# Patient Record
Sex: FEMALE | Race: WHITE | Employment: OTHER | URBAN - METROPOLITAN AREA
[De-identification: names, ages, dates, MRNs, and addresses within clinical notes are randomized per-mention and may not be internally consistent; named-entity substitution may affect disease eponyms.]

---

## 2017-03-28 ENCOUNTER — APPOINTMENT (OUTPATIENT)
Dept: GENERAL RADIOLOGY | Age: 62
End: 2017-03-28
Payer: MEDICARE

## 2017-03-28 ENCOUNTER — HOSPITAL ENCOUNTER (EMERGENCY)
Age: 62
Discharge: HOME | End: 2017-03-28
Attending: EMERGENCY MEDICINE
Payer: MEDICARE

## 2017-03-28 VITALS
BODY MASS INDEX: 38.95 KG/M2 | OXYGEN SATURATION: 99 % | HEART RATE: 74 BPM | HEIGHT: 69 IN | DIASTOLIC BLOOD PRESSURE: 89 MMHG | WEIGHT: 263 LBS | SYSTOLIC BLOOD PRESSURE: 153 MMHG | TEMPERATURE: 97.8 F | RESPIRATION RATE: 18 BRPM

## 2017-03-28 DIAGNOSIS — S91.311A FOOT LACERATION, RIGHT, INITIAL ENCOUNTER: Primary | ICD-10-CM

## 2017-03-28 PROCEDURE — 73630 X-RAY EXAM OF FOOT: CPT

## 2017-03-28 PROCEDURE — 99284 EMERGENCY DEPT VISIT MOD MDM: CPT | Performed by: PHYSICIAN ASSISTANT

## 2017-03-28 RX ORDER — LEVOTHYROXINE SODIUM 100 UG/1
TABLET ORAL
COMMUNITY

## 2017-03-28 RX ORDER — CLINDAMYCIN HYDROCHLORIDE 300 MG/1
300 CAPSULE ORAL 3 TIMES DAILY
Qty: 30 CAP | Refills: 0 | Status: SHIPPED | OUTPATIENT
Start: 2017-03-28 | End: 2017-04-07

## 2017-03-29 NOTE — ED PROVIDER NOTES
HPI Comments: 59-year-old  female presents with laceration to plantar surface of right foot. She states she sustained this 2 days ago when she stepped on a pole wine bottle that was on the ground causing it to shatter and cut her right foot. Patient states she has been cleansing the wound daily. She states her last tetanus diphtheria immunization was less than 5 years ago. Patient states that she has remained ambulatory and active since the injury. Patient is a 57 y.o. female presenting with skin laceration. The history is provided by the patient. Laceration    The incident occurred 2 days ago. The laceration is located on the right foot. The laceration is 3 cm in size. The injury mechanism is broken glass. Foreign body present: no. The pain is at a severity of 7/10. The pain is moderate. The pain has been fluctuating since onset. Pertinent negatives include no numbness and no tingling. The patient's last tetanus shot was less than 5 years ago. Past Medical History:   Diagnosis Date    Other ill-defined conditions(256.52)     Edema    Psychiatric disorder     Nervous breakdown 2007; bipolar, anxiety       Past Surgical History:   Procedure Laterality Date    ABDOMEN SURGERY PROC UNLISTED      Intestine \"twisted\" while pregnant with 2nd child    CARDIAC SURG PROCEDURE UNLIST      Negative heart cath 2005    CHEST SURGERY PROCEDURE UNLISTED      [facility] [place]; chest tubes 2006    HX CHOLECYSTECTOMY      HX GYN      Ovary removed; tubal ligation    VASCULAR SURGERY PROCEDURE UNLIST      Stripped veins         Family History:   Problem Relation Age of Onset    Breast Cancer Paternal Grandmother [de-identified]       Social History     Social History    Marital status:      Spouse name: N/A    Number of children: N/A    Years of education: N/A     Occupational History    Not on file.      Social History Main Topics    Smoking status: Current Some Day Smoker    Smokeless tobacco: Not on file    Alcohol use No    Drug use: No    Sexual activity: Not on file     Other Topics Concern    Not on file     Social History Narrative         ALLERGIES: Aspirin; Levaquin [levofloxacin]; Pcn [penicillins]; Geodon [ziprasidone hcl]; and Sting, bee    Review of Systems   Constitutional: Negative for chills and fever. Gastrointestinal: Negative for nausea and vomiting. Skin: Positive for wound. Neurological: Negative for tingling and numbness. Vitals:    03/28/17 1858   BP: 146/76   Pulse: 72   Resp: 16   Temp: 98.7 °F (37.1 °C)   SpO2: 96%   Weight: 119.3 kg (263 lb)   Height: 5' 9\" (1.753 m)            Physical Exam   Constitutional: She is oriented to person, place, and time. Vital signs are normal. She appears well-developed and well-nourished. She is active. Non-toxic appearance. She does not appear ill. No distress. Patient is ambulatory with slight limp. Cardiovascular:   Pulses:       Dorsalis pedis pulses are 2+ on the right side        Posterior tibial pulses are 2+ on the right side   Musculoskeletal:        Right foot: There is laceration. There is normal range of motion, no tenderness, no bony tenderness, no swelling and normal capillary refill. Feet:    Neurological: She is alert and oriented to person, place, and time. Skin: Skin is warm and dry. Psychiatric: She has a normal mood and affect. Her behavior is normal.   Nursing note and vitals reviewed. MDM  Number of Diagnoses or Management Options  Foot laceration, right, initial encounter: new and requires workup  Diagnosis management comments: Patient with foot wound that has been open for 2 days. Imaging studies of right foot do not reveal any foreign body. Right foot will be cleansed well followed by dry sterile dressing. Patient is advised to keep wound clean, dry and covered until healed. She'll be prescribed clindamycin for potential infection that may be developing.  Clindamycin prescribed due to allergy to quinolones as well as anaphylaxis listed as allergic reaction to penicillins. Amount and/or Complexity of Data Reviewed  Tests in the radiology section of CPT®: ordered and reviewed    Risk of Complications, Morbidity, and/or Mortality  Presenting problems: low  Diagnostic procedures: low  Management options: moderate    Patient Progress  Patient progress: improved    ED Course       Procedures      XR FOOT RT MIN 3 V   Final Result   IMPRESSION: Calcific plantar fasciitis. No evidence radiopaque foreign bodies. Soft tissue swelling. I discussed the results of all labs, procedures, radiographs, and treatments with the patient and available family. Treatment plan is agreed upon and the patient is ready for discharge. Questions about treatment in the ED and differential diagnosis of presenting condition were answered. Patient was given verbal discharge instructions including, but not limited to, importance of returning to the emergency department for any concern of worsening or continued symptoms. Instructions were given to follow up with a primary care provider or specialist within 1-2 days. Adverse effects of medications, if prescribed, were discussed and patient was advised to refrain from significant physical activity until followed up by primary care physician and to not drive or operate heavy machinery after taking any sedating substances.

## 2017-03-29 NOTE — DISCHARGE INSTRUCTIONS
May use Tylenol over the counter as directed for pain. It is important that you keep wound clean, dry and covered until healed. Keep right foot elevated as much as tolerated. If you should have any change in your symptoms, worsening symptoms, or concerns return to the ER       Cuts Left Open: Care Instructions  Your Care Instructions    A cut can happen anywhere on your body. Sometimes a cut can injure the tendons, blood vessels, or nerves. A cut may be left open instead of being closed with stitches, staples, or adhesive. A cut may be left open when it is likely to become infected, because closing it can make infection even more likely. You will probably have a bandage. The doctor may want the cut to stay open the whole time it heals. This happens with some cuts when too much time has gone by since the cut happened. Or the doctor may tell you to come back to have the cut closed in 4 to 5 days, when there is less chance of infection. If the cut stays open while healing, your scar may be larger than if the cut was closed. But you can get treatment later to make the scar smaller. The doctor has checked you carefully, but problems can develop later. If you notice any problems or new symptoms, get medical treatment right away. Follow-up care is a key part of your treatment and safety. Be sure to make and go to all appointments, and call your doctor if you are having problems. It's also a good idea to know your test results and keep a list of the medicines you take. How can you care for yourself at home? · Keep the cut dry for the first 24 to 48 hours. After this, you can shower if your doctor okays it. Pat the cut dry. · Don't soak the cut, such as in a bathtub. Your doctor will tell you when it's safe to get the cut wet. · If your doctor told you how to care for your cut, follow your doctor's instructions.  If you did not get instructions, follow this general advice:  ¨ After the first 24 to 48 hours, wash the cut with clean water 2 times a day. Don't use hydrogen peroxide or alcohol, which can slow healing. ¨ You may cover the cut with a thin layer of petroleum jelly, such as Vaseline, and a nonstick bandage. ¨ Apply more petroleum jelly and replace the bandage as needed. · Prop up the injured area on a pillow anytime you sit or lie down during the next 3 days. Try to keep it above the level of your heart. This will help reduce swelling. · Avoid any activity that could cause your cut to get worse. · Take pain medicines exactly as directed. ¨ If the doctor gave you a prescription medicine for pain, take it as prescribed. ¨ If you are not taking a prescription pain medicine, ask your doctor if you can take an over-the-counter medicine. When should you call for help? Call your doctor now or seek immediate medical care if:  · You have new pain, or your pain gets worse. · The cut starts to bleed, and blood soaks through the bandage. Oozing small amounts of blood is normal.  · The skin near the cut is cold or pale or changes color. · You have tingling, weakness, or numbness near the cut. · You have trouble moving the area near the cut. · You have symptoms of infection, such as:  ¨ Increased pain, swelling, warmth, or redness around the cut. ¨ Red streaks leading from the cut. ¨ Pus draining from the cut. ¨ A fever. Watch closely for changes in your health, and be sure to contact your doctor if:  · The cut is not closing (getting smaller). · You do not get better as expected. Where can you learn more? Go to http://bartolome-leanna.info/. Enter [id number] in the search box to learn more about \"Cuts Left Open: Care Instructions. \"  Current as of: May 27, 2016  Content Version: 11.2  © 7353-4038 [employer]. Care instructions adapted under license by [employer] (which disclaims liability or warranty for this information).  If you have questions about a medical condition or this instruction, always ask your healthcare professional. [place] [address] any warranty or liability for your use of this information.

## 2020-08-01 ENCOUNTER — APPOINTMENT (OUTPATIENT)
Dept: CT IMAGING | Age: 65
End: 2020-08-01
Attending: EMERGENCY MEDICINE
Payer: MEDICARE

## 2020-08-01 ENCOUNTER — APPOINTMENT (OUTPATIENT)
Dept: GENERAL RADIOLOGY | Age: 65
End: 2020-08-01
Attending: EMERGENCY MEDICINE
Payer: MEDICARE

## 2020-08-01 ENCOUNTER — HOSPITAL ENCOUNTER (EMERGENCY)
Age: 65
Discharge: HOME | End: 2020-08-02
Attending: EMERGENCY MEDICINE
Payer: MEDICARE

## 2020-08-01 VITALS
SYSTOLIC BLOOD PRESSURE: 141 MMHG | OXYGEN SATURATION: 93 % | RESPIRATION RATE: 16 BRPM | HEIGHT: 69 IN | HEART RATE: 92 BPM | BODY MASS INDEX: 41.47 KG/M2 | DIASTOLIC BLOOD PRESSURE: 64 MMHG | TEMPERATURE: 97.5 F | WEIGHT: 280 LBS

## 2020-08-01 DIAGNOSIS — H81.10 BENIGN PAROXYSMAL POSITIONAL VERTIGO, UNSPECIFIED LATERALITY: Primary | ICD-10-CM

## 2020-08-01 LAB
ANION GAP SERPL CALC-SCNC: 9 MMOL/L (ref 7–16)
BASOPHILS # BLD: 0.1 K/UL (ref 0–0.2)
BASOPHILS NFR BLD: 1 % (ref 0–2)
BUN SERPL-MCNC: 21 MG/DL (ref 8–23)
CALCIUM SERPL-MCNC: 9.3 MG/DL (ref 8.3–10.4)
CHLORIDE SERPL-SCNC: 107 MMOL/L (ref 98–107)
CO2 SERPL-SCNC: 25 MMOL/L (ref 21–32)
CREAT SERPL-MCNC: 1.48 MG/DL (ref 0.6–1)
DIFFERENTIAL METHOD BLD: ABNORMAL
EOSINOPHIL # BLD: 0.3 K/UL (ref 0–0.8)
EOSINOPHIL NFR BLD: 3 % (ref 0.5–7.8)
ERYTHROCYTE [DISTWIDTH] IN BLOOD BY AUTOMATED COUNT: 14.6 % (ref 11.9–14.6)
GLUCOSE SERPL-MCNC: 115 MG/DL (ref 65–100)
HCT VFR BLD AUTO: 41.6 % (ref 35.8–46.3)
HGB BLD-MCNC: 13.3 G/DL (ref 11.7–15.4)
IMM GRANULOCYTES # BLD AUTO: 0 K/UL (ref 0–0.5)
IMM GRANULOCYTES NFR BLD AUTO: 1 % (ref 0–5)
LACTATE SERPL-SCNC: 2.8 MMOL/L (ref 0.4–2)
LYMPHOCYTES # BLD: 2.1 K/UL (ref 0.5–4.6)
LYMPHOCYTES NFR BLD: 25 % (ref 13–44)
MAGNESIUM SERPL-MCNC: 2.2 MG/DL (ref 1.8–2.4)
MCH RBC QN AUTO: 28.3 PG (ref 26.1–32.9)
MCHC RBC AUTO-ENTMCNC: 32 G/DL (ref 31.4–35)
MCV RBC AUTO: 88.5 FL (ref 79.6–97.8)
MONOCYTES # BLD: 0.7 K/UL (ref 0.1–1.3)
MONOCYTES NFR BLD: 8 % (ref 4–12)
NEUTS SEG # BLD: 5.6 K/UL (ref 1.7–8.2)
NEUTS SEG NFR BLD: 64 % (ref 43–78)
NRBC # BLD: 0 K/UL (ref 0–0.2)
PLATELET # BLD AUTO: 307 K/UL (ref 150–450)
PMV BLD AUTO: 9.1 FL (ref 9.4–12.3)
POTASSIUM SERPL-SCNC: 4.1 MMOL/L (ref 3.5–5.1)
RBC # BLD AUTO: 4.7 M/UL (ref 4.05–5.2)
SODIUM SERPL-SCNC: 141 MMOL/L (ref 136–145)
WBC # BLD AUTO: 8.7 K/UL (ref 4.3–11.1)

## 2020-08-01 PROCEDURE — 74011250636 HC RX REV CODE- 250/636: Performed by: EMERGENCY MEDICINE

## 2020-08-01 PROCEDURE — 93005 ELECTROCARDIOGRAM TRACING: CPT | Performed by: EMERGENCY MEDICINE

## 2020-08-01 PROCEDURE — 87040 BLOOD CULTURE FOR BACTERIA: CPT

## 2020-08-01 PROCEDURE — 74011000258 HC RX REV CODE- 258: Performed by: EMERGENCY MEDICINE

## 2020-08-01 PROCEDURE — 80048 BASIC METABOLIC PNL TOTAL CA: CPT

## 2020-08-01 PROCEDURE — 83735 ASSAY OF MAGNESIUM: CPT

## 2020-08-01 PROCEDURE — 71260 CT THORAX DX C+: CPT

## 2020-08-01 PROCEDURE — 81003 URINALYSIS AUTO W/O SCOPE: CPT

## 2020-08-01 PROCEDURE — 99281 EMR DPT VST MAYX REQ PHY/QHP: CPT

## 2020-08-01 PROCEDURE — 74011636320 HC RX REV CODE- 636/320: Performed by: EMERGENCY MEDICINE

## 2020-08-01 PROCEDURE — 85025 COMPLETE CBC W/AUTO DIFF WBC: CPT

## 2020-08-01 PROCEDURE — 83605 ASSAY OF LACTIC ACID: CPT

## 2020-08-01 PROCEDURE — 87150 DNA/RNA AMPLIFIED PROBE: CPT

## 2020-08-01 PROCEDURE — 71045 X-RAY EXAM CHEST 1 VIEW: CPT

## 2020-08-01 PROCEDURE — 96374 THER/PROPH/DIAG INJ IV PUSH: CPT

## 2020-08-01 PROCEDURE — 99285 EMERGENCY DEPT VISIT HI MDM: CPT

## 2020-08-01 PROCEDURE — 87205 SMEAR GRAM STAIN: CPT

## 2020-08-01 RX ORDER — SODIUM CHLORIDE 0.9 % (FLUSH) 0.9 %
10 SYRINGE (ML) INJECTION
Status: COMPLETED | OUTPATIENT
Start: 2020-08-01 | End: 2020-08-01

## 2020-08-01 RX ORDER — MECLIZINE HYDROCHLORIDE 25 MG/1
25 TABLET ORAL
Qty: 20 TAB | Refills: 0 | Status: SHIPPED | OUTPATIENT
Start: 2020-08-01

## 2020-08-01 RX ORDER — ONDANSETRON 2 MG/ML
4 INJECTION INTRAMUSCULAR; INTRAVENOUS
Status: COMPLETED | OUTPATIENT
Start: 2020-08-01 | End: 2020-08-01

## 2020-08-01 RX ORDER — MECLIZINE HYDROCHLORIDE 25 MG/1
25 TABLET ORAL
Status: COMPLETED | OUTPATIENT
Start: 2020-08-01 | End: 2020-08-01

## 2020-08-01 RX ADMIN — MECLIZINE HYDROCHLORIDE 25 MG: 25 TABLET ORAL at 22:33

## 2020-08-01 RX ADMIN — IOPAMIDOL 100 ML: 755 INJECTION, SOLUTION INTRAVENOUS at 20:50

## 2020-08-01 RX ADMIN — ONDANSETRON 4 MG: 2 INJECTION INTRAMUSCULAR; INTRAVENOUS at 19:29

## 2020-08-01 RX ADMIN — Medication 10 ML: at 20:50

## 2020-08-01 RX ADMIN — SODIUM CHLORIDE 100 ML: 900 INJECTION, SOLUTION INTRAVENOUS at 20:50

## 2020-08-01 RX ADMIN — SODIUM CHLORIDE 500 ML: 900 INJECTION, SOLUTION INTRAVENOUS at 20:41

## 2020-08-01 NOTE — ED TRIAGE NOTES
Patient was brought by EMS after patient went to shop at [employer] and a electronic wheelchair was unavailable and started ambulating and became short of breath, employee was able to locate her a wheelchair and felt better until she had to walk out to car. While walking to car felt like she was going to pass out and per family was \"in and out\". EMS advised that initially BP was 564Z systolic, then decreased to 130 and manually checked at 88 systolic and EMS administered 10 mcg of epi IV push. Patient complaining of headache, noted to be clammy and pale at this time.

## 2020-08-01 NOTE — ED PROVIDER NOTES
[id number] [address] [place] is a 71 y.o. female seen on 8/1/2020 at 6:58 PM in the [address] [place], [place] [address]T in room ER04/04. Chief Complaint   Patient presents with    Hypotension     HPI: 41-year-old female presenting to the emergency department for shortness of breath and near syncope. Patient went to [employer] today where she was apparently supposed to use an electric shopping cart but there was not one available. As a result she was walking around the store and began feeling increasingly short of breath. She ultimately appeared to be so short of breath that another customer found her in electric shopping cart. As she continued to finish shopping she went out to her car and her family noted that she was \"going in and out\". As result they called EMS. EMS responded found the patient to be normotensive. During transport they also found her to be normotensive to slightly hypertensive. The medic then recheck the blood pressure and did a manual blood pressure finding a systolic blood pressure of 80 and then gave 10 mcg of push dose epinephrine. It was mentating at the time, there was no significant change in heart rate. States that she has been having shortness of breath for the last 6 months and that this primarily occurs when she is at [employer]. After seeing her doctor for this her doctor told her to use an electric shopping cart when she is at [employer]. When asked if she has had some unilateral leg swelling or edema she states that she has, it was in her right leg. She has had no fever, no cough congestion. No known sick contacts no known contacts with persons known to be positive with COVID-19    Historian: patient    REVIEW OF SYSTEMS     Review of Systems   Constitutional: Negative for fever. HENT: Negative. Eyes: Negative. Respiratory: Positive for shortness of breath. Negative for cough, chest tightness and wheezing.     Cardiovascular: Negative for chest pain. Gastrointestinal: Negative for abdominal distention, abdominal pain, constipation, diarrhea and vomiting. Endocrine: Negative. Genitourinary: Negative for dysuria, flank pain, frequency and urgency. Neurological: Positive for syncope (near syncope). Negative for dizziness and headaches. Psychiatric/Behavioral: Negative. All other systems reviewed and are negative. PAST MEDICAL HISTORY     Past Medical History:   Diagnosis Date    Other ill-defined conditions(799.89)     Edema    Psychiatric disorder     Nervous breakdown 2007; bipolar, anxiety     Past Surgical History:   Procedure Laterality Date    ABDOMEN SURGERY PROC UNLISTED      Intestine \"twisted\" while pregnant with 2nd child    CARDIAC SURG PROCEDURE UNLIST      Negative heart cath 2005    CHEST SURGERY PROCEDURE UNLISTED      [place]; chest tubes 2006    HX CHOLECYSTECTOMY      HX GYN      Ovary removed; tubal ligation    VASCULAR SURGERY PROCEDURE UNLIST      Stripped veins     Social History     Socioeconomic History    Marital status:      Spouse name: Not on file    Number of children: Not on file    Years of education: Not on file    Highest education level: Not on file   Tobacco Use    Smoking status: Current Some Day Smoker   Substance and Sexual Activity    Alcohol use: No    Drug use: No     Prior to Admission Medications   Prescriptions Last Dose Informant Patient Reported? Taking? FERROUS FUMARATE (IRON PO)   Yes No   Sig: Take 65 mg by mouth daily. MULTIVITAMINS W-MINERALS/LUT (CENTRUM SILVER PO)   Yes No   Sig: Take  by mouth. aripiprazole (ABILIFY) 15 mg tablet   Yes No   Sig: Take 15 mg by mouth daily. citalopram (CELEXA) 40 mg tablet   Yes No   Sig: Take 40 mg by mouth daily. lamotrigine (LAMICTAL) 150 mg tablet   Yes No   Sig: Take  by mouth daily. levothyroxine (SYNTHROID) 100 mcg tablet   Yes No   Sig: Take  by mouth Daily (before breakfast).    loratadine (CLARITIN) 10 mg tablet   Yes No   Sig: Take 10 mg by mouth daily. ranitidine (ZANTAC) 150 mg tablet   Yes No   Sig: Take 150 mg by mouth two (2) times a day. Facility-Administered Medications: None     Allergies   Allergen Reactions    Aspirin Swelling     Rash    Levaquin [Levofloxacin] Swelling     Also, itching    Pcn [Penicillins] Anaphylaxis    Geodon [Ziprasidone Hcl] Rash    Sting, Bee Hives        PHYSICAL EXAM       Vitals:    08/01/20 1849   BP: 129/61   Pulse: 99   Resp: 16   Temp: 97.5 °F (36.4 °C)   SpO2: 90%    Vital signs were reviewed. Physical Exam  Vitals signs reviewed. Constitutional:       General: She is not in acute distress. Appearance: She is well-developed. She is not diaphoretic. HENT:      Head: Normocephalic and atraumatic. Eyes:      Pupils: Pupils are equal, round, and reactive to light. Neck:      Musculoskeletal: Normal range of motion and neck supple. Cardiovascular:      Rate and Rhythm: Regular rhythm. Tachycardia present. Heart sounds: Normal heart sounds. No murmur. No friction rub. No gallop. Pulmonary:      Effort: Pulmonary effort is normal. No respiratory distress. Breath sounds: Normal breath sounds. No stridor. No wheezing. Abdominal:      General: Bowel sounds are normal. There is no distension. Palpations: Abdomen is soft. There is no mass. Tenderness: There is no abdominal tenderness. There is no guarding or rebound. Musculoskeletal: Normal range of motion. General: No tenderness or deformity. Skin:     General: Skin is warm and dry. Findings: No erythema or rash. Neurological:      Mental Status: She is alert and oriented to person, place, and time. Sensory: No sensory deficit.       Comments: No focal neuro deficits   Psychiatric:         Behavior: Behavior normal.          MEDICAL DECISION MAKING     MDM  Procedures    ED Course:    7:04 PM  Initial assessment, the patient does appear to be borderline hypoxic with a room air saturation of 90 to 91%. She has a history of DVT associated with surgery. Will scan for PE. Pt also has begun vomiting here in ED. She has had some moderate RUQ pain, hx of cheng, will obtain CT abd/pelv as well. .    7:42 PM  Called by lab regarding unusual metabolic panel findings. Will repeat BMP to confirm. EKG is normal, no peaked T waves, prolonged QRS, etc.    10:34 PM  CT of the chest abdomen and pelvis is unremarkable, with no obvious abnormality. On reevaluation of the patient, she seems much improved. She is saturating 95% on room air. She is now able to give a much more detailed history and she is actually describing intermittent episodes over the last 5 to 6 months of dizziness that is triggered by change in position of her head. Today when she was in [place] she had bent over to get some she was shopping for and had sudden room spinning sensation. This created nausea and made her felt she is going to pass out. She notes the dizziness seems to be triggering her vomiting as well. It is all caused by her change in position of her head. If she holds her head still her symptoms seem to improve. I did perform Tye-Hallpike which was positive here in the emergency department. She does not have any exam findings on HINTS suggestive of a central cause of vertigo. After talking to her further I think her symptoms sound most consistent with benign positional paroxysmal vertigo. Disposition:  Dc to home  Diagnosis:  bppv  ____________________________________________________________________  A portion of this note was generated using voice recognition dictation software. While the note has been reviewed for accuracy, please note certain words and phrases may not be transcribed as intended and some grammatical and/or typographical errors may be present.

## 2020-08-02 LAB
ATRIAL RATE: 100 BPM
CALCULATED P AXIS, ECG09: 53 DEGREES
CALCULATED R AXIS, ECG10: 55 DEGREES
CALCULATED T AXIS, ECG11: 76 DEGREES
DIAGNOSIS, 93000: NORMAL
P-R INTERVAL, ECG05: 172 MS
Q-T INTERVAL, ECG07: 346 MS
QRS DURATION, ECG06: 86 MS
QTC CALCULATION (BEZET), ECG08: 446 MS
VENTRICULAR RATE, ECG03: 100 BPM

## 2020-08-02 NOTE — DISCHARGE INSTRUCTIONS
As we discussed, I think your symptoms are likely due to benign positional vertigo. Take the medication as prescribed, follow-up with your primary care doctor early next week for reevaluation to ensure you are improving. Return to the ER for worsening symptoms.

## 2020-08-02 NOTE — ED NOTES
I have reviewed discharge instructions with the patient. The patient verbalized understanding. Patient left ED via Discharge Method: wheelchair to Home with daughter. Opportunity for questions and clarification provided. Patient given 1 scripts. To continue your aftercare when you leave the hospital, you may receive an automated call from our care team to check in on how you are doing. This is a free service and part of our promise to provide the best care and service to meet your aftercare needs.  If you have questions, or wish to unsubscribe from this service please call [phone]. Thank you for Choosing our [facility] Emergency Department.

## 2020-08-03 LAB
ACC. NO. FROM MICRO ORDER, ACCP: ABNORMAL
INTERPRETATION: ABNORMAL
MECA (METHICILLIN-RESISTANCE GENES), MRGP: NOT DETECTED
STAPHYLOCOCCUS, STAPP: DETECTED

## 2020-08-03 RX ORDER — CLINDAMYCIN HYDROCHLORIDE 300 MG/1
300 CAPSULE ORAL 4 TIMES DAILY
Qty: 28 CAP | Refills: 0 | Status: SHIPPED | OUTPATIENT
Start: 2020-08-03 | End: 2020-08-10

## 2020-08-03 NOTE — PROGRESS NOTES
cleocin sent to [facility], pt allergic to pcn, still with some dizziness, has appt with pmd, no fever,nv

## 2020-08-04 LAB
BACTERIA SPEC CULT: ABNORMAL
GRAM STN SPEC: ABNORMAL
GRAM STN SPEC: ABNORMAL
SERVICE CMNT-IMP: ABNORMAL

## 2020-08-06 LAB
BACTERIA SPEC CULT: NORMAL
BASOPHILS # BLD: ABNORMAL K/UL (ref 0–0.2)
DIFFERENTIAL METHOD BLD: ABNORMAL
EOSINOPHIL # BLD: ABNORMAL K/UL
ERYTHROCYTE [DISTWIDTH] IN BLOOD BY AUTOMATED COUNT: 14.6 % (ref 11.9–14.6)
HCT VFR BLD AUTO: 23.2 % (ref 35.8–46.3)
HGB BLD-MCNC: ABNORMAL G/DL (ref 11.7–15.4)
LYMPHOCYTES # BLD: ABNORMAL K/UL (ref 0.5–4.6)
MCH RBC QN AUTO: ABNORMAL PG (ref 26.1–32.9)
MCHC RBC AUTO-ENTMCNC: ABNORMAL G/DL (ref 31.4–35)
MCV RBC AUTO: ABNORMAL FL (ref 79.6–97.8)
MONOCYTES # BLD: ABNORMAL K/UL (ref 0.1–1.3)
NEUTS SEG # BLD: ABNORMAL K/UL (ref 1.7–8.2)
NRBC # BLD: ABNORMAL K/UL (ref 0–0.2)
PLATELET # BLD AUTO: ABNORMAL K/UL (ref 150–450)
PMV BLD AUTO: ABNORMAL FL (ref 9.4–12.3)
RBC # BLD AUTO: ABNORMAL M/UL (ref 4.05–5.2)
SERVICE CMNT-IMP: NORMAL
WBC # BLD AUTO: ABNORMAL K/UL (ref 4.3–11.1)

## 2021-04-07 ENCOUNTER — HOSPITAL ENCOUNTER (OUTPATIENT)
Dept: MAMMOGRAPHY | Age: 66
Discharge: HOME | End: 2021-04-07
Attending: INTERNAL MEDICINE
Payer: MEDICARE

## 2021-04-07 DIAGNOSIS — Z12.39 BREAST SCREENING: ICD-10-CM

## 2021-04-07 PROCEDURE — 77067 SCR MAMMO BI INCL CAD: CPT

## 2022-03-15 VITALS
BODY MASS INDEX: 41.47 KG/M2 | WEIGHT: 279.98 LBS | HEART RATE: 91 BPM | TEMPERATURE: 98.3 F | HEIGHT: 69 IN | OXYGEN SATURATION: 95 % | RESPIRATION RATE: 16 BRPM | SYSTOLIC BLOOD PRESSURE: 119 MMHG | DIASTOLIC BLOOD PRESSURE: 71 MMHG

## 2022-03-15 PROCEDURE — 75810000275 HC EMERGENCY DEPT VISIT NO LEVEL OF CARE

## 2022-03-16 ENCOUNTER — HOSPITAL ENCOUNTER (EMERGENCY)
Age: 67
Discharge: HOME | End: 2022-03-16
Payer: MEDICARE

## 2022-03-16 NOTE — ED TRIAGE NOTES
C/o right lower back pain for 2 days. Denies any recent injury or trauma. States that she is also having nausea.   Masked on arrival

## 2023-05-08 ENCOUNTER — HOSPITAL ENCOUNTER (OUTPATIENT)
Dept: MAMMOGRAPHY | Age: 68
Discharge: HOME | End: 2023-05-11
Payer: MEDICARE

## 2023-05-08 DIAGNOSIS — Z12.31 ENCOUNTER FOR SCREENING MAMMOGRAM FOR MALIGNANT NEOPLASM OF BREAST: ICD-10-CM

## 2023-05-08 PROCEDURE — 77067 SCR MAMMO BI INCL CAD: CPT

## 2023-12-23 ENCOUNTER — HOSPITAL ENCOUNTER (INPATIENT)
Age: 68
LOS: 6 days | Discharge: HOME | End: 2023-12-30
Attending: EMERGENCY MEDICINE | Admitting: EMERGENCY MEDICINE
Payer: MEDICARE

## 2023-12-23 ENCOUNTER — APPOINTMENT (OUTPATIENT)
Dept: CT IMAGING | Age: 68
End: 2023-12-23
Payer: MEDICARE

## 2023-12-23 ENCOUNTER — APPOINTMENT (OUTPATIENT)
Dept: GENERAL RADIOLOGY | Age: 68
End: 2023-12-23
Payer: MEDICARE

## 2023-12-23 DIAGNOSIS — E83.42 HYPOMAGNESEMIA: ICD-10-CM

## 2023-12-23 DIAGNOSIS — K28.9 MARGINAL ULCER: ICD-10-CM

## 2023-12-23 DIAGNOSIS — A41.9 SEPTICEMIA (HCC): Primary | ICD-10-CM

## 2023-12-23 DIAGNOSIS — E87.6 HYPOKALEMIA DUE TO EXCESSIVE GASTROINTESTINAL LOSS OF POTASSIUM: ICD-10-CM

## 2023-12-23 LAB
ALBUMIN SERPL-MCNC: 4.3 G/DL (ref 3.2–4.6)
ALBUMIN/GLOB SERPL: 1 (ref 0.4–1.6)
ALP SERPL-CCNC: 85 U/L (ref 50–136)
ALT SERPL-CCNC: 18 U/L (ref 12–65)
ANION GAP SERPL CALC-SCNC: 13 MMOL/L (ref 2–11)
AST SERPL-CCNC: 19 U/L (ref 15–37)
BASOPHILS # BLD: 0.1 K/UL (ref 0–0.2)
BASOPHILS NFR BLD: 1 % (ref 0–2)
BILIRUB SERPL-MCNC: 1.5 MG/DL (ref 0.2–1.1)
BUN SERPL-MCNC: 18 MG/DL (ref 8–23)
CALCIUM SERPL-MCNC: 10.8 MG/DL (ref 8.3–10.4)
CHLORIDE SERPL-SCNC: 100 MMOL/L (ref 103–113)
CO2 SERPL-SCNC: 25 MMOL/L (ref 21–32)
CREAT SERPL-MCNC: 1.6 MG/DL (ref 0.6–1)
DIFFERENTIAL METHOD BLD: ABNORMAL
EOSINOPHIL # BLD: 0.2 K/UL (ref 0–0.8)
EOSINOPHIL NFR BLD: 1 % (ref 0.5–7.8)
ERYTHROCYTE [DISTWIDTH] IN BLOOD BY AUTOMATED COUNT: 15.2 % (ref 11.9–14.6)
FLUAV RNA SPEC QL NAA+PROBE: NOT DETECTED
FLUBV RNA SPEC QL NAA+PROBE: NOT DETECTED
GLOBULIN SER CALC-MCNC: 4.1 G/DL (ref 2.8–4.5)
GLUCOSE SERPL-MCNC: 137 MG/DL (ref 65–100)
HCT VFR BLD AUTO: 46.5 % (ref 35.8–46.3)
HGB BLD-MCNC: 15.7 G/DL (ref 11.7–15.4)
IMM GRANULOCYTES # BLD AUTO: 0.1 K/UL (ref 0–0.5)
IMM GRANULOCYTES NFR BLD AUTO: 0 % (ref 0–5)
LACTATE SERPL-SCNC: 2.3 MMOL/L (ref 0.4–2)
LIPASE SERPL-CCNC: 58 U/L (ref 73–393)
LIPASE SERPL-CCNC: 75 U/L (ref 73–393)
LYMPHOCYTES # BLD: 1.8 K/UL (ref 0.5–4.6)
LYMPHOCYTES NFR BLD: 13 % (ref 13–44)
MAGNESIUM SERPL-MCNC: 1.5 MG/DL (ref 1.8–2.4)
MCH RBC QN AUTO: 29 PG (ref 26.1–32.9)
MCHC RBC AUTO-ENTMCNC: 33.8 G/DL (ref 31.4–35)
MCV RBC AUTO: 85.8 FL (ref 82–102)
MONOCYTES # BLD: 0.8 K/UL (ref 0.1–1.3)
MONOCYTES NFR BLD: 6 % (ref 4–12)
NEUTS SEG # BLD: 10.7 K/UL (ref 1.7–8.2)
NEUTS SEG NFR BLD: 79 % (ref 43–78)
NRBC # BLD: 0 K/UL (ref 0–0.2)
PLATELET # BLD AUTO: 409 K/UL (ref 150–450)
PMV BLD AUTO: 9.9 FL (ref 9.4–12.3)
POTASSIUM SERPL-SCNC: 2.8 MMOL/L (ref 3.5–5.1)
POTASSIUM SERPL-SCNC: 2.9 MMOL/L (ref 3.5–5.1)
PROCALCITONIN SERPL-MCNC: 0.22 NG/ML (ref 0–0.49)
PROT SERPL-MCNC: 8.4 G/DL (ref 6.3–8.2)
RBC # BLD AUTO: 5.42 M/UL (ref 4.05–5.2)
SARS-COV-2 RDRP RESP QL NAA+PROBE: NOT DETECTED
SODIUM SERPL-SCNC: 138 MMOL/L (ref 136–146)
SOURCE: NORMAL
WBC # BLD AUTO: 13.6 K/UL (ref 4.3–11.1)

## 2023-12-23 PROCEDURE — 83735 ASSAY OF MAGNESIUM: CPT

## 2023-12-23 PROCEDURE — 6360000002 HC RX W HCPCS: Performed by: EMERGENCY MEDICINE

## 2023-12-23 PROCEDURE — 94762 N-INVAS EAR/PLS OXIMTRY CONT: CPT

## 2023-12-23 PROCEDURE — 87205 SMEAR GRAM STAIN: CPT

## 2023-12-23 PROCEDURE — 74177 CT ABD & PELVIS W/CONTRAST: CPT

## 2023-12-23 PROCEDURE — 84132 ASSAY OF SERUM POTASSIUM: CPT

## 2023-12-23 PROCEDURE — 6360000004 HC RX CONTRAST MEDICATION: Performed by: PHYSICIAN ASSISTANT

## 2023-12-23 PROCEDURE — 36415 COLL VENOUS BLD VENIPUNCTURE: CPT

## 2023-12-23 PROCEDURE — 84145 PROCALCITONIN (PCT): CPT

## 2023-12-23 PROCEDURE — 6360000002 HC RX W HCPCS: Performed by: PHYSICIAN ASSISTANT

## 2023-12-23 PROCEDURE — 96365 THER/PROPH/DIAG IV INF INIT: CPT

## 2023-12-23 PROCEDURE — 2580000003 HC RX 258: Performed by: PHYSICIAN ASSISTANT

## 2023-12-23 PROCEDURE — 87040 BLOOD CULTURE FOR BACTERIA: CPT

## 2023-12-23 PROCEDURE — 87502 INFLUENZA DNA AMP PROBE: CPT

## 2023-12-23 PROCEDURE — 83690 ASSAY OF LIPASE: CPT

## 2023-12-23 PROCEDURE — 87154 CUL TYP ID BLD PTHGN 6+ TRGT: CPT

## 2023-12-23 PROCEDURE — 87635 SARS-COV-2 COVID-19 AMP PRB: CPT

## 2023-12-23 PROCEDURE — 99285 EMERGENCY DEPT VISIT HI MDM: CPT

## 2023-12-23 PROCEDURE — 71046 X-RAY EXAM CHEST 2 VIEWS: CPT

## 2023-12-23 PROCEDURE — 83605 ASSAY OF LACTIC ACID: CPT

## 2023-12-23 PROCEDURE — 96375 TX/PRO/DX INJ NEW DRUG ADDON: CPT

## 2023-12-23 PROCEDURE — 96366 THER/PROPH/DIAG IV INF ADDON: CPT

## 2023-12-23 PROCEDURE — 80053 COMPREHEN METABOLIC PANEL: CPT

## 2023-12-23 PROCEDURE — 85025 COMPLETE CBC W/AUTO DIFF WBC: CPT

## 2023-12-23 RX ORDER — SODIUM CHLORIDE 0.9 % (FLUSH) 0.9 %
5-40 SYRINGE (ML) INJECTION PRN
Status: DISCONTINUED | OUTPATIENT
Start: 2023-12-23 | End: 2023-12-24

## 2023-12-23 RX ORDER — CLINDAMYCIN PHOSPHATE 900 MG/50ML
900 INJECTION, SOLUTION INTRAVENOUS
Status: COMPLETED | OUTPATIENT
Start: 2023-12-23 | End: 2023-12-24

## 2023-12-23 RX ORDER — METRONIDAZOLE 500 MG/100ML
500 INJECTION, SOLUTION INTRAVENOUS
Status: COMPLETED | OUTPATIENT
Start: 2023-12-23 | End: 2023-12-23

## 2023-12-23 RX ORDER — ONDANSETRON 2 MG/ML
4 INJECTION INTRAMUSCULAR; INTRAVENOUS
Status: COMPLETED | OUTPATIENT
Start: 2023-12-23 | End: 2023-12-23

## 2023-12-23 RX ORDER — POTASSIUM CHLORIDE 7.45 MG/ML
10 INJECTION INTRAVENOUS ONCE
Status: COMPLETED | OUTPATIENT
Start: 2023-12-23 | End: 2023-12-24

## 2023-12-23 RX ORDER — SODIUM CHLORIDE 0.9 % (FLUSH) 0.9 %
5-40 SYRINGE (ML) INJECTION EVERY 12 HOURS SCHEDULED
Status: DISCONTINUED | OUTPATIENT
Start: 2023-12-23 | End: 2023-12-24

## 2023-12-23 RX ORDER — 0.9 % SODIUM CHLORIDE 0.9 %
30 INTRAVENOUS SOLUTION INTRAVENOUS ONCE
Status: COMPLETED | OUTPATIENT
Start: 2023-12-23 | End: 2023-12-24

## 2023-12-23 RX ORDER — SODIUM CHLORIDE 9 MG/ML
INJECTION, SOLUTION INTRAVENOUS PRN
Status: DISCONTINUED | OUTPATIENT
Start: 2023-12-23 | End: 2023-12-24

## 2023-12-23 RX ADMIN — IOPAMIDOL 100 ML: 755 INJECTION, SOLUTION INTRAVENOUS at 23:07

## 2023-12-23 RX ADMIN — ONDANSETRON 4 MG: 2 INJECTION INTRAMUSCULAR; INTRAVENOUS at 20:54

## 2023-12-23 RX ADMIN — METRONIDAZOLE 500 MG: 500 INJECTION, SOLUTION INTRAVENOUS at 22:22

## 2023-12-23 RX ADMIN — POTASSIUM CHLORIDE 10 MEQ: 7.46 INJECTION, SOLUTION INTRAVENOUS at 22:18

## 2023-12-23 RX ADMIN — SODIUM CHLORIDE 2000 ML: 9 INJECTION, SOLUTION INTRAVENOUS at 22:12

## 2023-12-23 ASSESSMENT — PAIN DESCRIPTION - LOCATION: LOCATION: ABDOMEN

## 2023-12-23 ASSESSMENT — PAIN DESCRIPTION - DESCRIPTORS: DESCRIPTORS: BURNING;ACHING

## 2023-12-23 ASSESSMENT — LIFESTYLE VARIABLES
HOW MANY STANDARD DRINKS CONTAINING ALCOHOL DO YOU HAVE ON A TYPICAL DAY: PATIENT DOES NOT DRINK
HOW OFTEN DO YOU HAVE A DRINK CONTAINING ALCOHOL: NEVER

## 2023-12-23 ASSESSMENT — PAIN SCALES - GENERAL: PAINLEVEL_OUTOF10: 10

## 2023-12-23 ASSESSMENT — PAIN - FUNCTIONAL ASSESSMENT: PAIN_FUNCTIONAL_ASSESSMENT: 0-10

## 2023-12-24 ENCOUNTER — APPOINTMENT (OUTPATIENT)
Dept: GENERAL RADIOLOGY | Age: 68
End: 2023-12-24
Payer: MEDICARE

## 2023-12-24 PROBLEM — R57.1 HYPOVOLEMIC SHOCK (HCC): Status: ACTIVE | Noted: 2023-12-24

## 2023-12-24 LAB
ACCESSION NUMBER, LLC1M: ABNORMAL
ACINETOBACTER CALCOAC BAUMANNII COMPLEX BY PCR: NOT DETECTED
ANION GAP SERPL CALC-SCNC: 15 MMOL/L (ref 2–11)
APPEARANCE UR: CLEAR
B FRAGILIS DNA BLD POS QL NAA+NON-PROBE: NOT DETECTED
BACTERIA URNS QL MICRO: ABNORMAL /HPF
BILIRUB UR QL: NEGATIVE
BIOFIRE TEST COMMENT: ABNORMAL
BUN SERPL-MCNC: 18 MG/DL (ref 8–23)
C ALBICANS DNA BLD POS QL NAA+NON-PROBE: NOT DETECTED
C AURIS DNA BLD POS QL NAA+NON-PROBE: NOT DETECTED
C GATTII+NEOFOR DNA BLD POS QL NAA+N-PRB: NOT DETECTED
C GLABRATA DNA BLD POS QL NAA+NON-PROBE: NOT DETECTED
C KRUSEI DNA BLD POS QL NAA+NON-PROBE: NOT DETECTED
C PARAP DNA BLD POS QL NAA+NON-PROBE: NOT DETECTED
C TROPICLS DNA BLD POS QL NAA+NON-PROBE: NOT DETECTED
CALCIUM SERPL-MCNC: 9.5 MG/DL (ref 8.3–10.4)
CASTS URNS QL MICRO: ABNORMAL /LPF
CHLORIDE SERPL-SCNC: 104 MMOL/L (ref 103–113)
CO2 SERPL-SCNC: 21 MMOL/L (ref 21–32)
COLOR UR: ABNORMAL
CREAT SERPL-MCNC: 1.2 MG/DL (ref 0.6–1)
E CLOAC COMP DNA BLD POS NAA+NON-PROBE: NOT DETECTED
E COLI DNA BLD POS QL NAA+NON-PROBE: NOT DETECTED
E FAECALIS DNA BLD POS QL NAA+NON-PROBE: NOT DETECTED
E FAECIUM DNA BLD POS QL NAA+NON-PROBE: NOT DETECTED
ENTEROBACTERALES DNA BLD POS NAA+N-PRB: NOT DETECTED
GLUCOSE BLD STRIP.AUTO-MCNC: 102 MG/DL (ref 65–100)
GLUCOSE BLD STRIP.AUTO-MCNC: 83 MG/DL (ref 65–100)
GLUCOSE BLD STRIP.AUTO-MCNC: 85 MG/DL (ref 65–100)
GLUCOSE BLD STRIP.AUTO-MCNC: 88 MG/DL (ref 65–100)
GLUCOSE SERPL-MCNC: 87 MG/DL (ref 65–100)
GLUCOSE UR STRIP.AUTO-MCNC: NEGATIVE MG/DL
GP B STREP DNA BLD POS QL NAA+NON-PROBE: NOT DETECTED
HAEM INFLU DNA BLD POS QL NAA+NON-PROBE: NOT DETECTED
HGB UR QL STRIP: NEGATIVE
K OXYTOCA DNA BLD POS QL NAA+NON-PROBE: NOT DETECTED
KETONES UR QL STRIP.AUTO: 15 MG/DL
KLEBSIELLA SP DNA BLD POS QL NAA+NON-PRB: NOT DETECTED
KLEBSIELLA SP DNA BLD POS QL NAA+NON-PRB: NOT DETECTED
L MONOCYTOG DNA BLD POS QL NAA+NON-PROBE: NOT DETECTED
LACTATE SERPL-SCNC: 1.8 MMOL/L (ref 0.4–2)
LEUKOCYTE ESTERASE UR QL STRIP.AUTO: ABNORMAL
MAGNESIUM SERPL-MCNC: 1.8 MG/DL (ref 1.8–2.4)
MECA+MECC ISLT/SPM QL: DETECTED
MUCOUS THREADS URNS QL MICRO: ABNORMAL /LPF
N MEN DNA BLD POS QL NAA+NON-PROBE: NOT DETECTED
NITRITE UR QL STRIP.AUTO: NEGATIVE
OTHER OBSERVATIONS: ABNORMAL
P AERUGINOSA DNA BLD POS NAA+NON-PROBE: NOT DETECTED
PH UR STRIP: 7.5 (ref 5–9)
POTASSIUM SERPL-SCNC: 2.4 MMOL/L (ref 3.5–5.1)
PROT UR STRIP-MCNC: ABNORMAL MG/DL
PROTEUS SP DNA BLD POS QL NAA+NON-PROBE: NOT DETECTED
RESISTANT GENE TARGETS: ABNORMAL
S AUREUS DNA BLD POS QL NAA+NON-PROBE: NOT DETECTED
S AUREUS+CONS DNA BLD POS NAA+NON-PROBE: DETECTED
S EPIDERMIDIS DNA BLD POS QL NAA+NON-PRB: DETECTED
S LUGDUNENSIS DNA BLD POS QL NAA+NON-PRB: NOT DETECTED
S MALTOPHILIA DNA BLD POS QL NAA+NON-PRB: NOT DETECTED
S MARCESCENS DNA BLD POS NAA+NON-PROBE: NOT DETECTED
S PNEUM DNA BLD POS QL NAA+NON-PROBE: NOT DETECTED
S PYO DNA BLD POS QL NAA+NON-PROBE: NOT DETECTED
SALMONELLA DNA BLD POS QL NAA+NON-PROBE: NOT DETECTED
SERVICE CMNT-IMP: ABNORMAL
SERVICE CMNT-IMP: NORMAL
SODIUM SERPL-SCNC: 140 MMOL/L (ref 136–146)
SP GR UR REFRACTOMETRY: >1.035 (ref 1–1.02)
STREPTOCOCCUS DNA BLD POS NAA+NON-PROBE: NOT DETECTED
UROBILINOGEN UR QL STRIP.AUTO: 1 EU/DL (ref 0.2–1)
WBC URNS QL MICRO: ABNORMAL /HPF

## 2023-12-24 PROCEDURE — 2580000003 HC RX 258: Performed by: STUDENT IN AN ORGANIZED HEALTH CARE EDUCATION/TRAINING PROGRAM

## 2023-12-24 PROCEDURE — 2580000003 HC RX 258: Performed by: PHYSICIAN ASSISTANT

## 2023-12-24 PROCEDURE — 96367 TX/PROPH/DG ADDL SEQ IV INF: CPT

## 2023-12-24 PROCEDURE — 76937 US GUIDE VASCULAR ACCESS: CPT

## 2023-12-24 PROCEDURE — 6360000002 HC RX W HCPCS: Performed by: STUDENT IN AN ORGANIZED HEALTH CARE EDUCATION/TRAINING PROGRAM

## 2023-12-24 PROCEDURE — 84425 ASSAY OF VITAMIN B-1: CPT

## 2023-12-24 PROCEDURE — 96366 THER/PROPH/DIAG IV INF ADDON: CPT

## 2023-12-24 PROCEDURE — 83605 ASSAY OF LACTIC ACID: CPT

## 2023-12-24 PROCEDURE — 6370000000 HC RX 637 (ALT 250 FOR IP): Performed by: STUDENT IN AN ORGANIZED HEALTH CARE EDUCATION/TRAINING PROGRAM

## 2023-12-24 PROCEDURE — 83735 ASSAY OF MAGNESIUM: CPT

## 2023-12-24 PROCEDURE — 6360000002 HC RX W HCPCS: Performed by: PHYSICIAN ASSISTANT

## 2023-12-24 PROCEDURE — 6360000002 HC RX W HCPCS: Performed by: NURSE PRACTITIONER

## 2023-12-24 PROCEDURE — 1100000000 HC RM PRIVATE

## 2023-12-24 PROCEDURE — 36415 COLL VENOUS BLD VENIPUNCTURE: CPT

## 2023-12-24 PROCEDURE — 80048 BASIC METABOLIC PNL TOTAL CA: CPT

## 2023-12-24 PROCEDURE — 2500000003 HC RX 250 WO HCPCS: Performed by: NURSE PRACTITIONER

## 2023-12-24 PROCEDURE — 81001 URINALYSIS AUTO W/SCOPE: CPT

## 2023-12-24 PROCEDURE — 2580000003 HC RX 258: Performed by: NURSE PRACTITIONER

## 2023-12-24 PROCEDURE — 82962 GLUCOSE BLOOD TEST: CPT

## 2023-12-24 PROCEDURE — 96375 TX/PRO/DX INJ NEW DRUG ADDON: CPT

## 2023-12-24 RX ORDER — MAGNESIUM SULFATE 1 G/100ML
1000 INJECTION INTRAVENOUS ONCE
Status: COMPLETED | OUTPATIENT
Start: 2023-12-24 | End: 2023-12-24

## 2023-12-24 RX ORDER — LEVOTHYROXINE SODIUM 0.1 MG/1
100 TABLET ORAL DAILY
Status: DISCONTINUED | OUTPATIENT
Start: 2023-12-24 | End: 2023-12-30 | Stop reason: HOSPADM

## 2023-12-24 RX ORDER — ONDANSETRON 4 MG/1
4 TABLET, ORALLY DISINTEGRATING ORAL EVERY 8 HOURS PRN
Status: DISCONTINUED | OUTPATIENT
Start: 2023-12-24 | End: 2023-12-30 | Stop reason: HOSPADM

## 2023-12-24 RX ORDER — TRAZODONE HYDROCHLORIDE 50 MG/1
50 TABLET ORAL NIGHTLY PRN
Status: DISCONTINUED | OUTPATIENT
Start: 2023-12-24 | End: 2023-12-30 | Stop reason: HOSPADM

## 2023-12-24 RX ORDER — HYDROMORPHONE HYDROCHLORIDE 1 MG/ML
0.25 INJECTION, SOLUTION INTRAMUSCULAR; INTRAVENOUS; SUBCUTANEOUS EVERY 4 HOURS PRN
Status: DISCONTINUED | OUTPATIENT
Start: 2023-12-24 | End: 2023-12-30 | Stop reason: HOSPADM

## 2023-12-24 RX ORDER — ONDANSETRON 2 MG/ML
4 INJECTION INTRAMUSCULAR; INTRAVENOUS
Status: COMPLETED | OUTPATIENT
Start: 2023-12-24 | End: 2023-12-24

## 2023-12-24 RX ORDER — DULOXETIN HYDROCHLORIDE 60 MG/1
60 CAPSULE, DELAYED RELEASE ORAL DAILY
COMMUNITY
Start: 2021-08-26

## 2023-12-24 RX ORDER — PROMETHAZINE HYDROCHLORIDE 25 MG/ML
12.5 INJECTION, SOLUTION INTRAMUSCULAR; INTRAVENOUS ONCE
Status: COMPLETED | OUTPATIENT
Start: 2023-12-24 | End: 2023-12-25

## 2023-12-24 RX ORDER — DEXTROSE MONOHYDRATE 100 MG/ML
INJECTION, SOLUTION INTRAVENOUS CONTINUOUS PRN
Status: DISCONTINUED | OUTPATIENT
Start: 2023-12-24 | End: 2023-12-30 | Stop reason: HOSPADM

## 2023-12-24 RX ORDER — SUCRALFATE 1 G/1
1 TABLET ORAL 2 TIMES DAILY
Status: ON HOLD | COMMUNITY
End: 2023-12-30 | Stop reason: HOSPADM

## 2023-12-24 RX ORDER — SODIUM CHLORIDE, SODIUM LACTATE, POTASSIUM CHLORIDE, CALCIUM CHLORIDE 600; 310; 30; 20 MG/100ML; MG/100ML; MG/100ML; MG/100ML
INJECTION, SOLUTION INTRAVENOUS CONTINUOUS
Status: ACTIVE | OUTPATIENT
Start: 2023-12-24 | End: 2023-12-24

## 2023-12-24 RX ORDER — ENOXAPARIN SODIUM 100 MG/ML
30 INJECTION SUBCUTANEOUS 2 TIMES DAILY
Status: DISCONTINUED | OUTPATIENT
Start: 2023-12-24 | End: 2023-12-24 | Stop reason: ALTCHOICE

## 2023-12-24 RX ORDER — ACETAMINOPHEN 325 MG/1
650 TABLET ORAL EVERY 6 HOURS PRN
Status: DISCONTINUED | OUTPATIENT
Start: 2023-12-24 | End: 2023-12-30 | Stop reason: HOSPADM

## 2023-12-24 RX ORDER — LANOLIN ALCOHOL/MO/W.PET/CERES
1.5 CREAM (GRAM) TOPICAL NIGHTLY PRN
Status: DISCONTINUED | OUTPATIENT
Start: 2023-12-24 | End: 2023-12-30 | Stop reason: HOSPADM

## 2023-12-24 RX ORDER — POLYETHYLENE GLYCOL 3350 17 G/17G
17 POWDER, FOR SOLUTION ORAL DAILY PRN
Status: DISCONTINUED | OUTPATIENT
Start: 2023-12-24 | End: 2023-12-30 | Stop reason: HOSPADM

## 2023-12-24 RX ORDER — ROSUVASTATIN CALCIUM 10 MG/1
5 TABLET, COATED ORAL NIGHTLY
Status: DISCONTINUED | OUTPATIENT
Start: 2023-12-24 | End: 2023-12-30 | Stop reason: HOSPADM

## 2023-12-24 RX ORDER — OXYCODONE HYDROCHLORIDE 5 MG/1
5 TABLET ORAL EVERY 6 HOURS PRN
Status: DISCONTINUED | OUTPATIENT
Start: 2023-12-24 | End: 2023-12-30 | Stop reason: HOSPADM

## 2023-12-24 RX ORDER — CARVEDILOL 6.25 MG/1
6.25 TABLET ORAL 2 TIMES DAILY
COMMUNITY
Start: 2021-09-27

## 2023-12-24 RX ORDER — HEPARIN SODIUM 5000 [USP'U]/ML
5000 INJECTION, SOLUTION INTRAVENOUS; SUBCUTANEOUS EVERY 8 HOURS SCHEDULED
Status: DISCONTINUED | OUTPATIENT
Start: 2023-12-24 | End: 2023-12-30 | Stop reason: HOSPADM

## 2023-12-24 RX ORDER — DULOXETIN HYDROCHLORIDE 30 MG/1
60 CAPSULE, DELAYED RELEASE ORAL DAILY
Status: DISCONTINUED | OUTPATIENT
Start: 2023-12-24 | End: 2023-12-30 | Stop reason: HOSPADM

## 2023-12-24 RX ORDER — MAGNESIUM SULFATE IN WATER 40 MG/ML
2000 INJECTION, SOLUTION INTRAVENOUS PRN
Status: DISCONTINUED | OUTPATIENT
Start: 2023-12-24 | End: 2023-12-30 | Stop reason: HOSPADM

## 2023-12-24 RX ORDER — INSULIN LISPRO 100 [IU]/ML
0-4 INJECTION, SOLUTION INTRAVENOUS; SUBCUTANEOUS
Status: DISCONTINUED | OUTPATIENT
Start: 2023-12-24 | End: 2023-12-30 | Stop reason: HOSPADM

## 2023-12-24 RX ORDER — POTASSIUM CHLORIDE 7.45 MG/ML
10 INJECTION INTRAVENOUS PRN
Status: DISCONTINUED | OUTPATIENT
Start: 2023-12-24 | End: 2023-12-30 | Stop reason: HOSPADM

## 2023-12-24 RX ORDER — INSULIN LISPRO 100 [IU]/ML
0-4 INJECTION, SOLUTION INTRAVENOUS; SUBCUTANEOUS NIGHTLY
Status: DISCONTINUED | OUTPATIENT
Start: 2023-12-24 | End: 2023-12-30 | Stop reason: HOSPADM

## 2023-12-24 RX ORDER — IBUPROFEN 600 MG/1
1 TABLET ORAL PRN
Status: DISCONTINUED | OUTPATIENT
Start: 2023-12-24 | End: 2023-12-30 | Stop reason: HOSPADM

## 2023-12-24 RX ORDER — POTASSIUM CHLORIDE 20 MEQ/1
40 TABLET, EXTENDED RELEASE ORAL PRN
Status: DISCONTINUED | OUTPATIENT
Start: 2023-12-24 | End: 2023-12-30 | Stop reason: HOSPADM

## 2023-12-24 RX ORDER — SENNOSIDES A AND B 8.6 MG/1
2 TABLET, FILM COATED ORAL NIGHTLY PRN
Status: DISCONTINUED | OUTPATIENT
Start: 2023-12-24 | End: 2023-12-30 | Stop reason: HOSPADM

## 2023-12-24 RX ORDER — LORAZEPAM 0.5 MG/1
0.5 TABLET ORAL EVERY 6 HOURS PRN
Status: DISCONTINUED | OUTPATIENT
Start: 2023-12-24 | End: 2023-12-30 | Stop reason: HOSPADM

## 2023-12-24 RX ORDER — MAGNESIUM HYDROXIDE/ALUMINUM HYDROXICE/SIMETHICONE 120; 1200; 1200 MG/30ML; MG/30ML; MG/30ML
30 SUSPENSION ORAL EVERY 6 HOURS PRN
Status: DISCONTINUED | OUTPATIENT
Start: 2023-12-24 | End: 2023-12-30 | Stop reason: HOSPADM

## 2023-12-24 RX ORDER — ONDANSETRON 2 MG/ML
4 INJECTION INTRAMUSCULAR; INTRAVENOUS EVERY 6 HOURS PRN
Status: DISCONTINUED | OUTPATIENT
Start: 2023-12-24 | End: 2023-12-30 | Stop reason: HOSPADM

## 2023-12-24 RX ADMIN — ONDANSETRON 4 MG: 4 TABLET, ORALLY DISINTEGRATING ORAL at 12:06

## 2023-12-24 RX ADMIN — HEPARIN SODIUM 5000 UNITS: 5000 INJECTION INTRAVENOUS; SUBCUTANEOUS at 05:11

## 2023-12-24 RX ADMIN — ASCORBIC ACID, VITAMIN A PALMITATE, CHOLECALCIFEROL, THIAMINE HYDROCHLORIDE, RIBOFLAVIN-5 PHOSPHATE SODIUM, PYRIDOXINE HYDROCHLORIDE, NIACINAMIDE, DEXPANTHENOL, ALPHA-TOCOPHEROL ACETATE, VITAMIN K1, FOLIC ACID, BIOTIN, CYANOCOBALAMIN: 200; 3300; 200; 6; 3.6; 6; 40; 15; 10; 150; 600; 60; 5 INJECTION, SOLUTION INTRAVENOUS at 11:50

## 2023-12-24 RX ADMIN — OXYCODONE HYDROCHLORIDE 5 MG: 5 TABLET ORAL at 16:51

## 2023-12-24 RX ADMIN — POTASSIUM CHLORIDE 10 MEQ: 7.46 INJECTION, SOLUTION INTRAVENOUS at 13:58

## 2023-12-24 RX ADMIN — ONDANSETRON 4 MG: 2 INJECTION INTRAMUSCULAR; INTRAVENOUS at 02:37

## 2023-12-24 RX ADMIN — POTASSIUM CHLORIDE 10 MEQ: 7.46 INJECTION, SOLUTION INTRAVENOUS at 11:35

## 2023-12-24 RX ADMIN — AZTREONAM 1000 MG: 1 INJECTION, POWDER, LYOPHILIZED, FOR SOLUTION INTRAMUSCULAR; INTRAVENOUS at 01:31

## 2023-12-24 RX ADMIN — ONDANSETRON 4 MG: 4 TABLET, ORALLY DISINTEGRATING ORAL at 20:52

## 2023-12-24 RX ADMIN — POTASSIUM CHLORIDE 10 MEQ: 7.46 INJECTION, SOLUTION INTRAVENOUS at 10:42

## 2023-12-24 RX ADMIN — ROSUVASTATIN CALCIUM 5 MG: 10 TABLET, FILM COATED ORAL at 20:52

## 2023-12-24 RX ADMIN — POTASSIUM CHLORIDE 10 MEQ: 7.46 INJECTION, SOLUTION INTRAVENOUS at 12:39

## 2023-12-24 RX ADMIN — POTASSIUM CHLORIDE 10 MEQ: 7.46 INJECTION, SOLUTION INTRAVENOUS at 00:23

## 2023-12-24 RX ADMIN — POTASSIUM CHLORIDE 10 MEQ: 7.46 INJECTION, SOLUTION INTRAVENOUS at 06:47

## 2023-12-24 RX ADMIN — ONDANSETRON 4 MG: 2 INJECTION INTRAMUSCULAR; INTRAVENOUS at 09:02

## 2023-12-24 RX ADMIN — LEVOTHYROXINE SODIUM 100 MCG: 0.1 TABLET ORAL at 05:10

## 2023-12-24 RX ADMIN — SODIUM CHLORIDE, POTASSIUM CHLORIDE, SODIUM LACTATE AND CALCIUM CHLORIDE: 600; 310; 30; 20 INJECTION, SOLUTION INTRAVENOUS at 03:42

## 2023-12-24 RX ADMIN — CLINDAMYCIN PHOSPHATE 900 MG: 900 INJECTION, SOLUTION INTRAVENOUS at 00:21

## 2023-12-24 RX ADMIN — POTASSIUM CHLORIDE 10 MEQ: 7.46 INJECTION, SOLUTION INTRAVENOUS at 09:05

## 2023-12-24 RX ADMIN — HEPARIN SODIUM 5000 UNITS: 5000 INJECTION INTRAVENOUS; SUBCUTANEOUS at 13:37

## 2023-12-24 RX ADMIN — MAGNESIUM SULFATE HEPTAHYDRATE 1000 MG: 1 INJECTION, SOLUTION INTRAVENOUS at 04:18

## 2023-12-24 RX ADMIN — HEPARIN SODIUM 5000 UNITS: 5000 INJECTION INTRAVENOUS; SUBCUTANEOUS at 20:54

## 2023-12-24 RX ADMIN — DULOXETINE HYDROCHLORIDE 60 MG: 30 CAPSULE, DELAYED RELEASE ORAL at 09:02

## 2023-12-24 ASSESSMENT — PAIN DESCRIPTION - LOCATION: LOCATION: ABDOMEN

## 2023-12-24 ASSESSMENT — PAIN SCALES - GENERAL
PAINLEVEL_OUTOF10: 6
PAINLEVEL_OUTOF10: 0

## 2023-12-24 ASSESSMENT — PAIN DESCRIPTION - DESCRIPTORS: DESCRIPTORS: ACHING

## 2023-12-24 NOTE — PROGRESS NOTES
Please see admitting H&P for details of presentation. In brief, 69-year-old female with severe obesity status post Jesica-en-Y 9/13/2023 at [facility], JEREMI not on CPAP due to poor tolerance presents with nausea vomiting and hypotension. Initial BP 84/66, K of 2.9 and then repeat of 2.8 but these are both hemolyzed, creatinine 1.6 (baseline closer to 1), magnesium 1.5, lactic acid 2.3 and a WBC of 13.6, hemoglobin of 15.7. She was admitted to the hospitalist service on Dec/24 with persistent N/V, hypovolemic shock. General surgery was consulted and has ordered a Gastrografin swallow to assess the patient's anatomy. General surgery consulted gastroenterology who will plan EGD on December 26. The patient was seen today and she is tearful and upset about her recent quality of life. She says she cannot even tolerate water without throwing up. We are continuing IVF and replacing her electrolytes. Her renal function appears better this morning. We will trend labs in the morning and continue to follow blood cultures which are thus far negative.

## 2023-12-24 NOTE — H&P (VIEW-ONLY)
HPI:    67-year-old female who underwent a robotic gastric bypass procedure on 09/13/2023 at [facility] by Dr. Felix Marr.  The patient reports that since the procedure in September, she has had difficulty with oral intake of any kind.  She reports having nausea, vomiting and diarrhea.  She was admitted to [facility] in October for the same problem.  They did an upper GI that was negative for strictures or leaks, and her diet was advanced.  She was thought to have a UTI and was given antibiotics.  She presented again to the emergency department at [facility] on 11/17/2023, again was treated for UTI and discharged.  She now comes to [place] on 12/23 due to nausea, vomiting, diarrhea.  She is quite angry about the situation, feeling that she is extremely frustrated and that she feels very dehydrated and malnourished.  I asked if she had been back to see Dr. Marr, she said that she has had a problem getting an appointment time where her daughter was available to take her to the appointment as she does not drive.  She was admitted by the hospitalist service.  CT scan showed surgical changes of the gastric bypass, there was no evidence of obstruction and the oral contrast easily entered her small intestine.  She was thought to have a urinary tract infection, was started on aztreonam, clindamycin and Flagyl.  She was given a bolus of fluids for lactic acid of 2.3 and white count of 13.6.  Her lactate improved with fluids           ROS:    HPI      Physical Exam:    General:          Well nourished.  Patient is alert and oriented.   Head:               Normocephalic, atraumatic  Eyes:               Sclerae appear normal.  Pupils equally round.  ENT:                Nares appear normal.  Moist oral mucosa  Neck:               No restricted ROM.  Trachea midline   CV:                  RRR.  No m/r/g.  No jugular venous distension.  Lungs:             CTAB.  No wheezing, rhonchi, or rales.  Symmetric expansion.  Abdomen:        Soft,  non tender abdomen, no guarding, non distended. Extremities:     No cyanosis or clubbing. No edema  Skin:                No rashes and normal coloration. Warm and dry. Neuro:             CN II-XII grossly intact. Sensation intact. Psych:             Normal mood and affect. Labs, Imaging reviewed      Impression:    Nausea, vomiting and diarrhea likely due to recent gastric bypass. Need to r/o anastomotic ulcer. No signs of bleeding noted. Plan:    Liquid diet as tolerated. General surgery consult noted. Recommended gastrograffin study. PPI 40 BID. Avoid NSAIDs. Zofran prn If no contraindications. Will do EGD on 12/26. NPO past midnight 12/25. Stool workup to rule out infections. Replace potassium. GI will follow.

## 2023-12-24 NOTE — ED NOTES
Verbal report and transfer of pt care given to [facility], [address] [place] [address].       Becky Emerson RN  12/24/23 [id number]

## 2023-12-24 NOTE — PLAN OF CARE
HISTORY OF PRESENT ILLNESS: Any Pantoja is a 51 year old female who comes in today complaining of Fall (1/21- states that she was on the steps and she fell back and hit her head- states that she has been having nausea, lightheadness, and vomiting but over the last few days it has gotten better- ) and Other (pt is by self)  .  Patient presents here requesting evaluation for a head injury that she suffered several days ago.  In general feels improved.  Patient states that her work wanted her checked before she returns.  She did in fact missed 2 days of work last week because of it.  Currently only has a slight headache no blurry double vision no ongoing nausea vomiting no other focal neurologic complaints.  Denies any significant swelling of the scalp no other areas of injury.  I have reviewed the patient's medications and allergies, past medical, surgical, social and family history, updating these as appropriate.  See Histories section of the EMR for a display of this information...       REVIEW OF SYSTEMS:   Otherwise negative    PHYSICAL EXAMINATION:  Vitals:    01/25/21 1055   BP: 118/70   Pulse: 65   Resp: 16   Temp: 97.8 °F (36.6 °C)     Alert female no acute distress.  Slight tenderness over the posterior scalp is noted no soft tissue swelling is noted neck is supple with full range of motion no meningeal signs are noted no other facial trauma is noted.  Neurologically she is alert she is oriented x3 cranial 2 through 12 are intact has equal strength bilaterally gait is stable negative Romberg is noted no other focal findings are noted.        ASSESSMENT/PLAN:  1. Injury of head, initial encounter  Head injury instructions given continue symptomatic treatment may return to work at a local [employer]       Problem: Discharge Planning  Goal: Discharge to home or other facility with appropriate resources  Outcome: Progressing     Problem: Pain  Goal: Verbalizes/displays adequate comfort level or baseline comfort level  Outcome: Progressing     Problem: Safety - Adult  Goal: Free from fall injury  Outcome: Progressing

## 2023-12-24 NOTE — ED NOTES
TRANSFER - OUT REPORT:    Verbal report given to CORNELIUS Ortiz on [employer]  being transferred to [phone] for routine progression of patient care       Report consisted of patient's Situation, Background, Assessment and   Recommendations(SBAR). Information from the following report(s) ED Encounter Summary was reviewed with the receiving nurse. [place] Fall Assessment:    Presents to emergency department  because of falls (Syncope, seizure, or loss of consciousness): No  Age > 70: No  Altered Mental Status, Intoxication with alcohol or substance confusion (Disorientation, impaired judgment, poor safety awaremess, or inability to follow instructions): No  Impaired Mobility: Ambulates or transfers with assistive devices or assistance; Unable to ambulate or transer.: No  Nursing Judgement: No          Lines:   Peripheral IV 12/23/23 Right Antecubital (Active)   Site Assessment Clean, dry & intact 12/23/23 2026   Line Status Blood return noted 12/23/23 2026       Peripheral IV 12/23/23 Left Antecubital (Active)        Opportunity for questions and clarification was provided.       Patient transported with:  Registered Nurse

## 2023-12-24 NOTE — ED PROVIDER NOTES
ED ATTENDING SHARED SERVICES NOTE:     I have seen and evaluated the patient and performed an independent history and physical exam, and I agree with the assessment and plan as documented in the advanced provider note. I performed the history of present illness, physical exam, and medical decision making in its entirety. With the following updates: Blood pressure improved with fluids. Ordered repeat potassium as it was low and hemolyzed. Potassium supplementation ordered. Viral panel is pending. Will admit.     12/23/23 9:57 PM EST MD Enzo Lal MD  12/23/23 [id number]

## 2023-12-24 NOTE — PROGRESS NOTES
TRANSFER - IN REPORT:    Verbal report received from [address] [place]  being received from ED for routine progression of patient care      Report consisted of patient's Situation, Background, Assessment and   Recommendations(SBAR). Information from the following report(s) Adult Overview, MAR, and Recent Results was reviewed with the receiving nurse. Opportunity for questions and clarification was provided.

## 2023-12-24 NOTE — ED PROVIDER NOTES
conditions(799.89)     Edema    Psychiatric disorder     Nervous breakdown 2007; bipolar, anxiety        Past Surgical History:   Procedure Laterality Date    CHEST SURGERY      Emypema; chest tubes 2006    CHOLECYSTECTOMY      GYN      Ovary removed; tubal ligation    HYSTERECTOMY (CERVIX STATUS UNKNOWN)      GA UNLISTED PROCEDURE ABDOMEN PERITONEUM & OMENTUM      Intestine \"twisted\" while pregnant with 2nd child    GA UNLISTED PROCEDURE CARDIAC SURGERY      Negative heart cath 2005    VASCULAR SURGERY      Stripped veins        Social History     Socioeconomic History    Marital status:      Spouse name: None    Number of children: None    Years of education: None    Highest education level: None   Tobacco Use    Smoking status: Some Days   Substance and Sexual Activity    Alcohol use: No    Drug use: No        Previous Medications    ARIPIPRAZOLE (ABILIFY) 15 MG TABLET    Take 15 mg by mouth daily    CITALOPRAM (CELEXA) 40 MG TABLET    Take 40 mg by mouth daily    LAMOTRIGINE (LAMICTAL) 150 MG TABLET    Take by mouth daily    LEVOTHYROXINE (SYNTHROID) 100 MCG TABLET    Take by mouth every morning (before breakfast)    LORATADINE (CLARITIN) 10 MG TABLET    Take 10 mg by mouth daily    MECLIZINE (ANTIVERT) 25 MG TABLET    Take 25 mg by mouth 3 times daily as needed    RANITIDINE (ZANTAC) 150 MG TABLET    Take 150 mg by mouth 2 times daily        Results for orders placed or performed during the hospital encounter of 12/23/23   COVID-19, Rapid    Specimen: Nasopharyngeal   Result Value Ref Range    Source NASAL      SARS-CoV-2, Rapid Not detected NOTD     Influenza A/B, Molecular    Specimen: Nasopharyngeal   Result Value Ref Range    Influenza A, SONIA Not detected NOTD      Influenza B, SONIA Not detected NOTD     XR CHEST (2 VW)    Narrative    Chest X-ray    INDICATION: Sepsis.    Comparison August 1, 2020.    PA and lateral views of the chest were obtained.    FINDINGS: Groundglass opacification with air  Calcium 10.8 (H) 8.3 - 10.4 MG/DL    Total Bilirubin 1.5 (H) 0.2 - 1.1 MG/DL    ALT 18 12 - 65 U/L    AST 19 15 - 37 U/L    Alk Phosphatase 85 50 - 136 U/L    Total Protein 8.4 (H) 6.3 - 8.2 g/dL    Albumin 4.3 3.2 - 4.6 g/dL    Globulin 4.1 2.8 - 4.5 g/dL    Albumin/Globulin Ratio 1.0 0.4 - 1.6     Lipase   Result Value Ref Range    Lipase 75 73 - 393 U/L   Magnesium   Result Value Ref Range    Magnesium 1.5 (L) 1.8 - 2.4 mg/dL   Lactate, Sepsis   Result Value Ref Range    Lactic Acid, Sepsis 2.3 (H) 0.4 - 2.0 MMOL/L   Lipase   Result Value Ref Range    Lipase 58 (L) 73 - 393 U/L   Potassium   Result Value Ref Range    Potassium 2.8 (L) 3.5 - 5.1 mmol/L   Procalcitonin   Result Value Ref Range    Procalcitonin 0.22 0.00 - 0.49 ng/mL        CT ABDOMEN PELVIS W IV CONTRAST Additional Contrast? None   Final Result   Possible left urinary tract infection based on central collecting system   haziness. No hydronephrosis or ureteral calculus seen bilaterally. Please   correlate clinically. Chronic findings. XR CHEST (2 VW)   Final Result   Groundglass opacification with air bronchogram at the left lower   lobe/retrocardiac regions. This could be due to infiltrate versus crowding of   the pulmonary vessel. Recommend clinical correlations. No other significant changes compared to prior study with chronic elevations of   the right hemidiaphragm. No evidence of pneumothorax or pleural effusions. Voice dictation software was used during the making of this note. This software is not perfect and grammatical and other typographical errors may be present. This note has not been completely proofread for errors.      Pili Tanner, [place]  12/24/23 7614

## 2023-12-24 NOTE — ED NOTES
TRANSFER - OUT REPORT:    Verbal report given to CORNELIUS Ortiz on [employer]  being transferred to [phone] for routine progression of patient care       Report consisted of patient's Situation, Background, Assessment and   Recommendations(SBAR). Information from the following report(s) ED Encounter Summary was reviewed with the receiving nurse. [employer] Fall Assessment:    Presents to emergency department  because of falls (Syncope, seizure, or loss of consciousness): No  Age > 70: No  Altered Mental Status, Intoxication with alcohol or substance confusion (Disorientation, impaired judgment, poor safety awaremess, or inability to follow instructions): No  Impaired Mobility: Ambulates or transfers with assistive devices or assistance; Unable to ambulate or transer.: No  Nursing Judgement: No          Lines:   Peripheral IV 12/23/23 Right Antecubital (Active)   Site Assessment Clean, dry & intact 12/23/23 2026   Line Status Blood return noted 12/23/23 2026       Peripheral IV 12/23/23 Left Antecubital (Active)        Opportunity for questions and clarification was provided.       Patient transported with:  Registered Nurse           Jordan Guillen RN  12/24/23 [id number]

## 2023-12-24 NOTE — PROGRESS NOTES
Surgery  Consult dictated. 75year old female who underwent a robotic gastric bypass with Dr. Mardene Oppenheim on 9/13/23. The patient has been having problems, by her report, tolerating a diet since the time of surgery. She was admitted with nausea, vomiting and diarrhea last night. She was seen at [facility] in late October for the same problems with a work up being negative. A CT scan done last night showed:    CT ABDOMEN PELVIS W IV CONTRAST - 12/23/2023 11:12 PM     COMPARISON: 8/1/2020 CT abdomen pelvis with contrast.     INDICATION: abd pain, nausea, vomiting, diarrhea . Hysterectomy. Cholecystectomy. TECHNIQUE:  Multiple axial images were obtained through the abdomen and pelvis. 100mL of Isovue 370 intravenous contrast was used for better evaluation of solid  organs and vascular structures. Oral contrast was used for bowel  opacification. Radiation dose reduction techniques were used for this study. All CT scans performed at this facility use one or all of the following:  Automated exposure control, adjustment of the mA and/or kVp according to  patient's size, iterative reconstruction. FINDINGS:  Lung bases: Mild bilateral pulmonary scar. Chronic right diaphragmatic  elevation. Left upper lobe calcified granuloma. Liver:        Mild diffuse hepatic steatosis with focal fatty infiltration  gallbladder fossa, para falciform region. 4 cm simple cyst.      Spleen:     Negative     Pancreas: Negative     Adrenals:  Negative     Urinary system: Minimal haziness left central collecting system may represent  urinary tract infection. No hydronephrosis bilaterally. Bilateral  benign-appearing renal cysts, largest 6.9 cm. Aorta/Vasculature: Mild calcific atherosclerotic disease. GI Tract: Gastric bypass surgical change. No bowel obstruction seen. Appendix  not identified on this study. No secondary signs of acute appendicitis seen. Colonic diverticulosis.      Mesentery:  Negative     Retroperitoneum:

## 2023-12-24 NOTE — H&P
Hospitalist History and Physical   Admit Date:  2023  7:58 PM   Name:  Torie Mckoy   Age:  75 y.o. Sex:  female  :  1955   MRN:  [id number]   Room:  [facility]/    Presenting Complaint: Emesis, Nausea, Diarrhea, and Dehydration     Reason(s) for Admission: Hypovolemic shock ([address]) [R57.1]  Septicemia ([address]) [A41.9]     History of Present Illness:   41-year-old female with severe obesity status post Jesica-en-Y 2023 at [facility], JEREMI not on CPAP due to poor tolerance presents with nausea vomiting and hypotension. Since the procedure in September, she has had difficulty with this. She was admitted to [facility] in October for postoperative nausea vomiting (see care everywhere discharge summary 10/25/2023). During that hospital stay, she had a bump in her creatinine and was hydrated, she is status post an upper GI that was negative for strictures or leaks and her diet was advanced. Thought possibly to have a UTI and was given antibiotics. She then presented to the emergency room 2023 at [facility], again was treated for UTI and discharged. Presents to [facility] on  due to nausea vomiting and diarrhea. Has had a steady decline in her overall quality of life due to how severe the nausea vomiting is. She has tried to stick to the diet that was recommended to her with no improvement. (Was even having only soups at some point). She has had a loss of appetite, no longer taking protein shakes. Says she cannot get in trouble with her bariatric surgeon at [facility]. Initial BP 84/66, K of 2.9 and then repeat of 2.8 but these are both hemolyzed, creatinine 1.6 (baseline closer to 1), magnesium 1.5, lactic acid 2.3 and a WBC of 13.6, hemoglobin of 15.7. COVID swab was negative. CT abdomen pelvis showed surgical changes of the gastric bypass, \"possible left urinary tract infection \"without hydronephrosis.   Chest x-ray showed groundglass opacification with air bronchograms in the left lower lobe

## 2023-12-24 NOTE — CONSULTS
[facility]  CONSULTATION    Name:  Jackelyn Cramer  MR#:  [id number]  :  1955  ACCOUNT #:  [de-identified]  DATE OF SERVICE:  2023    REQUESTING PHYSICIAN:  Dr. Leny Raymundo of the Hospitalist service. CONSULTING PHYSICIAN:  Dr. Jose Esquivel of the General Surgery service. REASON FOR THE CONSULTATION:  Nausea, vomiting, diarrhea. HISTORY:  The patient is a 79-year-old female who underwent a robotic gastric bypass procedure on 2023 at [facility] by Dr. Lencho Benoit. The patient reports that since the procedure in September, she has had difficulty with oral intake of any kind. She reports having nausea, vomiting and diarrhea. She was admitted to [facility] in October for the same problem. They did an upper GI that was negative for strictures or leaks, and her diet was advanced. She was thought to have a UTI and was given antibiotics. She presented again to the emergency department at [facility] on 2023, again was treated for UTI and discharged. She now comes to [address] [place] [address] on  due to nausea, vomiting, diarrhea. She is quite angry about the situation, feeling that she is extremely frustrated and that she feels very dehydrated and malnourished. I asked if she had been back to see Dr. Kathleen Encarnacion, she said that she has had a problem getting an appointment time where her daughter was available to take her to the appointment as she does not drive. She was admitted by the hospitalist service. CT scan showed surgical changes of the gastric bypass, there was no evidence of obstruction and the oral contrast easily entered her small intestine. She was thought to have a urinary tract infection, was started on aztreonam, clindamycin and Flagyl. She was given a bolus of fluids for lactic acid of 2.3 and white count of 13.6. Her lactate improved with fluids. She was admitted by the hospitalist service. General Surgery was asked to evaluate her for her recent gastric bypass procedure.     REVIEW OF

## 2023-12-24 NOTE — ED TRIAGE NOTES
Patient presents to ER with c/o of N/V/D for about 3/4b weeks states she has been to a couple of of different hospitals and no one can figure out what is going on with her, states she has gastric bypass in September and this has been going on since then and has not been able to get back in to see the bariatric surgeon.

## 2023-12-24 NOTE — CONSULTS
HPI:    67-year-old female who underwent a robotic gastric bypass procedure on 09/13/2023 at [facility] by Dr. Felix Marr.  The patient reports that since the procedure in September, she has had difficulty with oral intake of any kind.  She reports having nausea, vomiting and diarrhea.  She was admitted to [facility] in October for the same problem.  They did an upper GI that was negative for strictures or leaks, and her diet was advanced.  She was thought to have a UTI and was given antibiotics.  She presented again to the emergency department at [facility] on 11/17/2023, again was treated for UTI and discharged.  She now comes to [place] on 12/23 due to nausea, vomiting, diarrhea.  She is quite angry about the situation, feeling that she is extremely frustrated and that she feels very dehydrated and malnourished.  I asked if she had been back to see Dr. Marr, she said that she has had a problem getting an appointment time where her daughter was available to take her to the appointment as she does not drive.  She was admitted by the hospitalist service.  CT scan showed surgical changes of the gastric bypass, there was no evidence of obstruction and the oral contrast easily entered her small intestine.  She was thought to have a urinary tract infection, was started on aztreonam, clindamycin and Flagyl.  She was given a bolus of fluids for lactic acid of 2.3 and white count of 13.6.  Her lactate improved with fluids           ROS:    HPI      Physical Exam:    General:          Well nourished.  Patient is alert and oriented.   Head:               Normocephalic, atraumatic  Eyes:               Sclerae appear normal.  Pupils equally round.  ENT:                Nares appear normal.  Moist oral mucosa  Neck:               No restricted ROM.  Trachea midline   CV:                  RRR.  No m/r/g.  No jugular venous distension.  Lungs:             CTAB.  No wheezing, rhonchi, or rales.  Symmetric expansion.  Abdomen:        Soft,  non tender abdomen, no guarding, non distended. Extremities:     No cyanosis or clubbing. No edema  Skin:                No rashes and normal coloration. Warm and dry. Neuro:             CN II-XII grossly intact. Sensation intact. Psych:             Normal mood and affect. Labs, Imaging reviewed      Impression:    Nausea, vomiting and diarrhea likely due to recent gastric bypass. Need to r/o anastomotic ulcer. No signs of bleeding noted. Plan:    Liquid diet as tolerated. General surgery consult noted. Recommended gastrograffin study. PPI 40 BID. Avoid NSAIDs. Zofran prn If no contraindications. Will do EGD on 12/26. NPO past midnight 12/25. Stool workup to rule out infections. Replace potassium. GI will follow.

## 2023-12-25 LAB
ANION GAP SERPL CALC-SCNC: 8 MMOL/L (ref 2–11)
BUN SERPL-MCNC: 13 MG/DL (ref 8–23)
CALCIUM SERPL-MCNC: 8.7 MG/DL (ref 8.3–10.4)
CHLORIDE SERPL-SCNC: 108 MMOL/L (ref 103–113)
CO2 SERPL-SCNC: 25 MMOL/L (ref 21–32)
CREAT SERPL-MCNC: 0.8 MG/DL (ref 0.6–1)
ERYTHROCYTE [DISTWIDTH] IN BLOOD BY AUTOMATED COUNT: 15.6 % (ref 11.9–14.6)
GLUCOSE BLD STRIP.AUTO-MCNC: 133 MG/DL (ref 65–100)
GLUCOSE BLD STRIP.AUTO-MCNC: 88 MG/DL (ref 65–100)
GLUCOSE BLD STRIP.AUTO-MCNC: 89 MG/DL (ref 65–100)
GLUCOSE BLD STRIP.AUTO-MCNC: 90 MG/DL (ref 65–100)
GLUCOSE SERPL-MCNC: 79 MG/DL (ref 65–100)
HCT VFR BLD AUTO: 35.6 % (ref 35.8–46.3)
HGB BLD-MCNC: 11.1 G/DL (ref 11.7–15.4)
MCH RBC QN AUTO: 28.2 PG (ref 26.1–32.9)
MCHC RBC AUTO-ENTMCNC: 31.2 G/DL (ref 31.4–35)
MCV RBC AUTO: 90.6 FL (ref 82–102)
NRBC # BLD: 0 K/UL (ref 0–0.2)
PLATELET # BLD AUTO: 191 K/UL (ref 150–450)
PMV BLD AUTO: 10.4 FL (ref 9.4–12.3)
POTASSIUM SERPL-SCNC: 2.6 MMOL/L (ref 3.5–5.1)
RBC # BLD AUTO: 3.93 M/UL (ref 4.05–5.2)
SERVICE CMNT-IMP: ABNORMAL
SERVICE CMNT-IMP: NORMAL
SODIUM SERPL-SCNC: 141 MMOL/L (ref 136–146)
WBC # BLD AUTO: 7.1 K/UL (ref 4.3–11.1)

## 2023-12-25 PROCEDURE — 6370000000 HC RX 637 (ALT 250 FOR IP): Performed by: STUDENT IN AN ORGANIZED HEALTH CARE EDUCATION/TRAINING PROGRAM

## 2023-12-25 PROCEDURE — 1100000000 HC RM PRIVATE

## 2023-12-25 PROCEDURE — 80048 BASIC METABOLIC PNL TOTAL CA: CPT

## 2023-12-25 PROCEDURE — 6370000000 HC RX 637 (ALT 250 FOR IP): Performed by: SURGERY

## 2023-12-25 PROCEDURE — 6360000002 HC RX W HCPCS: Performed by: NURSE PRACTITIONER

## 2023-12-25 PROCEDURE — 2580000003 HC RX 258: Performed by: SURGERY

## 2023-12-25 PROCEDURE — 36415 COLL VENOUS BLD VENIPUNCTURE: CPT

## 2023-12-25 PROCEDURE — 2500000003 HC RX 250 WO HCPCS: Performed by: SURGERY

## 2023-12-25 PROCEDURE — 6360000002 HC RX W HCPCS: Performed by: SURGERY

## 2023-12-25 PROCEDURE — 82962 GLUCOSE BLOOD TEST: CPT

## 2023-12-25 PROCEDURE — 6360000002 HC RX W HCPCS: Performed by: STUDENT IN AN ORGANIZED HEALTH CARE EDUCATION/TRAINING PROGRAM

## 2023-12-25 PROCEDURE — 6360000002 HC RX W HCPCS: Performed by: HOSPITALIST

## 2023-12-25 PROCEDURE — C9113 INJ PANTOPRAZOLE SODIUM, VIA: HCPCS | Performed by: SURGERY

## 2023-12-25 PROCEDURE — 2580000003 HC RX 258: Performed by: INTERNAL MEDICINE

## 2023-12-25 PROCEDURE — A4216 STERILE WATER/SALINE, 10 ML: HCPCS | Performed by: SURGERY

## 2023-12-25 PROCEDURE — 85027 COMPLETE CBC AUTOMATED: CPT

## 2023-12-25 RX ORDER — SODIUM CHLORIDE 9 MG/ML
INJECTION, SOLUTION INTRAVENOUS CONTINUOUS
Status: DISCONTINUED | OUTPATIENT
Start: 2023-12-25 | End: 2023-12-26

## 2023-12-25 RX ORDER — ONDANSETRON 2 MG/ML
4 INJECTION INTRAMUSCULAR; INTRAVENOUS EVERY 6 HOURS PRN
Status: DISCONTINUED | OUTPATIENT
Start: 2023-12-25 | End: 2023-12-25 | Stop reason: SDUPTHER

## 2023-12-25 RX ORDER — PROCHLORPERAZINE EDISYLATE 5 MG/ML
10 INJECTION INTRAMUSCULAR; INTRAVENOUS EVERY 6 HOURS PRN
Status: DISCONTINUED | OUTPATIENT
Start: 2023-12-25 | End: 2023-12-30 | Stop reason: HOSPADM

## 2023-12-25 RX ORDER — SCOLOPAMINE TRANSDERMAL SYSTEM 1 MG/1
1 PATCH, EXTENDED RELEASE TRANSDERMAL
Status: DISCONTINUED | OUTPATIENT
Start: 2023-12-25 | End: 2023-12-30 | Stop reason: HOSPADM

## 2023-12-25 RX ADMIN — PROCHLORPERAZINE EDISYLATE 10 MG: 5 INJECTION INTRAMUSCULAR; INTRAVENOUS at 23:53

## 2023-12-25 RX ADMIN — HEPARIN SODIUM 5000 UNITS: 5000 INJECTION INTRAVENOUS; SUBCUTANEOUS at 21:38

## 2023-12-25 RX ADMIN — SODIUM CHLORIDE: 9 INJECTION, SOLUTION INTRAVENOUS at 14:11

## 2023-12-25 RX ADMIN — HEPARIN SODIUM 5000 UNITS: 5000 INJECTION INTRAVENOUS; SUBCUTANEOUS at 05:16

## 2023-12-25 RX ADMIN — PROMETHAZINE HYDROCHLORIDE 12.5 MG: 25 INJECTION INTRAMUSCULAR; INTRAVENOUS at 00:04

## 2023-12-25 RX ADMIN — PANTOPRAZOLE SODIUM 40 MG: 40 INJECTION, POWDER, FOR SOLUTION INTRAVENOUS at 11:09

## 2023-12-25 RX ADMIN — ASCORBIC ACID, VITAMIN A PALMITATE, CHOLECALCIFEROL, THIAMINE HYDROCHLORIDE, RIBOFLAVIN-5 PHOSPHATE SODIUM, PYRIDOXINE HYDROCHLORIDE, NIACINAMIDE, DEXPANTHENOL, ALPHA-TOCOPHEROL ACETATE, VITAMIN K1, FOLIC ACID, BIOTIN, CYANOCOBALAMIN: 200; 3300; 200; 6; 3.6; 6; 40; 15; 10; 150; 600; 60; 5 INJECTION, SOLUTION INTRAVENOUS at 12:58

## 2023-12-25 RX ADMIN — HEPARIN SODIUM 5000 UNITS: 5000 INJECTION INTRAVENOUS; SUBCUTANEOUS at 14:06

## 2023-12-25 RX ADMIN — POTASSIUM CHLORIDE 10 MEQ: 7.46 INJECTION, SOLUTION INTRAVENOUS at 12:01

## 2023-12-25 RX ADMIN — LEVOTHYROXINE SODIUM 100 MCG: 0.1 TABLET ORAL at 05:17

## 2023-12-25 RX ADMIN — PROCHLORPERAZINE EDISYLATE 10 MG: 5 INJECTION INTRAMUSCULAR; INTRAVENOUS at 12:35

## 2023-12-25 RX ADMIN — POTASSIUM CHLORIDE 10 MEQ: 7.46 INJECTION, SOLUTION INTRAVENOUS at 21:42

## 2023-12-25 RX ADMIN — POTASSIUM CHLORIDE 10 MEQ: 7.46 INJECTION, SOLUTION INTRAVENOUS at 18:13

## 2023-12-25 RX ADMIN — POTASSIUM CHLORIDE 10 MEQ: 7.46 INJECTION, SOLUTION INTRAVENOUS at 19:56

## 2023-12-25 RX ADMIN — POTASSIUM CHLORIDE 10 MEQ: 7.46 INJECTION, SOLUTION INTRAVENOUS at 14:03

## 2023-12-25 RX ADMIN — ONDANSETRON 4 MG: 4 TABLET, ORALLY DISINTEGRATING ORAL at 05:17

## 2023-12-25 RX ADMIN — ROSUVASTATIN CALCIUM 5 MG: 10 TABLET, FILM COATED ORAL at 20:02

## 2023-12-25 RX ADMIN — POTASSIUM CHLORIDE 10 MEQ: 7.46 INJECTION, SOLUTION INTRAVENOUS at 16:04

## 2023-12-25 RX ADMIN — DULOXETINE HYDROCHLORIDE 60 MG: 30 CAPSULE, DELAYED RELEASE ORAL at 10:50

## 2023-12-25 NOTE — PROGRESS NOTES
POCT Glucose    Collection Time: 12/25/23  6:56 AM   Result Value Ref Range    POC Glucose 89 65 - 100 mg/dL    Performed by: Odilon Sesay         Principal Problem:    Hypovolemic shock ([facility])  Resolved Problems:    * No resolved hospital problems. *        Plan: 1. Gastrograffin swallow for 12/26. 2. EGD for 12/26  3 Scopolamine patch  4. Daily banana bag  5. Supplement potassium  6. Protonix/Zofran  7.  F/U thiamine level  Robbie Diaz MD.

## 2023-12-25 NOTE — PROGRESS NOTES
Hospitalist Progress Note   Admit Date:  2023  7:58 PM   Name:  La Dawn   Age:  75 y.o. Sex:  female  :  1955   MRN:  [id number]   Room:  [facility]/    Presenting/Chief Complaint: Emesis, Nausea, Diarrhea, and Dehydration     Reason(s) for Admission: Hypovolemic shock ([address]) [R57.1]  Septicemia [facility]) [A41.9]     Hospital Course:       Please see admitting H&P for details of presentation. In brief, 69-year-old female with severe obesity status post Jesica-en-Y 2023 at [facility], JEREMI not on CPAP due to poor tolerance presents with nausea vomiting and hypotension. Initial BP 84/66, K of 2.9 and then repeat of 2.8 but these are both hemolyzed, creatinine 1.6 (baseline closer to 1), magnesium 1.5, lactic acid 2.3 and a WBC of 13.6, hemoglobin of 15.7. She was admitted to the hospitalist service on Dec/24 with persistent N/V, hypovolemic shock. General surgery was consulted and has ordered a Gastrografin swallow to assess the patient's anatomy. General surgery consulted gastroenterology who will plan EGD on . Subjective & 24hr Events:   Patient seen around 1040 this morning. She is resting comfortably. She was continuing to have nausea overnight. I have added Compazine as an option. Barium swallow was not completed yesterday nor will be completed today. Hopefully this test and EGD can be completed tomorrow. We will continue to replace her potassium today. Her renal function has normalized. Blood culture with contaminant; discussed this with the attending provider. Assessment & Plan:     Nausea vomiting, status post recent Jesica-en-Y  General surgery will have a barium swallow performed to evaluate anatomy status post gastric surgery  General surgery consulted GI for EGD for anatomical evaluation as well. As needed Zofran, add as needed Compazine today. Continue IVF.     Hypovolemic shock  do not favor sepsis as there is no clear source, and the CT imaging is 1.001 - 1.023    pH, Urine 7.5 5.0 - 9.0      Protein, UA TRACE (A) NEG mg/dL    Glucose, UA Negative mg/dL    Ketones, Urine 15 (A) NEG mg/dL    Bilirubin Urine Negative NEG      Blood, Urine Negative NEG      Urobilinogen, Urine 1.0 0.2 - 1.0 EU/dL    Nitrite, Urine Negative NEG      Leukocyte Esterase, Urine TRACE (A) NEG      WBC, UA 0-3 0 /hpf    BACTERIA, URINE 3+ (H) 0 /hpf    Casts 10-20 0 /lpf    Mucus, UA 2+ (H) 0 /lpf    Other observations RESULTS VERIFIED MANUALLY     Basic Metabolic Panel w/ Reflex to MG    Collection Time: 12/24/23  5:32 AM   Result Value Ref Range    Sodium 140 136 - 146 mmol/L    Potassium 2.4 (LL) 3.5 - 5.1 mmol/L    Chloride 104 103 - 113 mmol/L    CO2 21 21 - 32 mmol/L    Anion Gap 15 (H) 2 - 11 mmol/L    Glucose 87 65 - 100 mg/dL    BUN 18 8 - 23 MG/DL    Creatinine 1.20 (H) 0.6 - 1.0 MG/DL    Est, Glom Filt Rate 49 (L) >60 ml/min/1.73m2    Calcium 9.5 8.3 - 10.4 MG/DL   Magnesium    Collection Time: 12/24/23  5:32 AM   Result Value Ref Range    Magnesium 1.8 1.8 - 2.4 mg/dL   POCT Glucose    Collection Time: 12/24/23  7:54 AM   Result Value Ref Range    POC Glucose 83 65 - 100 mg/dL    Performed by: JamlLBuzzeroeraPCT    POCT Glucose    Collection Time: 12/24/23 10:58 AM   Result Value Ref Range    POC Glucose 88 65 - 100 mg/dL    Performed by: JamlLBuzzeroeraPCT    POCT Glucose    Collection Time: 12/24/23  4:11 PM   Result Value Ref Range    POC Glucose 85 65 - 100 mg/dL    Performed by: JamlLasheraPCT    POCT Glucose    Collection Time: 12/24/23  8:16 PM   Result Value Ref Range    POC Glucose 102 (H) 65 - 100 mg/dL    Performed by: Tina    CBC    Collection Time: 12/25/23  5:06 AM   Result Value Ref Range    WBC 7.1 4.3 - 11.1 K/uL    RBC 3.93 (L) 4.05 - 5.2 M/uL    Hemoglobin 11.1 (L) 11.7 - 15.4 g/dL    Hematocrit 35.6 (L) 35.8 - 46.3 %    MCV 90.6 82 - 102 FL    MCH 28.2 26.1 - 32.9 PG    MCHC 31.2 (L) 31.4 - 35.0 g/dL    RDW 15.6 (H) 11.9 - 14.6 %    Platelets

## 2023-12-25 NOTE — PROGRESS NOTES
Resting quietly in bed. A/O x4. Pt has c/o persistent nausea, relieved with Zofran PRN on MAR but does not seem to last long enough. Notified on-call hospitalist and received orders to give x1 of IM phenergan, and is effective. Hourly rounds completed, all needs met this shift. Bed in L/L with call light in reach. Report to be given to dayshift nurse. Lab called (+) positive BCx overnight. MD made aware. No new orders for now.

## 2023-12-26 ENCOUNTER — ANESTHESIA EVENT (OUTPATIENT)
Dept: ENDOSCOPY | Age: 68
End: 2023-12-26
Payer: MEDICARE

## 2023-12-26 ENCOUNTER — ANESTHESIA (OUTPATIENT)
Dept: ENDOSCOPY | Age: 68
End: 2023-12-26
Payer: MEDICARE

## 2023-12-26 ENCOUNTER — APPOINTMENT (OUTPATIENT)
Dept: GENERAL RADIOLOGY | Age: 68
End: 2023-12-26
Payer: MEDICARE

## 2023-12-26 LAB
ANION GAP SERPL CALC-SCNC: 6 MMOL/L (ref 2–11)
BACTERIA SPEC CULT: ABNORMAL
BACTERIA SPEC CULT: ABNORMAL
BUN SERPL-MCNC: 8 MG/DL (ref 8–23)
CALCIUM SERPL-MCNC: 8.7 MG/DL (ref 8.3–10.4)
CHLORIDE SERPL-SCNC: 110 MMOL/L (ref 103–113)
CO2 SERPL-SCNC: 27 MMOL/L (ref 21–32)
CREAT SERPL-MCNC: 0.7 MG/DL (ref 0.6–1)
GLUCOSE BLD STRIP.AUTO-MCNC: 148 MG/DL (ref 65–100)
GLUCOSE BLD STRIP.AUTO-MCNC: 79 MG/DL (ref 65–100)
GLUCOSE BLD STRIP.AUTO-MCNC: 79 MG/DL (ref 65–100)
GLUCOSE BLD STRIP.AUTO-MCNC: 95 MG/DL (ref 65–100)
GLUCOSE SERPL-MCNC: 79 MG/DL (ref 65–100)
GRAM STN SPEC: ABNORMAL
POTASSIUM SERPL-SCNC: 3.2 MMOL/L (ref 3.5–5.1)
SERVICE CMNT-IMP: ABNORMAL
SERVICE CMNT-IMP: ABNORMAL
SERVICE CMNT-IMP: NORMAL
SODIUM SERPL-SCNC: 143 MMOL/L (ref 136–146)

## 2023-12-26 PROCEDURE — 6370000000 HC RX 637 (ALT 250 FOR IP): Performed by: STUDENT IN AN ORGANIZED HEALTH CARE EDUCATION/TRAINING PROGRAM

## 2023-12-26 PROCEDURE — 82962 GLUCOSE BLOOD TEST: CPT

## 2023-12-26 PROCEDURE — 6360000002 HC RX W HCPCS: Performed by: SURGERY

## 2023-12-26 PROCEDURE — 3609012400 HC EGD TRANSORAL BIOPSY SINGLE/MULTIPLE: Performed by: INTERNAL MEDICINE

## 2023-12-26 PROCEDURE — 1100000000 HC RM PRIVATE

## 2023-12-26 PROCEDURE — 2580000003 HC RX 258: Performed by: INTERNAL MEDICINE

## 2023-12-26 PROCEDURE — 2580000003 HC RX 258: Performed by: SURGERY

## 2023-12-26 PROCEDURE — 2580000003 HC RX 258: Performed by: STUDENT IN AN ORGANIZED HEALTH CARE EDUCATION/TRAINING PROGRAM

## 2023-12-26 PROCEDURE — 43239 EGD BIOPSY SINGLE/MULTIPLE: CPT | Performed by: INTERNAL MEDICINE

## 2023-12-26 PROCEDURE — 7100000010 HC PHASE II RECOVERY - FIRST 15 MIN: Performed by: INTERNAL MEDICINE

## 2023-12-26 PROCEDURE — 88305 TISSUE EXAM BY PATHOLOGIST: CPT

## 2023-12-26 PROCEDURE — 6360000004 HC RX CONTRAST MEDICATION: Performed by: NURSE PRACTITIONER

## 2023-12-26 PROCEDURE — 2580000003 HC RX 258: Performed by: NURSE PRACTITIONER

## 2023-12-26 PROCEDURE — 6360000002 HC RX W HCPCS: Performed by: INTERNAL MEDICINE

## 2023-12-26 PROCEDURE — 6360000002 HC RX W HCPCS: Performed by: STUDENT IN AN ORGANIZED HEALTH CARE EDUCATION/TRAINING PROGRAM

## 2023-12-26 PROCEDURE — 36415 COLL VENOUS BLD VENIPUNCTURE: CPT

## 2023-12-26 PROCEDURE — 6370000000 HC RX 637 (ALT 250 FOR IP): Performed by: INTERNAL MEDICINE

## 2023-12-26 PROCEDURE — 6360000002 HC RX W HCPCS

## 2023-12-26 PROCEDURE — 2500000003 HC RX 250 WO HCPCS

## 2023-12-26 PROCEDURE — 2709999900 HC NON-CHARGEABLE SUPPLY: Performed by: INTERNAL MEDICINE

## 2023-12-26 PROCEDURE — 3700000000 HC ANESTHESIA ATTENDED CARE: Performed by: INTERNAL MEDICINE

## 2023-12-26 PROCEDURE — 0DB68ZX EXCISION OF STOMACH, VIA NATURAL OR ARTIFICIAL OPENING ENDOSCOPIC, DIAGNOSTIC: ICD-10-PCS | Performed by: INTERNAL MEDICINE

## 2023-12-26 PROCEDURE — A4216 STERILE WATER/SALINE, 10 ML: HCPCS | Performed by: SURGERY

## 2023-12-26 PROCEDURE — 88312 SPECIAL STAINS GROUP 1: CPT

## 2023-12-26 PROCEDURE — C9113 INJ PANTOPRAZOLE SODIUM, VIA: HCPCS | Performed by: INTERNAL MEDICINE

## 2023-12-26 PROCEDURE — 6370000000 HC RX 637 (ALT 250 FOR IP): Performed by: NURSE PRACTITIONER

## 2023-12-26 PROCEDURE — 80048 BASIC METABOLIC PNL TOTAL CA: CPT

## 2023-12-26 PROCEDURE — 7100000011 HC PHASE II RECOVERY - ADDTL 15 MIN: Performed by: INTERNAL MEDICINE

## 2023-12-26 PROCEDURE — C9113 INJ PANTOPRAZOLE SODIUM, VIA: HCPCS | Performed by: SURGERY

## 2023-12-26 PROCEDURE — 74220 X-RAY XM ESOPHAGUS 1CNTRST: CPT

## 2023-12-26 RX ORDER — POTASSIUM CHLORIDE 20 MEQ/1
40 TABLET, EXTENDED RELEASE ORAL ONCE
Status: COMPLETED | OUTPATIENT
Start: 2023-12-26 | End: 2023-12-26

## 2023-12-26 RX ORDER — PROPOFOL 10 MG/ML
INJECTION, EMULSION INTRAVENOUS PRN
Status: DISCONTINUED | OUTPATIENT
Start: 2023-12-26 | End: 2023-12-26 | Stop reason: SDUPTHER

## 2023-12-26 RX ORDER — SODIUM CHLORIDE, SODIUM LACTATE, POTASSIUM CHLORIDE, CALCIUM CHLORIDE 600; 310; 30; 20 MG/100ML; MG/100ML; MG/100ML; MG/100ML
INJECTION, SOLUTION INTRAVENOUS CONTINUOUS
Status: DISCONTINUED | OUTPATIENT
Start: 2023-12-26 | End: 2023-12-26

## 2023-12-26 RX ORDER — SUCRALFATE 1 G/1
1 TABLET ORAL EVERY 6 HOURS SCHEDULED
Status: DISCONTINUED | OUTPATIENT
Start: 2023-12-26 | End: 2023-12-30 | Stop reason: HOSPADM

## 2023-12-26 RX ORDER — LIDOCAINE HYDROCHLORIDE 20 MG/ML
INJECTION, SOLUTION EPIDURAL; INFILTRATION; INTRACAUDAL; PERINEURAL PRN
Status: DISCONTINUED | OUTPATIENT
Start: 2023-12-26 | End: 2023-12-26 | Stop reason: SDUPTHER

## 2023-12-26 RX ORDER — SODIUM CHLORIDE, SODIUM LACTATE, POTASSIUM CHLORIDE, CALCIUM CHLORIDE 600; 310; 30; 20 MG/100ML; MG/100ML; MG/100ML; MG/100ML
INJECTION, SOLUTION INTRAVENOUS CONTINUOUS
Status: DISCONTINUED | OUTPATIENT
Start: 2023-12-26 | End: 2023-12-27

## 2023-12-26 RX ORDER — SUCRALFATE ORAL 1 G/10ML
1 SUSPENSION ORAL EVERY 6 HOURS SCHEDULED
Status: DISCONTINUED | OUTPATIENT
Start: 2023-12-26 | End: 2023-12-26

## 2023-12-26 RX ADMIN — SUCRALFATE 1 G: 1 TABLET ORAL at 19:52

## 2023-12-26 RX ADMIN — LEVOTHYROXINE SODIUM 100 MCG: 0.1 TABLET ORAL at 04:15

## 2023-12-26 RX ADMIN — PROPOFOL 160 MCG/KG/MIN: 10 INJECTION, EMULSION INTRAVENOUS at 09:58

## 2023-12-26 RX ADMIN — SODIUM CHLORIDE, POTASSIUM CHLORIDE, SODIUM LACTATE AND CALCIUM CHLORIDE: 600; 310; 30; 20 INJECTION, SOLUTION INTRAVENOUS at 15:02

## 2023-12-26 RX ADMIN — SODIUM CHLORIDE, SODIUM LACTATE, POTASSIUM CHLORIDE, AND CALCIUM CHLORIDE: 600; 310; 30; 20 INJECTION, SOLUTION INTRAVENOUS at 09:46

## 2023-12-26 RX ADMIN — PROPOFOL 50 MG: 10 INJECTION, EMULSION INTRAVENOUS at 09:57

## 2023-12-26 RX ADMIN — SUCRALFATE 1 G: 1 TABLET ORAL at 22:50

## 2023-12-26 RX ADMIN — LORAZEPAM 0.5 MG: 0.5 TABLET ORAL at 09:16

## 2023-12-26 RX ADMIN — PROPOFOL 50 MG: 10 INJECTION, EMULSION INTRAVENOUS at 09:59

## 2023-12-26 RX ADMIN — POTASSIUM CHLORIDE 40 MEQ: 1500 TABLET, EXTENDED RELEASE ORAL at 12:15

## 2023-12-26 RX ADMIN — ROSUVASTATIN CALCIUM 5 MG: 10 TABLET, FILM COATED ORAL at 21:17

## 2023-12-26 RX ADMIN — DULOXETINE HYDROCHLORIDE 60 MG: 30 CAPSULE, DELAYED RELEASE ORAL at 09:16

## 2023-12-26 RX ADMIN — SUCRALFATE 1 G: 1 TABLET ORAL at 12:15

## 2023-12-26 RX ADMIN — HEPARIN SODIUM 5000 UNITS: 5000 INJECTION INTRAVENOUS; SUBCUTANEOUS at 21:16

## 2023-12-26 RX ADMIN — SODIUM CHLORIDE, PRESERVATIVE FREE 40 MG: 5 INJECTION INTRAVENOUS at 21:16

## 2023-12-26 RX ADMIN — PANTOPRAZOLE SODIUM 40 MG: 40 INJECTION, POWDER, FOR SOLUTION INTRAVENOUS at 09:16

## 2023-12-26 RX ADMIN — HEPARIN SODIUM 5000 UNITS: 5000 INJECTION INTRAVENOUS; SUBCUTANEOUS at 13:55

## 2023-12-26 RX ADMIN — ONDANSETRON 4 MG: 4 TABLET, ORALLY DISINTEGRATING ORAL at 09:16

## 2023-12-26 RX ADMIN — DIATRIZOATE MEGLUMINE AND DIATRIZOATE SODIUM 180 ML: 660; 100 LIQUID ORAL; RECTAL at 18:10

## 2023-12-26 RX ADMIN — LIDOCAINE HYDROCHLORIDE 50 MG: 20 INJECTION, SOLUTION EPIDURAL; INFILTRATION; INTRACAUDAL; PERINEURAL at 09:57

## 2023-12-26 ASSESSMENT — PAIN SCALES - GENERAL
PAINLEVEL_OUTOF10: 0

## 2023-12-26 ASSESSMENT — PAIN - FUNCTIONAL ASSESSMENT: PAIN_FUNCTIONAL_ASSESSMENT: 0-10

## 2023-12-26 NOTE — PROGRESS NOTES
Up ad geraldine in room. Intake of 2 italian ice. Medicated for nausea x 1. Refused melatonin. NPO for swallow study and EGD today.

## 2023-12-26 NOTE — PROGRESS NOTES
1026-TRANSFER - OUT REPORT:    Verbal report given to [facility] on [facility]  being transferred to 6th floor for routine progression of patient care       Report consisted of patient's Situation, Background, Assessment and   Recommendations(SBAR). Information from the following report(s) Nurse Handoff Report was reviewed with the receiving nurse. Lines:   Peripheral IV 12/24/23 Left;Upper Arm (Active)   Site Assessment Dry; Other (Comment) 12/26/23 0950   Line Status No blood return;Flushed 12/26/23 0950   Line Care Other (Comment) 12/26/23 0950   Phlebitis Assessment No symptoms 12/26/23 0950   Infiltration Assessment 0 12/26/23 0950   Alcohol Cap Used Yes 12/25/23 2337   Dressing Status New dressing applied; Old drainage noted 12/26/23 0950   Dressing Type Transparent 12/25/23 2337        Opportunity for questions and clarification was provided. Patient will be transported with:  Tech  1052-To floor via RT Jared. Clean and dry upon departure from unit with all needs met.

## 2023-12-26 NOTE — PROGRESS NOTES
Pt is resting comfortably in bed without complaints. Hourly rounds completed and all needs are met. Bed is locked, low and call light is within reach and patient is encouraged to call for any need(s).

## 2023-12-26 NOTE — PROGRESS NOTES
Hospitalist Progress Note   Admit Date:  2023  7:58 PM   Name:  Tyrell Isaac   Age:  75 y.o. Sex:  female  :  1955   MRN:  [id number]   Room:  [facility]/    Presenting/Chief Complaint: Emesis, Nausea, Diarrhea, and Dehydration     Reason(s) for Admission: Hypovolemic shock ([address]) [R57.1]  Septicemia [facility]) [A41.9]     Hospital Course:       Please see admitting H&P for details of presentation. In brief, 68-year-old female with severe obesity status post Jesica-en-Y 2023 at [facility], JEREMI not on CPAP due to poor tolerance presents with nausea vomiting and hypotension. Initial BP 84/66, K of 2.9 and then repeat of 2.8 but these are both hemolyzed, creatinine 1.6 (baseline closer to 1), magnesium 1.5, lactic acid 2.3 and a WBC of 13.6, hemoglobin of 15.7. She was admitted to the hospitalist service on Dec/24 with persistent N/V, hypovolemic shock. General surgery was consulted and has ordered a Gastrografin swallow to assess the patient's anatomy. General surgery consulted gastroenterology who performed an EGD on  with finding of a clean-based ulcer at the gastrojejunal anastomosis. Subjective & 24hr Events:   Patient seen after EGD. She denies nausea at this time and would like to try to eat. Continue IVF. Replace K+ and trend BMP in the morning. Assessment & Plan:     Nausea/vomiting, status post recent Jesica-en-Y  General surgery has ordered a barium swallow to evaluate anatomy s/p gastric surgery  General surgery consulted GI for EGD for anatomical evaluation  EGD w/ one clean-based ulcer, biopsy sent for pathology  PPI BID and sucralfate QID  As needed Zofran, add as needed Compazine. Hypovolemic shock  do not favor sepsis as there is no clear source, and the CT imaging is an extremely soft call for what has been documented multiple times and external records as a UTI.    Blood culture w/ coag negative Staph, contaminant  Resolved with   Intake 2576.21 ml   Output 0 ml   Net 2576.21 ml           Physical Exam:   General:    NAD, ambulatory  Head:  Normocephalic, atraumatic  Eyes:  Sclerae appear normal.  Pupils equally round.  ENT:  Nares appear normal.  Moist oral mucosa  CV:   RRR.  No m/r/g.   Lungs:   No wheezing.  Symmetric expansion.  Skin:     No rashes and normal coloration.   Warm and dry.    Neuro:  CN II-XII grossly intact.    Psych:  Normal mood and affect.      I have personally reviewed labs and tests:  Recent Labs:  Recent Results (from the past 48 hour(s))   POCT Glucose    Collection Time: 12/24/23  4:11 PM   Result Value Ref Range    POC Glucose 85 65 - 100 mg/dL    Performed by: Fabrice    POCT Glucose    Collection Time: 12/24/23  8:16 PM   Result Value Ref Range    POC Glucose 102 (H) 65 - 100 mg/dL    Performed by: Tina    CBC    Collection Time: 12/25/23  5:06 AM   Result Value Ref Range    WBC 7.1 4.3 - 11.1 K/uL    RBC 3.93 (L) 4.05 - 5.2 M/uL    Hemoglobin 11.1 (L) 11.7 - 15.4 g/dL    Hematocrit 35.6 (L) 35.8 - 46.3 %    MCV 90.6 82 - 102 FL    MCH 28.2 26.1 - 32.9 PG    MCHC 31.2 (L) 31.4 - 35.0 g/dL    RDW 15.6 (H) 11.9 - 14.6 %    Platelets 191 150 - 450 K/uL    MPV 10.4 9.4 - 12.3 FL    nRBC 0.00 0.0 - 0.2 K/uL   Basic Metabolic Panel    Collection Time: 12/25/23  5:06 AM   Result Value Ref Range    Sodium 141 136 - 146 mmol/L    Potassium 2.6 (L) 3.5 - 5.1 mmol/L    Chloride 108 103 - 113 mmol/L    CO2 25 21 - 32 mmol/L    Anion Gap 8 2 - 11 mmol/L    Glucose 79 65 - 100 mg/dL    BUN 13 8 - 23 MG/DL    Creatinine 0.80 0.6 - 1.0 MG/DL    Est, Glom Filt Rate >60 >60 ml/min/1.73m2    Calcium 8.7 8.3 - 10.4 MG/DL   POCT Glucose    Collection Time: 12/25/23  6:56 AM   Result Value Ref Range    POC Glucose 89 65 - 100 mg/dL    Performed by: Vega    POCT Glucose    Collection Time: 12/25/23 11:30 AM   Result Value Ref Range    POC Glucose 88 65 - 100 mg/dL    Performed by:

## 2023-12-26 NOTE — OP NOTE
Endoscopy Note          Operative Report    Patient: Kiran Amador MRN: [id number]      YOB: 1955  Age: 75 y.o. Sex: female            Indications: Nausea, vomiting, and abdominal pain after Diana-en-Y gastric bypass September 2023 at [facility] by Dr. Mami Ching. Preoperative Evaluation: The patient was evaluated prior to the procedure in the GI lab admission area, the patient ASA was recorded . Consent was obtained from the patient with the risk of perforation bleeding and aspiration. Anesthesia: ESTEFANI-per anesthesia    Complication: None; patient tolerated the procedure well. Estimated blood loss: insignificant    Procedure: The patient was sedated into the left lateral decubitus position. Scope was advanced from the mouth to the third portion of duodenum. Scope was withdrawn to the stomach and retroflexed view was performed. Findings:  Normal esophagus. Slightly irregular Z-line located at 40 cm. 5 cm gastric pouch, normal healthy mucosa. There was a 7 mm clean based ulcer at the gastrojejunal anastomosis with surrounding mild edema and erythema. Cold forcep biopsies were obtained for H. pylori at the ulcer edge and gastric pouch. The diana limb was normal and widely patent, up to 50 cm examined. Postoperative Diagnosis:  7mm clean based GJ anastomotic ulcer. Recommendations:   Return to hospital neri for ongoing care. Okay to advance diet as tolerated. Okay to resume prior medications. Increase pantoprazole to 40 mg twice daily and add liquid formulation sucralfate 3 times daily to 4 times daily x 14 days. Please send home on this regimen. Avoid NSAID medications. Follow-up pathology and treat if positive for H. pylori. Please contact our service with any questions or concerns.     Signed By:  Radha Coombs MD     December 26, 2023

## 2023-12-26 NOTE — PROGRESS NOTES
Repeat EGD in 3 weeks with Dr. Elizabeth Sun. We will order and arrange. Ensure she is d/c'd home with Pantoprazole 40mg po bid.

## 2023-12-26 NOTE — ANESTHESIA POSTPROCEDURE EVALUATION
Department of Anesthesiology  Postprocedure Note    Patient: Alvaro Cabrera  MRN: [id number]  YOB: 1955  Date of evaluation: 12/26/2023    Procedure Summary       Date: 12/26/23 Room / Location: [facility] ENDO 03 / [facility] ENDOSCOPY    Anesthesia Start: 0644 Anesthesia Stop: 4113    Procedure: EGD BIOPSY (Upper GI Region) Diagnosis:       Nausea and vomiting, unspecified vomiting type      (Nausea and vomiting, unspecified vomiting type [R11.2])    Surgeons: Tushar Guzman MD Responsible Provider: Les Monroy MD    Anesthesia Type: TIVA ASA Status: 3            Anesthesia Type: No value filed. Shahla Phase I: Shahla Score: 10    Shahla Phase II: Shahla Score: 10    Anesthesia Post Evaluation    Patient location during evaluation: PACU  Patient participation: complete - patient participated  Level of consciousness: awake  Airway patency: patent  Nausea & Vomiting: no nausea and no vomiting  Cardiovascular status: blood pressure returned to baseline  Respiratory status: acceptable  Hydration status: euvolemic  Pain management: adequate    No notable events documented.

## 2023-12-26 NOTE — INTERVAL H&P NOTE
Update History & Physical    The patient's History and Physical of December 26, 2023 was reviewed with the patient and I examined the patient. There was no change. The surgical site was confirmed by the patient and me. Plan: The risks, benefits, expected outcome, and alternative to the recommended procedure have been discussed with the patient. Patient understands and wants to proceed with the procedure.      Electronically signed by Yair Whittington MD on 12/26/2023 at 9:48 AM
3 = A little assistance

## 2023-12-26 NOTE — CARE COORDINATION
Met with Ms. Ness Pickens at bedside for initial CM assessment. Patient is alert and oriented x4. Demographics and PCP verified. Ms. Ness Pickens lives with her daughter in a one level home with no steps to enter. She was independent with mobility and ADLs at most recent baseline and used no DME or services. She is retired but remains an active  in the community. Ms. Ness Pickens plans to return home at discharge and anticipates no needs. CM will continue to follow. 12/26/23 1148   Service Assessment   Patient Orientation Alert and Oriented;Person;Place;Situation   Cognition Alert   History Provided By Patient   Primary Caregiver Self   Accompanied By/Relationship N/A   Support Systems Children;Family Members;Friends/Neighbors   Patient's Healthcare Decision Maker is: Legal Next of Kin   PCP Verified by CM Yes   Last Visit to PCP Within last 3 months   Prior Functional Level Independent in ADLs/IADLs   Current Functional Level Independent in ADLs/IADLs   Can patient return to prior living arrangement Yes   Ability to make needs known: Good   Family able to assist with home care needs: Yes   Would you like for me to discuss the discharge plan with any other family members/significant others, and if so, who? Yes  (daughter-Karin)   Financial Resources Medicare   Community Resources None   Social/Functional History   Type of [address] [place] [address]  One level   Home Access Level entry   [address] unit   [address] None   ADL Assistance Independent   Homemaking Assistance Independent   Ambulation Assistance Independent   Transfer Assistance Independent   Active  Yes   Mode of Transportation Car   Occupation Retired   Discharge Planning   Type of [address] [place] [address]  Prior To Admission None   Potential Assistance Needed N/A   DME Ordered?  No   Potential

## 2023-12-26 NOTE — PROGRESS NOTES
TRANSFER - IN REPORT:    Verbal report received from Essie Fortune RN on [email or web address]  being received from GI Lab for routine progression of patient care      Report consisted of patient's Situation, Background, Assessment and   Recommendations(SBAR). Information from the following report(s) Nurse Handoff Report was reviewed with the receiving nurse. Some biopsies were taken of an ulceration that was found. Patient is stable and will return to unit shortly via [employer] for questions and clarification was provided. Assessment completed upon patient's arrival to unit and care assumed.

## 2023-12-26 NOTE — PROGRESS NOTES
IV in pt forearm removed due to leaking when being flushed. Pt IV in upper arm had some old drainage noted and the dressing was not intact. Old dressing removed, IV site cleaned, and new Tegaderm placed.

## 2023-12-26 NOTE — PROGRESS NOTES
TRANSFER - IN REPORT:    Verbal report received from Odalis Albarran, [address] on Sal[address] Lawler  being received from [phone] for ordered procedure      Report consisted of patient's Situation, Background, Assessment and   Recommendations(SBAR). Information from the following report(s) Nurse Handoff Report, Adult Overview, Surgery Report, Intake/Output, MAR, and Recent Results was reviewed with the receiving nurse. Opportunity for questions and clarification was provided. Assessment completed upon patient's arrival to unit and care assumed.

## 2023-12-27 PROBLEM — K28.9 MARGINAL ULCER: Status: ACTIVE | Noted: 2023-12-23

## 2023-12-27 LAB
ANION GAP SERPL CALC-SCNC: 6 MMOL/L (ref 2–11)
BUN SERPL-MCNC: 4 MG/DL (ref 8–23)
CALCIUM SERPL-MCNC: 9 MG/DL (ref 8.3–10.4)
CHLORIDE SERPL-SCNC: 111 MMOL/L (ref 103–113)
CO2 SERPL-SCNC: 27 MMOL/L (ref 21–32)
CREAT SERPL-MCNC: 0.8 MG/DL (ref 0.6–1)
ERYTHROCYTE [DISTWIDTH] IN BLOOD BY AUTOMATED COUNT: 15.6 % (ref 11.9–14.6)
GLUCOSE BLD STRIP.AUTO-MCNC: 64 MG/DL (ref 65–100)
GLUCOSE BLD STRIP.AUTO-MCNC: 73 MG/DL (ref 65–100)
GLUCOSE BLD STRIP.AUTO-MCNC: 82 MG/DL (ref 65–100)
GLUCOSE BLD STRIP.AUTO-MCNC: 89 MG/DL (ref 65–100)
GLUCOSE BLD STRIP.AUTO-MCNC: 90 MG/DL (ref 65–100)
GLUCOSE SERPL-MCNC: 78 MG/DL (ref 65–100)
HCT VFR BLD AUTO: 36.3 % (ref 35.8–46.3)
HGB BLD-MCNC: 11.5 G/DL (ref 11.7–15.4)
MCH RBC QN AUTO: 28.9 PG (ref 26.1–32.9)
MCHC RBC AUTO-ENTMCNC: 31.7 G/DL (ref 31.4–35)
MCV RBC AUTO: 91.2 FL (ref 82–102)
NRBC # BLD: 0 K/UL (ref 0–0.2)
PLATELET # BLD AUTO: 222 K/UL (ref 150–450)
PMV BLD AUTO: 11 FL (ref 9.4–12.3)
POTASSIUM SERPL-SCNC: 3.2 MMOL/L (ref 3.5–5.1)
RBC # BLD AUTO: 3.98 M/UL (ref 4.05–5.2)
SERVICE CMNT-IMP: ABNORMAL
SERVICE CMNT-IMP: NORMAL
SODIUM SERPL-SCNC: 144 MMOL/L (ref 136–146)
WBC # BLD AUTO: 8.8 K/UL (ref 4.3–11.1)

## 2023-12-27 PROCEDURE — 82962 GLUCOSE BLOOD TEST: CPT

## 2023-12-27 PROCEDURE — 36415 COLL VENOUS BLD VENIPUNCTURE: CPT

## 2023-12-27 PROCEDURE — 99231 SBSQ HOSP IP/OBS SF/LOW 25: CPT | Performed by: SURGERY

## 2023-12-27 PROCEDURE — 85027 COMPLETE CBC AUTOMATED: CPT

## 2023-12-27 PROCEDURE — 2580000003 HC RX 258: Performed by: NURSE PRACTITIONER

## 2023-12-27 PROCEDURE — 1100000000 HC RM PRIVATE

## 2023-12-27 PROCEDURE — A4216 STERILE WATER/SALINE, 10 ML: HCPCS | Performed by: INTERNAL MEDICINE

## 2023-12-27 PROCEDURE — 6370000000 HC RX 637 (ALT 250 FOR IP): Performed by: INTERNAL MEDICINE

## 2023-12-27 PROCEDURE — 2500000003 HC RX 250 WO HCPCS: Performed by: NURSE PRACTITIONER

## 2023-12-27 PROCEDURE — 6370000000 HC RX 637 (ALT 250 FOR IP): Performed by: STUDENT IN AN ORGANIZED HEALTH CARE EDUCATION/TRAINING PROGRAM

## 2023-12-27 PROCEDURE — 76937 US GUIDE VASCULAR ACCESS: CPT

## 2023-12-27 PROCEDURE — 6360000002 HC RX W HCPCS: Performed by: INTERNAL MEDICINE

## 2023-12-27 PROCEDURE — 80048 BASIC METABOLIC PNL TOTAL CA: CPT

## 2023-12-27 PROCEDURE — 2580000003 HC RX 258: Performed by: INTERNAL MEDICINE

## 2023-12-27 PROCEDURE — 6360000002 HC RX W HCPCS: Performed by: STUDENT IN AN ORGANIZED HEALTH CARE EDUCATION/TRAINING PROGRAM

## 2023-12-27 PROCEDURE — 6360000002 HC RX W HCPCS: Performed by: NURSE PRACTITIONER

## 2023-12-27 PROCEDURE — C9113 INJ PANTOPRAZOLE SODIUM, VIA: HCPCS | Performed by: INTERNAL MEDICINE

## 2023-12-27 RX ORDER — CHOLESTYRAMINE LIGHT 4 G/5.7G
4 POWDER, FOR SUSPENSION ORAL 2 TIMES DAILY
Status: DISCONTINUED | OUTPATIENT
Start: 2023-12-27 | End: 2023-12-30 | Stop reason: HOSPADM

## 2023-12-27 RX ORDER — CARVEDILOL 3.12 MG/1
6.25 TABLET ORAL 2 TIMES DAILY WITH MEALS
Status: DISCONTINUED | OUTPATIENT
Start: 2023-12-27 | End: 2023-12-30 | Stop reason: HOSPADM

## 2023-12-27 RX ORDER — MECLIZINE HYDROCHLORIDE 25 MG/1
25 TABLET ORAL 3 TIMES DAILY PRN
Status: DISCONTINUED | OUTPATIENT
Start: 2023-12-27 | End: 2023-12-30 | Stop reason: HOSPADM

## 2023-12-27 RX ORDER — DEXTROSE MONOHYDRATE, SODIUM CHLORIDE, AND POTASSIUM CHLORIDE 50; 1.49; 4.5 G/1000ML; G/1000ML; G/1000ML
INJECTION, SOLUTION INTRAVENOUS CONTINUOUS
Status: DISCONTINUED | OUTPATIENT
Start: 2023-12-27 | End: 2023-12-29

## 2023-12-27 RX ADMIN — ROSUVASTATIN CALCIUM 5 MG: 10 TABLET, FILM COATED ORAL at 20:59

## 2023-12-27 RX ADMIN — HEPARIN SODIUM 5000 UNITS: 5000 INJECTION INTRAVENOUS; SUBCUTANEOUS at 05:31

## 2023-12-27 RX ADMIN — SODIUM CHLORIDE, PRESERVATIVE FREE 40 MG: 5 INJECTION INTRAVENOUS at 08:46

## 2023-12-27 RX ADMIN — SODIUM CHLORIDE, POTASSIUM CHLORIDE, SODIUM LACTATE AND CALCIUM CHLORIDE: 600; 310; 30; 20 INJECTION, SOLUTION INTRAVENOUS at 03:56

## 2023-12-27 RX ADMIN — CHOLESTYRAMINE 4 G: 4 POWDER, FOR SUSPENSION ORAL at 14:10

## 2023-12-27 RX ADMIN — ONDANSETRON 4 MG: 4 TABLET, ORALLY DISINTEGRATING ORAL at 08:46

## 2023-12-27 RX ADMIN — HEPARIN SODIUM 5000 UNITS: 5000 INJECTION INTRAVENOUS; SUBCUTANEOUS at 22:02

## 2023-12-27 RX ADMIN — SUCRALFATE 1 G: 1 TABLET ORAL at 04:34

## 2023-12-27 RX ADMIN — DULOXETINE HYDROCHLORIDE 60 MG: 30 CAPSULE, DELAYED RELEASE ORAL at 08:46

## 2023-12-27 RX ADMIN — POTASSIUM CHLORIDE, DEXTROSE MONOHYDRATE AND SODIUM CHLORIDE: 150; 5; 450 INJECTION, SOLUTION INTRAVENOUS at 21:06

## 2023-12-27 RX ADMIN — LEVOTHYROXINE SODIUM 100 MCG: 0.1 TABLET ORAL at 04:34

## 2023-12-27 RX ADMIN — SODIUM CHLORIDE, PRESERVATIVE FREE 40 MG: 5 INJECTION INTRAVENOUS at 20:59

## 2023-12-27 RX ADMIN — HEPARIN SODIUM 5000 UNITS: 5000 INJECTION INTRAVENOUS; SUBCUTANEOUS at 14:11

## 2023-12-27 RX ADMIN — CARVEDILOL 6.25 MG: 3.12 TABLET, FILM COATED ORAL at 17:42

## 2023-12-27 RX ADMIN — SUCRALFATE 1 G: 1 TABLET ORAL at 12:23

## 2023-12-27 RX ADMIN — SUCRALFATE 1 G: 1 TABLET ORAL at 17:41

## 2023-12-27 RX ADMIN — THIAMINE HYDROCHLORIDE: 100 INJECTION, SOLUTION INTRAMUSCULAR; INTRAVENOUS at 10:50

## 2023-12-27 RX ADMIN — CHOLESTYRAMINE 4 G: 4 POWDER, FOR SUSPENSION ORAL at 20:58

## 2023-12-27 RX ADMIN — SUCRALFATE 1 G: 1 TABLET ORAL at 22:03

## 2023-12-27 NOTE — PROGRESS NOTES
Hourly rounds performed this shift. Bed lowered and locked. Call light within reach. All needs met at this time. Pt c/o nausea/diarrhea earlier during shift. PRN meds administered with relief noted. New IV site initiated on the LUE due to bruising of the RUE and pain when flushed. Banana bag infusing without difficulty. Pt has continuous fluids hanging to start after completion of banana bag. Bedside shift report will be given to oncoming nurse.

## 2023-12-27 NOTE — CONSULTS
Consult Note            Date:12/27/2023        Patient Name:Jesika Olsen     YOB: 1955     Age:67 y.o.    Inpatient consult to GI  Consult performed by: Frommel, Kimberly, APRN - CNP  Consult ordered by: Ludivina Frausto APRN - CNP  Reason for consult: Acute/ Chronic diarrhea        Chief Complaint     Chief Complaint   Patient presents with    Emesis    Nausea    Diarrhea    Dehydration        History Obtained From   patient    History of Present Illness   As previously stated, Jesika Olsen is a \"67-year-old female who underwent a robotic gastric bypass procedure on 09/13/2023 at [facility] by Dr. Felix Marr.  The patient reports that since the procedure in September, she has had difficulty with oral intake of any kind.  She reports having nausea, vomiting and diarrhea.  She was admitted to [facility] in October for the same problem. They did an upper GI that was negative for strictures or leaks, and her diet was advanced.  She was thought to have a UTI and was given antibiotics.  She presented again to the emergency department at [facility] on 11/17/2023, again was treated for UTI and discharged.  She now comes to [place] on 12/23 due to nausea, vomiting, diarrhea.  She is quite angry about the situation, feeling that she is extremely frustrated and that she feels very dehydrated and malnourished.  I asked if she had been back to see Dr. Marr, she said that she has had a problem getting an appointment time where her daughter was available to take her to the appointment as she does not drive.  She was admitted by the hospitalist service.  CT scan showed surgical changes of the gastric bypass, there was no evidence of obstruction and the oral contrast easily entered her small intestine.  She was thought to have a urinary tract infection, was started on aztreonam, clindamycin and Flagyl.  She was given a bolus of fluids for lactic acid of 2.3 and white count of 13.6.  Her lactate improved with  POA    * (Principal) Hypovolemic shock ([address]) 12/24/2023 Yes       Plan   Care Management per Hospitalist  General Surgery Following   --Protonix BID  --Carafate QID  --Supplement potassium  --Banana bag today.   --Scopolamine patch  --Stool for Cdiff canceled   GI  --Start Cholestyramine BID  --Anti dumping diet  --Further input/ plan per Dr Samantha Camarena, FNP-BC

## 2023-12-27 NOTE — PROGRESS NOTES
Surgery  Looked to see the results of the UGI series. They are very disturbing seeing as she has had an antecolic, antegastric Omega loop gastric bypass done at [facility] in September of 2023. The results were read as follows: HISTORY: Gastric bypass. FINDINGS:  Gastrografin was given by mouth. Multiple images of the esophagus, the remnants  of the stomach and duodenum were obtained. There is free flow of Gastrografin from the esophagus through the remnant of the  stomach into the duodenum without any difficulty. No evidence of extravasation of Gastrografin. IMPRESSION:  Free flow of Gastrografin from the esophagus to the remnant of the stomach into  the duodenum without any difficulty. No evidence of extravasations of Gastrografin. There should be no flow into the duodenum as it is bypassed. Will have to check with radiology in the [place] to make sure there is no gastrogastric fistula.     Nimo Hurt MD.

## 2023-12-27 NOTE — PROGRESS NOTES
Hospitalist Progress Note   Admit Date:  2023  7:58 PM   Name:  Jesika Olsen   Age:  67 y.o.  Sex:  female  :  1955   MRN:  [id number]   Room:  [facility]/    Presenting/Chief Complaint: Emesis, Nausea, Diarrhea, and Dehydration     Reason(s) for Admission: Hypovolemic shock (HCC) [R57.1]  Septicemia (HCC) [A41.9]     Hospital Course:   Ms. Olsen is a 66 y/o WF with a h/o obesity status post Jesica-en-Y 2023 at [facility], JEREMI not on CPAP due to poor tolerance who was admitted to our service on  with suspected hypovolemic shock. She presented with N/V and hypotension.  Initial BP 84/66, K 2.9, Cr 1.6 (baseline closer to 1), Mg 1.5, LA 2.3 and a WBC of 13.6, hemoglobin of 15.7. General surgery was consulted. Gastrograffin swallow showed free flow of contrast from esophagus to remnant stomach and duodenum. General surgery consulted gastroenterology who performed an EGD on  with finding of a clean-based ulcer at the gastrojejunal anastomosis.    Subjective & 24hr Events:   Abdo cramping today, tolerating clears so far, overall improved.     Assessment & Plan:   # Intractable N/V   - Resolved. General Surgery following given recent Jesica-en-Y bypass surgery at [facility] 2023. Gastrograffin swallow study reviewed. EGD  with ulcer at gastrojejunal anastomosis. Con't PPI and sucralfate. Further plans per Surgery. For now con't IVFs, scopolamine patch ordered , cholestyramine.     # Hypovolemic shock    - Resolved. Due to poor PO intake and excessive GI losses.    # HypoMg // hypoK   - Resolved.    # ZEUS   - Resolved.    # MDD   - Con't Cymbalta    # HTN   - Resume carvedilol    # Peripheral neuropathy   - No longer on gabapentin    # DM2   - SSI    # Hypothyroidism   - Con't Synthroid    Anticipated Discharge Arrangements: Home when able.  PT/OT evals and PPD ordered?  No  Diet:  ADULT DIET; Regular; 3 carb choices (45 gm/meal); Low Fat/Low Chol/High Fiber/2 gm Na; Post Gastrectomy;  102 FL    MCH 28.9 26.1 - 32.9 PG    MCHC 31.7 31.4 - 35.0 g/dL    RDW 15.6 (H) 11.9 - 14.6 %    Platelets 402 282 - 278 K/uL    MPV 11.0 9.4 - 12.3 FL    nRBC 0.00 0.0 - 0.2 K/uL   POCT Glucose    Collection Time: 12/27/23  7:07 AM   Result Value Ref Range    POC Glucose 64 (L) 65 - 100 mg/dL    Performed by: Anabell Coronado    POCT Glucose    Collection Time: 12/27/23  7:58 AM   Result Value Ref Range    POC Glucose 73 65 - 100 mg/dL    Performed by: Brandyn Hill    POCT Glucose    Collection Time: 12/27/23 11:11 AM   Result Value Ref Range    POC Glucose 90 65 - 100 mg/dL    Performed by: Anabell Coronado        Current Meds:  Current Facility-Administered Medications   Medication Dose Route Frequency    dextrose 5 % and 0.45 % NaCl with KCl 20 mEq infusion   IntraVENous Continuous    cholestyramine light packet 4 g  4 g Oral BID    pantoprazole (PROTONIX) 40 mg in sodium chloride (PF) 0.9 % 10 mL injection  40 mg IntraVENous Q12H    sucralfate (CARAFATE) tablet 1 g  1 g Oral 4 times per day    diatrizoate meglumine-sodium (GASTROGRAFIN) 66-10 % solution 180 mL  180 mL Oral ONCE PRN    prochlorperazine (COMPAZINE) injection 10 mg  10 mg IntraVENous Q6H PRN    scopolamine (TRANSDERM-SCOP) transdermal patch 1 patch  1 patch TransDERmal Q72H    DULoxetine (CYMBALTA) extended release capsule 60 mg  60 mg Oral Daily    rosuvastatin (CRESTOR) tablet 5 mg  5 mg Oral Nightly    insulin lispro (HUMALOG) injection vial 0-4 Units  0-4 Units SubCUTAneous TID WC    insulin lispro (HUMALOG) injection vial 0-4 Units  0-4 Units SubCUTAneous Nightly    glucose chewable tablet 16 g  4 tablet Oral PRN    dextrose bolus 10% 125 mL  125 mL IntraVENous PRN    Or    dextrose bolus 10% 250 mL  250 mL IntraVENous PRN    Glucagon Emergency KIT 1 mg  1 mg SubCUTAneous PRN    dextrose 10 % infusion   IntraVENous Continuous PRN    levothyroxine (SYNTHROID) tablet 100 mcg  100 mcg Oral Daily    ondansetron (ZOFRAN) injection 4 mg  4 mg

## 2023-12-28 PROBLEM — E87.6 HYPOKALEMIA DUE TO EXCESSIVE GASTROINTESTINAL LOSS OF POTASSIUM: Status: ACTIVE | Noted: 2023-12-28

## 2023-12-28 PROBLEM — F32.9 MDD (MAJOR DEPRESSIVE DISORDER): Status: ACTIVE | Noted: 2023-12-28

## 2023-12-28 PROBLEM — R57.1 HYPOVOLEMIC SHOCK (HCC): Status: RESOLVED | Noted: 2023-12-24 | Resolved: 2023-12-28

## 2023-12-28 PROBLEM — N17.9 AKI (ACUTE KIDNEY INJURY) (HCC): Status: ACTIVE | Noted: 2023-12-28

## 2023-12-28 PROBLEM — N17.9 AKI (ACUTE KIDNEY INJURY) (HCC): Status: RESOLVED | Noted: 2023-12-28 | Resolved: 2023-12-28

## 2023-12-28 PROBLEM — E03.9 HYPOTHYROIDISM: Status: ACTIVE | Noted: 2023-12-28

## 2023-12-28 PROBLEM — E83.42 HYPOMAGNESEMIA: Status: ACTIVE | Noted: 2023-12-28

## 2023-12-28 PROBLEM — E11.9 DM2 (DIABETES MELLITUS, TYPE 2) (HCC): Status: ACTIVE | Noted: 2023-12-28

## 2023-12-28 LAB
BACTERIA SPEC CULT: NORMAL
GLUCOSE BLD STRIP.AUTO-MCNC: 79 MG/DL (ref 65–100)
GLUCOSE BLD STRIP.AUTO-MCNC: 82 MG/DL (ref 65–100)
GLUCOSE BLD STRIP.AUTO-MCNC: 86 MG/DL (ref 65–100)
GLUCOSE BLD STRIP.AUTO-MCNC: 90 MG/DL (ref 65–100)
GLUCOSE BLD STRIP.AUTO-MCNC: 92 MG/DL (ref 65–100)
MAGNESIUM SERPL-MCNC: 1.2 MG/DL (ref 1.8–2.4)
POTASSIUM SERPL-SCNC: 3.3 MMOL/L (ref 3.5–5.1)
SERVICE CMNT-IMP: NORMAL

## 2023-12-28 PROCEDURE — 1100000000 HC RM PRIVATE

## 2023-12-28 PROCEDURE — 2500000003 HC RX 250 WO HCPCS: Performed by: NURSE PRACTITIONER

## 2023-12-28 PROCEDURE — 87324 CLOSTRIDIUM AG IA: CPT

## 2023-12-28 PROCEDURE — 6370000000 HC RX 637 (ALT 250 FOR IP): Performed by: INTERNAL MEDICINE

## 2023-12-28 PROCEDURE — 6370000000 HC RX 637 (ALT 250 FOR IP): Performed by: STUDENT IN AN ORGANIZED HEALTH CARE EDUCATION/TRAINING PROGRAM

## 2023-12-28 PROCEDURE — 84132 ASSAY OF SERUM POTASSIUM: CPT

## 2023-12-28 PROCEDURE — C9113 INJ PANTOPRAZOLE SODIUM, VIA: HCPCS | Performed by: INTERNAL MEDICINE

## 2023-12-28 PROCEDURE — 2580000003 HC RX 258: Performed by: INTERNAL MEDICINE

## 2023-12-28 PROCEDURE — 83735 ASSAY OF MAGNESIUM: CPT

## 2023-12-28 PROCEDURE — 82962 GLUCOSE BLOOD TEST: CPT

## 2023-12-28 PROCEDURE — 87449 NOS EACH ORGANISM AG IA: CPT

## 2023-12-28 PROCEDURE — A4216 STERILE WATER/SALINE, 10 ML: HCPCS | Performed by: INTERNAL MEDICINE

## 2023-12-28 PROCEDURE — 6360000002 HC RX W HCPCS: Performed by: INTERNAL MEDICINE

## 2023-12-28 PROCEDURE — 6370000000 HC RX 637 (ALT 250 FOR IP): Performed by: SURGERY

## 2023-12-28 PROCEDURE — 36415 COLL VENOUS BLD VENIPUNCTURE: CPT

## 2023-12-28 PROCEDURE — 2500000003 HC RX 250 WO HCPCS: Performed by: STUDENT IN AN ORGANIZED HEALTH CARE EDUCATION/TRAINING PROGRAM

## 2023-12-28 PROCEDURE — 6360000002 HC RX W HCPCS: Performed by: STUDENT IN AN ORGANIZED HEALTH CARE EDUCATION/TRAINING PROGRAM

## 2023-12-28 RX ORDER — LOPERAMIDE HYDROCHLORIDE 2 MG/1
2 CAPSULE ORAL 3 TIMES DAILY PRN
Status: DISCONTINUED | OUTPATIENT
Start: 2023-12-28 | End: 2023-12-30 | Stop reason: HOSPADM

## 2023-12-28 RX ORDER — LANOLIN ALCOHOL/MO/W.PET/CERES
400 CREAM (GRAM) TOPICAL DAILY
Status: DISCONTINUED | OUTPATIENT
Start: 2023-12-28 | End: 2023-12-30 | Stop reason: HOSPADM

## 2023-12-28 RX ORDER — PANTOPRAZOLE SODIUM 40 MG/1
40 TABLET, DELAYED RELEASE ORAL
Status: DISCONTINUED | OUTPATIENT
Start: 2023-12-28 | End: 2023-12-30 | Stop reason: HOSPADM

## 2023-12-28 RX ADMIN — POTASSIUM CHLORIDE, DEXTROSE MONOHYDRATE AND SODIUM CHLORIDE: 150; 5; 450 INJECTION, SOLUTION INTRAVENOUS at 06:33

## 2023-12-28 RX ADMIN — MAGNESIUM SULFATE HEPTAHYDRATE 2000 MG: 40 INJECTION, SOLUTION INTRAVENOUS at 13:49

## 2023-12-28 RX ADMIN — PANTOPRAZOLE SODIUM 40 MG: 40 TABLET, DELAYED RELEASE ORAL at 15:43

## 2023-12-28 RX ADMIN — MAGNESIUM SULFATE HEPTAHYDRATE 2000 MG: 40 INJECTION, SOLUTION INTRAVENOUS at 19:20

## 2023-12-28 RX ADMIN — POTASSIUM BICARBONATE 40 MEQ: 782 TABLET, EFFERVESCENT ORAL at 11:43

## 2023-12-28 RX ADMIN — ROSUVASTATIN CALCIUM 5 MG: 10 TABLET, FILM COATED ORAL at 21:44

## 2023-12-28 RX ADMIN — HEPARIN SODIUM 5000 UNITS: 5000 INJECTION INTRAVENOUS; SUBCUTANEOUS at 21:44

## 2023-12-28 RX ADMIN — HEPARIN SODIUM 5000 UNITS: 5000 INJECTION INTRAVENOUS; SUBCUTANEOUS at 15:43

## 2023-12-28 RX ADMIN — CHOLESTYRAMINE 4 G: 4 POWDER, FOR SUSPENSION ORAL at 21:44

## 2023-12-28 RX ADMIN — CARVEDILOL 6.25 MG: 3.12 TABLET, FILM COATED ORAL at 17:00

## 2023-12-28 RX ADMIN — SUCRALFATE 1 G: 1 TABLET ORAL at 17:00

## 2023-12-28 RX ADMIN — LEVOTHYROXINE SODIUM 100 MCG: 0.1 TABLET ORAL at 04:55

## 2023-12-28 RX ADMIN — MAGNESIUM SULFATE HEPTAHYDRATE 2000 MG: 40 INJECTION, SOLUTION INTRAVENOUS at 11:55

## 2023-12-28 RX ADMIN — POTASSIUM CHLORIDE, DEXTROSE MONOHYDRATE AND SODIUM CHLORIDE: 150; 5; 450 INJECTION, SOLUTION INTRAVENOUS at 17:46

## 2023-12-28 RX ADMIN — CARVEDILOL 6.25 MG: 3.12 TABLET, FILM COATED ORAL at 10:01

## 2023-12-28 RX ADMIN — MAGNESIUM GLUCONATE 500 MG ORAL TABLET 400 MG: 500 TABLET ORAL at 21:44

## 2023-12-28 RX ADMIN — SUCRALFATE 1 G: 1 TABLET ORAL at 11:42

## 2023-12-28 RX ADMIN — HYDROMORPHONE HYDROCHLORIDE 0.25 MG: 1 INJECTION, SOLUTION INTRAMUSCULAR; INTRAVENOUS; SUBCUTANEOUS at 15:40

## 2023-12-28 RX ADMIN — CHOLESTYRAMINE 4 G: 4 POWDER, FOR SUSPENSION ORAL at 10:01

## 2023-12-28 RX ADMIN — HEPARIN SODIUM 5000 UNITS: 5000 INJECTION INTRAVENOUS; SUBCUTANEOUS at 05:30

## 2023-12-28 RX ADMIN — DULOXETINE HYDROCHLORIDE 60 MG: 30 CAPSULE, DELAYED RELEASE ORAL at 10:01

## 2023-12-28 RX ADMIN — SODIUM CHLORIDE, PRESERVATIVE FREE 40 MG: 5 INJECTION INTRAVENOUS at 10:02

## 2023-12-28 RX ADMIN — MAGNESIUM SULFATE HEPTAHYDRATE 2000 MG: 40 INJECTION, SOLUTION INTRAVENOUS at 17:05

## 2023-12-28 RX ADMIN — SUCRALFATE 1 G: 1 TABLET ORAL at 04:54

## 2023-12-28 ASSESSMENT — PAIN DESCRIPTION - LOCATION: LOCATION: ABDOMEN

## 2023-12-28 ASSESSMENT — PAIN SCALES - GENERAL
PAINLEVEL_OUTOF10: 1
PAINLEVEL_OUTOF10: 8

## 2023-12-28 ASSESSMENT — PAIN DESCRIPTION - DESCRIPTORS: DESCRIPTORS: ACHING

## 2023-12-28 ASSESSMENT — PAIN - FUNCTIONAL ASSESSMENT: PAIN_FUNCTIONAL_ASSESSMENT: ACTIVITIES ARE NOT PREVENTED

## 2023-12-28 NOTE — PROGRESS NOTES
Hospitalist Progress Note   Admit Date:  2023  7:58 PM   Name:  Pramod Weaver   Age:  75 y.o. Sex:  female  :  1955   MRN:  [id number]   Room:  [facility]/    Presenting/Chief Complaint: Emesis, Nausea, Diarrhea, and Dehydration     Reason(s) for Admission: Hypovolemic shock ([address]) [R57.1]  Septicemia [facility]) [A41.9]     Hospital Course:   Ms. Karan Irizarry is a 74 y/o WF with a h/o obesity status post Jesica-en-Y 2023 at [facility], JEREMI not on CPAP due to poor tolerance who was admitted to our service on  with suspected hypovolemic shock. She presented with N/V and hypotension. Initial BP 84/66, K 2.9, Cr 1.6 (baseline closer to 1), Mg 1.5, LA 2.3 and a WBC of 13.6, hemoglobin of 15.7. General surgery was consulted. Gastrograffin swallow showed free flow of contrast from esophagus to remnant stomach and duodenum. General surgery consulted gastroenterology who performed an EGD on  with finding of a clean-based ulcer at the gastrojejunal anastomosis. Subjective & 24hr Events: Tolerating PO better, still some intermittent cramping. Mg and K being replaced. Bowel movements are less today, lab would not run cdiff yesterday because it didn't meet criteria. Assessment & Plan:   # Intractable N/V              - Resolved. General Surgery following given recent Jesica-en-Y bypass surgery at [facility] 2023. Gastrograffin swallow study reviewed. EGD  with ulcer at gastrojejunal anastomosis. Con't PPI and sucralfate. Replacing electrolytes . # HypoMg // hypoK              - IV mag, PO Kcl along with Kcl in fluids. Start PO mag tonight, PO Kcl tomorrow pending repeat levels. # Hypovolemic shock                - Resolved. Due to poor PO intake and excessive GI losses. # ZEUS              - Resolved.      # MDD              - Con't Cymbalta     # HTN              - Resume carvedilol     # Peripheral neuropathy              - No longer on gabapentin     # DM2              - SSI     # lispro (HUMALOG) injection vial 0-4 Units  0-4 Units SubCUTAneous TID WC    insulin lispro (HUMALOG) injection vial 0-4 Units  0-4 Units SubCUTAneous Nightly    glucose chewable tablet 16 g  4 tablet Oral PRN    dextrose bolus 10% 125 mL  125 mL IntraVENous PRN    Or    dextrose bolus 10% 250 mL  250 mL IntraVENous PRN    Glucagon Emergency KIT 1 mg  1 mg SubCUTAneous PRN    dextrose 10 % infusion   IntraVENous Continuous PRN    levothyroxine (SYNTHROID) tablet 100 mcg  100 mcg Oral Daily    ondansetron (ZOFRAN) injection 4 mg  4 mg IntraVENous Q6H PRN    polyethylene glycol (GLYCOLAX) packet 17 g  17 g Oral Daily PRN    senna (SENOKOT) tablet 17.2 mg  2 tablet Oral Nightly PRN    melatonin tablet 1.5 mg  1.5 mg Oral Nightly PRN    aluminum & magnesium hydroxide-simethicone (MAALOX) 200-200-20 MG/5ML suspension 30 mL  30 mL Oral Q6H PRN    traZODone (DESYREL) tablet 50 mg  50 mg Oral Nightly PRN    ondansetron (ZOFRAN-ODT) disintegrating tablet 4 mg  4 mg Oral Q8H PRN    LORazepam (ATIVAN) tablet 0.5 mg  0.5 mg Oral Q6H PRN    potassium chloride (KLOR-CON M) extended release tablet 40 mEq  40 mEq Oral PRN    Or    potassium bicarb-citric acid (EFFER-K) effervescent tablet 40 mEq  40 mEq Oral PRN    Or    potassium chloride 10 mEq/100 mL IVPB (Peripheral Line)  10 mEq IntraVENous PRN    magnesium sulfate 2000 mg in 50 mL IVPB premix  2,000 mg IntraVENous PRN    HYDROmorphone HCl PF (DILAUDID) injection 0.25 mg  0.25 mg IntraVENous Q4H PRN    oxyCODONE (ROXICODONE) immediate release tablet 5 mg  5 mg Oral Q6H PRN    acetaminophen (TYLENOL) tablet 650 mg  650 mg Oral Q6H PRN    heparin (porcine) injection 5,000 Units  5,000 Units SubCUTAneous 3 times per day       Signed:  Garrett Calle MD    Part of this note may have been written by using a voice dictation software.  The note has been proof read but may still contain some grammatical/other typographical errors.

## 2023-12-28 NOTE — PROGRESS NOTES
Pt denies needs at this time. Bed in low position, locked and call light/personal items within reach. Pt c/o abd pain once during shift. PRN meds administered with relief noted. New orders placed for stool studies. Pt had a smear formed bowel movement earlier during shift but it was not enough to send down for sampling. Pt informed to notify staff next time she has one. No c/o nausea/vomiting or diarrhea today. Pt receiving IV electrolyte replacement and tolerating well. Will continue to monitor and report to night shift nurse.

## 2023-12-28 NOTE — PROGRESS NOTES
Up ad geraldine. No c/o nausea. States diarrhea continues having several unobserved Bms. IVF infusing.

## 2023-12-28 NOTE — PROGRESS NOTES
Seen, examined bedside. Pt feeling much better. Tolerating diet. No nausea or vomiting. Still experiencing some intermittent cramping and flushing. Stools have significantly decreased and are now more formed. Physical Exam    Constitutional: Obese, Alert, No acute distress   ENMT: no external lesions' gross hearing normal; no obvious neck masses, no ear or lip lesions, nares normal  CV: RRR. Normal perfusion  Resp: No JVD. Breathing is  non-labored; no audible wheezing. GI: soft and non-distended, non-tender     Musculoskeletal: unremarkable with normal function. No embolic signs or cyanosis. Neuro:  Oriented; moves all 4; no focal deficits  Psychiatric: normal affect and mood, no memory impairment           Labs, Imaging reviewed           Plan:  Care Management per Hospitalist  General Surgery Following   --Protonix BID  --Carafate QID  --Replace lytes prn  --Scopolamine patch  --Stool for Cdiff canceled by ID didn't meet criteria  GI  --Cholestyramine BID  --Anti dumping diet  --Imodium prn  --No additional GI needs at this time. Will sign off. Thank you for the consult. Please call for questions or concerns.   --F/u with Dr Elizabeth Sun to discuss covering the ulcer that was seen on EGD yesterday with ProStat to help it heal faster per Dr Faith Ramos (her surgeon at [facility] request)     ELMA Patiño-BC

## 2023-12-29 LAB
ANION GAP SERPL CALC-SCNC: 2 MMOL/L (ref 2–11)
BUN SERPL-MCNC: 5 MG/DL (ref 8–23)
C DIFF GDH STL QL: NORMAL
C DIFF TOX A+B STL QL IA: NORMAL
C DIFF TOXIN INTERPRETATION: NORMAL
CALCIUM SERPL-MCNC: 8.2 MG/DL (ref 8.3–10.4)
CHLORIDE SERPL-SCNC: 113 MMOL/L (ref 103–113)
CLINICAL CONSIDERATION: NORMAL
CO2 SERPL-SCNC: 24 MMOL/L (ref 21–32)
CREAT SERPL-MCNC: 0.8 MG/DL (ref 0.6–1)
GLUCOSE BLD STRIP.AUTO-MCNC: 71 MG/DL (ref 65–100)
GLUCOSE BLD STRIP.AUTO-MCNC: 76 MG/DL (ref 65–100)
GLUCOSE BLD STRIP.AUTO-MCNC: 78 MG/DL (ref 65–100)
GLUCOSE BLD STRIP.AUTO-MCNC: 92 MG/DL (ref 65–100)
GLUCOSE SERPL-MCNC: 110 MG/DL (ref 65–100)
MAGNESIUM SERPL-MCNC: 2.5 MG/DL (ref 1.8–2.4)
POTASSIUM SERPL-SCNC: 3.9 MMOL/L (ref 3.5–5.1)
REFLEX: NORMAL
SERVICE CMNT-IMP: NORMAL
SODIUM SERPL-SCNC: 139 MMOL/L (ref 136–146)
VIT B1 BLD-SCNC: 45.8 NMOL/L (ref 66.5–200)

## 2023-12-29 PROCEDURE — 6370000000 HC RX 637 (ALT 250 FOR IP): Performed by: INTERNAL MEDICINE

## 2023-12-29 PROCEDURE — 2500000003 HC RX 250 WO HCPCS: Performed by: NURSE PRACTITIONER

## 2023-12-29 PROCEDURE — 99221 1ST HOSP IP/OBS SF/LOW 40: CPT | Performed by: SURGERY

## 2023-12-29 PROCEDURE — 80048 BASIC METABOLIC PNL TOTAL CA: CPT

## 2023-12-29 PROCEDURE — 82962 GLUCOSE BLOOD TEST: CPT

## 2023-12-29 PROCEDURE — 83735 ASSAY OF MAGNESIUM: CPT

## 2023-12-29 PROCEDURE — 6370000000 HC RX 637 (ALT 250 FOR IP): Performed by: SURGERY

## 2023-12-29 PROCEDURE — 1100000000 HC RM PRIVATE

## 2023-12-29 PROCEDURE — 6360000002 HC RX W HCPCS: Performed by: STUDENT IN AN ORGANIZED HEALTH CARE EDUCATION/TRAINING PROGRAM

## 2023-12-29 PROCEDURE — 6370000000 HC RX 637 (ALT 250 FOR IP): Performed by: STUDENT IN AN ORGANIZED HEALTH CARE EDUCATION/TRAINING PROGRAM

## 2023-12-29 PROCEDURE — 36415 COLL VENOUS BLD VENIPUNCTURE: CPT

## 2023-12-29 RX ORDER — DIPHENHYDRAMINE HCL 25 MG
25 CAPSULE ORAL EVERY 6 HOURS PRN
Status: DISCONTINUED | OUTPATIENT
Start: 2023-12-29 | End: 2023-12-30 | Stop reason: HOSPADM

## 2023-12-29 RX ORDER — HYOSCYAMINE SULFATE 0.5 MG/ML
500 INJECTION, SOLUTION SUBCUTANEOUS EVERY 6 HOURS PRN
Status: DISCONTINUED | OUTPATIENT
Start: 2023-12-29 | End: 2023-12-29 | Stop reason: ALTCHOICE

## 2023-12-29 RX ORDER — BENZOCAINE/MENTHOL 6 MG-10 MG
LOZENGE MUCOUS MEMBRANE 2 TIMES DAILY
Status: DISCONTINUED | OUTPATIENT
Start: 2023-12-29 | End: 2023-12-30 | Stop reason: HOSPADM

## 2023-12-29 RX ADMIN — PANTOPRAZOLE SODIUM 40 MG: 40 TABLET, DELAYED RELEASE ORAL at 06:05

## 2023-12-29 RX ADMIN — SUCRALFATE 1 G: 1 TABLET ORAL at 06:06

## 2023-12-29 RX ADMIN — MAGNESIUM GLUCONATE 500 MG ORAL TABLET 400 MG: 500 TABLET ORAL at 09:44

## 2023-12-29 RX ADMIN — OXYCODONE HYDROCHLORIDE 5 MG: 5 TABLET ORAL at 06:06

## 2023-12-29 RX ADMIN — HEPARIN SODIUM 5000 UNITS: 5000 INJECTION INTRAVENOUS; SUBCUTANEOUS at 15:07

## 2023-12-29 RX ADMIN — DIPHENHYDRAMINE HYDROCHLORIDE 25 MG: 25 CAPSULE ORAL at 18:11

## 2023-12-29 RX ADMIN — SUCRALFATE 1 G: 1 TABLET ORAL at 00:23

## 2023-12-29 RX ADMIN — CHOLESTYRAMINE 4 G: 4 POWDER, FOR SUSPENSION ORAL at 09:45

## 2023-12-29 RX ADMIN — CHOLESTYRAMINE 4 G: 4 POWDER, FOR SUSPENSION ORAL at 20:52

## 2023-12-29 RX ADMIN — LEVOTHYROXINE SODIUM 100 MCG: 0.1 TABLET ORAL at 06:06

## 2023-12-29 RX ADMIN — SUCRALFATE 1 G: 1 TABLET ORAL at 22:49

## 2023-12-29 RX ADMIN — PANTOPRAZOLE SODIUM 40 MG: 40 TABLET, DELAYED RELEASE ORAL at 15:07

## 2023-12-29 RX ADMIN — SUCRALFATE 1 G: 1 TABLET ORAL at 18:11

## 2023-12-29 RX ADMIN — OXYCODONE HYDROCHLORIDE 5 MG: 5 TABLET ORAL at 11:50

## 2023-12-29 RX ADMIN — ROSUVASTATIN CALCIUM 5 MG: 10 TABLET, FILM COATED ORAL at 20:52

## 2023-12-29 RX ADMIN — CARVEDILOL 6.25 MG: 3.12 TABLET, FILM COATED ORAL at 09:44

## 2023-12-29 RX ADMIN — POTASSIUM CHLORIDE, DEXTROSE MONOHYDRATE AND SODIUM CHLORIDE: 150; 5; 450 INJECTION, SOLUTION INTRAVENOUS at 06:13

## 2023-12-29 RX ADMIN — CARVEDILOL 6.25 MG: 3.12 TABLET, FILM COATED ORAL at 18:11

## 2023-12-29 RX ADMIN — HEPARIN SODIUM 5000 UNITS: 5000 INJECTION INTRAVENOUS; SUBCUTANEOUS at 06:06

## 2023-12-29 RX ADMIN — DULOXETINE HYDROCHLORIDE 60 MG: 30 CAPSULE, DELAYED RELEASE ORAL at 09:45

## 2023-12-29 RX ADMIN — HEPARIN SODIUM 5000 UNITS: 5000 INJECTION INTRAVENOUS; SUBCUTANEOUS at 20:52

## 2023-12-29 RX ADMIN — SUCRALFATE 1 G: 1 TABLET ORAL at 11:50

## 2023-12-29 RX ADMIN — HYDROCORTISONE: 1 CREAM TOPICAL at 20:53

## 2023-12-29 RX ADMIN — HYDROCORTISONE: 1 CREAM TOPICAL at 15:06

## 2023-12-29 RX ADMIN — HYOSCYAMINE SULFATE 125 MCG: 0.12 TABLET ORAL; SUBLINGUAL at 09:55

## 2023-12-29 ASSESSMENT — PAIN SCALES - GENERAL
PAINLEVEL_OUTOF10: 6
PAINLEVEL_OUTOF10: 5
PAINLEVEL_OUTOF10: 5
PAINLEVEL_OUTOF10: 0

## 2023-12-29 ASSESSMENT — PAIN - FUNCTIONAL ASSESSMENT
PAIN_FUNCTIONAL_ASSESSMENT: PREVENTS OR INTERFERES SOME ACTIVE ACTIVITIES AND ADLS
PAIN_FUNCTIONAL_ASSESSMENT: ACTIVITIES ARE NOT PREVENTED

## 2023-12-29 ASSESSMENT — PAIN DESCRIPTION - DESCRIPTORS
DESCRIPTORS: CRAMPING
DESCRIPTORS: ACHING;DISCOMFORT
DESCRIPTORS: ACHING;CRAMPING
DESCRIPTORS: CRAMPING;ACHING

## 2023-12-29 ASSESSMENT — PAIN DESCRIPTION - ORIENTATION
ORIENTATION: MID;LOWER
ORIENTATION: LOWER

## 2023-12-29 ASSESSMENT — PAIN DESCRIPTION - LOCATION
LOCATION: ABDOMEN

## 2023-12-29 NOTE — CARE COORDINATION
Patient chart reviewed for continued stay. Per primary MD, awaiting general surgery clearance for discharge. Patient will discharge home with no CM needs when medically ready. Will continue to follow patient's plan of care and assist further with supportive care needs as appropriate.

## 2023-12-29 NOTE — PROGRESS NOTES
Hospitalist Progress Note   Admit Date:  2023  7:58 PM   Name:  Wilder El   Age:  75 y.o. Sex:  female  :  1955   MRN:  [id number]   Room:  Replaced by [facility]/    Presenting/Chief Complaint: Emesis, Nausea, Diarrhea, and Dehydration     Reason(s) for Admission: Hypovolemic shock ([address]) [R57.1]  Septicemia [facility]) [A41.9]     Hospital Course:   Ms. Luann Chin is a 74 y/o WF with a h/o obesity status post Jesica-en-Y 2023 at [facility], JEREMI not on CPAP due to poor tolerance who was admitted to our service on  with suspected hypovolemic shock. She presented with N/V and hypotension. Initial BP 84/66, K 2.9, Cr 1.6 (baseline closer to 1), Mg 1.5, LA 2.3 and a WBC of 13.6, hemoglobin of 15.7. General surgery was consulted. Gastrograffin swallow showed free flow of contrast from esophagus to remnant stomach and duodenum. General surgery consulted gastroenterology who performed an EGD on  with finding of a clean-based ulcer at the gastrojejunal anastomosis. Subjective & 24hr Events:   Up to edge of bed, ate a little food her daughter brought. She c/o leg and belly swelling. Passing gas but no BM today. Lytes normalized. Thinks she had a reaction to something bc her face is flushed/red. Assessment & Plan:   # Intractable N/V              - Resolved. General Surgery following given recent Jesica-en-Y bypass surgery at [facility] 2023. Gastrograffin swallow study reviewed. EGD  with ulcer at gastrojejunal anastomosis. Con't PPI and sucralfate. K and Mg normal , message sent to Surgery about that and patient's desire for discharge since her bday is tomorrow, awaiting to hear back. Stop IVFs with swelling. # HypoMg // hypoK              - Normal on today's labs, stop fluids due to swelling as noted above. # Hypovolemic shock                - Resolved. Due to poor PO intake and excessive GI losses. # ZEUS              - Resolved.      # MDD              - Con't Cymbalta     # HTN

## 2023-12-29 NOTE — PROGRESS NOTES
Hourly rounds performed this shift. Bed rails up x2, bed set in lowest position, locked, and call bell within reach. All needs met at this time. Pt has yet to have bowel movement substantial enough for stool sample.

## 2023-12-30 VITALS
OXYGEN SATURATION: 100 % | RESPIRATION RATE: 17 BRPM | HEART RATE: 69 BPM | BODY MASS INDEX: 39.22 KG/M2 | DIASTOLIC BLOOD PRESSURE: 68 MMHG | TEMPERATURE: 98 F | WEIGHT: 264.77 LBS | SYSTOLIC BLOOD PRESSURE: 107 MMHG | HEIGHT: 69 IN

## 2023-12-30 PROBLEM — E87.6 HYPOKALEMIA DUE TO EXCESSIVE GASTROINTESTINAL LOSS OF POTASSIUM: Status: RESOLVED | Noted: 2023-12-28 | Resolved: 2023-12-30

## 2023-12-30 PROBLEM — E83.42 HYPOMAGNESEMIA: Status: RESOLVED | Noted: 2023-12-28 | Resolved: 2023-12-30

## 2023-12-30 LAB
ANION GAP SERPL CALC-SCNC: 4 MMOL/L (ref 2–11)
BUN SERPL-MCNC: 6 MG/DL (ref 8–23)
CALCIUM SERPL-MCNC: 8.9 MG/DL (ref 8.3–10.4)
CHLORIDE SERPL-SCNC: 111 MMOL/L (ref 103–113)
CO2 SERPL-SCNC: 25 MMOL/L (ref 21–32)
CREAT SERPL-MCNC: 0.8 MG/DL (ref 0.6–1)
GLUCOSE BLD STRIP.AUTO-MCNC: 87 MG/DL (ref 65–100)
GLUCOSE BLD STRIP.AUTO-MCNC: 89 MG/DL (ref 65–100)
GLUCOSE SERPL-MCNC: 101 MG/DL (ref 65–100)
MAGNESIUM SERPL-MCNC: 2 MG/DL (ref 1.8–2.4)
POTASSIUM SERPL-SCNC: 3.8 MMOL/L (ref 3.5–5.1)
SERVICE CMNT-IMP: NORMAL
SERVICE CMNT-IMP: NORMAL
SODIUM SERPL-SCNC: 140 MMOL/L (ref 136–146)

## 2023-12-30 PROCEDURE — 6370000000 HC RX 637 (ALT 250 FOR IP): Performed by: INTERNAL MEDICINE

## 2023-12-30 PROCEDURE — 6360000002 HC RX W HCPCS: Performed by: STUDENT IN AN ORGANIZED HEALTH CARE EDUCATION/TRAINING PROGRAM

## 2023-12-30 PROCEDURE — 99221 1ST HOSP IP/OBS SF/LOW 40: CPT | Performed by: SURGERY

## 2023-12-30 PROCEDURE — 6370000000 HC RX 637 (ALT 250 FOR IP): Performed by: STUDENT IN AN ORGANIZED HEALTH CARE EDUCATION/TRAINING PROGRAM

## 2023-12-30 PROCEDURE — 82962 GLUCOSE BLOOD TEST: CPT

## 2023-12-30 PROCEDURE — 83735 ASSAY OF MAGNESIUM: CPT

## 2023-12-30 PROCEDURE — 80048 BASIC METABOLIC PNL TOTAL CA: CPT

## 2023-12-30 PROCEDURE — 36415 COLL VENOUS BLD VENIPUNCTURE: CPT

## 2023-12-30 RX ORDER — SCOLOPAMINE TRANSDERMAL SYSTEM 1 MG/1
1 PATCH, EXTENDED RELEASE TRANSDERMAL
Qty: 5 PATCH | Refills: 0 | Status: SHIPPED | OUTPATIENT
Start: 2023-12-31

## 2023-12-30 RX ORDER — ONDANSETRON 4 MG/1
4 TABLET, ORALLY DISINTEGRATING ORAL EVERY 8 HOURS PRN
Qty: 30 TABLET | Refills: 0 | Status: SHIPPED | OUTPATIENT
Start: 2023-12-30

## 2023-12-30 RX ORDER — PANTOPRAZOLE SODIUM 40 MG/1
40 TABLET, DELAYED RELEASE ORAL
Qty: 30 TABLET | Refills: 1 | Status: SHIPPED | OUTPATIENT
Start: 2023-12-30

## 2023-12-30 RX ORDER — SUCRALFATE ORAL 1 G/10ML
1 SUSPENSION ORAL 3 TIMES DAILY
Qty: 1200 ML | Refills: 1 | Status: SHIPPED | OUTPATIENT
Start: 2023-12-30

## 2023-12-30 RX ADMIN — MAGNESIUM GLUCONATE 500 MG ORAL TABLET 400 MG: 500 TABLET ORAL at 09:08

## 2023-12-30 RX ADMIN — SUCRALFATE 1 G: 1 TABLET ORAL at 11:24

## 2023-12-30 RX ADMIN — CARVEDILOL 6.25 MG: 3.12 TABLET, FILM COATED ORAL at 09:08

## 2023-12-30 RX ADMIN — PANTOPRAZOLE SODIUM 40 MG: 40 TABLET, DELAYED RELEASE ORAL at 05:27

## 2023-12-30 RX ADMIN — LEVOTHYROXINE SODIUM 100 MCG: 0.1 TABLET ORAL at 05:27

## 2023-12-30 RX ADMIN — CHOLESTYRAMINE 4 G: 4 POWDER, FOR SUSPENSION ORAL at 09:08

## 2023-12-30 RX ADMIN — HYDROCORTISONE: 1 CREAM TOPICAL at 09:11

## 2023-12-30 RX ADMIN — HEPARIN SODIUM 5000 UNITS: 5000 INJECTION INTRAVENOUS; SUBCUTANEOUS at 05:27

## 2023-12-30 RX ADMIN — SUCRALFATE 1 G: 1 TABLET ORAL at 05:27

## 2023-12-30 RX ADMIN — DULOXETINE HYDROCHLORIDE 60 MG: 30 CAPSULE, DELAYED RELEASE ORAL at 09:07

## 2023-12-30 ASSESSMENT — PAIN SCALES - GENERAL: PAINLEVEL_OUTOF10: 0

## 2023-12-30 NOTE — PROGRESS NOTES
General Surgery Progress Note    12/30/2023    Admit Date: 12/23/2023    Subjective:   Surgery     The patient feels much better today. She denies nausea or vomiting at present. Diarrhea is better. Potassium and magnesium are normal. She would like to go home as it is her birthday.     Objective:     /73   Pulse 67   Temp 97.9 °F (36.6 °C) (Oral)   Resp 17   Ht 1.753 m (5' 9\")   Wt 120.1 kg (264 lb 12.4 oz)   SpO2 99%   BMI 39.10 kg/m²       Intake/Output Summary (Last 24 hours) at 12/30/2023 0944  Last data filed at 12/30/2023 0306  Gross per 24 hour   Intake 480 ml   Output --   Net 480 ml        EXAM:  ABD soft, no tenderness, active BS'S       Data Review    Recent Results (from the past 24 hour(s))   POCT Glucose    Collection Time: 12/29/23 11:41 AM   Result Value Ref Range    POC Glucose 71 65 - 100 mg/dL    Performed by: [employer]    POCT Glucose    Collection Time: 12/29/23  4:47 PM   Result Value Ref Range    POC Glucose 92 65 - 100 mg/dL    Performed by: [employer]    POCT Glucose    Collection Time: 12/29/23  8:35 PM   Result Value Ref Range    POC Glucose 76 65 - 100 mg/dL    Performed by: Elvie    Basic Metabolic Panel    Collection Time: 12/30/23  5:24 AM   Result Value Ref Range    Sodium 140 136 - 146 mmol/L    Potassium 3.8 3.5 - 5.1 mmol/L    Chloride 111 103 - 113 mmol/L    CO2 25 21 - 32 mmol/L    Anion Gap 4 2 - 11 mmol/L    Glucose 101 (H) 65 - 100 mg/dL    BUN 6 (L) 8 - 23 MG/DL    Creatinine 0.80 0.6 - 1.0 MG/DL    Est, Glom Filt Rate >60 >60 ml/min/1.73m2    Calcium 8.9 8.3 - 10.4 MG/DL   Magnesium    Collection Time: 12/30/23  5:24 AM   Result Value Ref Range    Magnesium 2.0 1.8 - 2.4 mg/dL   POCT Glucose    Collection Time: 12/30/23  8:00 AM   Result Value Ref Range    POC Glucose 89 65 - 100 mg/dL    Performed by: Margareth         Principal Problem (Resolved):    Hypovolemic shock (HCC)  Active Problems:    Marginal  ulcer    Hypokalemia due to excessive gastrointestinal loss of potassium    Hypomagnesemia    Hypothyroidism    MDD (major depressive disorder)    DM2 (diabetes mellitus, type 2) ([facility])  Resolved Problems:    ZEUS (acute kidney injury) ([address])        Plan: 1. Clear for discharge from a surgical point of view  2. Would discharge her on:       A) Protonix 40 mg BID for 6 weeks       B) Carafate slurry 1 gram TID       C) Scopolamine patch for 2 weeks       D) Bariatric soft diet. She should have instructions on this from Dr. Flory Miller. 3. I will have to follow her for bariatrics as her insurance is no longer accepted at [facility]. She can see me in 2 weeks, [phone]. 4. Dr. Blank Ventura is planning on repeating an EGD and administering ProStat at that time on 1/16/24.   Clarissa Nichole MD.

## 2023-12-30 NOTE — CARE COORDINATION
Pt is for discharge home today with family and no needs/supportive care orders recieved for CM at this time. 12/26/23 1148   Service Assessment   Patient Orientation Alert and Oriented;Person;Place;Situation   Cognition Alert   History Provided By Patient   Primary Caregiver Self   Accompanied By/Relationship N/A   Support Systems Children;Family Members;Friends/Neighbors   Patient's Healthcare Decision Maker is: Legal Next of Kin   PCP Verified by CM Yes   Last Visit to PCP Within last 3 months   Prior Functional Level Independent in ADLs/IADLs   Current Functional Level Independent in ADLs/IADLs   Can patient return to prior living arrangement Yes   Ability to make needs known: Good   Family able to assist with home care needs: Yes   Would you like for me to discuss the discharge plan with any other family members/significant others, and if so, who? Yes  (daughter-Karin)   Financial Resources Medicare   Community Resources None   Social/Functional History   Type of [address] [place] [address]  One level   Home Access Level entry   [address] [facility] [address]   [address] [facility] None   ADL Assistance Independent   Homemaking Assistance Independent   Ambulation Assistance Independent   Transfer Assistance Independent   Active  Yes   Mode of Transportation Car   Occupation Retired   Discharge Planning   Type of [address] [place] [address] Dr Prior To Admission None   Potential Assistance Needed N/A   DME Ordered? No   Potential Assistance Purchasing Medications No   Type of Home Care Services None   One/Two Story Residence One story   History of falls? 0   Services At/After Discharge   Transition of Care Consult (CM Consult) Discharge [facility] Discharge None    Resource Information Provided?  No   Mode of Transport at Discharge Other (see comment)  (Family

## 2023-12-30 NOTE — PROGRESS NOTES
Pt resting in bed. Completed hourly rounds. Pt's bed low and locked. Call light and personal items within pt's reach. Pt denies needs at this time.  End of shift report given to night shift RN

## 2024-01-15 ENCOUNTER — ANESTHESIA EVENT (OUTPATIENT)
Dept: ENDOSCOPY | Age: 69
End: 2024-01-15
Payer: MEDICARE

## 2024-01-15 RX ORDER — GABAPENTIN 100 MG/1
100 CAPSULE ORAL EVERY 12 HOURS
COMMUNITY

## 2024-01-15 RX ORDER — LOSARTAN POTASSIUM 100 MG/1
12.5 TABLET ORAL DAILY
COMMUNITY

## 2024-01-15 RX ORDER — OMEPRAZOLE 20 MG/1
20 CAPSULE, DELAYED RELEASE ORAL 2 TIMES DAILY
COMMUNITY

## 2024-01-15 NOTE — PRE-PROCEDURE INSTRUCTIONS
Patient verified name, , and procedure.    Type: 1a; abbreviated assessment per anesthesia guidelines  Labs per surgeon: none  Labs per anesthesia: none      Instructed pt that they will be notified by the Gi Lab for time of arrival. If any questions please call the GI lab at [phone].    Follow diet and prep instructions per office. May have clear liquids until 4 hours prior to time of arrival.    Bath or shower the night before and the am of surgery with antibacterial soap. No lotions, oils, powders, cologne on skin. No make up, eye make up or jewelry. Wear loose fitting comfortable, clean clothing.     Must have adult present in building the entire time .     Medications for the day of procedure Duloxetine,Gabapentin, Levothyroxine, Protonix leave Scopolamine and Omeprazole    Hold Losartan, and Potassium day of procedure

## 2024-01-16 ENCOUNTER — ANESTHESIA (OUTPATIENT)
Dept: ENDOSCOPY | Age: 69
End: 2024-01-16
Payer: MEDICARE

## 2024-01-16 ENCOUNTER — HOSPITAL ENCOUNTER (OUTPATIENT)
Age: 69
Setting detail: OUTPATIENT SURGERY
Discharge: HOME | End: 2024-01-16
Attending: INTERNAL MEDICINE | Admitting: INTERNAL MEDICINE
Payer: MEDICARE

## 2024-01-16 VITALS
TEMPERATURE: 98.6 F | RESPIRATION RATE: 18 BRPM | WEIGHT: 236 LBS | HEIGHT: 69 IN | OXYGEN SATURATION: 98 % | HEART RATE: 68 BPM | DIASTOLIC BLOOD PRESSURE: 65 MMHG | BODY MASS INDEX: 34.96 KG/M2 | SYSTOLIC BLOOD PRESSURE: 144 MMHG

## 2024-01-16 PROCEDURE — 2580000003 HC RX 258: Performed by: NURSE ANESTHETIST, CERTIFIED REGISTERED

## 2024-01-16 PROCEDURE — 3700000000 HC ANESTHESIA ATTENDED CARE: Performed by: INTERNAL MEDICINE

## 2024-01-16 PROCEDURE — 7100000010 HC PHASE II RECOVERY - FIRST 15 MIN: Performed by: INTERNAL MEDICINE

## 2024-01-16 PROCEDURE — 2500000003 HC RX 250 WO HCPCS: Performed by: NURSE ANESTHETIST, CERTIFIED REGISTERED

## 2024-01-16 PROCEDURE — 43235 EGD DIAGNOSTIC BRUSH WASH: CPT | Performed by: INTERNAL MEDICINE

## 2024-01-16 PROCEDURE — 7100000011 HC PHASE II RECOVERY - ADDTL 15 MIN: Performed by: INTERNAL MEDICINE

## 2024-01-16 PROCEDURE — 6360000002 HC RX W HCPCS: Performed by: NURSE ANESTHETIST, CERTIFIED REGISTERED

## 2024-01-16 PROCEDURE — 3609017100 HC EGD: Performed by: INTERNAL MEDICINE

## 2024-01-16 PROCEDURE — 2709999900 HC NON-CHARGEABLE SUPPLY: Performed by: INTERNAL MEDICINE

## 2024-01-16 RX ORDER — SODIUM CHLORIDE 0.9 % (FLUSH) 0.9 %
5-40 SYRINGE (ML) INJECTION PRN
Status: DISCONTINUED | OUTPATIENT
Start: 2024-01-16 | End: 2024-01-16 | Stop reason: HOSPADM

## 2024-01-16 RX ORDER — LIDOCAINE HYDROCHLORIDE 20 MG/ML
INJECTION, SOLUTION EPIDURAL; INFILTRATION; INTRACAUDAL; PERINEURAL PRN
Status: DISCONTINUED | OUTPATIENT
Start: 2024-01-16 | End: 2024-01-16 | Stop reason: SDUPTHER

## 2024-01-16 RX ORDER — SODIUM CHLORIDE 0.9 % (FLUSH) 0.9 %
5-40 SYRINGE (ML) INJECTION EVERY 12 HOURS SCHEDULED
Status: DISCONTINUED | OUTPATIENT
Start: 2024-01-16 | End: 2024-01-16 | Stop reason: HOSPADM

## 2024-01-16 RX ORDER — LIDOCAINE HYDROCHLORIDE 10 MG/ML
1 INJECTION, SOLUTION INFILTRATION; PERINEURAL
Status: DISCONTINUED | OUTPATIENT
Start: 2024-01-16 | End: 2024-01-16 | Stop reason: HOSPADM

## 2024-01-16 RX ORDER — PROPOFOL 10 MG/ML
INJECTION, EMULSION INTRAVENOUS PRN
Status: DISCONTINUED | OUTPATIENT
Start: 2024-01-16 | End: 2024-01-16 | Stop reason: SDUPTHER

## 2024-01-16 RX ORDER — SODIUM CHLORIDE, SODIUM LACTATE, POTASSIUM CHLORIDE, CALCIUM CHLORIDE 600; 310; 30; 20 MG/100ML; MG/100ML; MG/100ML; MG/100ML
INJECTION, SOLUTION INTRAVENOUS CONTINUOUS PRN
Status: DISCONTINUED | OUTPATIENT
Start: 2024-01-16 | End: 2024-01-16 | Stop reason: SDUPTHER

## 2024-01-16 RX ORDER — SODIUM CHLORIDE, SODIUM LACTATE, POTASSIUM CHLORIDE, CALCIUM CHLORIDE 600; 310; 30; 20 MG/100ML; MG/100ML; MG/100ML; MG/100ML
INJECTION, SOLUTION INTRAVENOUS CONTINUOUS
Status: DISCONTINUED | OUTPATIENT
Start: 2024-01-16 | End: 2024-01-16 | Stop reason: HOSPADM

## 2024-01-16 RX ORDER — SODIUM CHLORIDE 9 MG/ML
INJECTION, SOLUTION INTRAVENOUS PRN
Status: DISCONTINUED | OUTPATIENT
Start: 2024-01-16 | End: 2024-01-16 | Stop reason: HOSPADM

## 2024-01-16 RX ADMIN — SODIUM CHLORIDE, SODIUM LACTATE, POTASSIUM CHLORIDE, AND CALCIUM CHLORIDE: 600; 310; 30; 20 INJECTION, SOLUTION INTRAVENOUS at 14:25

## 2024-01-16 RX ADMIN — PROPOFOL 20 MG: 10 INJECTION, EMULSION INTRAVENOUS at 14:29

## 2024-01-16 RX ADMIN — PROPOFOL 80 MG: 10 INJECTION, EMULSION INTRAVENOUS at 14:28

## 2024-01-16 RX ADMIN — PROPOFOL 50 MG: 10 INJECTION, EMULSION INTRAVENOUS at 14:30

## 2024-01-16 RX ADMIN — LIDOCAINE HYDROCHLORIDE 50 MG: 20 INJECTION, SOLUTION EPIDURAL; INFILTRATION; INTRACAUDAL; PERINEURAL at 14:28

## 2024-01-16 ASSESSMENT — PAIN SCALES - GENERAL
PAINLEVEL_OUTOF10: 0

## 2024-01-16 NOTE — ANESTHESIA POSTPROCEDURE EVALUATION
Department of Anesthesiology  Postprocedure Note    Patient: Jesika Olsen  MRN: [id number]  YOB: 1955  Date of evaluation: 1/16/2024    Procedure Summary       Date: 01/16/24 Room / Location: [facility] ENDO 03 / [facility] ENDOSCOPY    Anesthesia Start: 1425 Anesthesia Stop: 1439    Procedure: EGD DIAGNOSTIC ONLY (Upper GI Region) Diagnosis:       Marginal ulcer      (Marginal ulcer [K28.9])    Surgeons: Davy Altamirano MD Responsible Provider: Garrett Richards Jr., MD    Anesthesia Type: TIVA ASA Status: 3            Anesthesia Type: TIVA    Shahla Phase I: Shahla Score: 10    Shahla Phase II:      Anesthesia Post Evaluation    Patient location during evaluation: PACU  Patient participation: complete - patient participated  Level of consciousness: awake  Pain score: 0  Airway patency: patent  Nausea & Vomiting: no nausea and no vomiting  Cardiovascular status: blood pressure returned to baseline and hemodynamically stable  Respiratory status: acceptable, spontaneous ventilation and nonlabored ventilation  Hydration status: euvolemic  Multimodal analgesia pain management approach  Pain management: adequate    No notable events documented.

## 2024-01-16 NOTE — OP NOTE
Procedure: EGD    Indication: GJ anastomotic ulcer    Date of Procedure: 1/16/2024    Patient profile: Refer to patient note in chart for documentation of history and physical    Providers: Davy Altamirano MD    Referring MD: Dr. Washington    PCP: aPulette Negrete MD    Medicines: Monitored anesthesia care    Complication: No immediate complications.     Estimated blood loss: Minimal    Procedure: After the risks including, but not limited to medication reaction, infection, bleeding, perforation, missed lesions, benefits and alternatives of the procedure were discussed with the patient and all questions were answered, informed consent was obtained. The gastroscope was passed under direct vision throughout the procedure, the patient's blood pressure pulse and oxygen saturation were monitored continuously. The scope was introduced through the mouth and advanced to the 2nd portion of the duodenum. The endoscopy was performed without difficulty. The patient tolerated the procedure well.       Findings:   --The adult gastroscope was introduced from the mouth and advanced through the esophagus to the GE junction.   --The esophagus was normal.  --The scope was advanced to the stomach where anatomy of prior gastric surgery was noted. (Jesica-en-Y).  --GJ anastomosis was noted. This normal  The scope was pulled back, and the procedure was subsequently ended.    Impression:  --GJ anastomotic ulcer appears healed from prior.   --normal exam  Recommendations:  -- Follow up in GI clinic in 4 weeks.    Davy Altamirano MD  Gastroenterology and Interventional Endoscopy

## 2024-01-16 NOTE — ANESTHESIA PRE PROCEDURE
\"LABRH\"    Drug/Infectious Status (If Applicable):  No results found for: \"HIV\", \"HEPCAB\"    COVID-19 Screening (If Applicable):   Lab Results   Component Value Date/Time    COVID19 Not detected 12/23/2023 10:00 PM           Anesthesia Evaluation  Patient summary reviewed and Nursing notes reviewed   no history of anesthetic complications:   Airway: Mallampati: III  TM distance: >3 FB   Neck ROM: full  Mouth opening: < 3 FB   Dental:    (+) edentulous      Pulmonary:normal exam  breath sounds clear to auscultation  (+)     sleep apnea:                                  Cardiovascular:  Exercise tolerance: no interval change  (+) hypertension:, hyperlipidemia        Rhythm: regular  Rate: normal                    Neuro/Psych:   (+) depression/anxiety             GI/Hepatic/Renal:            ROS comment: Jesica-en-Y gastric bypass in Sept 2023, diarrhea/intermittent nausea since.   Endo/Other:    (+) DiabetesType II DM.                 Abdominal:             Vascular:          Other Findings:           Anesthesia Plan      TIVA     ASA 3       Induction: intravenous.      Anesthetic plan and risks discussed with patient and child/children.                      HOLLY ARIAS MD   1/16/2024

## 2024-01-16 NOTE — H&P
Gastroenterology and Interventional Endoscopy Pre-procedure HPI    Date of visit   :  01/16/24      Patient name :  Jesika Olsen  YOB: 1955    HPI:    Patient is a 68 y.o. year old female, who has been referred for re-evaluation of GJ anastomotic ulcer. Plan for Purastat application if ulcer noted  ____________________      Past Medical History:  Reviewed, and in prior HPI,documentation    Surgical History:    Reviewed, and in prior HPI,documentation    Medications:    Reviewed, and in prior HPI,documentation    Allergies:  Allergies   Allergen Reactions    Aspirin Swelling     Rash    Levofloxacin Swelling     Also, itching    Penicillins Anaphylaxis    Ziprasidone Hcl Rash       Social History:    Reviewed, and in prior HPI,documentation    Family History:    Reviewed, and in prior HPI,documentation  Physical Exam:    There were no vitals filed for this visit.  Body mass index is 34.85 kg/m².  [unfilled]      Constitutional: Alert, oriented.  No acute distress  Head: Normocephalic and Atraumatic  Eyes: No icterus, EOMI, no congestion  ENT: No deformity, no hearing loss   Cardiovascular: Regular rate and rhythm, S1-S2 normal, no murmur rub or gallop  Respiratory: Clear to auscultation bilaterally no wheezing rhonchi or crackles  Gastrointestinal:, No hepatosplenomegaly nontender nondistended bowel sounds present in all 4 quadrants  Musculoskeletal: No joint deformity, no swelling in larger joints including knees elbows hands shoulders  Skin: No new rash.  Neurologic: Alert oriented to time place and person , good affect  ____________________    Recommendations:  --Plan for EGD    Davy Altamirano MD  Gastroenterology and Interventional Endoscopy

## 2024-01-16 NOTE — DISCHARGE INSTRUCTIONS
Gastrointestinal Esophagogastroduodenoscopy (EGD) - Upper Exam Discharge Instructions    1. Call Dr. Altamirano at [phone] for any problems or questions.    2. Contact the doctor's office for follow up appointment as directed.    3. Medication may cause drowsiness for several hours, therefore:  Do not drive or operate machinery for remainder of the day.    No alcohol today.  Do not make any important or legal decisions for 24 hours.  Do not sign any legal documents for 24 hours.    5. Ordinarily, you may resume regular diet and activity after exam unless otherwise specified by your physician.    6. For mild soreness in your throat you may use Cepacol throat lozenges or warm salt-water gargles as needed.    Any additional instructions:  Follow up as directed.

## 2024-01-17 NOTE — PROGRESS NOTES
Called and left detailed message informing f/u office visit is scheduled 2/13, instructed patient to call back if date and time didn't work

## 2024-01-17 NOTE — PROGRESS NOTES
aDate: 2024      Name: Jesika Olsen      MRN: [id number]       : 1955       Age: 68 y.o.    Sex: female        Paulette Negrete MD       CC:  No chief complaint on file.      HPI:     Jesika Olsen is a 68 y.o. female who presents for follow up after I saw her in the hospital from 23 until 23. The hospital course was as follows:    Course:  Ms. Olsen is a 68 y/o WF with a h/o obesity status post Jesica-en-Y 2023 at [facility], JEREMI not on CPAP due to poor tolerance who was admitted to our service on  with suspected hypovolemic shock. She presented with N/V and hypotension.  Initial BP 84/66, K 2.9, Cr 1.6 (baseline closer to 1), Mg 1.5, LA 2.3 and a WBC of 13.6, hemoglobin of 15.7. General surgery was consulted. Gastrograffin swallow showed free flow of contrast from esophagus to remnant stomach and duodenum. General surgery consulted GI. EGD on  showed a clean-based ulcer at the gastrojejunal anastomosis.  Potassium and magnesium were replaced aggressively. IVFs were stopped due to LE edema. Her diet was advanced. Scopolamine patch was added. Cdiff testing was negative. Her home medications were continued. Electrolytes normalized. Tolerating her diet. Medications adjusted per Dr. Washington's recommendations, sent to pharmacy. She will need follow up with him in approximately 2 weeks, there are also plans for her to see Dr. Altamirano for repeat EGD to have prostat for her ulcer. She is medically stable for discharge home today. She needs repeat K/Mg levels in about 2 weeks which I have already ordered.    24: The patient had a robotic RNY done by Dr. Felix Marr at [facility] on 23. She has [employer] insurance and can not go back and see Dr. Marr. She had an EGD done on 23 that showed an anastamotic ulcer. She had a repeat EGD with Dr. Altamirano on 24 that showed further ulceration. She has some epigastric pain, no nausea, no vomiting, but continues to have

## 2024-01-18 ENCOUNTER — OFFICE VISIT (OUTPATIENT)
Dept: SURGERY | Age: 69
End: 2024-01-18
Payer: MEDICARE

## 2024-01-18 VITALS
DIASTOLIC BLOOD PRESSURE: 98 MMHG | BODY MASS INDEX: 35.69 KG/M2 | SYSTOLIC BLOOD PRESSURE: 120 MMHG | WEIGHT: 241.7 LBS | HEART RATE: 83 BPM

## 2024-01-18 DIAGNOSIS — R19.7 DIARRHEA, UNSPECIFIED TYPE: ICD-10-CM

## 2024-01-18 DIAGNOSIS — R11.0 NAUSEA: ICD-10-CM

## 2024-01-18 DIAGNOSIS — K28.9 MARGINAL ULCER: Primary | ICD-10-CM

## 2024-01-18 PROCEDURE — 4004F PT TOBACCO SCREEN RCVD TLK: CPT | Performed by: SURGERY

## 2024-01-18 PROCEDURE — G8427 DOCREV CUR MEDS BY ELIG CLIN: HCPCS | Performed by: SURGERY

## 2024-01-18 PROCEDURE — 1123F ACP DISCUSS/DSCN MKR DOCD: CPT | Performed by: SURGERY

## 2024-01-18 PROCEDURE — G8400 PT W/DXA NO RESULTS DOC: HCPCS | Performed by: SURGERY

## 2024-01-18 PROCEDURE — G8417 CALC BMI ABV UP PARAM F/U: HCPCS | Performed by: SURGERY

## 2024-01-18 PROCEDURE — 99213 OFFICE O/P EST LOW 20 MIN: CPT | Performed by: SURGERY

## 2024-01-18 PROCEDURE — G8484 FLU IMMUNIZE NO ADMIN: HCPCS | Performed by: SURGERY

## 2024-01-18 PROCEDURE — 3017F COLORECTAL CA SCREEN DOC REV: CPT | Performed by: SURGERY

## 2024-01-18 PROCEDURE — 1090F PRES/ABSN URINE INCON ASSESS: CPT | Performed by: SURGERY

## 2024-01-18 PROCEDURE — 1111F DSCHRG MED/CURRENT MED MERGE: CPT | Performed by: SURGERY

## 2024-01-18 RX ORDER — PANTOPRAZOLE SODIUM 40 MG/1
40 TABLET, DELAYED RELEASE ORAL
Qty: 30 TABLET | Refills: 2 | Status: SHIPPED | OUTPATIENT
Start: 2024-01-18

## 2024-01-18 RX ORDER — CHOLESTYRAMINE 4 G/9G
1 POWDER, FOR SUSPENSION ORAL 2 TIMES DAILY
Qty: 90 PACKET | Refills: 3 | Status: SHIPPED | OUTPATIENT
Start: 2024-01-18

## 2024-01-18 RX ORDER — SCOLOPAMINE TRANSDERMAL SYSTEM 1 MG/1
1 PATCH, EXTENDED RELEASE TRANSDERMAL
Qty: 10 PATCH | Refills: 1 | Status: SHIPPED | OUTPATIENT
Start: 2024-01-18

## 2024-02-06 NOTE — PROGRESS NOTES
diarrhea.    2/8/24: Pre-op weight was 338 lbs. Today the patient weighs 230 lbs. She has occasional abdominal pain, but no further nausea or vomiting. She has occasional diarrhea. She uses a half a packet of Questran every \"few days\" as she became constipated while using it daily.     PMH:    Past Medical History:   Diagnosis Date    History of gastric bypass 09/13/2023    JEREMI (obstructive sleep apnea)     can not tolerated c pap    Other ill-defined conditions(799.89)     Edema    Psychiatric disorder     Nervous breakdown 2007; bipolar, anxiety       PSH:    Past Surgical History:   Procedure Laterality Date    CHEST SURGERY      Emypema; chest tubes 2006    CHOLECYSTECTOMY      GYN  2020    Ovary removed; tubal ligation    HYSTERECTOMY (CERVIX STATUS UNKNOWN)  2021    ID UNLISTED PROCEDURE ABDOMEN PERITONEUM & OMENTUM      Intestine \"twisted\" while pregnant with 2nd child    ID UNLISTED PROCEDURE CARDIAC SURGERY      Negative heart cath 2005    UPPER GASTROINTESTINAL ENDOSCOPY N/A 12/26/2023    EGD BIOPSY performed by Lizeth Carter MD at [facility] ENDOSCOPY    UPPER GASTROINTESTINAL ENDOSCOPY N/A 1/16/2024    EGD DIAGNOSTIC ONLY performed by Davy Altamirano MD at [facility] ENDOSCOPY    VASCULAR SURGERY      Stripped veins       MEDS:    Prior to Visit Medications    Medication Sig Taking? Authorizing Provider   scopolamine (TRANSDERM-SCOP) transdermal patch Place 1 patch onto the skin every 72 hours  Spenser Washington MD   pantoprazole (PROTONIX) 40 MG tablet Take 1 tablet by mouth every morning (before breakfast)  Spenser Washington MD   cholestyramine (QUESTRAN) 4 g packet Take 1 packet by mouth 2 times daily  Spenser Washington MD   gabapentin (NEURONTIN) 100 MG capsule Take 1 capsule by mouth in the morning and 1 capsule in the evening.  ProviderZelalem MD   omeprazole (PRILOSEC) 20 MG delayed release capsule Take 1 capsule by mouth in the morning and at bedtime  Patient not taking: Reported on 1/18/2024

## 2024-02-08 ENCOUNTER — OFFICE VISIT (OUTPATIENT)
Dept: SURGERY | Age: 69
End: 2024-02-08
Payer: MEDICARE

## 2024-02-08 VITALS
WEIGHT: 230 LBS | HEART RATE: 78 BPM | DIASTOLIC BLOOD PRESSURE: 82 MMHG | SYSTOLIC BLOOD PRESSURE: 140 MMHG | BODY MASS INDEX: 33.97 KG/M2

## 2024-02-08 DIAGNOSIS — K28.9 MARGINAL ULCER: Primary | ICD-10-CM

## 2024-02-08 DIAGNOSIS — Z98.84 H/O GASTRIC BYPASS: ICD-10-CM

## 2024-02-08 PROCEDURE — G8484 FLU IMMUNIZE NO ADMIN: HCPCS | Performed by: SURGERY

## 2024-02-08 PROCEDURE — G8400 PT W/DXA NO RESULTS DOC: HCPCS | Performed by: SURGERY

## 2024-02-08 PROCEDURE — 99212 OFFICE O/P EST SF 10 MIN: CPT | Performed by: SURGERY

## 2024-02-08 PROCEDURE — 1090F PRES/ABSN URINE INCON ASSESS: CPT | Performed by: SURGERY

## 2024-02-08 PROCEDURE — G8427 DOCREV CUR MEDS BY ELIG CLIN: HCPCS | Performed by: SURGERY

## 2024-02-08 PROCEDURE — G8417 CALC BMI ABV UP PARAM F/U: HCPCS | Performed by: SURGERY

## 2024-02-08 PROCEDURE — 1036F TOBACCO NON-USER: CPT | Performed by: SURGERY

## 2024-02-08 PROCEDURE — 1123F ACP DISCUSS/DSCN MKR DOCD: CPT | Performed by: SURGERY

## 2024-02-08 PROCEDURE — 3017F COLORECTAL CA SCREEN DOC REV: CPT | Performed by: SURGERY

## 2024-03-08 ENCOUNTER — HOSPITAL ENCOUNTER (INPATIENT)
Age: 69
LOS: 5 days | Discharge: HOME | DRG: 683 | End: 2024-03-13
Attending: EMERGENCY MEDICINE | Admitting: HOSPITALIST
Payer: MEDICARE

## 2024-03-08 ENCOUNTER — APPOINTMENT (OUTPATIENT)
Dept: CT IMAGING | Age: 69
DRG: 683 | End: 2024-03-08
Payer: MEDICARE

## 2024-03-08 DIAGNOSIS — E86.0 DEHYDRATION: Primary | ICD-10-CM

## 2024-03-08 DIAGNOSIS — E87.6 HYPOKALEMIA: ICD-10-CM

## 2024-03-08 DIAGNOSIS — R11.2 NAUSEA AND VOMITING, UNSPECIFIED VOMITING TYPE: ICD-10-CM

## 2024-03-08 PROBLEM — N17.9 AKI (ACUTE KIDNEY INJURY) (HCC): Status: ACTIVE | Noted: 2024-03-08

## 2024-03-08 LAB
ALBUMIN SERPL-MCNC: 3.4 G/DL (ref 3.2–4.6)
ALBUMIN/GLOB SERPL: 0.9 (ref 0.4–1.6)
ALP SERPL-CCNC: 79 U/L (ref 50–136)
ALT SERPL-CCNC: 19 U/L (ref 12–65)
ANION GAP SERPL CALC-SCNC: 10 MMOL/L (ref 2–11)
AST SERPL-CCNC: 26 U/L (ref 15–37)
BASOPHILS # BLD: 0.1 K/UL (ref 0–0.2)
BASOPHILS NFR BLD: 1 % (ref 0–2)
BILIRUB SERPL-MCNC: 0.9 MG/DL (ref 0.2–1.1)
BUN SERPL-MCNC: 18 MG/DL (ref 8–23)
CALCIUM SERPL-MCNC: 10.5 MG/DL (ref 8.3–10.4)
CHLORIDE SERPL-SCNC: 104 MMOL/L (ref 103–113)
CO2 SERPL-SCNC: 25 MMOL/L (ref 21–32)
CREAT SERPL-MCNC: 1.1 MG/DL (ref 0.6–1)
DIFFERENTIAL METHOD BLD: ABNORMAL
EOSINOPHIL # BLD: 0.1 K/UL (ref 0–0.8)
EOSINOPHIL NFR BLD: 1 % (ref 0.5–7.8)
ERYTHROCYTE [DISTWIDTH] IN BLOOD BY AUTOMATED COUNT: 12.7 % (ref 11.9–14.6)
GLOBULIN SER CALC-MCNC: 3.8 G/DL (ref 2.8–4.5)
GLUCOSE SERPL-MCNC: 131 MG/DL (ref 65–100)
HCT VFR BLD AUTO: 42.1 % (ref 35.8–46.3)
HGB BLD-MCNC: 14.2 G/DL (ref 11.7–15.4)
IMM GRANULOCYTES # BLD AUTO: 0.1 K/UL (ref 0–0.5)
IMM GRANULOCYTES NFR BLD AUTO: 1 % (ref 0–5)
LACTATE SERPL-SCNC: 1.4 MMOL/L (ref 0.4–2)
LACTATE SERPL-SCNC: 2.2 MMOL/L (ref 0.4–2)
LIPASE SERPL-CCNC: 74 U/L (ref 73–393)
LYMPHOCYTES # BLD: 2.5 K/UL (ref 0.5–4.6)
LYMPHOCYTES NFR BLD: 17 % (ref 13–44)
MAGNESIUM SERPL-MCNC: 1.5 MG/DL (ref 1.8–2.4)
MCH RBC QN AUTO: 29.6 PG (ref 26.1–32.9)
MCHC RBC AUTO-ENTMCNC: 33.7 G/DL (ref 31.4–35)
MCV RBC AUTO: 87.9 FL (ref 82–102)
MONOCYTES # BLD: 1 K/UL (ref 0.1–1.3)
MONOCYTES NFR BLD: 7 % (ref 4–12)
NEUTS SEG # BLD: 10.9 K/UL (ref 1.7–8.2)
NEUTS SEG NFR BLD: 73 % (ref 43–78)
NRBC # BLD: 0 K/UL (ref 0–0.2)
PLATELET # BLD AUTO: 426 K/UL (ref 150–450)
PMV BLD AUTO: 10.2 FL (ref 9.4–12.3)
POTASSIUM SERPL-SCNC: 2.5 MMOL/L (ref 3.5–5.1)
PROCALCITONIN SERPL-MCNC: 0.31 NG/ML (ref 0–0.49)
PROT SERPL-MCNC: 7.2 G/DL (ref 6.3–8.2)
RBC # BLD AUTO: 4.79 M/UL (ref 4.05–5.2)
SODIUM SERPL-SCNC: 139 MMOL/L (ref 136–146)
WBC # BLD AUTO: 14.7 K/UL (ref 4.3–11.1)

## 2024-03-08 PROCEDURE — 2580000003 HC RX 258: Performed by: EMERGENCY MEDICINE

## 2024-03-08 PROCEDURE — 96365 THER/PROPH/DIAG IV INF INIT: CPT

## 2024-03-08 PROCEDURE — 96361 HYDRATE IV INFUSION ADD-ON: CPT

## 2024-03-08 PROCEDURE — 84145 PROCALCITONIN (PCT): CPT

## 2024-03-08 PROCEDURE — 74177 CT ABD & PELVIS W/CONTRAST: CPT

## 2024-03-08 PROCEDURE — 6360000004 HC RX CONTRAST MEDICATION: Performed by: EMERGENCY MEDICINE

## 2024-03-08 PROCEDURE — 99285 EMERGENCY DEPT VISIT HI MDM: CPT

## 2024-03-08 PROCEDURE — 96375 TX/PRO/DX INJ NEW DRUG ADDON: CPT

## 2024-03-08 PROCEDURE — 83690 ASSAY OF LIPASE: CPT

## 2024-03-08 PROCEDURE — 83605 ASSAY OF LACTIC ACID: CPT

## 2024-03-08 PROCEDURE — 83735 ASSAY OF MAGNESIUM: CPT

## 2024-03-08 PROCEDURE — 1100000000 HC RM PRIVATE

## 2024-03-08 PROCEDURE — 85025 COMPLETE CBC W/AUTO DIFF WBC: CPT

## 2024-03-08 PROCEDURE — 80053 COMPREHEN METABOLIC PANEL: CPT

## 2024-03-08 PROCEDURE — 6360000002 HC RX W HCPCS: Performed by: EMERGENCY MEDICINE

## 2024-03-08 PROCEDURE — 96366 THER/PROPH/DIAG IV INF ADDON: CPT

## 2024-03-08 RX ORDER — POLYETHYLENE GLYCOL 3350 17 G/17G
17 POWDER, FOR SOLUTION ORAL DAILY PRN
Status: DISCONTINUED | OUTPATIENT
Start: 2024-03-08 | End: 2024-03-13 | Stop reason: HOSPADM

## 2024-03-08 RX ORDER — ONDANSETRON 2 MG/ML
4 INJECTION INTRAMUSCULAR; INTRAVENOUS ONCE
Status: COMPLETED | OUTPATIENT
Start: 2024-03-08 | End: 2024-03-08

## 2024-03-08 RX ORDER — ACETAMINOPHEN 650 MG/1
650 SUPPOSITORY RECTAL EVERY 6 HOURS PRN
Status: DISCONTINUED | OUTPATIENT
Start: 2024-03-08 | End: 2024-03-13 | Stop reason: HOSPADM

## 2024-03-08 RX ORDER — ENOXAPARIN SODIUM 100 MG/ML
40 INJECTION SUBCUTANEOUS DAILY
Status: DISCONTINUED | OUTPATIENT
Start: 2024-03-09 | End: 2024-03-09

## 2024-03-08 RX ORDER — CHOLESTYRAMINE 4 G/9G
1 POWDER, FOR SUSPENSION ORAL 2 TIMES DAILY
Status: DISCONTINUED | OUTPATIENT
Start: 2024-03-09 | End: 2024-03-09

## 2024-03-08 RX ORDER — ONDANSETRON 4 MG/1
4 TABLET, ORALLY DISINTEGRATING ORAL EVERY 8 HOURS PRN
Status: DISCONTINUED | OUTPATIENT
Start: 2024-03-08 | End: 2024-03-13 | Stop reason: HOSPADM

## 2024-03-08 RX ORDER — ONDANSETRON 2 MG/ML
4 INJECTION INTRAMUSCULAR; INTRAVENOUS EVERY 6 HOURS PRN
Status: DISCONTINUED | OUTPATIENT
Start: 2024-03-08 | End: 2024-03-13 | Stop reason: HOSPADM

## 2024-03-08 RX ORDER — SODIUM CHLORIDE AND POTASSIUM CHLORIDE 300; 900 MG/100ML; MG/100ML
INJECTION, SOLUTION INTRAVENOUS CONTINUOUS
Status: DISPENSED | OUTPATIENT
Start: 2024-03-08 | End: 2024-03-09

## 2024-03-08 RX ORDER — 0.9 % SODIUM CHLORIDE 0.9 %
1000 INTRAVENOUS SOLUTION INTRAVENOUS ONCE
Status: COMPLETED | OUTPATIENT
Start: 2024-03-08 | End: 2024-03-08

## 2024-03-08 RX ORDER — POTASSIUM CHLORIDE 7.45 MG/ML
10 INJECTION INTRAVENOUS PRN
Status: DISCONTINUED | OUTPATIENT
Start: 2024-03-08 | End: 2024-03-13 | Stop reason: HOSPADM

## 2024-03-08 RX ORDER — POTASSIUM CHLORIDE 20 MEQ/1
40 TABLET, EXTENDED RELEASE ORAL PRN
Status: DISCONTINUED | OUTPATIENT
Start: 2024-03-08 | End: 2024-03-13 | Stop reason: HOSPADM

## 2024-03-08 RX ORDER — MAGNESIUM SULFATE IN WATER 40 MG/ML
2000 INJECTION, SOLUTION INTRAVENOUS PRN
Status: DISCONTINUED | OUTPATIENT
Start: 2024-03-08 | End: 2024-03-13 | Stop reason: HOSPADM

## 2024-03-08 RX ORDER — SODIUM CHLORIDE 0.9 % (FLUSH) 0.9 %
5-40 SYRINGE (ML) INJECTION PRN
Status: DISCONTINUED | OUTPATIENT
Start: 2024-03-08 | End: 2024-03-13 | Stop reason: HOSPADM

## 2024-03-08 RX ORDER — SCOLOPAMINE TRANSDERMAL SYSTEM 1 MG/1
1 PATCH, EXTENDED RELEASE TRANSDERMAL
Status: DISCONTINUED | OUTPATIENT
Start: 2024-03-09 | End: 2024-03-09

## 2024-03-08 RX ORDER — GABAPENTIN 100 MG/1
100 CAPSULE ORAL 2 TIMES DAILY
Status: DISCONTINUED | OUTPATIENT
Start: 2024-03-09 | End: 2024-03-13 | Stop reason: HOSPADM

## 2024-03-08 RX ORDER — SODIUM CHLORIDE 0.9 % (FLUSH) 0.9 %
5-40 SYRINGE (ML) INJECTION EVERY 12 HOURS SCHEDULED
Status: DISCONTINUED | OUTPATIENT
Start: 2024-03-09 | End: 2024-03-13 | Stop reason: HOSPADM

## 2024-03-08 RX ORDER — LEVOTHYROXINE SODIUM 0.1 MG/1
100 TABLET ORAL
Status: DISCONTINUED | OUTPATIENT
Start: 2024-03-09 | End: 2024-03-13 | Stop reason: HOSPADM

## 2024-03-08 RX ORDER — POTASSIUM CHLORIDE 7.45 MG/ML
10 INJECTION INTRAVENOUS
Status: COMPLETED | OUTPATIENT
Start: 2024-03-08 | End: 2024-03-09

## 2024-03-08 RX ORDER — SODIUM CHLORIDE 9 MG/ML
INJECTION, SOLUTION INTRAVENOUS PRN
Status: DISCONTINUED | OUTPATIENT
Start: 2024-03-08 | End: 2024-03-13 | Stop reason: HOSPADM

## 2024-03-08 RX ORDER — ACETAMINOPHEN 325 MG/1
650 TABLET ORAL EVERY 6 HOURS PRN
Status: DISCONTINUED | OUTPATIENT
Start: 2024-03-08 | End: 2024-03-13 | Stop reason: HOSPADM

## 2024-03-08 RX ORDER — DULOXETIN HYDROCHLORIDE 60 MG/1
60 CAPSULE, DELAYED RELEASE ORAL DAILY
Status: DISCONTINUED | OUTPATIENT
Start: 2024-03-09 | End: 2024-03-13 | Stop reason: HOSPADM

## 2024-03-08 RX ADMIN — SODIUM CHLORIDE 1000 ML: 9 INJECTION, SOLUTION INTRAVENOUS at 19:22

## 2024-03-08 RX ADMIN — IOPAMIDOL 100 ML: 755 INJECTION, SOLUTION INTRAVENOUS at 21:03

## 2024-03-08 RX ADMIN — POTASSIUM CHLORIDE 10 MEQ: 7.46 INJECTION, SOLUTION INTRAVENOUS at 22:57

## 2024-03-08 RX ADMIN — SODIUM CHLORIDE 1000 ML: 9 INJECTION, SOLUTION INTRAVENOUS at 20:39

## 2024-03-08 RX ADMIN — POTASSIUM CHLORIDE 10 MEQ: 7.46 INJECTION, SOLUTION INTRAVENOUS at 20:40

## 2024-03-08 RX ADMIN — ONDANSETRON 4 MG: 2 INJECTION INTRAMUSCULAR; INTRAVENOUS at 19:24

## 2024-03-08 RX ADMIN — DIATRIZOATE MEGLUMINE AND DIATRIZOATE SODIUM 15 ML: 660; 100 LIQUID ORAL; RECTAL at 19:56

## 2024-03-08 ASSESSMENT — PAIN - FUNCTIONAL ASSESSMENT: PAIN_FUNCTIONAL_ASSESSMENT: 0-10

## 2024-03-08 ASSESSMENT — LIFESTYLE VARIABLES
HOW OFTEN DO YOU HAVE A DRINK CONTAINING ALCOHOL: NEVER
HOW MANY STANDARD DRINKS CONTAINING ALCOHOL DO YOU HAVE ON A TYPICAL DAY: PATIENT DOES NOT DRINK

## 2024-03-08 ASSESSMENT — PAIN DESCRIPTION - LOCATION: LOCATION: ABDOMEN

## 2024-03-08 ASSESSMENT — PAIN SCALES - GENERAL: PAINLEVEL_OUTOF10: 8

## 2024-03-08 ASSESSMENT — PAIN DESCRIPTION - ORIENTATION: ORIENTATION: MID

## 2024-03-08 NOTE — ED TRIAGE NOTES
Patient arrives with daughter from home. Patient had a bariatric surgery in september. Patient has had nausea and vomiting since Friday. Patient states she hs 8/10 abdominal pain in the mid abdomen. Patient has also has diarrhea and denies blood in vomit or stool.

## 2024-03-09 ENCOUNTER — APPOINTMENT (OUTPATIENT)
Dept: MRI IMAGING | Age: 69
DRG: 683 | End: 2024-03-09
Payer: MEDICARE

## 2024-03-09 LAB
ALBUMIN SERPL-MCNC: 2.8 G/DL (ref 3.2–4.6)
ALBUMIN/GLOB SERPL: 0.9 (ref 0.4–1.6)
ALP SERPL-CCNC: 63 U/L (ref 50–136)
ALT SERPL-CCNC: 15 U/L (ref 12–65)
ANION GAP SERPL CALC-SCNC: 6 MMOL/L (ref 2–11)
APPEARANCE UR: ABNORMAL
AST SERPL-CCNC: 19 U/L (ref 15–37)
BACTERIA URNS QL MICRO: ABNORMAL /HPF
BASOPHILS # BLD: 0.1 K/UL (ref 0–0.2)
BASOPHILS NFR BLD: 1 % (ref 0–2)
BILIRUB SERPL-MCNC: 0.7 MG/DL (ref 0.2–1.1)
BILIRUB UR QL: NEGATIVE
BUN SERPL-MCNC: 15 MG/DL (ref 8–23)
CALCIUM SERPL-MCNC: 9 MG/DL (ref 8.3–10.4)
CHLORIDE SERPL-SCNC: 109 MMOL/L (ref 103–113)
CO2 SERPL-SCNC: 27 MMOL/L (ref 21–32)
COLOR UR: ABNORMAL
CREAT SERPL-MCNC: 0.9 MG/DL (ref 0.6–1)
DIFFERENTIAL METHOD BLD: ABNORMAL
EOSINOPHIL # BLD: 0.2 K/UL (ref 0–0.8)
EOSINOPHIL NFR BLD: 2 % (ref 0.5–7.8)
EPI CELLS #/AREA URNS HPF: ABNORMAL /HPF
ERYTHROCYTE [DISTWIDTH] IN BLOOD BY AUTOMATED COUNT: 12.9 % (ref 11.9–14.6)
GLOBULIN SER CALC-MCNC: 3 G/DL (ref 2.8–4.5)
GLUCOSE SERPL-MCNC: 93 MG/DL (ref 65–100)
GLUCOSE UR STRIP.AUTO-MCNC: NEGATIVE MG/DL
HCT VFR BLD AUTO: 34.5 % (ref 35.8–46.3)
HGB BLD-MCNC: 11.5 G/DL (ref 11.7–15.4)
HGB UR QL STRIP: NEGATIVE
IMM GRANULOCYTES # BLD AUTO: 0.1 K/UL (ref 0–0.5)
IMM GRANULOCYTES NFR BLD AUTO: 1 % (ref 0–5)
KETONES UR QL STRIP.AUTO: NEGATIVE MG/DL
LEUKOCYTE ESTERASE UR QL STRIP.AUTO: ABNORMAL
LYMPHOCYTES # BLD: 2.9 K/UL (ref 0.5–4.6)
LYMPHOCYTES NFR BLD: 27 % (ref 13–44)
MAGNESIUM SERPL-MCNC: 1.6 MG/DL (ref 1.8–2.4)
MCH RBC QN AUTO: 29.2 PG (ref 26.1–32.9)
MCHC RBC AUTO-ENTMCNC: 33.3 G/DL (ref 31.4–35)
MCV RBC AUTO: 87.6 FL (ref 82–102)
MONOCYTES # BLD: 0.9 K/UL (ref 0.1–1.3)
MONOCYTES NFR BLD: 8 % (ref 4–12)
NEUTS SEG # BLD: 6.8 K/UL (ref 1.7–8.2)
NEUTS SEG NFR BLD: 63 % (ref 43–78)
NITRITE UR QL STRIP.AUTO: NEGATIVE
NRBC # BLD: 0 K/UL (ref 0–0.2)
OTHER OBSERVATIONS: ABNORMAL
PH UR STRIP: 8 (ref 5–9)
PLATELET # BLD AUTO: 266 K/UL (ref 150–450)
PMV BLD AUTO: 10.1 FL (ref 9.4–12.3)
POTASSIUM SERPL-SCNC: 2.6 MMOL/L (ref 3.5–5.1)
PROT SERPL-MCNC: 5.8 G/DL (ref 6.3–8.2)
PROT UR STRIP-MCNC: 30 MG/DL
RBC # BLD AUTO: 3.94 M/UL (ref 4.05–5.2)
SODIUM SERPL-SCNC: 142 MMOL/L (ref 136–146)
SP GR UR REFRACTOMETRY: >1.035 (ref 1–1.02)
TSH, 3RD GENERATION: 9.53 UIU/ML (ref 0.36–3.74)
UROBILINOGEN UR QL STRIP.AUTO: 1 EU/DL (ref 0.2–1)
WBC # BLD AUTO: 10.9 K/UL (ref 4.3–11.1)
WBC URNS QL MICRO: ABNORMAL /HPF

## 2024-03-09 PROCEDURE — 87086 URINE CULTURE/COLONY COUNT: CPT

## 2024-03-09 PROCEDURE — 2580000003 HC RX 258: Performed by: INTERNAL MEDICINE

## 2024-03-09 PROCEDURE — 81001 URINALYSIS AUTO W/SCOPE: CPT

## 2024-03-09 PROCEDURE — 1100000000 HC RM PRIVATE

## 2024-03-09 PROCEDURE — 2580000003 HC RX 258: Performed by: HOSPITALIST

## 2024-03-09 PROCEDURE — 80053 COMPREHEN METABOLIC PANEL: CPT

## 2024-03-09 PROCEDURE — 84443 ASSAY THYROID STIM HORMONE: CPT

## 2024-03-09 PROCEDURE — 36415 COLL VENOUS BLD VENIPUNCTURE: CPT

## 2024-03-09 PROCEDURE — 6360000002 HC RX W HCPCS: Performed by: INTERNAL MEDICINE

## 2024-03-09 PROCEDURE — 6370000000 HC RX 637 (ALT 250 FOR IP): Performed by: HOSPITALIST

## 2024-03-09 PROCEDURE — 83735 ASSAY OF MAGNESIUM: CPT

## 2024-03-09 PROCEDURE — 74183 MRI ABD W/O CNTR FLWD CNTR: CPT

## 2024-03-09 PROCEDURE — A9579 GAD-BASE MR CONTRAST NOS,1ML: HCPCS | Performed by: HOSPITALIST

## 2024-03-09 PROCEDURE — 85025 COMPLETE CBC W/AUTO DIFF WBC: CPT

## 2024-03-09 PROCEDURE — 6360000004 HC RX CONTRAST MEDICATION: Performed by: HOSPITALIST

## 2024-03-09 PROCEDURE — 6360000002 HC RX W HCPCS: Performed by: HOSPITALIST

## 2024-03-09 RX ORDER — HEPARIN SODIUM 5000 [USP'U]/ML
5000 INJECTION, SOLUTION INTRAVENOUS; SUBCUTANEOUS EVERY 8 HOURS SCHEDULED
Status: DISCONTINUED | OUTPATIENT
Start: 2024-03-09 | End: 2024-03-13 | Stop reason: HOSPADM

## 2024-03-09 RX ORDER — SODIUM CHLORIDE, SODIUM LACTATE, POTASSIUM CHLORIDE, AND CALCIUM CHLORIDE .6; .31; .03; .02 G/100ML; G/100ML; G/100ML; G/100ML
500 INJECTION, SOLUTION INTRAVENOUS ONCE
Status: COMPLETED | OUTPATIENT
Start: 2024-03-09 | End: 2024-03-09

## 2024-03-09 RX ORDER — MORPHINE SULFATE 2 MG/ML
2 INJECTION, SOLUTION INTRAMUSCULAR; INTRAVENOUS EVERY 4 HOURS PRN
Status: DISCONTINUED | OUTPATIENT
Start: 2024-03-09 | End: 2024-03-13 | Stop reason: HOSPADM

## 2024-03-09 RX ORDER — SUCRALFATE ORAL 1 G/10ML
1 SUSPENSION ORAL 4 TIMES DAILY
COMMUNITY

## 2024-03-09 RX ORDER — SODIUM CHLORIDE 0.9 % (FLUSH) 0.9 %
30 SYRINGE (ML) INJECTION AS NEEDED
Status: DISCONTINUED | OUTPATIENT
Start: 2024-03-09 | End: 2024-03-13 | Stop reason: HOSPADM

## 2024-03-09 RX ORDER — MAGNESIUM SULFATE IN WATER 40 MG/ML
2000 INJECTION, SOLUTION INTRAVENOUS ONCE
Status: COMPLETED | OUTPATIENT
Start: 2024-03-09 | End: 2024-03-09

## 2024-03-09 RX ADMIN — GABAPENTIN 100 MG: 100 CAPSULE ORAL at 08:34

## 2024-03-09 RX ADMIN — SODIUM CHLORIDE, POTASSIUM CHLORIDE, SODIUM LACTATE AND CALCIUM CHLORIDE 500 ML: 600; 310; 30; 20 INJECTION, SOLUTION INTRAVENOUS at 10:52

## 2024-03-09 RX ADMIN — HEPARIN SODIUM 5000 UNITS: 5000 INJECTION INTRAVENOUS; SUBCUTANEOUS at 21:47

## 2024-03-09 RX ADMIN — ONDANSETRON 4 MG: 4 TABLET, ORALLY DISINTEGRATING ORAL at 08:34

## 2024-03-09 RX ADMIN — GABAPENTIN 100 MG: 100 CAPSULE ORAL at 21:48

## 2024-03-09 RX ADMIN — AZTREONAM 1000 MG: 1 INJECTION, POWDER, LYOPHILIZED, FOR SOLUTION INTRAMUSCULAR; INTRAVENOUS at 22:37

## 2024-03-09 RX ADMIN — MAGNESIUM SULFATE HEPTAHYDRATE 2000 MG: 40 INJECTION, SOLUTION INTRAVENOUS at 06:14

## 2024-03-09 RX ADMIN — SODIUM CHLORIDE, PRESERVATIVE FREE 5 ML: 5 INJECTION INTRAVENOUS at 21:48

## 2024-03-09 RX ADMIN — GABAPENTIN 100 MG: 100 CAPSULE ORAL at 01:23

## 2024-03-09 RX ADMIN — HEPARIN SODIUM 5000 UNITS: 5000 INJECTION INTRAVENOUS; SUBCUTANEOUS at 15:21

## 2024-03-09 RX ADMIN — DULOXETINE HYDROCHLORIDE 60 MG: 60 CAPSULE, DELAYED RELEASE ORAL at 08:34

## 2024-03-09 RX ADMIN — POTASSIUM CHLORIDE AND SODIUM CHLORIDE: 900; 300 INJECTION, SOLUTION INTRAVENOUS at 01:29

## 2024-03-09 RX ADMIN — MORPHINE SULFATE 2 MG: 2 INJECTION, SOLUTION INTRAMUSCULAR; INTRAVENOUS at 09:12

## 2024-03-09 RX ADMIN — SODIUM CHLORIDE, PRESERVATIVE FREE 30 ML: 5 INJECTION INTRAVENOUS at 10:06

## 2024-03-09 RX ADMIN — HEPARIN SODIUM 5000 UNITS: 5000 INJECTION INTRAVENOUS; SUBCUTANEOUS at 05:52

## 2024-03-09 RX ADMIN — SODIUM CHLORIDE: 9 INJECTION, SOLUTION INTRAVENOUS at 22:05

## 2024-03-09 RX ADMIN — LEVOTHYROXINE SODIUM 100 MCG: 0.1 TABLET ORAL at 05:52

## 2024-03-09 RX ADMIN — GADOTERIDOL 19 ML: 279.3 INJECTION, SOLUTION INTRAVENOUS at 10:06

## 2024-03-09 RX ADMIN — ONDANSETRON 4 MG: 4 TABLET, ORALLY DISINTEGRATING ORAL at 22:39

## 2024-03-09 ASSESSMENT — PAIN DESCRIPTION - DESCRIPTORS: DESCRIPTORS: ACHING;DISCOMFORT

## 2024-03-09 ASSESSMENT — PAIN SCALES - GENERAL
PAINLEVEL_OUTOF10: 0
PAINLEVEL_OUTOF10: 10

## 2024-03-09 ASSESSMENT — PAIN - FUNCTIONAL ASSESSMENT: PAIN_FUNCTIONAL_ASSESSMENT: PREVENTS OR INTERFERES SOME ACTIVE ACTIVITIES AND ADLS

## 2024-03-09 ASSESSMENT — PAIN DESCRIPTION - LOCATION: LOCATION: ABDOMEN

## 2024-03-09 NOTE — H&P
contrast-enhanced MRI of the liver to exclude neoplasm. Prior gastric bypass, cholecystectomy, and hysterectomy. Additional findings as detailed above.. If there are any questions about this report, I can be reached on [employer] or at [phone]         Signed:  Jenniffer Dominique MD    Part of this note may have been written by using a voice dictation software.  The note has been proof read but may still contain some grammatical/other typographical errors.

## 2024-03-09 NOTE — ED PROVIDER NOTES
mouth in the morning and at bedtime    ONDANSETRON (ZOFRAN-ODT) 4 MG DISINTEGRATING TABLET    Take 1 tablet by mouth every 8 hours as needed for Nausea or Vomiting (if pt can tolerate PO)    PANTOPRAZOLE (PROTONIX) 40 MG TABLET    Take 1 tablet by mouth 2 times daily (before meals)    PANTOPRAZOLE (PROTONIX) 40 MG TABLET    Take 1 tablet by mouth every morning (before breakfast)    POTASSIUM 99 MG TABS    Take by mouth daily    SCOPOLAMINE (TRANSDERM-SCOP) TRANSDERMAL PATCH    Place 1 patch onto the skin every 72 hours    SCOPOLAMINE (TRANSDERM-SCOP) TRANSDERMAL PATCH    Place 1 patch onto the skin every 72 hours        Results for orders placed or performed during the hospital encounter of 03/08/24   CBC with Auto Differential   Result Value Ref Range    WBC 14.7 (H) 4.3 - 11.1 K/uL    RBC 4.79 4.05 - 5.2 M/uL    Hemoglobin 14.2 11.7 - 15.4 g/dL    Hematocrit 42.1 35.8 - 46.3 %    MCV 87.9 82 - 102 FL    MCH 29.6 26.1 - 32.9 PG    MCHC 33.7 31.4 - 35.0 g/dL    RDW 12.7 11.9 - 14.6 %    Platelets 426 150 - 450 K/uL    MPV 10.2 9.4 - 12.3 FL    nRBC 0.00 0.0 - 0.2 K/uL    Differential Type AUTOMATED      Neutrophils % 73 43 - 78 %    Lymphocytes % 17 13 - 44 %    Monocytes % 7 4.0 - 12.0 %    Eosinophils % 1 0.5 - 7.8 %    Basophils % 1 0.0 - 2.0 %    Immature Granulocytes 1 0.0 - 5.0 %    Neutrophils Absolute 10.9 (H) 1.7 - 8.2 K/UL    Lymphocytes Absolute 2.5 0.5 - 4.6 K/UL    Monocytes Absolute 1.0 0.1 - 1.3 K/UL    Eosinophils Absolute 0.1 0.0 - 0.8 K/UL    Basophils Absolute 0.1 0.0 - 0.2 K/UL    Absolute Immature Granulocyte 0.1 0.0 - 0.5 K/UL         No orders to display                No results for input(s): \"COVID19\" in the last 72 hours.    Voice dictation software was used during the making of this note.  This software is not perfect and grammatical and other typographical errors may be present.  This note has not been completely proofread for errors.        Joselito Dominguez MD  03/08/24 8144

## 2024-03-09 NOTE — ED NOTES
TRANSFER - OUT REPORT:    Verbal report given to Leslie SHIELDS on Jesika Olsen  being transferred to 7th floor for routine progression of patient care       Report consisted of patient's Situation, Background, Assessment and   Recommendations(SBAR).     Information from the following report(s) ED SBAR was reviewed with the receiving nurse.    Alee Fall Assessment:    Presents to emergency department  because of falls (Syncope, seizure, or loss of consciousness): No  Age > 70: No  Altered Mental Status, Intoxication with alcohol or substance confusion (Disorientation, impaired judgment, poor safety awaremess, or inability to follow instructions): No  Impaired Mobility: Ambulates or transfers with assistive devices or assistance; Unable to ambulate or transer.: Yes             Lines:   Peripheral IV 03/08/24 Right Antecubital (Active)        Opportunity for questions and clarification was provided.      Patient transported with:  Registered Nurse          Eri Hinojosa RN  03/09/24 0002

## 2024-03-09 NOTE — ICUWATCH
RRT Clinical Rounding Nurse Progress Report      SUBJECTIVE: Patient assessed secondary to recent rapid response.      Vitals:    03/09/24 0025 03/09/24 0832 03/09/24 0912 03/09/24 0942   BP: 124/69 107/64     Pulse: 65 64     Resp: 16  18 18   Temp: 97.7 °F (36.5 °C)      TempSrc: Oral      SpO2: 100% 96%     Weight:       Height:            DETERIORATION INDEX SCORE: 21    ASSESSMENT:  Patient alert and oriented. No further syncopal episodes. VS, labs, and progress notes reviewed.    PLAN:  Will follow per RRT Clinical Rounding Program protocol.    Javier Wilkerson RN  [facility]: [phone]  [facility] [place]: [phone]

## 2024-03-09 NOTE — ICUWATCH
Rapid Response Team Note      Subjective: Responded to Rapid Response secondary to near loss of consciousness in MRI.    Summary: Patient in MRI suite for MRI. Per MRI staff, when patient sat up to transfer to MRI table, her eyes rolled back and she almost passed out. Connected to monitor and appeared SR 60s. SpO2 95% /62. Patient evaluated by attending MD and transported back to room to receive IV fluid bolus.       See Rapid Response/Code Blue Narrator for full documentation    Outcome:  Patient returned to room. Will follow per RRT Clinical Rounding protocol.       Javier Wilkerson RN  [facility]: [phone]  [facility]: [phone]

## 2024-03-10 LAB
ANION GAP SERPL CALC-SCNC: 5 MMOL/L (ref 2–11)
BUN SERPL-MCNC: 10 MG/DL (ref 8–23)
CALCIUM SERPL-MCNC: 9 MG/DL (ref 8.3–10.4)
CHLORIDE SERPL-SCNC: 110 MMOL/L (ref 103–113)
CO2 SERPL-SCNC: 26 MMOL/L (ref 21–32)
CREAT SERPL-MCNC: 0.7 MG/DL (ref 0.6–1)
ERYTHROCYTE [DISTWIDTH] IN BLOOD BY AUTOMATED COUNT: 12.9 % (ref 11.9–14.6)
GLUCOSE SERPL-MCNC: 93 MG/DL (ref 65–100)
HCT VFR BLD AUTO: 32.3 % (ref 35.8–46.3)
HGB BLD-MCNC: 10.8 G/DL (ref 11.7–15.4)
MAGNESIUM SERPL-MCNC: 1.7 MG/DL (ref 1.8–2.4)
MCH RBC QN AUTO: 29.3 PG (ref 26.1–32.9)
MCHC RBC AUTO-ENTMCNC: 33.4 G/DL (ref 31.4–35)
MCV RBC AUTO: 87.5 FL (ref 82–102)
NRBC # BLD: 0 K/UL (ref 0–0.2)
PLATELET # BLD AUTO: 212 K/UL (ref 150–450)
PMV BLD AUTO: 10 FL (ref 9.4–12.3)
POTASSIUM SERPL-SCNC: 2.6 MMOL/L (ref 3.5–5.1)
RBC # BLD AUTO: 3.69 M/UL (ref 4.05–5.2)
SODIUM SERPL-SCNC: 141 MMOL/L (ref 136–146)
WBC # BLD AUTO: 8.5 K/UL (ref 4.3–11.1)

## 2024-03-10 PROCEDURE — 2580000003 HC RX 258: Performed by: HOSPITALIST

## 2024-03-10 PROCEDURE — 6360000002 HC RX W HCPCS: Performed by: HOSPITALIST

## 2024-03-10 PROCEDURE — 6370000000 HC RX 637 (ALT 250 FOR IP): Performed by: HOSPITALIST

## 2024-03-10 PROCEDURE — 1100000000 HC RM PRIVATE

## 2024-03-10 PROCEDURE — 85027 COMPLETE CBC AUTOMATED: CPT

## 2024-03-10 PROCEDURE — 6360000002 HC RX W HCPCS: Performed by: INTERNAL MEDICINE

## 2024-03-10 PROCEDURE — 80048 BASIC METABOLIC PNL TOTAL CA: CPT

## 2024-03-10 PROCEDURE — 83735 ASSAY OF MAGNESIUM: CPT

## 2024-03-10 PROCEDURE — 76937 US GUIDE VASCULAR ACCESS: CPT

## 2024-03-10 PROCEDURE — 36415 COLL VENOUS BLD VENIPUNCTURE: CPT

## 2024-03-10 RX ORDER — PROCHLORPERAZINE EDISYLATE 5 MG/ML
5 INJECTION INTRAMUSCULAR; INTRAVENOUS ONCE
Status: COMPLETED | OUTPATIENT
Start: 2024-03-10 | End: 2024-03-10

## 2024-03-10 RX ADMIN — POTASSIUM CHLORIDE 10 MEQ: 7.46 INJECTION, SOLUTION INTRAVENOUS at 18:39

## 2024-03-10 RX ADMIN — AZTREONAM 1000 MG: 1 INJECTION, POWDER, LYOPHILIZED, FOR SOLUTION INTRAMUSCULAR; INTRAVENOUS at 22:53

## 2024-03-10 RX ADMIN — HEPARIN SODIUM 5000 UNITS: 5000 INJECTION INTRAVENOUS; SUBCUTANEOUS at 20:29

## 2024-03-10 RX ADMIN — ONDANSETRON 4 MG: 4 TABLET, ORALLY DISINTEGRATING ORAL at 22:50

## 2024-03-10 RX ADMIN — POTASSIUM CHLORIDE 10 MEQ: 7.46 INJECTION, SOLUTION INTRAVENOUS at 16:44

## 2024-03-10 RX ADMIN — POTASSIUM CHLORIDE 10 MEQ: 7.46 INJECTION, SOLUTION INTRAVENOUS at 14:29

## 2024-03-10 RX ADMIN — GABAPENTIN 100 MG: 100 CAPSULE ORAL at 08:55

## 2024-03-10 RX ADMIN — DULOXETINE HYDROCHLORIDE 60 MG: 60 CAPSULE, DELAYED RELEASE ORAL at 08:55

## 2024-03-10 RX ADMIN — AZTREONAM 1000 MG: 1 INJECTION, POWDER, LYOPHILIZED, FOR SOLUTION INTRAMUSCULAR; INTRAVENOUS at 05:46

## 2024-03-10 RX ADMIN — GABAPENTIN 100 MG: 100 CAPSULE ORAL at 20:29

## 2024-03-10 RX ADMIN — HEPARIN SODIUM 5000 UNITS: 5000 INJECTION INTRAVENOUS; SUBCUTANEOUS at 14:28

## 2024-03-10 RX ADMIN — POTASSIUM CHLORIDE 10 MEQ: 7.46 INJECTION, SOLUTION INTRAVENOUS at 15:40

## 2024-03-10 RX ADMIN — SODIUM CHLORIDE, PRESERVATIVE FREE 5 ML: 5 INJECTION INTRAVENOUS at 08:55

## 2024-03-10 RX ADMIN — POTASSIUM CHLORIDE 10 MEQ: 7.46 INJECTION, SOLUTION INTRAVENOUS at 20:31

## 2024-03-10 RX ADMIN — PROCHLORPERAZINE EDISYLATE 5 MG: 5 INJECTION INTRAMUSCULAR; INTRAVENOUS at 12:47

## 2024-03-10 RX ADMIN — HEPARIN SODIUM 5000 UNITS: 5000 INJECTION INTRAVENOUS; SUBCUTANEOUS at 05:36

## 2024-03-10 RX ADMIN — ONDANSETRON 4 MG: 4 TABLET, ORALLY DISINTEGRATING ORAL at 05:47

## 2024-03-10 RX ADMIN — AZTREONAM 1000 MG: 1 INJECTION, POWDER, LYOPHILIZED, FOR SOLUTION INTRAMUSCULAR; INTRAVENOUS at 13:25

## 2024-03-10 RX ADMIN — POTASSIUM CHLORIDE 10 MEQ: 7.46 INJECTION, SOLUTION INTRAVENOUS at 23:43

## 2024-03-10 RX ADMIN — LEVOTHYROXINE SODIUM 100 MCG: 0.1 TABLET ORAL at 05:36

## 2024-03-10 RX ADMIN — ONDANSETRON 4 MG: 4 TABLET, ORALLY DISINTEGRATING ORAL at 15:41

## 2024-03-10 RX ADMIN — SODIUM CHLORIDE, PRESERVATIVE FREE 5 ML: 5 INJECTION INTRAVENOUS at 20:33

## 2024-03-10 ASSESSMENT — PAIN SCALES - GENERAL
PAINLEVEL_OUTOF10: 0
PAINLEVEL_OUTOF10: 0

## 2024-03-10 NOTE — ICUWATCH
RRT Clinical Rounding Nurse Update    Vitals:    03/09/24 0832 03/09/24 0912 03/09/24 0942 03/09/24 1947   BP: 107/64   120/71   Pulse: 64   62   Resp:  18 18 18   Temp:    97.7 °F (36.5 °C)   TempSrc:    Oral   SpO2: 96%   96%   Weight:       Height:            DETERIORATION INDEX SCORE: 21    ASSESSMENT:  Previous outreach assessment was reviewed. There have been no significant changes since previous assessment. VSS.    PLAN:  Will follow per RRT Clinical Rounding Program protocol.    Compa Avila RN  [facility]: [phone]  [facility]: [phone]

## 2024-03-10 NOTE — ICUWATCH
RRT Clinical Rounding Nurse Update    Vitals:    03/09/24 0912 03/09/24 0942 03/09/24 1947 03/10/24 0804   BP:   120/71 120/73   Pulse:   62 70   Resp: 18 18 18 18   Temp:   97.7 °F (36.5 °C) 98.2 °F (36.8 °C)   TempSrc:   Oral Oral   SpO2:   96% 96%   Weight:       Height:            DETERIORATION INDEX SCORE: 21    ASSESSMENT:  Previous outreach assessment was reviewed. There have been no significant changes since previous assessment. Patient alert and oriented. Complains of not being able to have full diet.     PLAN:  Will discharge from RRT Clinical Rounding Program per protocol. Please call if needed.    Javier Wilkerson RN  [facility]: [phone]  [facility]: [phone]

## 2024-03-10 NOTE — ICUWATCH
RRT Clinical Rounding Nurse Progress Report      SUBJECTIVE: Patient assessed secondary to recent rapid response.      Vitals:    03/09/24 0832 03/09/24 0912 03/09/24 0942 03/09/24 1947   BP: 107/64   120/71   Pulse: 64   62   Resp:  18 18 18   Temp:    97.7 °F (36.5 °C)   TempSrc:    Oral   SpO2: 96%   96%   Weight:       Height:            DETERIORATION INDEX SCORE: 21    ASSESSMENT:  Previous outreach assessment reviewed. Pt is laying in bed, awakens to voice and oriented to self, place and time. Unlabored breathing on RA. No complaints of HA, dizziness or lightheadedness, although has not gotten out of bed since syncopal episode earlier. VSS. Pt has no additional concerns or complaints at this time.    Pertinent lab work, vital signs and progress notes from today have been reviewed.    PLAN:  Will follow per RRT Clinical Rounding Program protocol.    Compa Avila RN  [facility]: [phone]  [facility]: [phone]

## 2024-03-11 LAB
ANION GAP SERPL CALC-SCNC: 7 MMOL/L (ref 2–11)
BUN SERPL-MCNC: 9 MG/DL (ref 8–23)
CALCIUM SERPL-MCNC: 8.8 MG/DL (ref 8.3–10.4)
CHLORIDE SERPL-SCNC: 110 MMOL/L (ref 103–113)
CO2 SERPL-SCNC: 27 MMOL/L (ref 21–32)
CREAT SERPL-MCNC: 0.7 MG/DL (ref 0.6–1)
GLUCOSE SERPL-MCNC: 93 MG/DL (ref 65–100)
POTASSIUM SERPL-SCNC: 3.3 MMOL/L (ref 3.5–5.1)
POTASSIUM SERPL-SCNC: 3.3 MMOL/L (ref 3.5–5.1)
SODIUM SERPL-SCNC: 144 MMOL/L (ref 136–146)

## 2024-03-11 PROCEDURE — 6360000002 HC RX W HCPCS: Performed by: HOSPITALIST

## 2024-03-11 PROCEDURE — 97530 THERAPEUTIC ACTIVITIES: CPT

## 2024-03-11 PROCEDURE — 80048 BASIC METABOLIC PNL TOTAL CA: CPT

## 2024-03-11 PROCEDURE — 36415 COLL VENOUS BLD VENIPUNCTURE: CPT

## 2024-03-11 PROCEDURE — 2580000003 HC RX 258: Performed by: HOSPITALIST

## 2024-03-11 PROCEDURE — 76937 US GUIDE VASCULAR ACCESS: CPT

## 2024-03-11 PROCEDURE — 1100000000 HC RM PRIVATE

## 2024-03-11 PROCEDURE — 97535 SELF CARE MNGMENT TRAINING: CPT

## 2024-03-11 PROCEDURE — 84132 ASSAY OF SERUM POTASSIUM: CPT

## 2024-03-11 PROCEDURE — 97165 OT EVAL LOW COMPLEX 30 MIN: CPT

## 2024-03-11 PROCEDURE — 97161 PT EVAL LOW COMPLEX 20 MIN: CPT

## 2024-03-11 PROCEDURE — 6370000000 HC RX 637 (ALT 250 FOR IP): Performed by: HOSPITALIST

## 2024-03-11 RX ADMIN — ONDANSETRON 4 MG: 4 TABLET, ORALLY DISINTEGRATING ORAL at 20:36

## 2024-03-11 RX ADMIN — LEVOTHYROXINE SODIUM 100 MCG: 0.1 TABLET ORAL at 06:03

## 2024-03-11 RX ADMIN — ONDANSETRON 4 MG: 4 TABLET, ORALLY DISINTEGRATING ORAL at 11:34

## 2024-03-11 RX ADMIN — GABAPENTIN 100 MG: 100 CAPSULE ORAL at 08:59

## 2024-03-11 RX ADMIN — AZTREONAM 1000 MG: 1 INJECTION, POWDER, LYOPHILIZED, FOR SOLUTION INTRAMUSCULAR; INTRAVENOUS at 06:11

## 2024-03-11 RX ADMIN — SODIUM CHLORIDE, PRESERVATIVE FREE 10 ML: 5 INJECTION INTRAVENOUS at 09:00

## 2024-03-11 RX ADMIN — HEPARIN SODIUM 5000 UNITS: 5000 INJECTION INTRAVENOUS; SUBCUTANEOUS at 14:02

## 2024-03-11 RX ADMIN — HEPARIN SODIUM 5000 UNITS: 5000 INJECTION INTRAVENOUS; SUBCUTANEOUS at 21:46

## 2024-03-11 RX ADMIN — POTASSIUM CHLORIDE 40 MEQ: 1500 TABLET, EXTENDED RELEASE ORAL at 09:00

## 2024-03-11 RX ADMIN — SODIUM CHLORIDE: 9 INJECTION, SOLUTION INTRAVENOUS at 06:09

## 2024-03-11 RX ADMIN — AZTREONAM 1000 MG: 1 INJECTION, POWDER, LYOPHILIZED, FOR SOLUTION INTRAMUSCULAR; INTRAVENOUS at 14:02

## 2024-03-11 RX ADMIN — SODIUM CHLORIDE, PRESERVATIVE FREE 10 ML: 5 INJECTION INTRAVENOUS at 20:36

## 2024-03-11 RX ADMIN — SODIUM CHLORIDE: 9 INJECTION, SOLUTION INTRAVENOUS at 21:50

## 2024-03-11 RX ADMIN — HEPARIN SODIUM 5000 UNITS: 5000 INJECTION INTRAVENOUS; SUBCUTANEOUS at 06:03

## 2024-03-11 RX ADMIN — GABAPENTIN 100 MG: 100 CAPSULE ORAL at 20:36

## 2024-03-11 RX ADMIN — DULOXETINE HYDROCHLORIDE 60 MG: 60 CAPSULE, DELAYED RELEASE ORAL at 08:59

## 2024-03-11 RX ADMIN — AZTREONAM 1000 MG: 1 INJECTION, POWDER, LYOPHILIZED, FOR SOLUTION INTRAMUSCULAR; INTRAVENOUS at 21:50

## 2024-03-11 ASSESSMENT — PAIN SCALES - GENERAL
PAINLEVEL_OUTOF10: 0
PAINLEVEL_OUTOF10: 0

## 2024-03-12 PROBLEM — R19.7 DIARRHEA: Status: ACTIVE | Noted: 2024-03-12

## 2024-03-12 LAB
ANION GAP SERPL CALC-SCNC: 6 MMOL/L (ref 2–11)
BACTERIA SPEC CULT: NORMAL
BACTERIA SPEC CULT: NORMAL
BUN SERPL-MCNC: 7 MG/DL (ref 8–23)
CALCIUM SERPL-MCNC: 9 MG/DL (ref 8.3–10.4)
CHLORIDE SERPL-SCNC: 113 MMOL/L (ref 103–113)
CO2 SERPL-SCNC: 27 MMOL/L (ref 21–32)
CREAT SERPL-MCNC: 0.6 MG/DL (ref 0.6–1)
ERYTHROCYTE [DISTWIDTH] IN BLOOD BY AUTOMATED COUNT: 13 % (ref 11.9–14.6)
GLUCOSE SERPL-MCNC: 77 MG/DL (ref 65–100)
HCT VFR BLD AUTO: 33.4 % (ref 35.8–46.3)
HGB BLD-MCNC: 10.9 G/DL (ref 11.7–15.4)
MAGNESIUM SERPL-MCNC: 1.6 MG/DL (ref 1.8–2.4)
MCH RBC QN AUTO: 29.3 PG (ref 26.1–32.9)
MCHC RBC AUTO-ENTMCNC: 32.6 G/DL (ref 31.4–35)
MCV RBC AUTO: 89.8 FL (ref 82–102)
NRBC # BLD: 0 K/UL (ref 0–0.2)
PLATELET # BLD AUTO: 216 K/UL (ref 150–450)
PMV BLD AUTO: 10.3 FL (ref 9.4–12.3)
POTASSIUM SERPL-SCNC: 3.2 MMOL/L (ref 3.5–5.1)
RBC # BLD AUTO: 3.72 M/UL (ref 4.05–5.2)
SERVICE CMNT-IMP: NORMAL
SODIUM SERPL-SCNC: 146 MMOL/L (ref 136–146)
WBC # BLD AUTO: 7.2 K/UL (ref 4.3–11.1)

## 2024-03-12 PROCEDURE — 1100000000 HC RM PRIVATE

## 2024-03-12 PROCEDURE — 6360000002 HC RX W HCPCS: Performed by: INTERNAL MEDICINE

## 2024-03-12 PROCEDURE — 36415 COLL VENOUS BLD VENIPUNCTURE: CPT

## 2024-03-12 PROCEDURE — 2580000003 HC RX 258: Performed by: HOSPITALIST

## 2024-03-12 PROCEDURE — 80048 BASIC METABOLIC PNL TOTAL CA: CPT

## 2024-03-12 PROCEDURE — 6370000000 HC RX 637 (ALT 250 FOR IP): Performed by: HOSPITALIST

## 2024-03-12 PROCEDURE — 6370000000 HC RX 637 (ALT 250 FOR IP): Performed by: INTERNAL MEDICINE

## 2024-03-12 PROCEDURE — 85027 COMPLETE CBC AUTOMATED: CPT

## 2024-03-12 PROCEDURE — 83735 ASSAY OF MAGNESIUM: CPT

## 2024-03-12 PROCEDURE — 6360000002 HC RX W HCPCS: Performed by: HOSPITALIST

## 2024-03-12 RX ORDER — CALCIUM CARBONATE 500 MG/1
500 TABLET, CHEWABLE ORAL 3 TIMES DAILY PRN
Status: DISCONTINUED | OUTPATIENT
Start: 2024-03-12 | End: 2024-03-13 | Stop reason: HOSPADM

## 2024-03-12 RX ORDER — LOPERAMIDE HYDROCHLORIDE 2 MG/1
2 CAPSULE ORAL 4 TIMES DAILY PRN
Status: DISCONTINUED | OUTPATIENT
Start: 2024-03-12 | End: 2024-03-13 | Stop reason: HOSPADM

## 2024-03-12 RX ADMIN — CALCIUM CARBONATE (ANTACID) CHEW TAB 500 MG 500 MG: 500 CHEW TAB at 19:57

## 2024-03-12 RX ADMIN — HEPARIN SODIUM 5000 UNITS: 5000 INJECTION INTRAVENOUS; SUBCUTANEOUS at 14:30

## 2024-03-12 RX ADMIN — DULOXETINE HYDROCHLORIDE 60 MG: 60 CAPSULE, DELAYED RELEASE ORAL at 09:35

## 2024-03-12 RX ADMIN — HEPARIN SODIUM 5000 UNITS: 5000 INJECTION INTRAVENOUS; SUBCUTANEOUS at 05:59

## 2024-03-12 RX ADMIN — SODIUM CHLORIDE: 9 INJECTION, SOLUTION INTRAVENOUS at 06:04

## 2024-03-12 RX ADMIN — SODIUM CHLORIDE, PRESERVATIVE FREE 10 ML: 5 INJECTION INTRAVENOUS at 19:58

## 2024-03-12 RX ADMIN — ONDANSETRON 4 MG: 2 INJECTION INTRAMUSCULAR; INTRAVENOUS at 13:26

## 2024-03-12 RX ADMIN — GABAPENTIN 100 MG: 100 CAPSULE ORAL at 09:35

## 2024-03-12 RX ADMIN — AZTREONAM 1000 MG: 1 INJECTION, POWDER, LYOPHILIZED, FOR SOLUTION INTRAMUSCULAR; INTRAVENOUS at 14:26

## 2024-03-12 RX ADMIN — MORPHINE SULFATE 2 MG: 2 INJECTION, SOLUTION INTRAMUSCULAR; INTRAVENOUS at 19:57

## 2024-03-12 RX ADMIN — POTASSIUM CHLORIDE 40 MEQ: 1500 TABLET, EXTENDED RELEASE ORAL at 13:28

## 2024-03-12 RX ADMIN — LOPERAMIDE HYDROCHLORIDE 2 MG: 2 CAPSULE ORAL at 15:20

## 2024-03-12 RX ADMIN — LEVOTHYROXINE SODIUM 100 MCG: 0.1 TABLET ORAL at 05:59

## 2024-03-12 RX ADMIN — SODIUM CHLORIDE, PRESERVATIVE FREE 10 ML: 5 INJECTION INTRAVENOUS at 09:36

## 2024-03-12 RX ADMIN — GABAPENTIN 100 MG: 100 CAPSULE ORAL at 19:57

## 2024-03-12 RX ADMIN — AZTREONAM 1000 MG: 1 INJECTION, POWDER, LYOPHILIZED, FOR SOLUTION INTRAMUSCULAR; INTRAVENOUS at 06:04

## 2024-03-12 RX ADMIN — HEPARIN SODIUM 5000 UNITS: 5000 INJECTION INTRAVENOUS; SUBCUTANEOUS at 22:42

## 2024-03-12 RX ADMIN — MAGNESIUM SULFATE HEPTAHYDRATE 2000 MG: 40 INJECTION, SOLUTION INTRAVENOUS at 15:59

## 2024-03-12 RX ADMIN — POTASSIUM BICARBONATE 40 MEQ: 782 TABLET, EFFERVESCENT ORAL at 15:21

## 2024-03-12 RX ADMIN — ONDANSETRON 4 MG: 4 TABLET, ORALLY DISINTEGRATING ORAL at 06:06

## 2024-03-12 ASSESSMENT — PAIN DESCRIPTION - DESCRIPTORS: DESCRIPTORS: SHARP;SPASM

## 2024-03-12 ASSESSMENT — PAIN DESCRIPTION - PAIN TYPE: TYPE: ACUTE PAIN

## 2024-03-12 ASSESSMENT — PAIN DESCRIPTION - ONSET: ONSET: GRADUAL

## 2024-03-12 ASSESSMENT — PAIN - FUNCTIONAL ASSESSMENT: PAIN_FUNCTIONAL_ASSESSMENT: ACTIVITIES ARE NOT PREVENTED

## 2024-03-12 ASSESSMENT — PAIN DESCRIPTION - ORIENTATION: ORIENTATION: LOWER;MID

## 2024-03-12 ASSESSMENT — PAIN SCALES - GENERAL: PAINLEVEL_OUTOF10: 8

## 2024-03-12 ASSESSMENT — PAIN DESCRIPTION - FREQUENCY: FREQUENCY: INTERMITTENT

## 2024-03-12 ASSESSMENT — PAIN DESCRIPTION - LOCATION: LOCATION: ABDOMEN

## 2024-03-12 NOTE — PROGRESS NOTES
Progress note   Admit Date:  3/8/2024  7:05 PM   Name:  Jesika Olsen   Age:  68 y.o.  Sex:  female  :  1955   MRN:  [id number]   Room:  1/    Presenting/Chief Complaint: Emesis     Reason(s) for Admission: Dehydration [E86.0]  Hypokalemia [E87.6]  ZEUS (acute kidney injury) (HCC) [N17.9]  Nausea and vomiting, unspecified vomiting type [R11.2]     Hospital course:   68-year-old female with PMH of hypothyroidism, and PSH of gastric bypass surgery performed in 2023 who presented with abdominal pain associated with nausea, vomiting and diarrhea.  According to the patient, she underwent gastric bypass surgery in .  Ever since then she has had 4 episodes of abdominal pain associate with nausea vomiting diarrhea requiring hospitalization.  In the week prior to admission, she started experiencing abdominal pain.  She described the pain as sharp and severe and localized to the epigastric region.  The abdominal pain is associated with nausea and vomiting and diarrhea.  She would vomit 8 to 10 times a day and have 8 to 10 episodes of diarrhea.  On the day of admission, patient complained of feeling dizzy.  She described the sensation as feeling lightheaded and she almost passed out.  As a result, patient decided come to the hospital for further evaluation and treatment.      Subjective:   This morning feeling weak.  Improved abdominal pain.  Not having any more diarrhea.  Improved nausea and vomiting.  Having some dysuria      Assessment & Plan:     Acute kidney injury   Avoid nephrotoxic agents  Monitor renal function    Acute gastroenteritis  CT scan of the abdomen pelvis was unremarkable.  Symptomatic management    Urinary tract infection  Continue aztreonam  Follow urine cultures    Hypothyroidism stable  Continue levothyroxine.    Liver lesion stable  MRI of the abdomen with no malignant appearing liver lesion  Outpatient follow-up    PT/OT evals and PPD ordered?  Not ordered; 
Attempted OT eval. Patient eating her breakfast and did not want to participate.   Tram Rod, OT    
Placed 20g 1.75in PIV in left forearm with ultrasound assistance.      
TRANSFER - IN REPORT:    Verbal report received from CORNELIUS Alba on Jesika Olsen  being received from ED for routine progression of patient care      Report consisted of patient's Situation, Background, Assessment and   Recommendations(SBAR).     Information from the following report(s) Nurse Handoff Report, MAR, Neuro Assessment, and Event Log was reviewed with the receiving nurse.    Opportunity for questions and clarification was provided.      Assessment to be completed upon patient's arrival to unit and care assumed.    
The beginning of Daylight Saving Time occurred at 0200 hrs. Documentation of patient care and medications administered is done with respect to the time change.   
US Guided PIV access-    Ultrasound was used to find the vein which was compressible and without any ultrasound features of an artery or nerve bundle. Skin was cleaned and disinfected prior to IV puncture.  Under real-time ultrasound guidance peripheral access was obtained in the right cephalic using 22 G 1.75\" Peripheral IV catheter after 1 attempt(s). Blood return was present and IV flushed without difficulty with no clinical signs of infiltration. IV dressing applied and no immediate complications noted. Patient tolerated the procedure well.      
   potassium bicarb-citric acid (EFFER-K) effervescent tablet 40 mEq  40 mEq Oral PRN    Or    potassium chloride 10 mEq/100 mL IVPB (Peripheral Line)  10 mEq IntraVENous PRN    magnesium sulfate 2000 mg in 50 mL IVPB premix  2,000 mg IntraVENous PRN    ondansetron (ZOFRAN-ODT) disintegrating tablet 4 mg  4 mg Oral Q8H PRN    Or    ondansetron (ZOFRAN) injection 4 mg  4 mg IntraVENous Q6H PRN    polyethylene glycol (GLYCOLAX) packet 17 g  17 g Oral Daily PRN    acetaminophen (TYLENOL) tablet 650 mg  650 mg Oral Q6H PRN    Or    acetaminophen (TYLENOL) suppository 650 mg  650 mg Rectal Q6H PRN       Signed:  Iris Sue MD    Part of this note may have been written by using a voice dictation software.  The note has been proof read but may still contain some grammatical/other typographical errors.      
TREATMENT PRECAUTIONS: Bed/Chair Locked, Call light within reach, Chair, Needs within reach, and RN notified    INTERDISCIPLINARY COLLABORATION:  MD/ PA/ NP , RN/ PCT, PT/ PTA, OT/ OLSON, and RN Case Manager/      EDUCATION:  Education Given To: Patient  Education Provided: Role of Therapy;Plan of Care  Education Outcome: Verbalized understanding    TOTAL TREATMENT DURATION AND TIME:  Time In: 1030  Time Out: 1056  Minutes: 26    Milwaukeefemi Rod OT               
ground, Sitting balance , and Standing balance to improve functional Activity tolerance, Balance, Coordination, Mobility, Strength, and ROM.    TREATMENT GRID:  N/A    AFTER TREATMENT PRECAUTIONS: Bed/Chair Locked, Call light within reach, Chair, and Needs within reach    INTERDISCIPLINARY COLLABORATION:  RN/ PCT, PT/ PTA, and OT/ OLSON    EDUCATION: Education Given To: Patient  Education Provided: Role of Therapy    TIME IN/OUT:  Time In: 1037  Time Out: 1055  Minutes: 18    Jorge Segura PT    
surgically absent. Small calcifications in the peritoneum. - BOWEL: Postoperative changes of gastric bypass. No evidence of bowel obstruction. The appendix is not definitely visualized but no secondary signs of appendicitis. - LYMPH NODES: No significant retroperitoneal, mesenteric, or pelvic adenopathy. - BONES: Multilevel spondylosis - VASCULATURE: Normal - OTHER: No ascites.     No evidence of acute process in the abdomen or pelvis. Ill-defined areas of diminished liver attenuation are of uncertain etiology. Perfusion abnormalities or focal fat could give a similar appearance. Recommend contrast-enhanced MRI of the liver to exclude neoplasm. Prior gastric bypass, cholecystectomy, and hysterectomy. Additional findings as detailed above.. If there are any questions about this report, I can be reached on [employer] or at [phone]         Signed:  Elder Up MD    Part of this note may have been written by using a voice dictation software.  The note has been proof read but may still contain some grammatical/other typographical errors.

## 2024-03-13 VITALS
RESPIRATION RATE: 16 BRPM | WEIGHT: 200 LBS | HEART RATE: 65 BPM | BODY MASS INDEX: 29.62 KG/M2 | TEMPERATURE: 98.1 F | HEIGHT: 69 IN | DIASTOLIC BLOOD PRESSURE: 73 MMHG | OXYGEN SATURATION: 99 % | SYSTOLIC BLOOD PRESSURE: 135 MMHG

## 2024-03-13 LAB
ANION GAP SERPL CALC-SCNC: 7 MMOL/L (ref 2–11)
BUN SERPL-MCNC: 7 MG/DL (ref 8–23)
CALCIUM SERPL-MCNC: 9.1 MG/DL (ref 8.3–10.4)
CHLORIDE SERPL-SCNC: 111 MMOL/L (ref 103–113)
CO2 SERPL-SCNC: 28 MMOL/L (ref 21–32)
CREAT SERPL-MCNC: 0.6 MG/DL (ref 0.6–1)
GLUCOSE SERPL-MCNC: 76 MG/DL (ref 65–100)
POTASSIUM SERPL-SCNC: 3.4 MMOL/L (ref 3.5–5.1)
SODIUM SERPL-SCNC: 146 MMOL/L (ref 136–146)

## 2024-03-13 PROCEDURE — 6370000000 HC RX 637 (ALT 250 FOR IP): Performed by: HOSPITALIST

## 2024-03-13 PROCEDURE — 80048 BASIC METABOLIC PNL TOTAL CA: CPT

## 2024-03-13 PROCEDURE — 36415 COLL VENOUS BLD VENIPUNCTURE: CPT

## 2024-03-13 RX ORDER — ONDANSETRON 4 MG/1
4 TABLET, ORALLY DISINTEGRATING ORAL EVERY 8 HOURS PRN
Qty: 9 TABLET | Refills: 0 | Status: SHIPPED | OUTPATIENT
Start: 2024-03-13 | End: 2024-03-16

## 2024-03-13 RX ADMIN — CALCIUM CARBONATE (ANTACID) CHEW TAB 500 MG 500 MG: 500 CHEW TAB at 08:39

## 2024-03-13 RX ADMIN — ONDANSETRON 4 MG: 4 TABLET, ORALLY DISINTEGRATING ORAL at 12:03

## 2024-03-13 RX ADMIN — DULOXETINE HYDROCHLORIDE 60 MG: 60 CAPSULE, DELAYED RELEASE ORAL at 09:59

## 2024-03-13 RX ADMIN — ONDANSETRON 4 MG: 4 TABLET, ORALLY DISINTEGRATING ORAL at 04:19

## 2024-03-13 RX ADMIN — POTASSIUM CHLORIDE 40 MEQ: 1500 TABLET, EXTENDED RELEASE ORAL at 10:41

## 2024-03-13 RX ADMIN — GABAPENTIN 100 MG: 100 CAPSULE ORAL at 09:59

## 2024-03-13 RX ADMIN — LEVOTHYROXINE SODIUM 100 MCG: 0.1 TABLET ORAL at 05:54

## 2024-03-13 ASSESSMENT — PAIN SCALES - GENERAL: PAINLEVEL_OUTOF10: 7

## 2024-03-13 ASSESSMENT — PAIN - FUNCTIONAL ASSESSMENT: PAIN_FUNCTIONAL_ASSESSMENT: ACTIVITIES ARE NOT PREVENTED

## 2024-03-13 ASSESSMENT — PAIN DESCRIPTION - LOCATION: LOCATION: ABDOMEN

## 2024-03-13 ASSESSMENT — PAIN DESCRIPTION - PAIN TYPE: TYPE: ACUTE PAIN

## 2024-03-13 ASSESSMENT — PAIN DESCRIPTION - DESCRIPTORS: DESCRIPTORS: DISCOMFORT;ACHING

## 2024-03-13 ASSESSMENT — PAIN DESCRIPTION - ONSET: ONSET: GRADUAL

## 2024-03-13 ASSESSMENT — PAIN DESCRIPTION - ORIENTATION: ORIENTATION: MID;LOWER

## 2024-03-13 ASSESSMENT — PAIN DESCRIPTION - FREQUENCY: FREQUENCY: INTERMITTENT

## 2024-03-13 NOTE — CARE COORDINATION
CM screened chart to assist with transitions of care needs.  No CM consult was received.  Patient is medically ready to discharge per attending.  Therapy recommending no skilled needs at discharge.  Patient current with PCP and insured with managed Medicare plan.  No CM/transitions of care needs noted at this time.  2nd ILM completed with patient at bedside.           03/13/24 1535   Service Assessment   Patient Orientation Alert and Oriented   Cognition Alert   History Provided By Medical Record   Primary Caregiver Self   Accompanied By/Relationship N/A   Support Systems Children   Patient's Healthcare Decision Maker is: Legal Next of Kin   PCP Verified by CM Yes   Last Visit to PCP Within last 3 months   Prior Functional Level Independent in ADLs/IADLs   Current Functional Level Independent in ADLs/IADLs   Can patient return to prior living arrangement Yes   Ability to make needs known: Good   Family able to assist with home care needs: Yes   Would you like for me to discuss the discharge plan with any other family members/significant others, and if so, who? No   Financial Resources Medicare   Community Resources None   CM/SW Referral Other (see comment)  (No CM consult)

## 2024-03-13 NOTE — DISCHARGE SUMMARY
06:02 AM    NITRU Negative 03/09/2024 06:02 AM    LEUKOCYTESUR MODERATE 03/09/2024 06:02 AM    WBCUA 20-50 03/09/2024 06:02 AM    EPITHUA 3-5 03/09/2024 06:02 AM    BACTERIA 4+ 03/09/2024 06:02 AM    LABCAST 10-20 12/24/2023 02:01 AM    MUCUS 2+ 12/24/2023 02:01 AM        Microbiology:  Results       Procedure Component Value Units Date/Time    C. difficile toxin Molecular [[id number]]     Order Status: Canceled Specimen: Stool     Culture, Urine [[id number]] Collected: 03/09/24 0602    Order Status: Completed Specimen: Urine Updated: 03/12/24 0752     Special Requests NO SPECIAL REQUESTS        Culture       50,000-100,000 COLONIES/mL MIXED SKIN DECLAN ISOLATED                  >2 organisms - likely contaminated specimen.                  All Labs from Last 24 Hrs:  Recent Results (from the past 24 hour(s))   Basic Metabolic Panel    Collection Time: 03/13/24  4:14 AM   Result Value Ref Range    Sodium 146 136 - 146 mmol/L    Potassium 3.4 (L) 3.5 - 5.1 mmol/L    Chloride 111 103 - 113 mmol/L    CO2 28 21 - 32 mmol/L    Anion Gap 7 2 - 11 mmol/L    Glucose 76 65 - 100 mg/dL    BUN 7 (L) 8 - 23 MG/DL    Creatinine 0.60 0.6 - 1.0 MG/DL    Est, Glom Filt Rate >60 >60 ml/min/1.73m2    Calcium 9.1 8.3 - 10.4 MG/DL       No results for input(s): \"COVID19\" in the last 72 hours.    Recent Vital Data:  Patient Vitals for the past 24 hrs:   Temp Pulse Resp BP SpO2   03/13/24 0825 98.1 °F (36.7 °C) 65 16 135/73 99 %   03/12/24 2027 -- -- 18 -- --   03/12/24 1957 -- -- 18 -- --   03/12/24 1925 98.4 °F (36.9 °C) 70 18 128/81 97 %       Oxygen Therapy  SpO2: 99 %  Pulse via Oximetry: 67 beats per minute  O2 Device: None (Room air)    Estimated body mass index is 29.53 kg/m² as calculated from the following:    Height as of this encounter: 1.753 m (5' 9\").    Weight as of this encounter: 90.7 kg (200 lb).    Intake/Output Summary (Last 24 hours) at 3/13/2024 1039  Last data filed at 3/13/2024 0958  Gross per 24 hour   Intake 350

## 2024-03-13 NOTE — PLAN OF CARE
Problem: Discharge Planning  Goal: Discharge to home or other facility with appropriate resources  Outcome: Progressing     Problem: Pain  Goal: Verbalizes/displays adequate comfort level or baseline comfort level  Outcome: Progressing     Problem: Safety - Adult  Goal: Free from fall injury  Outcome: Progressing     Problem: ABCDS Injury Assessment  Goal: Absence of physical injury  Outcome: Progressing     Problem: Neurosensory - Adult  Goal: Achieves maximal functionality and self care  Outcome: Progressing     Problem: Respiratory - Adult  Goal: Achieves optimal ventilation and oxygenation  Outcome: Progressing     Problem: Cardiovascular - Adult  Goal: Absence of cardiac dysrhythmias or at baseline  Outcome: Progressing     Problem: Skin/Tissue Integrity - Adult  Goal: Skin integrity remains intact  Outcome: Progressing  Goal: Incisions, wounds, or drain sites healing without S/S of infection  Outcome: Progressing     Problem: Musculoskeletal - Adult  Goal: Return mobility to safest level of function  Outcome: Progressing  Goal: Maintain proper alignment of affected body part  Outcome: Progressing  Goal: Return ADL status to a safe level of function  Outcome: Progressing     Problem: Gastrointestinal - Adult  Goal: Minimal or absence of nausea and vomiting  Outcome: Progressing  Goal: Maintains or returns to baseline bowel function  Outcome: Progressing     Problem: Genitourinary - Adult  Goal: Absence of urinary retention  Outcome: Progressing  Goal: Urinary catheter remains patent  Outcome: Progressing     Problem: Infection - Adult  Goal: Absence of infection at discharge  Outcome: Progressing  Goal: Absence of infection during hospitalization  Outcome: Progressing     Problem: Metabolic/Fluid and Electrolytes - Adult  Goal: Electrolytes maintained within normal limits  Outcome: Progressing  Goal: Hemodynamic stability and optimal renal function maintained  Outcome: Progressing  Goal: Glucose maintained 
  Problem: Discharge Planning  Goal: Discharge to home or other facility with appropriate resources  Outcome: Progressing     Problem: Pain  Goal: Verbalizes/displays adequate comfort level or baseline comfort level  Outcome: Progressing     Problem: Safety - Adult  Goal: Free from fall injury  Outcome: Progressing     Problem: ABCDS Injury Assessment  Goal: Absence of physical injury  Outcome: Progressing     Problem: Neurosensory - Adult  Goal: Achieves maximal functionality and self care  Outcome: Progressing     Problem: Respiratory - Adult  Goal: Achieves optimal ventilation and oxygenation  Outcome: Progressing     Problem: Cardiovascular - Adult  Goal: Absence of cardiac dysrhythmias or at baseline  Outcome: Progressing     Problem: Skin/Tissue Integrity - Adult  Goal: Skin integrity remains intact  Outcome: Progressing  Goal: Incisions, wounds, or drain sites healing without S/S of infection  Outcome: Progressing     Problem: Musculoskeletal - Adult  Goal: Return mobility to safest level of function  Outcome: Progressing  Goal: Maintain proper alignment of affected body part  Outcome: Progressing  Goal: Return ADL status to a safe level of function  Outcome: Progressing     Problem: Gastrointestinal - Adult  Goal: Minimal or absence of nausea and vomiting  Outcome: Progressing  Goal: Maintains or returns to baseline bowel function  Outcome: Progressing     Problem: Genitourinary - Adult  Goal: Absence of urinary retention  Outcome: Progressing  Goal: Urinary catheter remains patent  Outcome: Progressing     Problem: Infection - Adult  Goal: Absence of infection at discharge  Outcome: Progressing  Goal: Absence of infection during hospitalization  Outcome: Progressing     Problem: Metabolic/Fluid and Electrolytes - Adult  Goal: Electrolytes maintained within normal limits  Outcome: Progressing  Goal: Hemodynamic stability and optimal renal function maintained  Outcome: Progressing  Goal: Glucose maintained 
within prescribed range  Outcome: Progressing     Problem: Hematologic - Adult  Goal: Maintains hematologic stability  Outcome: Progressing     Problem: Chronic Conditions and Co-morbidities  Goal: Patient's chronic conditions and co-morbidity symptoms are monitored and maintained or improved  Outcome: Progressing     
Metabolic/Fluid and Electrolytes - Adult  Goal: Electrolytes maintained within normal limits  Outcome: Completed  Flowsheets (Taken 3/13/2024 0844)  Electrolytes maintained within normal limits: Monitor labs and assess patient for signs and symptoms of electrolyte imbalances  Goal: Hemodynamic stability and optimal renal function maintained  Outcome: Completed  Flowsheets (Taken 3/13/2024 0844)  Hemodynamic stability and optimal renal function maintained: Monitor labs and assess for signs and symptoms of volume excess or deficit  Goal: Glucose maintained within prescribed range  Outcome: Completed  Flowsheets (Taken 3/13/2024 0844)  Glucose maintained within prescribed range: Monitor blood glucose as ordered     Problem: Hematologic - Adult  Goal: Maintains hematologic stability  Outcome: Completed  Flowsheets (Taken 3/13/2024 0844)  Maintains hematologic stability: Assess for signs and symptoms of bleeding or hemorrhage     Problem: Chronic Conditions and Co-morbidities  Goal: Patient's chronic conditions and co-morbidity symptoms are monitored and maintained or improved  Outcome: Completed  Flowsheets (Taken 3/13/2024 0844)  Care Plan - Patient's Chronic Conditions and Co-Morbidity Symptoms are Monitored and Maintained or Improved: Monitor and assess patient's chronic conditions and comorbid symptoms for stability, deterioration, or improvement     Problem: Skin/Tissue Integrity  Goal: Absence of new skin breakdown  Description: 1.  Monitor for areas of redness and/or skin breakdown  2.  Assess vascular access sites hourly  3.  Every 4-6 hours minimum:  Change oxygen saturation probe site  4.  Every 4-6 hours:  If on nasal continuous positive airway pressure, respiratory therapy assess nares and determine need for appliance change or resting period.  Outcome: Completed

## 2024-03-13 NOTE — DISCHARGE INSTRUCTIONS
If your condition is worse after hospital discharge, contact your healthcare provider or return to hospital.  You are advised to follow-up with your primary doctor and/or specialist.  With a follow-up visit, please make sure that your provider is aware of your admission and have them review your hospital record for any follow-up plans or investigations that need to be done.  You may need prescription renewal when you complete your current prescription, at the time of follow-up.  Please consult your healthcare provider regarding that.

## 2024-03-26 ENCOUNTER — HOSPITAL ENCOUNTER (EMERGENCY)
Age: 69
Discharge: HOME | End: 2024-03-26
Payer: MEDICARE

## 2024-03-26 ENCOUNTER — APPOINTMENT (OUTPATIENT)
Dept: CT IMAGING | Age: 69
End: 2024-03-26
Payer: MEDICARE

## 2024-03-26 VITALS
SYSTOLIC BLOOD PRESSURE: 148 MMHG | BODY MASS INDEX: 30.66 KG/M2 | HEART RATE: 85 BPM | OXYGEN SATURATION: 96 % | TEMPERATURE: 97.6 F | RESPIRATION RATE: 16 BRPM | WEIGHT: 207 LBS | HEIGHT: 69 IN | DIASTOLIC BLOOD PRESSURE: 83 MMHG

## 2024-03-26 DIAGNOSIS — R19.7 DIARRHEA, UNSPECIFIED TYPE: ICD-10-CM

## 2024-03-26 DIAGNOSIS — R10.13 ABDOMINAL PAIN, EPIGASTRIC: ICD-10-CM

## 2024-03-26 DIAGNOSIS — R11.2 NAUSEA AND VOMITING, UNSPECIFIED VOMITING TYPE: Primary | ICD-10-CM

## 2024-03-26 LAB
ALBUMIN SERPL-MCNC: 3.7 G/DL (ref 3.2–4.6)
ALBUMIN/GLOB SERPL: 1.2 (ref 0.4–1.6)
ALP SERPL-CCNC: 85 U/L (ref 45–117)
ALT SERPL-CCNC: 10 U/L (ref 13–61)
ANION GAP SERPL CALC-SCNC: 18 MMOL/L (ref 2–11)
APPEARANCE UR: CLEAR
AST SERPL-CCNC: 19 U/L (ref 15–37)
BACTERIA URNS QL MICRO: ABNORMAL /HPF
BASOPHILS # BLD: 0.1 K/UL (ref 0–0.2)
BASOPHILS NFR BLD: 0 % (ref 0–2)
BILIRUB SERPL-MCNC: 1 MG/DL (ref 0.2–1.1)
BILIRUB UR QL: ABNORMAL
BUN SERPL-MCNC: 11 MG/DL (ref 8–23)
CALCIUM SERPL-MCNC: 10.3 MG/DL (ref 8.3–10.4)
CASTS URNS QL MICRO: ABNORMAL /LPF
CHLORIDE SERPL-SCNC: 99 MMOL/L (ref 98–107)
CO2 SERPL-SCNC: 26 MMOL/L (ref 21–32)
COLOR UR: YELLOW
CREAT SERPL-MCNC: 0.55 MG/DL (ref 0.6–1)
CRYSTALS URNS QL MICRO: 0 /LPF
DIFFERENTIAL METHOD BLD: ABNORMAL
EOSINOPHIL # BLD: 0.1 K/UL (ref 0–0.8)
EOSINOPHIL NFR BLD: 1 % (ref 0.5–7.8)
EPI CELLS #/AREA URNS HPF: ABNORMAL /HPF
ERYTHROCYTE [DISTWIDTH] IN BLOOD BY AUTOMATED COUNT: 13.1 % (ref 11.9–14.6)
GLOBULIN SER CALC-MCNC: 3.2 G/DL (ref 2.8–4.5)
GLUCOSE SERPL-MCNC: 144 MG/DL (ref 65–100)
GLUCOSE UR STRIP.AUTO-MCNC: NEGATIVE MG/DL
HCT VFR BLD AUTO: 41.8 % (ref 35.8–46.3)
HGB BLD-MCNC: 14.2 G/DL (ref 11.7–15.4)
HGB UR QL STRIP: NEGATIVE
IMM GRANULOCYTES # BLD AUTO: 0.1 K/UL (ref 0–0.5)
IMM GRANULOCYTES NFR BLD AUTO: 1 % (ref 0–5)
KETONES UR QL STRIP.AUTO: 40 MG/DL
LACTATE SERPL-SCNC: 1.3 MMOL/L (ref 0.4–2)
LACTATE SERPL-SCNC: 2.7 MMOL/L (ref 0.4–2)
LEUKOCYTE ESTERASE UR QL STRIP.AUTO: NEGATIVE
LIPASE SERPL-CCNC: 10 U/L (ref 13–60)
LYMPHOCYTES # BLD: 1.5 K/UL (ref 0.5–4.6)
LYMPHOCYTES NFR BLD: 12 % (ref 13–44)
MCH RBC QN AUTO: 29.9 PG (ref 26.1–32.9)
MCHC RBC AUTO-ENTMCNC: 34 G/DL (ref 31.4–35)
MCV RBC AUTO: 88 FL (ref 82–102)
MONOCYTES # BLD: 0.6 K/UL (ref 0.1–1.3)
MONOCYTES NFR BLD: 5 % (ref 4–12)
MUCOUS THREADS URNS QL MICRO: ABNORMAL /LPF
NEUTS SEG # BLD: 9.8 K/UL (ref 1.7–8.2)
NEUTS SEG NFR BLD: 81 % (ref 43–78)
NITRITE UR QL STRIP.AUTO: NEGATIVE
NRBC # BLD: 0.02 K/UL (ref 0–0.2)
OTHER OBSERVATIONS: ABNORMAL
PH UR STRIP: 6.5 (ref 5–9)
PLATELET # BLD AUTO: 417 K/UL (ref 150–450)
PMV BLD AUTO: 9.8 FL (ref 9.4–12.3)
POTASSIUM SERPL-SCNC: 3 MMOL/L (ref 3.5–5.1)
PROCALCITONIN SERPL-MCNC: 0.14 NG/ML (ref 0–0.49)
PROT SERPL-MCNC: 6.9 G/DL (ref 6.4–8.2)
PROT UR STRIP-MCNC: 100 MG/DL
RBC # BLD AUTO: 4.75 M/UL (ref 4.05–5.2)
RBC #/AREA URNS HPF: 0 /HPF
SODIUM SERPL-SCNC: 143 MMOL/L (ref 133–143)
SP GR UR REFRACTOMETRY: 1.02 (ref 1–1.02)
UROBILINOGEN UR QL STRIP.AUTO: 2 EU/DL (ref 0.2–1)
WBC # BLD AUTO: 12.1 K/UL (ref 4.3–11.1)
WBC URNS QL MICRO: ABNORMAL /HPF

## 2024-03-26 PROCEDURE — 87040 BLOOD CULTURE FOR BACTERIA: CPT

## 2024-03-26 PROCEDURE — 99285 EMERGENCY DEPT VISIT HI MDM: CPT

## 2024-03-26 PROCEDURE — 2580000003 HC RX 258

## 2024-03-26 PROCEDURE — 80053 COMPREHEN METABOLIC PANEL: CPT

## 2024-03-26 PROCEDURE — 96361 HYDRATE IV INFUSION ADD-ON: CPT

## 2024-03-26 PROCEDURE — 96375 TX/PRO/DX INJ NEW DRUG ADDON: CPT

## 2024-03-26 PROCEDURE — 87086 URINE CULTURE/COLONY COUNT: CPT

## 2024-03-26 PROCEDURE — 84145 PROCALCITONIN (PCT): CPT

## 2024-03-26 PROCEDURE — 96374 THER/PROPH/DIAG INJ IV PUSH: CPT

## 2024-03-26 PROCEDURE — 85025 COMPLETE CBC W/AUTO DIFF WBC: CPT

## 2024-03-26 PROCEDURE — 83605 ASSAY OF LACTIC ACID: CPT

## 2024-03-26 PROCEDURE — 6360000004 HC RX CONTRAST MEDICATION

## 2024-03-26 PROCEDURE — 6370000000 HC RX 637 (ALT 250 FOR IP)

## 2024-03-26 PROCEDURE — 74177 CT ABD & PELVIS W/CONTRAST: CPT

## 2024-03-26 PROCEDURE — 81001 URINALYSIS AUTO W/SCOPE: CPT

## 2024-03-26 PROCEDURE — 6360000002 HC RX W HCPCS

## 2024-03-26 PROCEDURE — 83690 ASSAY OF LIPASE: CPT

## 2024-03-26 RX ORDER — 0.9 % SODIUM CHLORIDE 0.9 %
1000 INTRAVENOUS SOLUTION INTRAVENOUS
Status: COMPLETED | OUTPATIENT
Start: 2024-03-26 | End: 2024-03-26

## 2024-03-26 RX ORDER — ONDANSETRON 2 MG/ML
4 INJECTION INTRAMUSCULAR; INTRAVENOUS
Status: COMPLETED | OUTPATIENT
Start: 2024-03-26 | End: 2024-03-26

## 2024-03-26 RX ORDER — DIPHENHYDRAMINE HYDROCHLORIDE 50 MG/ML
12.5 INJECTION INTRAMUSCULAR; INTRAVENOUS ONCE
Status: COMPLETED | OUTPATIENT
Start: 2024-03-26 | End: 2024-03-26

## 2024-03-26 RX ORDER — METOCLOPRAMIDE 10 MG/1
10 TABLET ORAL 4 TIMES DAILY
Qty: 60 TABLET | Refills: 0 | Status: SHIPPED | OUTPATIENT
Start: 2024-03-26 | End: 2024-04-10

## 2024-03-26 RX ORDER — KETOROLAC TROMETHAMINE 15 MG/ML
15 INJECTION, SOLUTION INTRAMUSCULAR; INTRAVENOUS ONCE
Status: COMPLETED | OUTPATIENT
Start: 2024-03-26 | End: 2024-03-26

## 2024-03-26 RX ORDER — METOCLOPRAMIDE HYDROCHLORIDE 5 MG/ML
10 INJECTION INTRAMUSCULAR; INTRAVENOUS
Status: COMPLETED | OUTPATIENT
Start: 2024-03-26 | End: 2024-03-26

## 2024-03-26 RX ORDER — SUCRALFATE 1 G/1
1 TABLET ORAL 4 TIMES DAILY
Qty: 120 TABLET | Refills: 0 | Status: SHIPPED | OUTPATIENT
Start: 2024-03-26 | End: 2024-04-25

## 2024-03-26 RX ADMIN — IOPAMIDOL 100 ML: 755 INJECTION, SOLUTION INTRAVENOUS at 21:36

## 2024-03-26 RX ADMIN — POTASSIUM BICARBONATE 40 MEQ: 391 TABLET, EFFERVESCENT ORAL at 22:30

## 2024-03-26 RX ADMIN — SODIUM CHLORIDE 1000 ML: 9 INJECTION, SOLUTION INTRAVENOUS at 20:25

## 2024-03-26 RX ADMIN — ONDANSETRON 4 MG: 2 INJECTION INTRAMUSCULAR; INTRAVENOUS at 21:20

## 2024-03-26 RX ADMIN — KETOROLAC TROMETHAMINE 15 MG: 15 INJECTION, SOLUTION INTRAMUSCULAR; INTRAVENOUS at 21:21

## 2024-03-26 RX ADMIN — CEFTRIAXONE 1000 MG: 1 INJECTION, POWDER, FOR SOLUTION INTRAMUSCULAR; INTRAVENOUS at 21:23

## 2024-03-26 RX ADMIN — METOCLOPRAMIDE 10 MG: 5 INJECTION, SOLUTION INTRAMUSCULAR; INTRAVENOUS at 22:18

## 2024-03-26 RX ADMIN — DIPHENHYDRAMINE HYDROCHLORIDE 12.5 MG: 50 INJECTION INTRAMUSCULAR; INTRAVENOUS at 22:18

## 2024-03-26 ASSESSMENT — PAIN SCALES - GENERAL
PAINLEVEL_OUTOF10: 7
PAINLEVEL_OUTOF10: 9
PAINLEVEL_OUTOF10: 7
PAINLEVEL_OUTOF10: 8

## 2024-03-26 ASSESSMENT — ENCOUNTER SYMPTOMS
CONSTIPATION: 0
DIARRHEA: 1
VOMITING: 1
ABDOMINAL PAIN: 1
NAUSEA: 1

## 2024-03-26 ASSESSMENT — PAIN DESCRIPTION - LOCATION
LOCATION: ABDOMEN

## 2024-03-26 ASSESSMENT — PAIN DESCRIPTION - ORIENTATION: ORIENTATION: MID

## 2024-03-26 ASSESSMENT — PAIN - FUNCTIONAL ASSESSMENT: PAIN_FUNCTIONAL_ASSESSMENT: 0-10

## 2024-03-26 NOTE — ED TRIAGE NOTES
Arrives via w/c . Reports n/v/d for weeks with mid abd pain.   Was on Abx 2 weeks ago for UTI    Reports seeing PCP and has been taking immodium- last taken 3 days ago

## 2024-03-27 ENCOUNTER — TELEPHONE (OUTPATIENT)
Age: 69
End: 2024-03-27

## 2024-03-27 NOTE — ED NOTES
Blood Culture drawn and 2nd staff member assisted (audited) Eddie SHIELDS.  Clinical collect stickers were printed prior to scanning and administering antibiotics.

## 2024-03-27 NOTE — DISCHARGE INSTRUCTIONS
Please make sure you are still taking your Protonix daily as prescribed.  Also begin using the Carafate.  Begin using the Reglan as needed for nausea.  You can also continue using the Imodium.  Continue to stay hydrated and make sure you are pushing fluids.  Please follow-up with your primary care provider as well as Dr. Altamirano.

## 2024-03-27 NOTE — TELEPHONE ENCOUNTER
Jesika called requesting a Hospital f/up per her D/G orders. Unable to schedule pt within the time frame requested due to Dr. Altamirano not having anything available at this time. Pt is stating she can't wait a month to have a f/up w/provider and is requesting to be seen sooner.    F/up due to experiencing: Nausea, vomiting, abdominal pain, epigastric, diarrhea, unspecified type.     D/G date: 3/26/2024    Please advise and or call patient to schedule.

## 2024-03-27 NOTE — ED PROVIDER NOTES
Emergency Department Provider Note       PCP: Paulette Negrete MD   Age: 68 y.o.   Sex: female     DISPOSITION Decision To Discharge 03/26/2024 10:33:54 PM       ICD-10-CM    1. Nausea and vomiting, unspecified vomiting type  R11.2       2. Abdominal pain, epigastric  R10.13       3. Diarrhea, unspecified type  R19.7           Medical Decision Making     Patient is a 68-year-old female with past medical history of type 2 diabetes presenting with abdominal pain mainly in the epigastric region, nausea, vomiting, and diarrhea.    Patient states he symptoms have been ongoing since September 2023.  She had an EGD performed in January by Dr. Barros but she has not followed up with them.    On presentation, patient is afebrile, vital signs are stable, and she is nontoxic-appearing.    She has some mild generalized abdominal tenderness with maximal tenderness in the epigastric region without any rebound, guarding, or distention.    CBC revealed a mild leukocytosis of 12.1, stable H&H.  Lactic was elevated at 2.7, do believe this is likely due to her nausea and vomiting rather than infection, went ahead and gave rocephin to cover before CT scan.   Urinalysis reveals evidence of dehydration, did have 1+ bacteria, will send for culture, will hold off on antibiotic at this time as she is not currently experiencing any symptoms and could likely be contamination.    CMP with no evidence of ZEUS, mild hypokalemia, will repeat.  Mildly elevated anion gap and normal bicarb, likely do to her elevated lactic.  She is currently receiving IV fluids and will repeat her lactic.    Her CT scan did not reveal any acute pathology.    Patient was given IV Zofran and Reglan with improvement in her nausea.  We discussed further treatment and management and patient did believe she could manage her nausea outpatient.  Will begin her on Reglan.  Advise she continue taking her Protonix that was previously prescribed to her and begin taking Carafate as

## 2024-03-27 NOTE — ED NOTES
Patient mobility status  with no difficulty. Provider aware     I have reviewed discharge instructions with the patient.  The patient verbalized understanding.    Patient left ED via Discharge Method: ambulatory to Home with Extended Family:.daughter    Opportunity for questions and clarification provided.     Patient given 1 scripts.

## 2024-03-29 LAB
BACTERIA SPEC CULT: NORMAL
SERVICE CMNT-IMP: NORMAL

## 2024-03-31 ENCOUNTER — APPOINTMENT (OUTPATIENT)
Dept: GENERAL RADIOLOGY | Age: 69
DRG: 208 | End: 2024-03-31
Payer: MEDICARE

## 2024-03-31 ENCOUNTER — HOSPITAL ENCOUNTER (INPATIENT)
Age: 69
LOS: 16 days | Discharge: NURSING, REHAB OR LONG-TERM CARE | DRG: 208 | End: 2024-04-17
Attending: EMERGENCY MEDICINE
Payer: MEDICARE

## 2024-03-31 ENCOUNTER — APPOINTMENT (OUTPATIENT)
Dept: CT IMAGING | Age: 69
DRG: 208 | End: 2024-03-31
Payer: MEDICARE

## 2024-03-31 DIAGNOSIS — J96.00 ACUTE RESPIRATORY FAILURE, UNSPECIFIED WHETHER WITH HYPOXIA OR HYPERCAPNIA (HCC): ICD-10-CM

## 2024-03-31 DIAGNOSIS — A41.9 SEPTICEMIA (HCC): ICD-10-CM

## 2024-03-31 DIAGNOSIS — R09.2 RESPIRATORY ARREST (HCC): Primary | ICD-10-CM

## 2024-03-31 LAB
ALBUMIN SERPL-MCNC: 3.5 G/DL (ref 3.2–4.6)
ALBUMIN/GLOB SERPL: 0.8 (ref 0.4–1.6)
ALP SERPL-CCNC: 81 U/L (ref 50–136)
ALT SERPL-CCNC: 20 U/L (ref 12–65)
ANION GAP SERPL CALC-SCNC: 13 MMOL/L (ref 2–11)
APPEARANCE UR: CLEAR
AST SERPL-CCNC: 29 U/L (ref 15–37)
BACTERIA SPEC CULT: NORMAL
BACTERIA SPEC CULT: NORMAL
BACTERIA URNS QL MICRO: ABNORMAL /HPF
BASOPHILS # BLD: 0.1 K/UL (ref 0–0.2)
BASOPHILS NFR BLD: 0 % (ref 0–2)
BILIRUB SERPL-MCNC: 1.2 MG/DL (ref 0.2–1.1)
BILIRUB UR QL: ABNORMAL
BUN SERPL-MCNC: 11 MG/DL (ref 8–23)
CALCIUM SERPL-MCNC: 10.3 MG/DL (ref 8.3–10.4)
CASTS URNS QL MICRO: ABNORMAL /LPF
CHLORIDE SERPL-SCNC: 100 MMOL/L (ref 103–113)
CO2 SERPL-SCNC: 25 MMOL/L (ref 21–32)
COLOR UR: ABNORMAL
CREAT SERPL-MCNC: 0.9 MG/DL (ref 0.6–1)
DIFFERENTIAL METHOD BLD: ABNORMAL
EOSINOPHIL # BLD: 0 K/UL (ref 0–0.8)
EOSINOPHIL NFR BLD: 0 % (ref 0.5–7.8)
EPI CELLS #/AREA URNS HPF: ABNORMAL /HPF
ERYTHROCYTE [DISTWIDTH] IN BLOOD BY AUTOMATED COUNT: 13.1 % (ref 11.9–14.6)
GLOBULIN SER CALC-MCNC: 4.2 G/DL (ref 2.8–4.5)
GLUCOSE SERPL-MCNC: 113 MG/DL (ref 65–100)
GLUCOSE UR STRIP.AUTO-MCNC: NEGATIVE MG/DL
HCT VFR BLD AUTO: 41.9 % (ref 35.8–46.3)
HGB BLD-MCNC: 14.3 G/DL (ref 11.7–15.4)
HGB UR QL STRIP: NEGATIVE
IMM GRANULOCYTES # BLD AUTO: 0.2 K/UL (ref 0–0.5)
IMM GRANULOCYTES NFR BLD AUTO: 1 % (ref 0–5)
KETONES UR QL STRIP.AUTO: 15 MG/DL
LACTATE SERPL-SCNC: 4.7 MMOL/L (ref 0.4–2)
LEUKOCYTE ESTERASE UR QL STRIP.AUTO: NEGATIVE
LYMPHOCYTES # BLD: 1.1 K/UL (ref 0.5–4.6)
LYMPHOCYTES NFR BLD: 5 % (ref 13–44)
MCH RBC QN AUTO: 28.9 PG (ref 26.1–32.9)
MCHC RBC AUTO-ENTMCNC: 34.1 G/DL (ref 31.4–35)
MCV RBC AUTO: 84.6 FL (ref 82–102)
MONOCYTES # BLD: 1.7 K/UL (ref 0.1–1.3)
MONOCYTES NFR BLD: 8 % (ref 4–12)
MUCOUS THREADS URNS QL MICRO: ABNORMAL /LPF
NEUTS SEG # BLD: 18.9 K/UL (ref 1.7–8.2)
NEUTS SEG NFR BLD: 86 % (ref 43–78)
NITRITE UR QL STRIP.AUTO: NEGATIVE
NRBC # BLD: 0 K/UL (ref 0–0.2)
OTHER OBSERVATIONS: ABNORMAL
PH UR STRIP: 7 (ref 5–9)
PLATELET # BLD AUTO: 420 K/UL (ref 150–450)
PMV BLD AUTO: 9.7 FL (ref 9.4–12.3)
POTASSIUM SERPL-SCNC: 3.1 MMOL/L (ref 3.5–5.1)
PROCALCITONIN SERPL-MCNC: 0.27 NG/ML (ref 0–0.49)
PROT SERPL-MCNC: 7.7 G/DL (ref 6.3–8.2)
PROT UR STRIP-MCNC: 100 MG/DL
RBC # BLD AUTO: 4.95 M/UL (ref 4.05–5.2)
RBC #/AREA URNS HPF: ABNORMAL /HPF
SERVICE CMNT-IMP: NORMAL
SERVICE CMNT-IMP: NORMAL
SODIUM SERPL-SCNC: 138 MMOL/L (ref 136–146)
SP GR UR REFRACTOMETRY: 1.02 (ref 1–1.02)
TROPONIN I SERPL HS-MCNC: 27.1 PG/ML (ref 0–14)
UROBILINOGEN UR QL STRIP.AUTO: 1 EU/DL (ref 0.2–1)
WBC # BLD AUTO: 21.9 K/UL (ref 4.3–11.1)
WBC URNS QL MICRO: ABNORMAL /HPF

## 2024-03-31 PROCEDURE — 83605 ASSAY OF LACTIC ACID: CPT

## 2024-03-31 PROCEDURE — 87086 URINE CULTURE/COLONY COUNT: CPT

## 2024-03-31 PROCEDURE — 31500 INSERT EMERGENCY AIRWAY: CPT

## 2024-03-31 PROCEDURE — 93005 ELECTROCARDIOGRAM TRACING: CPT | Performed by: EMERGENCY MEDICINE

## 2024-03-31 PROCEDURE — 2580000003 HC RX 258: Performed by: EMERGENCY MEDICINE

## 2024-03-31 PROCEDURE — 84439 ASSAY OF FREE THYROXINE: CPT

## 2024-03-31 PROCEDURE — 81001 URINALYSIS AUTO W/SCOPE: CPT

## 2024-03-31 PROCEDURE — 84145 PROCALCITONIN (PCT): CPT

## 2024-03-31 PROCEDURE — 96361 HYDRATE IV INFUSION ADD-ON: CPT

## 2024-03-31 PROCEDURE — 96375 TX/PRO/DX INJ NEW DRUG ADDON: CPT

## 2024-03-31 PROCEDURE — 87040 BLOOD CULTURE FOR BACTERIA: CPT

## 2024-03-31 PROCEDURE — 80053 COMPREHEN METABOLIC PANEL: CPT

## 2024-03-31 PROCEDURE — 99291 CRITICAL CARE FIRST HOUR: CPT

## 2024-03-31 PROCEDURE — 84484 ASSAY OF TROPONIN QUANT: CPT

## 2024-03-31 PROCEDURE — 84443 ASSAY THYROID STIM HORMONE: CPT

## 2024-03-31 PROCEDURE — 6360000002 HC RX W HCPCS: Performed by: EMERGENCY MEDICINE

## 2024-03-31 PROCEDURE — 71045 X-RAY EXAM CHEST 1 VIEW: CPT

## 2024-03-31 PROCEDURE — 85025 COMPLETE CBC W/AUTO DIFF WBC: CPT

## 2024-03-31 PROCEDURE — 96365 THER/PROPH/DIAG IV INF INIT: CPT

## 2024-03-31 RX ORDER — 0.9 % SODIUM CHLORIDE 0.9 %
1000 INTRAVENOUS SOLUTION INTRAVENOUS
Status: COMPLETED | OUTPATIENT
Start: 2024-03-31 | End: 2024-04-01

## 2024-03-31 RX ORDER — DROPERIDOL 2.5 MG/ML
1.25 INJECTION, SOLUTION INTRAMUSCULAR; INTRAVENOUS EVERY 6 HOURS PRN
Status: DISCONTINUED | OUTPATIENT
Start: 2024-03-31 | End: 2024-04-03

## 2024-03-31 RX ORDER — ONDANSETRON 2 MG/ML
4 INJECTION INTRAMUSCULAR; INTRAVENOUS
Status: COMPLETED | OUTPATIENT
Start: 2024-03-31 | End: 2024-03-31

## 2024-03-31 RX ORDER — MORPHINE SULFATE 4 MG/ML
4 INJECTION INTRAVENOUS ONCE
Status: COMPLETED | OUTPATIENT
Start: 2024-03-31 | End: 2024-03-31

## 2024-03-31 RX ADMIN — SODIUM CHLORIDE 1000 ML: 9 INJECTION, SOLUTION INTRAVENOUS at 22:47

## 2024-03-31 RX ADMIN — MORPHINE SULFATE 4 MG: 4 INJECTION INTRAVENOUS at 23:48

## 2024-03-31 RX ADMIN — ONDANSETRON 4 MG: 2 INJECTION INTRAMUSCULAR; INTRAVENOUS at 22:56

## 2024-03-31 RX ADMIN — WATER 2000 MG: 1 INJECTION INTRAMUSCULAR; INTRAVENOUS; SUBCUTANEOUS at 22:49

## 2024-03-31 RX ADMIN — VANCOMYCIN HYDROCHLORIDE 2000 MG: 10 INJECTION, POWDER, LYOPHILIZED, FOR SOLUTION INTRAVENOUS at 23:19

## 2024-04-01 ENCOUNTER — APPOINTMENT (OUTPATIENT)
Dept: CT IMAGING | Age: 69
DRG: 208 | End: 2024-04-01
Payer: MEDICARE

## 2024-04-01 ENCOUNTER — APPOINTMENT (OUTPATIENT)
Dept: GENERAL RADIOLOGY | Age: 69
DRG: 208 | End: 2024-04-01
Payer: MEDICARE

## 2024-04-01 ENCOUNTER — APPOINTMENT (OUTPATIENT)
Dept: ULTRASOUND IMAGING | Age: 69
DRG: 208 | End: 2024-04-01
Payer: MEDICARE

## 2024-04-01 PROBLEM — R65.21 SEPTIC SHOCK (HCC): Status: ACTIVE | Noted: 2024-04-01

## 2024-04-01 PROBLEM — A41.9 SEPTIC SHOCK (HCC): Status: ACTIVE | Noted: 2024-04-01

## 2024-04-01 PROBLEM — R09.2 RESPIRATORY ARREST (HCC): Status: ACTIVE | Noted: 2024-04-01

## 2024-04-01 LAB
ANION GAP BLD CALC-SCNC: ABNORMAL MMOL/L
ANION GAP SERPL CALC-SCNC: 8 MMOL/L (ref 2–11)
ARTERIAL PATENCY WRIST A: POSITIVE
BASE EXCESS BLD CALC-SCNC: 0.6 MMOL/L
BASOPHILS # BLD: 0 K/UL (ref 0–0.2)
BASOPHILS NFR BLD: 0 % (ref 0–2)
BDY SITE: ABNORMAL
BUN SERPL-MCNC: 9 MG/DL (ref 8–23)
C GATTII+NEOFOR DNA CSF QL NAA+NON-PROBE: NOT DETECTED
CA-I BLD-MCNC: 1.16 MMOL/L (ref 1.12–1.32)
CALCIUM SERPL-MCNC: 8.8 MG/DL (ref 8.3–10.4)
CHLORIDE SERPL-SCNC: 109 MMOL/L (ref 103–113)
CMV DNA CSF QL NAA+NON-PROBE: NOT DETECTED
CO2 BLD-SCNC: 23 MMOL/L (ref 13–23)
CO2 SERPL-SCNC: 26 MMOL/L (ref 21–32)
CREAT SERPL-MCNC: 0.7 MG/DL (ref 0.6–1)
D DIMER PPP FEU-MCNC: 11.73 UG/ML(FEU)
DIFFERENTIAL METHOD BLD: ABNORMAL
E COLI K1 DNA CSF QL NAA+NON-PROBE: NOT DETECTED
EKG ATRIAL RATE: 106 BPM
EKG DIAGNOSIS: NORMAL
EKG P AXIS: 49 DEGREES
EKG P-R INTERVAL: 178 MS
EKG Q-T INTERVAL: 380 MS
EKG QRS DURATION: 91 MS
EKG QTC CALCULATION (BAZETT): 507 MS
EKG R AXIS: 39 DEGREES
EKG T AXIS: 40 DEGREES
EKG VENTRICULAR RATE: 107 BPM
EOSINOPHIL # BLD: 0 K/UL (ref 0–0.8)
EOSINOPHIL NFR BLD: 0 % (ref 0.5–7.8)
ERYTHROCYTE [DISTWIDTH] IN BLOOD BY AUTOMATED COUNT: 13 % (ref 11.9–14.6)
EV RNA CSF QL NAA+NON-PROBE: NOT DETECTED
FIO2 ON VENT: 100 %
GAS FLOW.O2 O2 DELIVERY SYS: ABNORMAL
GLUCOSE BLD STRIP.AUTO-MCNC: 107 MG/DL (ref 65–100)
GLUCOSE CSF-MCNC: 62 MG/DL (ref 50–70)
GLUCOSE SERPL-MCNC: 78 MG/DL (ref 65–100)
GP B STREP DNA CSF QL NAA+NON-PROBE: NOT DETECTED
HAEM INFLU DNA CSF QL NAA+NON-PROBE: NOT DETECTED
HCO3 BLD-SCNC: 23.6 MMOL/L (ref 22–26)
HCT VFR BLD AUTO: 37.4 % (ref 35.8–46.3)
HGB BLD-MCNC: 12.6 G/DL (ref 11.7–15.4)
HHV6 DNA CSF QL NAA+NON-PROBE: NOT DETECTED
HSV1 DNA CSF QL NAA+PROBE: NOT DETECTED
HSV2 DNA CSF QL NAA+NON-PROBE: NOT DETECTED
IMM GRANULOCYTES # BLD AUTO: 0.1 K/UL (ref 0–0.5)
IMM GRANULOCYTES NFR BLD AUTO: 1 % (ref 0–5)
INSPIRATION.DURATION SETTING TIME VENT: 0.85 SEC
IPAP/PIP: 19
L MONOCYTOG DNA CSF QL NAA+NON-PROBE: NOT DETECTED
LACTATE SERPL-SCNC: 2.5 MMOL/L (ref 0.4–2)
LYMPHOCYTES # BLD: 2.2 K/UL (ref 0.5–4.6)
LYMPHOCYTES NFR BLD: 13 % (ref 13–44)
MAGNESIUM SERPL-MCNC: 1.2 MG/DL (ref 1.8–2.4)
MAGNESIUM SERPL-MCNC: 1.8 MG/DL (ref 1.8–2.4)
MCH RBC QN AUTO: 29.2 PG (ref 26.1–32.9)
MCHC RBC AUTO-ENTMCNC: 33.7 G/DL (ref 31.4–35)
MCV RBC AUTO: 86.6 FL (ref 82–102)
MONOCYTES # BLD: 1.9 K/UL (ref 0.1–1.3)
MONOCYTES NFR BLD: 11 % (ref 4–12)
N MEN DNA CSF QL NAA+NON-PROBE: NOT DETECTED
NEUTS SEG # BLD: 13.3 K/UL (ref 1.7–8.2)
NEUTS SEG NFR BLD: 76 % (ref 43–78)
NRBC # BLD: 0 K/UL (ref 0–0.2)
PARECHOVIRUS A RNA CSF QL NAA+NON-PROBE: NOT DETECTED
PAW @ MEAN EXP FLOW ON VENT: 11 CMH2O
PCO2 BLD: 31.9 MMHG (ref 35–45)
PEEP RESPIRATORY: 8
PH BLD: 7.48 (ref 7.35–7.45)
PLATELET # BLD AUTO: 294 K/UL (ref 150–450)
PMV BLD AUTO: 9.8 FL (ref 9.4–12.3)
PO2 BLD: 528 MMHG (ref 75–100)
POTASSIUM BLD-SCNC: 2.2 MMOL/L (ref 3.5–5.1)
POTASSIUM SERPL-SCNC: 3 MMOL/L (ref 3.5–5.1)
POTASSIUM SERPL-SCNC: 3.3 MMOL/L (ref 3.5–5.1)
PROT CSF-MCNC: 52 MG/DL (ref 15–45)
RBC # BLD AUTO: 4.32 M/UL (ref 4.05–5.2)
RESPIRATORY RATE: 20
S PNEUM DNA CSF QL NAA+NON-PROBE: NOT DETECTED
SAO2 % BLD: 100 %
SERVICE CMNT-IMP: ABNORMAL
SERVICE CMNT-IMP: ABNORMAL
SODIUM BLD-SCNC: 142 MMOL/L (ref 136–145)
SODIUM SERPL-SCNC: 143 MMOL/L (ref 136–146)
SPECIMEN SITE: ABNORMAL
T4 FREE SERPL-MCNC: 1.2 NG/DL (ref 0.78–1.46)
TSH W FREE THYROID IF ABNORMAL: 13.6 UIU/ML (ref 0.36–3.74)
TUBE # CSF: 3
TUBE # CSF: 3
VENTILATION MODE VENT: ABNORMAL
VT SETTING VENT: 450
VZV DNA CSF QL NAA+NON-PROBE: NOT DETECTED
WBC # BLD AUTO: 17.6 K/UL (ref 4.3–11.1)

## 2024-04-01 PROCEDURE — 87205 SMEAR GRAM STAIN: CPT

## 2024-04-01 PROCEDURE — 71045 X-RAY EXAM CHEST 1 VIEW: CPT

## 2024-04-01 PROCEDURE — 2500000003 HC RX 250 WO HCPCS: Performed by: EMERGENCY MEDICINE

## 2024-04-01 PROCEDURE — 82945 GLUCOSE OTHER FLUID: CPT

## 2024-04-01 PROCEDURE — 74177 CT ABD & PELVIS W/CONTRAST: CPT

## 2024-04-01 PROCEDURE — 93970 EXTREMITY STUDY: CPT | Performed by: RADIOLOGY

## 2024-04-01 PROCEDURE — 36415 COLL VENOUS BLD VENIPUNCTURE: CPT

## 2024-04-01 PROCEDURE — 96374 THER/PROPH/DIAG INJ IV PUSH: CPT

## 2024-04-01 PROCEDURE — 82947 ASSAY GLUCOSE BLOOD QUANT: CPT

## 2024-04-01 PROCEDURE — 83735 ASSAY OF MAGNESIUM: CPT

## 2024-04-01 PROCEDURE — 6360000002 HC RX W HCPCS: Performed by: EMERGENCY MEDICINE

## 2024-04-01 PROCEDURE — 85025 COMPLETE CBC W/AUTO DIFF WBC: CPT

## 2024-04-01 PROCEDURE — 71260 CT THORAX DX C+: CPT

## 2024-04-01 PROCEDURE — 2709999900 HC NON-CHARGEABLE SUPPLY

## 2024-04-01 PROCEDURE — 6360000002 HC RX W HCPCS: Performed by: INTERNAL MEDICINE

## 2024-04-01 PROCEDURE — 2700000000 HC OXYGEN THERAPY PER DAY

## 2024-04-01 PROCEDURE — 85379 FIBRIN DEGRADATION QUANT: CPT

## 2024-04-01 PROCEDURE — 87070 CULTURE OTHR SPECIMN AEROBIC: CPT

## 2024-04-01 PROCEDURE — 6360000002 HC RX W HCPCS

## 2024-04-01 PROCEDURE — 84157 ASSAY OF PROTEIN OTHER: CPT

## 2024-04-01 PROCEDURE — 87641 MR-STAPH DNA AMP PROBE: CPT

## 2024-04-01 PROCEDURE — 6360000004 HC RX CONTRAST MEDICATION: Performed by: EMERGENCY MEDICINE

## 2024-04-01 PROCEDURE — 87483 CNS DNA AMP PROBE TYPE 12-25: CPT

## 2024-04-01 PROCEDURE — 70450 CT HEAD/BRAIN W/O DYE: CPT

## 2024-04-01 PROCEDURE — 94002 VENT MGMT INPAT INIT DAY: CPT

## 2024-04-01 PROCEDURE — 6360000004 HC RX CONTRAST MEDICATION

## 2024-04-01 PROCEDURE — 5A1935Z RESPIRATORY VENTILATION, LESS THAN 24 CONSECUTIVE HOURS: ICD-10-PCS

## 2024-04-01 PROCEDURE — 74018 RADEX ABDOMEN 1 VIEW: CPT

## 2024-04-01 PROCEDURE — 2000000000 HC ICU R&B

## 2024-04-01 PROCEDURE — 96361 HYDRATE IV INFUSION ADD-ON: CPT

## 2024-04-01 PROCEDURE — 0BH17EZ INSERTION OF ENDOTRACHEAL AIRWAY INTO TRACHEA, VIA NATURAL OR ARTIFICIAL OPENING: ICD-10-PCS | Performed by: EMERGENCY MEDICINE

## 2024-04-01 PROCEDURE — 93010 ELECTROCARDIOGRAM REPORT: CPT | Performed by: INTERNAL MEDICINE

## 2024-04-01 PROCEDURE — 93970 EXTREMITY STUDY: CPT

## 2024-04-01 PROCEDURE — 83605 ASSAY OF LACTIC ACID: CPT

## 2024-04-01 PROCEDURE — 82330 ASSAY OF CALCIUM: CPT

## 2024-04-01 PROCEDURE — 84132 ASSAY OF SERUM POTASSIUM: CPT

## 2024-04-01 PROCEDURE — 009U3ZX DRAINAGE OF SPINAL CANAL, PERCUTANEOUS APPROACH, DIAGNOSTIC: ICD-10-PCS

## 2024-04-01 PROCEDURE — 80048 BASIC METABOLIC PNL TOTAL CA: CPT

## 2024-04-01 PROCEDURE — 84295 ASSAY OF SERUM SODIUM: CPT

## 2024-04-01 PROCEDURE — 82803 BLOOD GASES ANY COMBINATION: CPT

## 2024-04-01 PROCEDURE — 2580000003 HC RX 258

## 2024-04-01 RX ORDER — ACETAMINOPHEN 650 MG/1
650 SUPPOSITORY RECTAL EVERY 6 HOURS PRN
Status: DISCONTINUED | OUTPATIENT
Start: 2024-04-01 | End: 2024-04-17 | Stop reason: HOSPADM

## 2024-04-01 RX ORDER — POLYETHYLENE GLYCOL 3350 17 G/17G
17 POWDER, FOR SOLUTION ORAL DAILY PRN
Status: DISCONTINUED | OUTPATIENT
Start: 2024-04-01 | End: 2024-04-05

## 2024-04-01 RX ORDER — ENOXAPARIN SODIUM 100 MG/ML
40 INJECTION SUBCUTANEOUS DAILY
Status: DISCONTINUED | OUTPATIENT
Start: 2024-04-01 | End: 2024-04-17 | Stop reason: HOSPADM

## 2024-04-01 RX ORDER — MAGNESIUM SULFATE IN WATER 40 MG/ML
2000 INJECTION, SOLUTION INTRAVENOUS PRN
Status: DISCONTINUED | OUTPATIENT
Start: 2024-04-01 | End: 2024-04-03

## 2024-04-01 RX ORDER — SODIUM CHLORIDE 0.9 % (FLUSH) 0.9 %
5-40 SYRINGE (ML) INJECTION EVERY 12 HOURS SCHEDULED
Status: DISCONTINUED | OUTPATIENT
Start: 2024-04-01 | End: 2024-04-17 | Stop reason: HOSPADM

## 2024-04-01 RX ORDER — SODIUM CHLORIDE 9 MG/ML
INJECTION, SOLUTION INTRAVENOUS PRN
Status: DISCONTINUED | OUTPATIENT
Start: 2024-04-01 | End: 2024-04-17 | Stop reason: HOSPADM

## 2024-04-01 RX ORDER — SODIUM CHLORIDE 0.9 % (FLUSH) 0.9 %
5-40 SYRINGE (ML) INJECTION PRN
Status: DISCONTINUED | OUTPATIENT
Start: 2024-04-01 | End: 2024-04-17 | Stop reason: HOSPADM

## 2024-04-01 RX ORDER — SUCCINYLCHOLINE CHLORIDE 20 MG/ML
INJECTION INTRAMUSCULAR; INTRAVENOUS DAILY PRN
Status: COMPLETED | OUTPATIENT
Start: 2024-04-01 | End: 2024-04-01

## 2024-04-01 RX ORDER — PROPOFOL 10 MG/ML
INJECTION, EMULSION INTRAVENOUS
Status: COMPLETED
Start: 2024-04-01 | End: 2024-04-01

## 2024-04-01 RX ORDER — POTASSIUM CHLORIDE 29.8 MG/ML
20 INJECTION INTRAVENOUS PRN
Status: DISCONTINUED | OUTPATIENT
Start: 2024-04-01 | End: 2024-04-03

## 2024-04-01 RX ORDER — POTASSIUM CHLORIDE 7.45 MG/ML
10 INJECTION INTRAVENOUS PRN
Status: DISCONTINUED | OUTPATIENT
Start: 2024-04-01 | End: 2024-04-04

## 2024-04-01 RX ORDER — SODIUM CHLORIDE 0.9 % (FLUSH) 0.9 %
5-40 SYRINGE (ML) INJECTION EVERY 12 HOURS SCHEDULED
Status: DISCONTINUED | OUTPATIENT
Start: 2024-04-01 | End: 2024-04-02

## 2024-04-01 RX ORDER — PROPOFOL 10 MG/ML
5-50 INJECTION, EMULSION INTRAVENOUS
Status: COMPLETED | OUTPATIENT
Start: 2024-04-01 | End: 2024-04-01

## 2024-04-01 RX ORDER — ETOMIDATE 2 MG/ML
INJECTION INTRAVENOUS DAILY PRN
Status: COMPLETED | OUTPATIENT
Start: 2024-04-01 | End: 2024-04-01

## 2024-04-01 RX ORDER — ONDANSETRON 4 MG/1
4 TABLET, ORALLY DISINTEGRATING ORAL EVERY 8 HOURS PRN
Status: DISCONTINUED | OUTPATIENT
Start: 2024-04-01 | End: 2024-04-17 | Stop reason: HOSPADM

## 2024-04-01 RX ORDER — ONDANSETRON 2 MG/ML
4 INJECTION INTRAMUSCULAR; INTRAVENOUS EVERY 6 HOURS PRN
Status: DISCONTINUED | OUTPATIENT
Start: 2024-04-01 | End: 2024-04-17 | Stop reason: HOSPADM

## 2024-04-01 RX ORDER — ACETAMINOPHEN 325 MG/1
650 TABLET ORAL EVERY 6 HOURS PRN
Status: DISCONTINUED | OUTPATIENT
Start: 2024-04-01 | End: 2024-04-17 | Stop reason: HOSPADM

## 2024-04-01 RX ORDER — PROPOFOL 10 MG/ML
5-50 INJECTION, EMULSION INTRAVENOUS CONTINUOUS
Status: DISCONTINUED | OUTPATIENT
Start: 2024-04-01 | End: 2024-04-01

## 2024-04-01 RX ORDER — MAGNESIUM SULFATE IN WATER 40 MG/ML
2000 INJECTION, SOLUTION INTRAVENOUS ONCE
Status: COMPLETED | OUTPATIENT
Start: 2024-04-01 | End: 2024-04-01

## 2024-04-01 RX ADMIN — VANCOMYCIN HYDROCHLORIDE 1500 MG: 10 INJECTION, POWDER, LYOPHILIZED, FOR SOLUTION INTRAVENOUS at 23:23

## 2024-04-01 RX ADMIN — DROPERIDOL 1.25 MG: 2.5 INJECTION, SOLUTION INTRAMUSCULAR; INTRAVENOUS at 00:21

## 2024-04-01 RX ADMIN — CEFEPIME 2000 MG: 2 INJECTION, POWDER, FOR SOLUTION INTRAVENOUS at 13:44

## 2024-04-01 RX ADMIN — POTASSIUM CHLORIDE 10 MEQ: 7.46 INJECTION, SOLUTION INTRAVENOUS at 11:39

## 2024-04-01 RX ADMIN — IOPAMIDOL 100 ML: 755 INJECTION, SOLUTION INTRAVENOUS at 00:44

## 2024-04-01 RX ADMIN — POTASSIUM CHLORIDE 10 MEQ: 7.46 INJECTION, SOLUTION INTRAVENOUS at 23:17

## 2024-04-01 RX ADMIN — POTASSIUM CHLORIDE 10 MEQ: 7.46 INJECTION, SOLUTION INTRAVENOUS at 13:05

## 2024-04-01 RX ADMIN — POTASSIUM CHLORIDE 10 MEQ: 7.46 INJECTION, SOLUTION INTRAVENOUS at 10:35

## 2024-04-01 RX ADMIN — PROPOFOL 20 MCG/KG/MIN: 10 INJECTION, EMULSION INTRAVENOUS at 01:52

## 2024-04-01 RX ADMIN — PROPOFOL 50 MCG/KG/MIN: 10 INJECTION, EMULSION INTRAVENOUS at 04:27

## 2024-04-01 RX ADMIN — MAGNESIUM SULFATE HEPTAHYDRATE 2000 MG: 40 INJECTION, SOLUTION INTRAVENOUS at 10:08

## 2024-04-01 RX ADMIN — SODIUM CHLORIDE, PRESERVATIVE FREE 10 ML: 5 INJECTION INTRAVENOUS at 22:01

## 2024-04-01 RX ADMIN — PROPOFOL 50 MCG/KG/MIN: 10 INJECTION, EMULSION INTRAVENOUS at 08:09

## 2024-04-01 RX ADMIN — POTASSIUM CHLORIDE 10 MEQ: 7.46 INJECTION, SOLUTION INTRAVENOUS at 09:37

## 2024-04-01 RX ADMIN — ETOMIDATE 30 MG: 2 INJECTION, SOLUTION INTRAVENOUS at 01:31

## 2024-04-01 RX ADMIN — CEFEPIME 2000 MG: 2 INJECTION, POWDER, FOR SOLUTION INTRAVENOUS at 21:59

## 2024-04-01 RX ADMIN — POTASSIUM CHLORIDE 10 MEQ: 7.46 INJECTION, SOLUTION INTRAVENOUS at 14:06

## 2024-04-01 RX ADMIN — SODIUM CHLORIDE: 9 INJECTION, SOLUTION INTRAVENOUS at 05:46

## 2024-04-01 RX ADMIN — CEFEPIME 2000 MG: 2 INJECTION, POWDER, FOR SOLUTION INTRAVENOUS at 05:47

## 2024-04-01 RX ADMIN — SODIUM CHLORIDE, PRESERVATIVE FREE 10 ML: 5 INJECTION INTRAVENOUS at 09:07

## 2024-04-01 RX ADMIN — IOPAMIDOL 75 ML: 755 INJECTION, SOLUTION INTRAVENOUS at 14:53

## 2024-04-01 RX ADMIN — POTASSIUM CHLORIDE 10 MEQ: 7.46 INJECTION, SOLUTION INTRAVENOUS at 22:17

## 2024-04-01 RX ADMIN — POTASSIUM CHLORIDE 10 MEQ: 7.46 INJECTION, SOLUTION INTRAVENOUS at 08:34

## 2024-04-01 RX ADMIN — Medication 150 MG: at 01:31

## 2024-04-01 RX ADMIN — SODIUM CHLORIDE, PRESERVATIVE FREE 10 ML: 5 INJECTION INTRAVENOUS at 09:08

## 2024-04-01 ASSESSMENT — PULMONARY FUNCTION TESTS
PIF_VALUE: 19
PIF_VALUE: 13
PIF_VALUE: 18
PIF_VALUE: 20
PIF_VALUE: 16
PIF_VALUE: 18

## 2024-04-01 ASSESSMENT — PAIN SCALES - GENERAL
PAINLEVEL_OUTOF10: 0

## 2024-04-01 NOTE — H&P
infection and sepsis who will recently diagnosed with a UTI with noncompliant with medication.  Patient was complaining of lower abdominal cramping but had alteration in her mental status today and was brought to the ER.  The patient had respiratory failure upon returning from CT and was intubated.  Patient admitted to the ICU for sepsis and respiratory failure..    NEURO:   Sedation: Propofol      CV:   Volume Status: Euvolemic  Septic shock: Patient responded to fluids  NSTEMI: EKG notes ST segment changes sinus tachycardia  PULM:   Acute hypoxemic/hypercapneic respiratory failure: Patient had agonal respirations was hypoxic upon returning from CT  RENAL:  ZEUS: BUN/creatinine: 11 and 0.9 with a GFR of 69  Lactic acidosis: Initial lactic acid 4.7 decreased to 2.5 after fluid administration  Electrolytes: Hypokalemia 3.1  GI:   Nutrition: Morbidly obese well-developed female  HEME:   Anemia: H&H 14/41  Thrombocytopenia: 420 K  Anticoagulation: No previous anticoagulation  ID:   Septic Shock: Borderline hypotension   ENDO:   DM: Glucose normal  Skin: no decub, turns, preventive care  Prophy: Mechanical, hold anticoagulation therapy until LP complete    Family can be updated by phone after rounds.  [email or web address] (Child)    can be reached at [phone] (Mobile).    Full Code    The patient is critically ill with respiratory failure, circulatory failure and requires high complexity decision making for assessment and support including frequent ventilator adjustment, frequent evaluation and titration of therapies , application of advanced monitoring technologies and extensive interpretation of multiple databases    Cumulative time devoted to patient care services by me for day of service is 45 mins.    Stef Benson MD

## 2024-04-01 NOTE — CARE COORDINATION
Case Management Assessment  Initial Evaluation    Date/Time of Evaluation: 4/1/2024 11:51 AM  Assessment Completed by: Belgica Overton RN    If patient is discharged prior to next notation, then this note serves as note for discharge by case management.    Patient Name: Jesika Olsen                   YOB: 1955  Diagnosis: Respiratory arrest (HCC) [R09.2]  Septicemia (HCC) [A41.9]  Septic shock (HCC) [A41.9, R65.21]  Acute respiratory failure, unspecified whether with hypoxia or hypercapnia (HCC) [J96.00]                   Date / Time: 3/31/2024  9:12 PM    Patient Admission Status: Inpatient   Readmission Risk (Low < 19, Mod (19-27), High > 27): Readmission Risk Score: 18.9    Current PCP: Paulette Negrete MD  PCP verified by CM? (P) Yes    Chart Reviewed: Yes      History Provided by: (P) Child/Family  Patient Orientation: (P) Unable to Assess (on vent)    Patient Cognition: (P) Other (see comment) (on vent)    Hospitalization in the last 30 days (Readmission):  No    If yes, Readmission Assessment in CM Navigator will be completed.    Advance Directives:      Code Status: Full Code   Patient's Primary Decision Maker is: (P) Legal Next of Kin      Discharge Planning:    Patient lives with: (P) Children Type of Home: (P) Other (Comment) (pending)  Primary Care Giver: (P) Self  Patient Support Systems include: (P) Children, Family Members   Current Financial resources: (P) Medicare ([facility])  Current community resources: (P) None  Current services prior to admission: (P) Durable Medical Equipment            Current DME: (P) Cane, Walker (Rolator walker)- at home but does not use            Type of Home Care services:  None    ADLS  Prior functional level: (P) Independent in ADLs/IADLs  Current functional level: (P) Assistance with the following:    PT AM-PAC:   /24  OT AM-PAC:   /24    Family can provide assistance at DC: (P) Yes  Would you like Case Management to discuss the discharge plan with any

## 2024-04-01 NOTE — INTERDISCIPLINARY ROUNDS
Multi-D Rounds/Checklist (leapfrog):  Lines: can any be removed?: None   ETT  (Active)     Urinary Catheter 04/01/24 [place]-Temperature (Active)      DVT Prophylaxis: Ordered  Vent: HOB elevated? Yes ;  mL/kg: N/A ; Vent day 1  Nutrition Ordered/appropriate: Per Primary Team  Can antibiotics or other drugs be stopped? Yes/End Date set Yes  Inpat Anti-Infectives (From admission, onward)       Start     Ordered Stop    04/01/24 2300  vancomycin (VANCOCIN) 1500 mg in sodium chloride 0.9 % 250 mL IVPB  1,500 mg,   IntraVENous,   EVERY 24 HOURS         04/01/24 0237 04/07/24 2259    04/01/24 0600  ceFEPIme (MAXIPIME) 2,000 mg in sodium chloride 0.9 % 100 mL IVPB (mini-bag)  2,000 mg,   IntraVENous,   EVERY 8 HOURS         04/01/24 0237 04/08/24 0559                  Consults needed: None  A: Is pain control adequate? (has PRNs? Stop drip?) Yes  B: Sedation break and SBT? Yes  C: Is sedation choice appropriate? Yes  D: Delirium/CAM-ICU? Unable to screen  E: Mobility goals/appropriateness? Yes  F: Family update and plan? child is surrogate decision maker and is being updated daily by primary attending and nursing staff.    Fiorella Carter, APRN - CNP

## 2024-04-01 NOTE — ED TRIAGE NOTES
Pt diagnosed with uti 3/26 d/c home with uti and rx for abx. Has not taken any of antibiotics. Pt now with nausea and lower abdominal pain. Confused per family.  EMS VS    %  /100  Temp97.8F  Resp 40  Unable to get IV got 1 blood culture.

## 2024-04-01 NOTE — ED PROVIDER NOTES
LEFT  Antecubital        Culture PENDING    XR CHEST PORTABLE    Narrative    Chest X-ray    INDICATION: Sepsis    COMPARISON:  None    TECHNIQUE: AP/PA view of the chest was obtained.    FINDINGS: The lungs are clear. There are no infiltrates or effusions.  The heart  size is normal.  The bony thorax is intact.        Impression    No acute findings in the chest     CT ABDOMEN PELVIS W IV CONTRAST Additional Contrast? Radiologist Recommendation    Narrative    CT OF THE ABDOMEN AND PELVIS    INDICATION: Pain    TECHNIQUE: Multiple 2D axial images were obtained through the abdomen and  pelvis.  Oral contrast was used for bowel opacification.  100mL of Isovue 370  intravenous contrast was used for better evaluation of solid organs and vascular  structures.  Radiation dose reduction techniques were used for this study.  All  CT scans performed at this facility use one or all of the following: Automated  exposure control, adjustment of the mA and/or kVp according to patient's size,  iterative reconstruction.    COMPARISON: None    FINDINGS:  - LUNG BASES: No infiltrates or masses.    - LIVER: Normal in size and appearance.  Right hepatic cyst.  - GALLBLADDER/BILE DUCTS: Status post cholecystectomy.  - PANCREAS: Normal.  - SPLEEN: Normal.    - ADRENALS: Normal.  - KIDNEYS/URETERS: No hydronephrosis or significant mass. Left renal cyst.  - BLADDER: Normal.  - REPRODUCTIVE ORGANS: No pelvic masses.    - BOWEL: Gastric bypass. Normal caliber.  No inflammatory changes.  - LYMPH NODES: No significant retroperitoneal, mesenteric, or pelvic adenopathy.  - BONES: No fracture or significant bone lesion.  - VASCULATURE: Normal  - OTHER: No ascites.      Impression    No evidence of acute intra-abdominal pathology.      If there are any questions about this report, I can be reached on PerfectServe  or at [phone]     CT HEAD WO CONTRAST    Narrative    Head CT    INDICATION: Altered mental status    TECHNIQUE: Multiple 2D axial

## 2024-04-01 NOTE — ED NOTES
TRANSFER - OUT REPORT:    Verbal report given to CORNELIUS Garcia on Jesika Olsen  being transferred to [facility] for routine progression of patient care       Report consisted of patient's Situation, Background, Assessment and   Recommendations(SBAR).     Information from the following report(s) Nurse Handoff Report was reviewed with the receiving nurse.    [place] Fall Assessment:    Presents to emergency department  because of falls (Syncope, seizure, or loss of consciousness): No  Age > 70: No  Altered Mental Status, Intoxication with alcohol or substance confusion (Disorientation, impaired judgment, poor safety awaremess, or inability to follow instructions): Yes  Impaired Mobility: Ambulates or transfers with assistive devices or assistance; Unable to ambulate or transer.: Yes  Nursing Judgement: Yes          Lines:   Peripheral IV 03/31/24 Right Antecubital (Active)   Site Assessment Clean, dry & intact;Clean 03/31/24 2140   Line Status Blood return noted;Flushed;Specimen collected;Normal saline locked 03/31/24 2140   Phlebitis Assessment No symptoms 03/31/24 2140   Infiltration Assessment 0 03/31/24 2140   Dressing Status Clean, dry & intact;New dressing applied 03/31/24 2140   Dressing Type Transparent 03/31/24 2140       Peripheral IV Left Antecubital (Active)        Opportunity for questions and clarification was provided.      Patient transported with:  Registered Nurse          Stef Gamino RN  04/01/24 7563

## 2024-04-01 NOTE — ACP (ADVANCE CARE PLANNING)
Advance Care Planning     Advance Care Planning Activator (Inpatient)  Conversation Note      Date of ACP Conversation: 4/1/2024       ACP Activator: Belgica Ovetron RN    {When Decision Maker makes decisions on behalf of the incapacitated patient: Decision Maker is asked to consider and make decisions based on patient values, known preferences, or best interests.     Health Care Decision Maker: NO LW/HCPOA on file. Pt . Has 2 children, but one estranged. Both are legal NOK unless document presented stating otherwise. Daughter thinks pt has LW/HCPOA.     Current Designated Health Care Decision Maker: Karin primary contact.     Click here to complete Healthcare Decision Makers including section of the Healthcare Decision Maker Relationship (ie \"Primary\")  Today we documented Decision Maker(s) consistent with Legal Next of Kin hierarchy.    Care Preferences  Full code per MD orders.

## 2024-04-02 PROBLEM — E03.9 HYPOTHYROIDISM: Chronic | Status: ACTIVE | Noted: 2023-12-28

## 2024-04-02 PROBLEM — E11.9 DM2 (DIABETES MELLITUS, TYPE 2) (HCC): Chronic | Status: ACTIVE | Noted: 2023-12-28

## 2024-04-02 PROBLEM — F32.9 MDD (MAJOR DEPRESSIVE DISORDER): Chronic | Status: ACTIVE | Noted: 2023-12-28

## 2024-04-02 PROBLEM — N17.9 AKI (ACUTE KIDNEY INJURY) (HCC): Status: RESOLVED | Noted: 2024-03-08 | Resolved: 2024-04-02

## 2024-04-02 PROBLEM — E87.6 HYPOKALEMIA: Status: RESOLVED | Noted: 2023-12-28 | Resolved: 2024-04-02

## 2024-04-02 PROBLEM — A41.9 SEPTIC SHOCK (HCC): Status: RESOLVED | Noted: 2024-04-01 | Resolved: 2024-04-02

## 2024-04-02 PROBLEM — R65.21 SEPTIC SHOCK (HCC): Status: RESOLVED | Noted: 2024-04-01 | Resolved: 2024-04-02

## 2024-04-02 LAB
ANION GAP SERPL CALC-SCNC: 9 MMOL/L (ref 2–11)
BASOPHILS # BLD: 0.1 K/UL (ref 0–0.2)
BASOPHILS NFR BLD: 1 % (ref 0–2)
BUN SERPL-MCNC: 10 MG/DL (ref 8–23)
CALCIUM SERPL-MCNC: 9.3 MG/DL (ref 8.3–10.4)
CHLORIDE SERPL-SCNC: 106 MMOL/L (ref 103–113)
CO2 SERPL-SCNC: 23 MMOL/L (ref 21–32)
CREAT SERPL-MCNC: 0.5 MG/DL (ref 0.6–1)
DIFFERENTIAL METHOD BLD: ABNORMAL
EOSINOPHIL # BLD: 0.1 K/UL (ref 0–0.8)
EOSINOPHIL NFR BLD: 0 % (ref 0.5–7.8)
ERYTHROCYTE [DISTWIDTH] IN BLOOD BY AUTOMATED COUNT: 13.2 % (ref 11.9–14.6)
GLUCOSE SERPL-MCNC: 86 MG/DL (ref 65–100)
HCT VFR BLD AUTO: 37.2 % (ref 35.8–46.3)
HGB BLD-MCNC: 12.6 G/DL (ref 11.7–15.4)
IMM GRANULOCYTES # BLD AUTO: 0.1 K/UL (ref 0–0.5)
IMM GRANULOCYTES NFR BLD AUTO: 1 % (ref 0–5)
LYMPHOCYTES # BLD: 2.2 K/UL (ref 0.5–4.6)
LYMPHOCYTES NFR BLD: 12 % (ref 13–44)
MAGNESIUM SERPL-MCNC: 1.8 MG/DL (ref 1.8–2.4)
MCH RBC QN AUTO: 29.5 PG (ref 26.1–32.9)
MCHC RBC AUTO-ENTMCNC: 33.9 G/DL (ref 31.4–35)
MCV RBC AUTO: 87.1 FL (ref 82–102)
MONOCYTES # BLD: 1.8 K/UL (ref 0.1–1.3)
MONOCYTES NFR BLD: 10 % (ref 4–12)
MRSA DNA SPEC QL NAA+PROBE: NOT DETECTED
NEUTS SEG # BLD: 14.2 K/UL (ref 1.7–8.2)
NEUTS SEG NFR BLD: 77 % (ref 43–78)
NRBC # BLD: 0 K/UL (ref 0–0.2)
PLATELET # BLD AUTO: 284 K/UL (ref 150–450)
PMV BLD AUTO: 10.4 FL (ref 9.4–12.3)
POTASSIUM SERPL-SCNC: 3.5 MMOL/L (ref 3.5–5.1)
RBC # BLD AUTO: 4.27 M/UL (ref 4.05–5.2)
S AUREUS CPE NOSE QL NAA+PROBE: NOT DETECTED
SODIUM SERPL-SCNC: 138 MMOL/L (ref 136–146)
WBC # BLD AUTO: 18.5 K/UL (ref 4.3–11.1)

## 2024-04-02 PROCEDURE — 92610 EVALUATE SWALLOWING FUNCTION: CPT

## 2024-04-02 PROCEDURE — 80048 BASIC METABOLIC PNL TOTAL CA: CPT

## 2024-04-02 PROCEDURE — 2580000003 HC RX 258

## 2024-04-02 PROCEDURE — 99232 SBSQ HOSP IP/OBS MODERATE 35: CPT | Performed by: INTERNAL MEDICINE

## 2024-04-02 PROCEDURE — 1100000000 HC RM PRIVATE

## 2024-04-02 PROCEDURE — 83735 ASSAY OF MAGNESIUM: CPT

## 2024-04-02 PROCEDURE — 85025 COMPLETE CBC W/AUTO DIFF WBC: CPT

## 2024-04-02 PROCEDURE — 6370000000 HC RX 637 (ALT 250 FOR IP): Performed by: NURSE PRACTITIONER

## 2024-04-02 PROCEDURE — 97166 OT EVAL MOD COMPLEX 45 MIN: CPT

## 2024-04-02 PROCEDURE — 97535 SELF CARE MNGMENT TRAINING: CPT

## 2024-04-02 PROCEDURE — 51798 US URINE CAPACITY MEASURE: CPT

## 2024-04-02 PROCEDURE — 6360000002 HC RX W HCPCS

## 2024-04-02 PROCEDURE — 36415 COLL VENOUS BLD VENIPUNCTURE: CPT

## 2024-04-02 PROCEDURE — 2500000003 HC RX 250 WO HCPCS: Performed by: NURSE PRACTITIONER

## 2024-04-02 RX ORDER — LEVOTHYROXINE SODIUM 0.1 MG/1
100 TABLET ORAL
Status: DISCONTINUED | OUTPATIENT
Start: 2024-04-03 | End: 2024-04-17 | Stop reason: HOSPADM

## 2024-04-02 RX ORDER — DULOXETIN HYDROCHLORIDE 60 MG/1
60 CAPSULE, DELAYED RELEASE ORAL DAILY
Status: DISCONTINUED | OUTPATIENT
Start: 2024-04-02 | End: 2024-04-17 | Stop reason: HOSPADM

## 2024-04-02 RX ORDER — SUCRALFATE 1 G/1
1 TABLET ORAL 4 TIMES DAILY
Status: DISCONTINUED | OUTPATIENT
Start: 2024-04-02 | End: 2024-04-17 | Stop reason: HOSPADM

## 2024-04-02 RX ORDER — PANTOPRAZOLE SODIUM 40 MG/1
40 TABLET, DELAYED RELEASE ORAL
Status: DISCONTINUED | OUTPATIENT
Start: 2024-04-02 | End: 2024-04-17 | Stop reason: HOSPADM

## 2024-04-02 RX ORDER — GABAPENTIN 100 MG/1
100 CAPSULE ORAL EVERY 12 HOURS
Status: DISCONTINUED | OUTPATIENT
Start: 2024-04-02 | End: 2024-04-10

## 2024-04-02 RX ADMIN — SODIUM CHLORIDE, PRESERVATIVE FREE 5 ML: 5 INJECTION INTRAVENOUS at 08:49

## 2024-04-02 RX ADMIN — POTASSIUM CHLORIDE 10 MEQ: 7.46 INJECTION, SOLUTION INTRAVENOUS at 01:26

## 2024-04-02 RX ADMIN — ENOXAPARIN SODIUM 40 MG: 100 INJECTION SUBCUTANEOUS at 08:49

## 2024-04-02 RX ADMIN — GABAPENTIN 100 MG: 100 CAPSULE ORAL at 11:38

## 2024-04-02 RX ADMIN — CEFEPIME 2000 MG: 2 INJECTION, POWDER, FOR SOLUTION INTRAVENOUS at 06:25

## 2024-04-02 RX ADMIN — TUBERCULIN PURIFIED PROTEIN DERIVATIVE 5 UNITS: 5 INJECTION, SOLUTION INTRADERMAL at 10:21

## 2024-04-02 RX ADMIN — SUCRALFATE 1 G: 1 TABLET ORAL at 20:45

## 2024-04-02 RX ADMIN — GABAPENTIN 100 MG: 100 CAPSULE ORAL at 22:42

## 2024-04-02 RX ADMIN — SODIUM CHLORIDE, PRESERVATIVE FREE 5 ML: 5 INJECTION INTRAVENOUS at 08:50

## 2024-04-02 RX ADMIN — POTASSIUM CHLORIDE 10 MEQ: 7.46 INJECTION, SOLUTION INTRAVENOUS at 00:24

## 2024-04-02 RX ADMIN — DULOXETINE HYDROCHLORIDE 60 MG: 60 CAPSULE, DELAYED RELEASE ORAL at 11:38

## 2024-04-02 RX ADMIN — SODIUM CHLORIDE: 9 INJECTION, SOLUTION INTRAVENOUS at 06:24

## 2024-04-02 RX ADMIN — PANTOPRAZOLE SODIUM 40 MG: 40 TABLET, DELAYED RELEASE ORAL at 16:58

## 2024-04-02 RX ADMIN — SUCRALFATE 1 G: 1 TABLET ORAL at 11:38

## 2024-04-02 RX ADMIN — SUCRALFATE 1 G: 1 TABLET ORAL at 16:58

## 2024-04-02 ASSESSMENT — PAIN SCALES - GENERAL: PAINLEVEL_OUTOF10: 0

## 2024-04-03 LAB
ANION GAP SERPL CALC-SCNC: 9 MMOL/L (ref 2–11)
BACTERIA SPEC CULT: NORMAL
BUN SERPL-MCNC: 13 MG/DL (ref 8–23)
CALCIUM SERPL-MCNC: 9.3 MG/DL (ref 8.3–10.4)
CHLORIDE SERPL-SCNC: 110 MMOL/L (ref 103–113)
CO2 SERPL-SCNC: 23 MMOL/L (ref 21–32)
CREAT SERPL-MCNC: 0.4 MG/DL (ref 0.6–1)
ERYTHROCYTE [DISTWIDTH] IN BLOOD BY AUTOMATED COUNT: 13.2 % (ref 11.9–14.6)
GLUCOSE SERPL-MCNC: 108 MG/DL (ref 65–100)
HCT VFR BLD AUTO: 31.8 % (ref 35.8–46.3)
HGB BLD-MCNC: 10.7 G/DL (ref 11.7–15.4)
MCH RBC QN AUTO: 28.9 PG (ref 26.1–32.9)
MCHC RBC AUTO-ENTMCNC: 33.6 G/DL (ref 31.4–35)
MCV RBC AUTO: 85.9 FL (ref 82–102)
MM INDURATION, POC: 0 MM (ref 0–5)
NRBC # BLD: 0 K/UL (ref 0–0.2)
PLATELET # BLD AUTO: 238 K/UL (ref 150–450)
PMV BLD AUTO: 9.8 FL (ref 9.4–12.3)
POTASSIUM SERPL-SCNC: 2.7 MMOL/L (ref 3.5–5.1)
POTASSIUM SERPL-SCNC: 3.1 MMOL/L (ref 3.5–5.1)
PPD, POC: NEGATIVE
RBC # BLD AUTO: 3.7 M/UL (ref 4.05–5.2)
SERVICE CMNT-IMP: NORMAL
SODIUM SERPL-SCNC: 142 MMOL/L (ref 136–146)
WBC # BLD AUTO: 13.8 K/UL (ref 4.3–11.1)

## 2024-04-03 PROCEDURE — 92526 ORAL FUNCTION THERAPY: CPT

## 2024-04-03 PROCEDURE — 6370000000 HC RX 637 (ALT 250 FOR IP)

## 2024-04-03 PROCEDURE — 36415 COLL VENOUS BLD VENIPUNCTURE: CPT

## 2024-04-03 PROCEDURE — 2500000003 HC RX 250 WO HCPCS: Performed by: NURSE PRACTITIONER

## 2024-04-03 PROCEDURE — 6360000002 HC RX W HCPCS: Performed by: NURSE PRACTITIONER

## 2024-04-03 PROCEDURE — 6360000002 HC RX W HCPCS

## 2024-04-03 PROCEDURE — 80048 BASIC METABOLIC PNL TOTAL CA: CPT

## 2024-04-03 PROCEDURE — 84132 ASSAY OF SERUM POTASSIUM: CPT

## 2024-04-03 PROCEDURE — 1100000000 HC RM PRIVATE

## 2024-04-03 PROCEDURE — 2580000003 HC RX 258

## 2024-04-03 PROCEDURE — 99232 SBSQ HOSP IP/OBS MODERATE 35: CPT | Performed by: INTERNAL MEDICINE

## 2024-04-03 PROCEDURE — 6370000000 HC RX 637 (ALT 250 FOR IP): Performed by: NURSE PRACTITIONER

## 2024-04-03 PROCEDURE — 51798 US URINE CAPACITY MEASURE: CPT

## 2024-04-03 PROCEDURE — 76937 US GUIDE VASCULAR ACCESS: CPT

## 2024-04-03 PROCEDURE — 85027 COMPLETE CBC AUTOMATED: CPT

## 2024-04-03 PROCEDURE — 97161 PT EVAL LOW COMPLEX 20 MIN: CPT

## 2024-04-03 PROCEDURE — 97530 THERAPEUTIC ACTIVITIES: CPT

## 2024-04-03 RX ORDER — DEXTROSE MONOHYDRATE, SODIUM CHLORIDE, AND POTASSIUM CHLORIDE 50; 1.49; 4.5 G/1000ML; G/1000ML; G/1000ML
INJECTION, SOLUTION INTRAVENOUS CONTINUOUS
Status: DISCONTINUED | OUTPATIENT
Start: 2024-04-03 | End: 2024-04-05

## 2024-04-03 RX ORDER — MAGNESIUM SULFATE IN WATER 40 MG/ML
2000 INJECTION, SOLUTION INTRAVENOUS ONCE
Status: COMPLETED | OUTPATIENT
Start: 2024-04-03 | End: 2024-04-03

## 2024-04-03 RX ADMIN — SUCRALFATE 1 G: 1 TABLET ORAL at 09:57

## 2024-04-03 RX ADMIN — SUCRALFATE 1 G: 1 TABLET ORAL at 16:53

## 2024-04-03 RX ADMIN — POTASSIUM CHLORIDE, DEXTROSE MONOHYDRATE AND SODIUM CHLORIDE: 150; 5; 450 INJECTION, SOLUTION INTRAVENOUS at 21:07

## 2024-04-03 RX ADMIN — POTASSIUM CHLORIDE, DEXTROSE MONOHYDRATE AND SODIUM CHLORIDE: 150; 5; 450 INJECTION, SOLUTION INTRAVENOUS at 10:16

## 2024-04-03 RX ADMIN — PANTOPRAZOLE SODIUM 40 MG: 40 TABLET, DELAYED RELEASE ORAL at 05:36

## 2024-04-03 RX ADMIN — SODIUM CHLORIDE, PRESERVATIVE FREE 5 ML: 5 INJECTION INTRAVENOUS at 21:05

## 2024-04-03 RX ADMIN — POTASSIUM CHLORIDE 10 MEQ: 7.46 INJECTION, SOLUTION INTRAVENOUS at 13:19

## 2024-04-03 RX ADMIN — POTASSIUM CHLORIDE 10 MEQ: 7.46 INJECTION, SOLUTION INTRAVENOUS at 16:52

## 2024-04-03 RX ADMIN — ONDANSETRON 4 MG: 4 TABLET, ORALLY DISINTEGRATING ORAL at 09:56

## 2024-04-03 RX ADMIN — SUCRALFATE 1 G: 1 TABLET ORAL at 13:19

## 2024-04-03 RX ADMIN — SUCRALFATE 1 G: 1 TABLET ORAL at 21:05

## 2024-04-03 RX ADMIN — DULOXETINE HYDROCHLORIDE 60 MG: 60 CAPSULE, DELAYED RELEASE ORAL at 09:57

## 2024-04-03 RX ADMIN — POTASSIUM CHLORIDE 10 MEQ: 7.46 INJECTION, SOLUTION INTRAVENOUS at 15:21

## 2024-04-03 RX ADMIN — GABAPENTIN 100 MG: 100 CAPSULE ORAL at 11:32

## 2024-04-03 RX ADMIN — LEVOTHYROXINE SODIUM 100 MCG: 0.1 TABLET ORAL at 05:36

## 2024-04-03 RX ADMIN — ENOXAPARIN SODIUM 40 MG: 100 INJECTION SUBCUTANEOUS at 09:58

## 2024-04-03 RX ADMIN — PANTOPRAZOLE SODIUM 40 MG: 40 TABLET, DELAYED RELEASE ORAL at 16:53

## 2024-04-03 RX ADMIN — MAGNESIUM SULFATE HEPTAHYDRATE 2000 MG: 40 INJECTION, SOLUTION INTRAVENOUS at 10:15

## 2024-04-03 RX ADMIN — POTASSIUM CHLORIDE 10 MEQ: 7.46 INJECTION, SOLUTION INTRAVENOUS at 11:31

## 2024-04-04 ENCOUNTER — APPOINTMENT (OUTPATIENT)
Dept: CT IMAGING | Age: 69
DRG: 208 | End: 2024-04-04
Payer: MEDICARE

## 2024-04-04 LAB
ANION GAP SERPL CALC-SCNC: 5 MMOL/L (ref 2–11)
BUN SERPL-MCNC: 10 MG/DL (ref 8–23)
CALCIUM SERPL-MCNC: 9.3 MG/DL (ref 8.3–10.4)
CHLORIDE SERPL-SCNC: 107 MMOL/L (ref 103–113)
CO2 SERPL-SCNC: 26 MMOL/L (ref 21–32)
CREAT SERPL-MCNC: 0.4 MG/DL (ref 0.6–1)
ERYTHROCYTE [DISTWIDTH] IN BLOOD BY AUTOMATED COUNT: 13.2 % (ref 11.9–14.6)
GLUCOSE BLD STRIP.AUTO-MCNC: 123 MG/DL (ref 65–100)
GLUCOSE SERPL-MCNC: 95 MG/DL (ref 65–100)
HCT VFR BLD AUTO: 36.7 % (ref 35.8–46.3)
HGB BLD-MCNC: 12.5 G/DL (ref 11.7–15.4)
MAGNESIUM SERPL-MCNC: 2.2 MG/DL (ref 1.8–2.4)
MCH RBC QN AUTO: 29.6 PG (ref 26.1–32.9)
MCHC RBC AUTO-ENTMCNC: 34.1 G/DL (ref 31.4–35)
MCV RBC AUTO: 86.8 FL (ref 82–102)
MM INDURATION, POC: 0 MM (ref 0–5)
NRBC # BLD: 0.02 K/UL (ref 0–0.2)
PLATELET # BLD AUTO: 245 K/UL (ref 150–450)
PMV BLD AUTO: 10.3 FL (ref 9.4–12.3)
POTASSIUM SERPL-SCNC: 3.3 MMOL/L (ref 3.5–5.1)
PPD, POC: NEGATIVE
RBC # BLD AUTO: 4.23 M/UL (ref 4.05–5.2)
SERVICE CMNT-IMP: ABNORMAL
SODIUM SERPL-SCNC: 138 MMOL/L (ref 136–146)
WBC # BLD AUTO: 11.9 K/UL (ref 4.3–11.1)

## 2024-04-04 PROCEDURE — 2580000003 HC RX 258

## 2024-04-04 PROCEDURE — 85027 COMPLETE CBC AUTOMATED: CPT

## 2024-04-04 PROCEDURE — 70450 CT HEAD/BRAIN W/O DYE: CPT

## 2024-04-04 PROCEDURE — 83735 ASSAY OF MAGNESIUM: CPT

## 2024-04-04 PROCEDURE — 76937 US GUIDE VASCULAR ACCESS: CPT

## 2024-04-04 PROCEDURE — 36415 COLL VENOUS BLD VENIPUNCTURE: CPT

## 2024-04-04 PROCEDURE — APPNB30 APP NON BILLABLE TIME 0-30 MINS: Performed by: NURSE PRACTITIONER

## 2024-04-04 PROCEDURE — 2700000000 HC OXYGEN THERAPY PER DAY

## 2024-04-04 PROCEDURE — 2500000003 HC RX 250 WO HCPCS: Performed by: NURSE PRACTITIONER

## 2024-04-04 PROCEDURE — 82962 GLUCOSE BLOOD TEST: CPT

## 2024-04-04 PROCEDURE — 97530 THERAPEUTIC ACTIVITIES: CPT

## 2024-04-04 PROCEDURE — 80048 BASIC METABOLIC PNL TOTAL CA: CPT

## 2024-04-04 PROCEDURE — 1100000000 HC RM PRIVATE

## 2024-04-04 PROCEDURE — 6370000000 HC RX 637 (ALT 250 FOR IP): Performed by: NURSE PRACTITIONER

## 2024-04-04 PROCEDURE — 6360000002 HC RX W HCPCS

## 2024-04-04 PROCEDURE — 92526 ORAL FUNCTION THERAPY: CPT

## 2024-04-04 RX ORDER — POTASSIUM CHLORIDE 20 MEQ/1
40 TABLET, EXTENDED RELEASE ORAL 2 TIMES DAILY WITH MEALS
Status: COMPLETED | OUTPATIENT
Start: 2024-04-04 | End: 2024-04-04

## 2024-04-04 RX ORDER — DICYCLOMINE HCL 20 MG
20 TABLET ORAL 4 TIMES DAILY PRN
Status: DISCONTINUED | OUTPATIENT
Start: 2024-04-04 | End: 2024-04-17 | Stop reason: HOSPADM

## 2024-04-04 RX ORDER — BACLOFEN 10 MG/1
10 TABLET ORAL 2 TIMES DAILY
Status: COMPLETED | OUTPATIENT
Start: 2024-04-04 | End: 2024-04-04

## 2024-04-04 RX ADMIN — LEVOTHYROXINE SODIUM 100 MCG: 0.1 TABLET ORAL at 05:46

## 2024-04-04 RX ADMIN — SUCRALFATE 1 G: 1 TABLET ORAL at 12:54

## 2024-04-04 RX ADMIN — SODIUM CHLORIDE, PRESERVATIVE FREE 5 ML: 5 INJECTION INTRAVENOUS at 08:39

## 2024-04-04 RX ADMIN — GABAPENTIN 100 MG: 100 CAPSULE ORAL at 00:06

## 2024-04-04 RX ADMIN — SUCRALFATE 1 G: 1 TABLET ORAL at 20:35

## 2024-04-04 RX ADMIN — POTASSIUM CHLORIDE 40 MEQ: 1500 TABLET, EXTENDED RELEASE ORAL at 08:38

## 2024-04-04 RX ADMIN — POTASSIUM CHLORIDE, DEXTROSE MONOHYDRATE AND SODIUM CHLORIDE: 150; 5; 450 INJECTION, SOLUTION INTRAVENOUS at 08:42

## 2024-04-04 RX ADMIN — GABAPENTIN 100 MG: 100 CAPSULE ORAL at 22:23

## 2024-04-04 RX ADMIN — GABAPENTIN 100 MG: 100 CAPSULE ORAL at 12:53

## 2024-04-04 RX ADMIN — SUCRALFATE 1 G: 1 TABLET ORAL at 17:29

## 2024-04-04 RX ADMIN — SUCRALFATE 1 G: 1 TABLET ORAL at 08:38

## 2024-04-04 RX ADMIN — SODIUM CHLORIDE, PRESERVATIVE FREE 10 ML: 5 INJECTION INTRAVENOUS at 20:38

## 2024-04-04 RX ADMIN — BACLOFEN 10 MG: 10 TABLET ORAL at 12:53

## 2024-04-04 RX ADMIN — ENOXAPARIN SODIUM 40 MG: 100 INJECTION SUBCUTANEOUS at 08:38

## 2024-04-04 RX ADMIN — POTASSIUM CHLORIDE 40 MEQ: 1500 TABLET, EXTENDED RELEASE ORAL at 17:29

## 2024-04-04 RX ADMIN — PANTOPRAZOLE SODIUM 40 MG: 40 TABLET, DELAYED RELEASE ORAL at 05:46

## 2024-04-04 RX ADMIN — DULOXETINE HYDROCHLORIDE 60 MG: 60 CAPSULE, DELAYED RELEASE ORAL at 08:43

## 2024-04-04 RX ADMIN — BACLOFEN 10 MG: 10 TABLET ORAL at 20:35

## 2024-04-04 RX ADMIN — PANTOPRAZOLE SODIUM 40 MG: 40 TABLET, DELAYED RELEASE ORAL at 17:29

## 2024-04-04 NOTE — ICUWATCH
Rapid Response Team Note      Subjective: Responded to Code Stroke secondary to L sided weakness when up to bedside commode. .    Summary: On assessment patient is A/Ox4. NIH 0, GCS 15. Primary RN reports patient leaning to left side and inability to keep LUE raised when she was up to bedside commode. Dr. Suero at bedside to assess. Patient transported to [facility] CT head. VSS.      See Rapid Response/Code Blue Narrator for full documentation    Outcome: Patient to remain in current location. Will follow-up per Critical Care Clinical Rounding protocol.      Ru Nichols RN  [place]: [phone]

## 2024-04-04 NOTE — ICUWATCH
RRT Clinical Rounding Nurse Update    Vitals:    04/02/24 1907 04/03/24 0710 04/03/24 1942 04/04/24 0738   BP: 132/66 136/64 132/84 (!) 148/89   Pulse: 87 80 77 88   Resp: 17 24 18 19   Temp: 97.7 °F (36.5 °C) 98.6 °F (37 °C) 98.1 °F (36.7 °C) 97.9 °F (36.6 °C)   TempSrc: Oral Oral Oral Oral   SpO2: 99% 98% 100% 97%   Weight:       Height:            DETERIORATION INDEX SCORE: 31    ASSESSMENT:  Previous outreach assessment was reviewed. There have been no significant changes since previous assessment. Mild confusion noted on assessment. Patient oriented to self and place. Disoriented to time and situation. Patient able to move all four extremities equally to command.     PLAN:  Will follow per RRT Clinical Rounding Program protocol.    Ru Nichols RN  [place]: [phone]

## 2024-04-04 NOTE — CARE COORDINATION
Chart reviewed.  Therapy recommendations for [facility] noted.  TC to pt's dtr, Karin.  Left VM message requesting a return call.      1620:  Return call from pt's dtr.  She is in agreement with the pt going to STR at [place].  [facility]  Referrals submitted.  A list of facilities in network with the pt's insurance emailed to her to review for additional choices.

## 2024-04-05 LAB
BACTERIA SPEC CULT: NORMAL
MM INDURATION, POC: 0 MM (ref 0–5)
POTASSIUM SERPL-SCNC: 3.8 MMOL/L (ref 3.5–5.1)
PPD, POC: NEGATIVE
SERVICE CMNT-IMP: NORMAL

## 2024-04-05 PROCEDURE — 6360000002 HC RX W HCPCS: Performed by: NURSE PRACTITIONER

## 2024-04-05 PROCEDURE — 2580000003 HC RX 258

## 2024-04-05 PROCEDURE — 6370000000 HC RX 637 (ALT 250 FOR IP)

## 2024-04-05 PROCEDURE — 6360000002 HC RX W HCPCS

## 2024-04-05 PROCEDURE — 84132 ASSAY OF SERUM POTASSIUM: CPT

## 2024-04-05 PROCEDURE — 1100000000 HC RM PRIVATE

## 2024-04-05 PROCEDURE — 6370000000 HC RX 637 (ALT 250 FOR IP): Performed by: NURSE PRACTITIONER

## 2024-04-05 PROCEDURE — 36415 COLL VENOUS BLD VENIPUNCTURE: CPT

## 2024-04-05 PROCEDURE — 97530 THERAPEUTIC ACTIVITIES: CPT

## 2024-04-05 RX ORDER — PROCHLORPERAZINE EDISYLATE 5 MG/ML
10 INJECTION INTRAMUSCULAR; INTRAVENOUS EVERY 6 HOURS PRN
Status: DISCONTINUED | OUTPATIENT
Start: 2024-04-05 | End: 2024-04-17 | Stop reason: HOSPADM

## 2024-04-05 RX ORDER — HYDRALAZINE HYDROCHLORIDE 20 MG/ML
10 INJECTION INTRAMUSCULAR; INTRAVENOUS EVERY 6 HOURS PRN
Status: DISCONTINUED | OUTPATIENT
Start: 2024-04-05 | End: 2024-04-17 | Stop reason: HOSPADM

## 2024-04-05 RX ORDER — SENNA AND DOCUSATE SODIUM 50; 8.6 MG/1; MG/1
2 TABLET, FILM COATED ORAL DAILY
Status: DISCONTINUED | OUTPATIENT
Start: 2024-04-05 | End: 2024-04-17 | Stop reason: HOSPADM

## 2024-04-05 RX ORDER — LACTULOSE 10 G/15ML
20 SOLUTION ORAL 2 TIMES DAILY PRN
Status: DISCONTINUED | OUTPATIENT
Start: 2024-04-05 | End: 2024-04-17 | Stop reason: HOSPADM

## 2024-04-05 RX ORDER — CARVEDILOL 6.25 MG/1
6.25 TABLET ORAL 2 TIMES DAILY WITH MEALS
Status: DISCONTINUED | OUTPATIENT
Start: 2024-04-05 | End: 2024-04-06

## 2024-04-05 RX ADMIN — DICYCLOMINE HYDROCHLORIDE 20 MG: 20 TABLET ORAL at 11:57

## 2024-04-05 RX ADMIN — DOCUSATE SODIUM 50 MG AND SENNOSIDES 8.6 MG 2 TABLET: 8.6; 5 TABLET, FILM COATED ORAL at 16:06

## 2024-04-05 RX ADMIN — ONDANSETRON 4 MG: 2 INJECTION INTRAMUSCULAR; INTRAVENOUS at 08:56

## 2024-04-05 RX ADMIN — SODIUM CHLORIDE, PRESERVATIVE FREE 10 ML: 5 INJECTION INTRAVENOUS at 08:49

## 2024-04-05 RX ADMIN — SODIUM CHLORIDE, PRESERVATIVE FREE 5 ML: 5 INJECTION INTRAVENOUS at 21:20

## 2024-04-05 RX ADMIN — LEVOTHYROXINE SODIUM 100 MCG: 0.1 TABLET ORAL at 05:44

## 2024-04-05 RX ADMIN — SUCRALFATE 1 G: 1 TABLET ORAL at 08:49

## 2024-04-05 RX ADMIN — PANTOPRAZOLE SODIUM 40 MG: 40 TABLET, DELAYED RELEASE ORAL at 15:44

## 2024-04-05 RX ADMIN — PANTOPRAZOLE SODIUM 40 MG: 40 TABLET, DELAYED RELEASE ORAL at 05:44

## 2024-04-05 RX ADMIN — GABAPENTIN 100 MG: 100 CAPSULE ORAL at 11:57

## 2024-04-05 RX ADMIN — ENOXAPARIN SODIUM 40 MG: 100 INJECTION SUBCUTANEOUS at 08:49

## 2024-04-05 RX ADMIN — ONDANSETRON 4 MG: 4 TABLET, ORALLY DISINTEGRATING ORAL at 21:16

## 2024-04-05 RX ADMIN — SUCRALFATE 1 G: 1 TABLET ORAL at 11:57

## 2024-04-05 RX ADMIN — PROCHLORPERAZINE EDISYLATE 10 MG: 5 INJECTION INTRAMUSCULAR; INTRAVENOUS at 11:56

## 2024-04-05 RX ADMIN — CARVEDILOL 6.25 MG: 6.25 TABLET, FILM COATED ORAL at 15:44

## 2024-04-05 RX ADMIN — SUCRALFATE 1 G: 1 TABLET ORAL at 21:16

## 2024-04-05 RX ADMIN — DICYCLOMINE HYDROCHLORIDE 20 MG: 20 TABLET ORAL at 08:52

## 2024-04-05 RX ADMIN — DULOXETINE HYDROCHLORIDE 60 MG: 60 CAPSULE, DELAYED RELEASE ORAL at 08:49

## 2024-04-05 RX ADMIN — SUCRALFATE 1 G: 1 TABLET ORAL at 15:44

## 2024-04-05 ASSESSMENT — PAIN SCALES - GENERAL: PAINLEVEL_OUTOF10: 0

## 2024-04-05 NOTE — ICUWATCH
RRT Clinical Rounding Nurse Progress Report      SUBJECTIVE: Called to assess patient secondary to  recent code S .      Vitals:    04/03/24 1942 04/04/24 0738 04/04/24 1936 04/05/24 0800   BP: 132/84 (!) 148/89 (!) 164/89 (!) 162/104   Pulse: 77 88 80 89   Resp: 18 19 18 17   Temp: 98.1 °F (36.7 °C) 97.9 °F (36.6 °C) 97.3 °F (36.3 °C) 98.2 °F (36.8 °C)   TempSrc: Oral Oral Oral Oral   SpO2: 100% 97% 99% 99%   Weight:       Height:            DETERIORATION INDEX SCORE: 29    ASSESSMENT:  On arrival to room, I found patient to be resting in bed watching TV. Patient is alert and oriented. Endorses ongoing abdominal discomfort. Neurologic assessment unchanged, NIH 0, GCS 15. No needs or concerns expressed at this time.    PT at bedside to work with patient.    PLAN:  VS, labs, and progress notes reviewed. No concern per primary RN. Will discharge from RRT Clinical Rounding Program per protocol. Please call if needed.    Ru Nichols RN  [place]: [phone]

## 2024-04-05 NOTE — CARE COORDINATION
No beds available at [facility] facilities.  Dtr requested referral to [facility].  Referral submitted and pt accepted for admission for possibly Monday pending insurance approval.  CM notified dtr of bed offer and plan and she is in agreement.  CM following to assist as needed.

## 2024-04-06 LAB
BACTERIA SPEC CULT: NORMAL
BACTERIA SPEC CULT: NORMAL
GRAM STN SPEC: NORMAL
SERVICE CMNT-IMP: NORMAL
SERVICE CMNT-IMP: NORMAL

## 2024-04-06 PROCEDURE — 6370000000 HC RX 637 (ALT 250 FOR IP): Performed by: NURSE PRACTITIONER

## 2024-04-06 PROCEDURE — 6360000002 HC RX W HCPCS: Performed by: NURSE PRACTITIONER

## 2024-04-06 PROCEDURE — 2580000003 HC RX 258

## 2024-04-06 PROCEDURE — 6370000000 HC RX 637 (ALT 250 FOR IP): Performed by: PHYSICIAN ASSISTANT

## 2024-04-06 PROCEDURE — 1100000000 HC RM PRIVATE

## 2024-04-06 PROCEDURE — 6360000002 HC RX W HCPCS

## 2024-04-06 RX ORDER — CARVEDILOL 3.12 MG/1
3.12 TABLET ORAL 2 TIMES DAILY WITH MEALS
Status: DISCONTINUED | OUTPATIENT
Start: 2024-04-06 | End: 2024-04-17 | Stop reason: HOSPADM

## 2024-04-06 RX ORDER — POLYETHYLENE GLYCOL 3350 17 G/17G
17 POWDER, FOR SOLUTION ORAL 2 TIMES DAILY PRN
Status: DISCONTINUED | OUTPATIENT
Start: 2024-04-06 | End: 2024-04-17 | Stop reason: HOSPADM

## 2024-04-06 RX ADMIN — SODIUM CHLORIDE, PRESERVATIVE FREE 5 ML: 5 INJECTION INTRAVENOUS at 21:10

## 2024-04-06 RX ADMIN — SUCRALFATE 1 G: 1 TABLET ORAL at 21:10

## 2024-04-06 RX ADMIN — SODIUM CHLORIDE, PRESERVATIVE FREE 10 ML: 5 INJECTION INTRAVENOUS at 09:00

## 2024-04-06 RX ADMIN — GABAPENTIN 100 MG: 100 CAPSULE ORAL at 10:50

## 2024-04-06 RX ADMIN — POLYETHYLENE GLYCOL 3350 17 G: 17 POWDER, FOR SOLUTION ORAL at 16:57

## 2024-04-06 RX ADMIN — CARVEDILOL 6.25 MG: 6.25 TABLET, FILM COATED ORAL at 08:59

## 2024-04-06 RX ADMIN — DOCUSATE SODIUM 50 MG AND SENNOSIDES 8.6 MG 2 TABLET: 8.6; 5 TABLET, FILM COATED ORAL at 08:59

## 2024-04-06 RX ADMIN — SUCRALFATE 1 G: 1 TABLET ORAL at 13:25

## 2024-04-06 RX ADMIN — SUCRALFATE 1 G: 1 TABLET ORAL at 08:59

## 2024-04-06 RX ADMIN — GABAPENTIN 100 MG: 100 CAPSULE ORAL at 23:11

## 2024-04-06 RX ADMIN — PROCHLORPERAZINE EDISYLATE 10 MG: 5 INJECTION INTRAMUSCULAR; INTRAVENOUS at 00:16

## 2024-04-06 RX ADMIN — PANTOPRAZOLE SODIUM 40 MG: 40 TABLET, DELAYED RELEASE ORAL at 16:57

## 2024-04-06 RX ADMIN — GABAPENTIN 100 MG: 100 CAPSULE ORAL at 00:16

## 2024-04-06 RX ADMIN — DULOXETINE HYDROCHLORIDE 60 MG: 60 CAPSULE, DELAYED RELEASE ORAL at 08:59

## 2024-04-06 RX ADMIN — SUCRALFATE 1 G: 1 TABLET ORAL at 16:57

## 2024-04-06 RX ADMIN — ENOXAPARIN SODIUM 40 MG: 100 INJECTION SUBCUTANEOUS at 08:59

## 2024-04-06 RX ADMIN — CARVEDILOL 3.12 MG: 3.12 TABLET, FILM COATED ORAL at 16:57

## 2024-04-06 RX ADMIN — LEVOTHYROXINE SODIUM 100 MCG: 0.1 TABLET ORAL at 06:00

## 2024-04-06 RX ADMIN — PANTOPRAZOLE SODIUM 40 MG: 40 TABLET, DELAYED RELEASE ORAL at 06:00

## 2024-04-06 ASSESSMENT — PAIN SCALES - GENERAL
PAINLEVEL_OUTOF10: 0
PAINLEVEL_OUTOF10: 0

## 2024-04-07 PROCEDURE — 6370000000 HC RX 637 (ALT 250 FOR IP): Performed by: PHYSICIAN ASSISTANT

## 2024-04-07 PROCEDURE — 6370000000 HC RX 637 (ALT 250 FOR IP)

## 2024-04-07 PROCEDURE — 6370000000 HC RX 637 (ALT 250 FOR IP): Performed by: NURSE PRACTITIONER

## 2024-04-07 PROCEDURE — 6360000002 HC RX W HCPCS

## 2024-04-07 PROCEDURE — 2580000003 HC RX 258

## 2024-04-07 PROCEDURE — 1100000000 HC RM PRIVATE

## 2024-04-07 RX ADMIN — GABAPENTIN 100 MG: 100 CAPSULE ORAL at 21:57

## 2024-04-07 RX ADMIN — CARVEDILOL 3.12 MG: 3.12 TABLET, FILM COATED ORAL at 08:34

## 2024-04-07 RX ADMIN — ONDANSETRON 4 MG: 2 INJECTION INTRAMUSCULAR; INTRAVENOUS at 13:31

## 2024-04-07 RX ADMIN — SUCRALFATE 1 G: 1 TABLET ORAL at 13:29

## 2024-04-07 RX ADMIN — SUCRALFATE 1 G: 1 TABLET ORAL at 21:57

## 2024-04-07 RX ADMIN — GABAPENTIN 100 MG: 100 CAPSULE ORAL at 13:30

## 2024-04-07 RX ADMIN — SUCRALFATE 1 G: 1 TABLET ORAL at 16:37

## 2024-04-07 RX ADMIN — ENOXAPARIN SODIUM 40 MG: 100 INJECTION SUBCUTANEOUS at 08:34

## 2024-04-07 RX ADMIN — LEVOTHYROXINE SODIUM 100 MCG: 0.1 TABLET ORAL at 05:42

## 2024-04-07 RX ADMIN — ONDANSETRON 4 MG: 4 TABLET, ORALLY DISINTEGRATING ORAL at 05:42

## 2024-04-07 RX ADMIN — SODIUM CHLORIDE, PRESERVATIVE FREE 10 ML: 5 INJECTION INTRAVENOUS at 08:35

## 2024-04-07 RX ADMIN — SODIUM CHLORIDE, PRESERVATIVE FREE 5 ML: 5 INJECTION INTRAVENOUS at 21:57

## 2024-04-07 RX ADMIN — PANTOPRAZOLE SODIUM 40 MG: 40 TABLET, DELAYED RELEASE ORAL at 05:42

## 2024-04-07 RX ADMIN — LACTULOSE 20 G: 10 SOLUTION ORAL at 05:42

## 2024-04-07 RX ADMIN — SUCRALFATE 1 G: 1 TABLET ORAL at 08:34

## 2024-04-07 RX ADMIN — DOCUSATE SODIUM 50 MG AND SENNOSIDES 8.6 MG 2 TABLET: 8.6; 5 TABLET, FILM COATED ORAL at 08:34

## 2024-04-07 RX ADMIN — CARVEDILOL 3.12 MG: 3.12 TABLET, FILM COATED ORAL at 16:37

## 2024-04-07 RX ADMIN — PANTOPRAZOLE SODIUM 40 MG: 40 TABLET, DELAYED RELEASE ORAL at 16:37

## 2024-04-08 PROBLEM — R11.0 CHRONIC NAUSEA: Status: ACTIVE | Noted: 2024-04-08

## 2024-04-08 LAB
ANION GAP SERPL CALC-SCNC: 6 MMOL/L (ref 2–11)
BASOPHILS # BLD: 0.1 K/UL (ref 0–0.2)
BASOPHILS NFR BLD: 1 % (ref 0–2)
BUN SERPL-MCNC: 12 MG/DL (ref 8–23)
CALCIUM SERPL-MCNC: 9.5 MG/DL (ref 8.3–10.4)
CHLORIDE SERPL-SCNC: 106 MMOL/L (ref 103–113)
CO2 SERPL-SCNC: 27 MMOL/L (ref 21–32)
CREAT SERPL-MCNC: 0.5 MG/DL (ref 0.6–1)
DIFFERENTIAL METHOD BLD: ABNORMAL
EOSINOPHIL # BLD: 0.2 K/UL (ref 0–0.8)
EOSINOPHIL NFR BLD: 3 % (ref 0.5–7.8)
ERYTHROCYTE [DISTWIDTH] IN BLOOD BY AUTOMATED COUNT: 13.2 % (ref 11.9–14.6)
FERRITIN SERPL-MCNC: 428 NG/ML (ref 8–388)
FOLATE SERPL-MCNC: 2.2 NG/ML (ref 3.1–17.5)
GLUCOSE SERPL-MCNC: 84 MG/DL (ref 65–100)
HCT VFR BLD AUTO: 31.5 % (ref 35.8–46.3)
HGB BLD-MCNC: 10.4 G/DL (ref 11.7–15.4)
IMM GRANULOCYTES # BLD AUTO: 0.1 K/UL (ref 0–0.5)
IMM GRANULOCYTES NFR BLD AUTO: 2 % (ref 0–5)
IRON SATN MFR SERPL: 50 %
IRON SERPL-MCNC: 108 UG/DL (ref 35–150)
LYMPHOCYTES # BLD: 2.2 K/UL (ref 0.5–4.6)
LYMPHOCYTES NFR BLD: 23 % (ref 13–44)
MCH RBC QN AUTO: 28.9 PG (ref 26.1–32.9)
MCHC RBC AUTO-ENTMCNC: 33 G/DL (ref 31.4–35)
MCV RBC AUTO: 87.5 FL (ref 82–102)
MONOCYTES # BLD: 0.8 K/UL (ref 0.1–1.3)
MONOCYTES NFR BLD: 8 % (ref 4–12)
NEUTS SEG # BLD: 6.1 K/UL (ref 1.7–8.2)
NEUTS SEG NFR BLD: 63 % (ref 43–78)
NRBC # BLD: 0 K/UL (ref 0–0.2)
PLATELET # BLD AUTO: 252 K/UL (ref 150–450)
PMV BLD AUTO: 9.8 FL (ref 9.4–12.3)
POTASSIUM SERPL-SCNC: 3.7 MMOL/L (ref 3.5–5.1)
RBC # BLD AUTO: 3.6 M/UL (ref 4.05–5.2)
SARS-COV-2 RDRP RESP QL NAA+PROBE: NOT DETECTED
SODIUM SERPL-SCNC: 139 MMOL/L (ref 136–146)
SOURCE: NORMAL
TIBC SERPL-MCNC: 214 UG/DL (ref 250–450)
VIT B12 SERPL-MCNC: 528 PG/ML (ref 193–986)
WBC # BLD AUTO: 9.6 K/UL (ref 4.3–11.1)

## 2024-04-08 PROCEDURE — 6360000002 HC RX W HCPCS: Performed by: NURSE PRACTITIONER

## 2024-04-08 PROCEDURE — 2580000003 HC RX 258

## 2024-04-08 PROCEDURE — 6370000000 HC RX 637 (ALT 250 FOR IP): Performed by: NURSE PRACTITIONER

## 2024-04-08 PROCEDURE — 83540 ASSAY OF IRON: CPT

## 2024-04-08 PROCEDURE — 97112 NEUROMUSCULAR REEDUCATION: CPT

## 2024-04-08 PROCEDURE — 6360000002 HC RX W HCPCS

## 2024-04-08 PROCEDURE — 1100000000 HC RM PRIVATE

## 2024-04-08 PROCEDURE — 97530 THERAPEUTIC ACTIVITIES: CPT

## 2024-04-08 PROCEDURE — 82728 ASSAY OF FERRITIN: CPT

## 2024-04-08 PROCEDURE — 6370000000 HC RX 637 (ALT 250 FOR IP): Performed by: PHYSICIAN ASSISTANT

## 2024-04-08 PROCEDURE — 36415 COLL VENOUS BLD VENIPUNCTURE: CPT

## 2024-04-08 PROCEDURE — 82607 VITAMIN B-12: CPT

## 2024-04-08 PROCEDURE — 6370000000 HC RX 637 (ALT 250 FOR IP)

## 2024-04-08 PROCEDURE — 85025 COMPLETE CBC W/AUTO DIFF WBC: CPT

## 2024-04-08 PROCEDURE — 80048 BASIC METABOLIC PNL TOTAL CA: CPT

## 2024-04-08 PROCEDURE — 83550 IRON BINDING TEST: CPT

## 2024-04-08 PROCEDURE — 87635 SARS-COV-2 COVID-19 AMP PRB: CPT

## 2024-04-08 PROCEDURE — 82746 ASSAY OF FOLIC ACID SERUM: CPT

## 2024-04-08 RX ADMIN — ONDANSETRON 4 MG: 4 TABLET, ORALLY DISINTEGRATING ORAL at 21:38

## 2024-04-08 RX ADMIN — SUCRALFATE 1 G: 1 TABLET ORAL at 16:06

## 2024-04-08 RX ADMIN — GABAPENTIN 100 MG: 100 CAPSULE ORAL at 23:38

## 2024-04-08 RX ADMIN — SODIUM CHLORIDE, PRESERVATIVE FREE 5 ML: 5 INJECTION INTRAVENOUS at 08:46

## 2024-04-08 RX ADMIN — PANTOPRAZOLE SODIUM 40 MG: 40 TABLET, DELAYED RELEASE ORAL at 16:06

## 2024-04-08 RX ADMIN — SUCRALFATE 1 G: 1 TABLET ORAL at 08:43

## 2024-04-08 RX ADMIN — ONDANSETRON 4 MG: 2 INJECTION INTRAMUSCULAR; INTRAVENOUS at 23:57

## 2024-04-08 RX ADMIN — DOCUSATE SODIUM 50 MG AND SENNOSIDES 8.6 MG 2 TABLET: 8.6; 5 TABLET, FILM COATED ORAL at 08:43

## 2024-04-08 RX ADMIN — CARVEDILOL 3.12 MG: 3.12 TABLET, FILM COATED ORAL at 08:43

## 2024-04-08 RX ADMIN — PROCHLORPERAZINE EDISYLATE 10 MG: 5 INJECTION INTRAMUSCULAR; INTRAVENOUS at 09:24

## 2024-04-08 RX ADMIN — SODIUM CHLORIDE, PRESERVATIVE FREE 5 ML: 5 INJECTION INTRAVENOUS at 21:38

## 2024-04-08 RX ADMIN — ONDANSETRON 4 MG: 4 TABLET, ORALLY DISINTEGRATING ORAL at 05:16

## 2024-04-08 RX ADMIN — ENOXAPARIN SODIUM 40 MG: 100 INJECTION SUBCUTANEOUS at 08:43

## 2024-04-08 RX ADMIN — LEVOTHYROXINE SODIUM 100 MCG: 0.1 TABLET ORAL at 05:12

## 2024-04-08 RX ADMIN — SUCRALFATE 1 G: 1 TABLET ORAL at 21:38

## 2024-04-08 RX ADMIN — DULOXETINE HYDROCHLORIDE 60 MG: 60 CAPSULE, DELAYED RELEASE ORAL at 08:43

## 2024-04-08 RX ADMIN — PANTOPRAZOLE SODIUM 40 MG: 40 TABLET, DELAYED RELEASE ORAL at 05:12

## 2024-04-08 RX ADMIN — CARVEDILOL 3.12 MG: 3.12 TABLET, FILM COATED ORAL at 16:07

## 2024-04-08 RX ADMIN — GABAPENTIN 100 MG: 100 CAPSULE ORAL at 11:06

## 2024-04-08 RX ADMIN — SUCRALFATE 1 G: 1 TABLET ORAL at 13:10

## 2024-04-08 ASSESSMENT — PAIN SCALES - GENERAL: PAINLEVEL_OUTOF10: 0

## 2024-04-08 NOTE — CARE COORDINATION
[facility] that indicate pt is able to participate in therapy.  Last note indicated that pt was experiencing orthostatic hypotension and could not safely participate.  Today's note submitted to [facility] but pt could not participate today for the same reason.   will update insurance auth request once pt is able to safely participate in therapy.

## 2024-04-09 PROBLEM — I95.1 ORTHOSTATIC HYPOTENSION: Status: ACTIVE | Noted: 2024-04-09

## 2024-04-09 PROCEDURE — 97530 THERAPEUTIC ACTIVITIES: CPT

## 2024-04-09 PROCEDURE — 1100000000 HC RM PRIVATE

## 2024-04-09 PROCEDURE — 6370000000 HC RX 637 (ALT 250 FOR IP): Performed by: PHYSICIAN ASSISTANT

## 2024-04-09 PROCEDURE — 6370000000 HC RX 637 (ALT 250 FOR IP): Performed by: INTERNAL MEDICINE

## 2024-04-09 PROCEDURE — 6360000002 HC RX W HCPCS

## 2024-04-09 PROCEDURE — 6360000002 HC RX W HCPCS: Performed by: NURSE PRACTITIONER

## 2024-04-09 PROCEDURE — 99222 1ST HOSP IP/OBS MODERATE 55: CPT | Performed by: STUDENT IN AN ORGANIZED HEALTH CARE EDUCATION/TRAINING PROGRAM

## 2024-04-09 PROCEDURE — 2580000003 HC RX 258

## 2024-04-09 PROCEDURE — 6370000000 HC RX 637 (ALT 250 FOR IP)

## 2024-04-09 PROCEDURE — 76937 US GUIDE VASCULAR ACCESS: CPT

## 2024-04-09 PROCEDURE — 2580000003 HC RX 258: Performed by: INTERNAL MEDICINE

## 2024-04-09 PROCEDURE — 6370000000 HC RX 637 (ALT 250 FOR IP): Performed by: NURSE PRACTITIONER

## 2024-04-09 RX ORDER — SODIUM CHLORIDE 9 MG/ML
INJECTION, SOLUTION INTRAVENOUS CONTINUOUS
Status: ACTIVE | OUTPATIENT
Start: 2024-04-09 | End: 2024-04-10

## 2024-04-09 RX ORDER — DICYCLOMINE HCL 20 MG
20 TABLET ORAL
Status: DISCONTINUED | OUTPATIENT
Start: 2024-04-09 | End: 2024-04-10

## 2024-04-09 RX ORDER — MECLIZINE HYDROCHLORIDE 25 MG/1
25 TABLET ORAL EVERY 6 HOURS
Status: DISCONTINUED | OUTPATIENT
Start: 2024-04-09 | End: 2024-04-13

## 2024-04-09 RX ORDER — MIDODRINE HYDROCHLORIDE 5 MG/1
5 TABLET ORAL
Status: DISCONTINUED | OUTPATIENT
Start: 2024-04-09 | End: 2024-04-13

## 2024-04-09 RX ADMIN — ONDANSETRON 4 MG: 2 INJECTION INTRAMUSCULAR; INTRAVENOUS at 08:04

## 2024-04-09 RX ADMIN — ENOXAPARIN SODIUM 40 MG: 100 INJECTION SUBCUTANEOUS at 08:03

## 2024-04-09 RX ADMIN — LEVOTHYROXINE SODIUM 100 MCG: 0.1 TABLET ORAL at 05:21

## 2024-04-09 RX ADMIN — PANTOPRAZOLE SODIUM 40 MG: 40 TABLET, DELAYED RELEASE ORAL at 05:21

## 2024-04-09 RX ADMIN — SUCRALFATE 1 G: 1 TABLET ORAL at 16:15

## 2024-04-09 RX ADMIN — SODIUM CHLORIDE: 9 INJECTION, SOLUTION INTRAVENOUS at 18:59

## 2024-04-09 RX ADMIN — DOCUSATE SODIUM 50 MG AND SENNOSIDES 8.6 MG 2 TABLET: 8.6; 5 TABLET, FILM COATED ORAL at 08:02

## 2024-04-09 RX ADMIN — MIDODRINE HYDROCHLORIDE 5 MG: 5 TABLET ORAL at 16:15

## 2024-04-09 RX ADMIN — MECLIZINE HYDROCHLORIDE 25 MG: 25 TABLET ORAL at 23:12

## 2024-04-09 RX ADMIN — MIDODRINE HYDROCHLORIDE 5 MG: 5 TABLET ORAL at 12:13

## 2024-04-09 RX ADMIN — MIDODRINE HYDROCHLORIDE 5 MG: 5 TABLET ORAL at 08:03

## 2024-04-09 RX ADMIN — SODIUM CHLORIDE, PRESERVATIVE FREE 10 ML: 5 INJECTION INTRAVENOUS at 20:46

## 2024-04-09 RX ADMIN — DICYCLOMINE HYDROCHLORIDE 20 MG: 20 TABLET ORAL at 16:15

## 2024-04-09 RX ADMIN — GABAPENTIN 100 MG: 100 CAPSULE ORAL at 23:12

## 2024-04-09 RX ADMIN — DICYCLOMINE HYDROCHLORIDE 20 MG: 20 TABLET ORAL at 23:12

## 2024-04-09 RX ADMIN — SODIUM CHLORIDE: 9 INJECTION, SOLUTION INTRAVENOUS at 12:20

## 2024-04-09 RX ADMIN — PROCHLORPERAZINE EDISYLATE 10 MG: 5 INJECTION INTRAMUSCULAR; INTRAVENOUS at 04:39

## 2024-04-09 RX ADMIN — DULOXETINE HYDROCHLORIDE 60 MG: 60 CAPSULE, DELAYED RELEASE ORAL at 08:03

## 2024-04-09 RX ADMIN — SUCRALFATE 1 G: 1 TABLET ORAL at 20:46

## 2024-04-09 RX ADMIN — SUCRALFATE 1 G: 1 TABLET ORAL at 12:00

## 2024-04-09 RX ADMIN — PANTOPRAZOLE SODIUM 40 MG: 40 TABLET, DELAYED RELEASE ORAL at 16:15

## 2024-04-09 RX ADMIN — CARVEDILOL 3.12 MG: 3.12 TABLET, FILM COATED ORAL at 08:03

## 2024-04-09 RX ADMIN — MECLIZINE HYDROCHLORIDE 25 MG: 25 TABLET ORAL at 16:15

## 2024-04-09 RX ADMIN — SUCRALFATE 1 G: 1 TABLET ORAL at 08:03

## 2024-04-09 RX ADMIN — SODIUM CHLORIDE, PRESERVATIVE FREE 10 ML: 5 INJECTION INTRAVENOUS at 08:04

## 2024-04-09 RX ADMIN — MECLIZINE HYDROCHLORIDE 25 MG: 25 TABLET ORAL at 12:13

## 2024-04-09 RX ADMIN — GABAPENTIN 100 MG: 100 CAPSULE ORAL at 12:13

## 2024-04-09 ASSESSMENT — PAIN SCALES - GENERAL
PAINLEVEL_OUTOF10: 6
PAINLEVEL_OUTOF10: 0

## 2024-04-09 ASSESSMENT — PAIN DESCRIPTION - DESCRIPTORS: DESCRIPTORS: CRAMPING;ACHING

## 2024-04-09 ASSESSMENT — PAIN - FUNCTIONAL ASSESSMENT: PAIN_FUNCTIONAL_ASSESSMENT: ACTIVITIES ARE NOT PREVENTED

## 2024-04-09 ASSESSMENT — PAIN DESCRIPTION - FREQUENCY: FREQUENCY: INTERMITTENT

## 2024-04-09 ASSESSMENT — PAIN DESCRIPTION - PAIN TYPE: TYPE: ACUTE PAIN

## 2024-04-09 ASSESSMENT — PAIN DESCRIPTION - ORIENTATION: ORIENTATION: MID

## 2024-04-09 ASSESSMENT — PAIN DESCRIPTION - ONSET: ONSET: GRADUAL

## 2024-04-09 ASSESSMENT — PAIN DESCRIPTION - LOCATION: LOCATION: ABDOMEN

## 2024-04-09 NOTE — CARE COORDINATION
Pt still limited in her ability to participate in PT/OT so CM has contacted [facility] and requested that the auth request be withdrawn.  CM can resubmit a new request once pt is able to participate in therapy.  TIMUR notified [place] liaison as well.  CM following.

## 2024-04-10 ENCOUNTER — HOSPITAL ENCOUNTER (INPATIENT)
Dept: MRI IMAGING | Age: 69
Discharge: HOME | DRG: 208 | End: 2024-04-13
Payer: MEDICARE

## 2024-04-10 PROBLEM — R42 DIZZINESS: Status: ACTIVE | Noted: 2024-04-10

## 2024-04-10 LAB
ANION GAP SERPL CALC-SCNC: 5 MMOL/L (ref 2–11)
BASOPHILS # BLD: 0.1 K/UL (ref 0–0.2)
BASOPHILS NFR BLD: 1 % (ref 0–2)
BUN SERPL-MCNC: 9 MG/DL (ref 8–23)
CALCIUM SERPL-MCNC: 9.2 MG/DL (ref 8.3–10.4)
CHLORIDE SERPL-SCNC: 107 MMOL/L (ref 103–113)
CO2 SERPL-SCNC: 26 MMOL/L (ref 21–32)
CREAT SERPL-MCNC: 0.5 MG/DL (ref 0.6–1)
DIFFERENTIAL METHOD BLD: ABNORMAL
EOSINOPHIL # BLD: 0.3 K/UL (ref 0–0.8)
EOSINOPHIL NFR BLD: 3 % (ref 0.5–7.8)
ERYTHROCYTE [DISTWIDTH] IN BLOOD BY AUTOMATED COUNT: 13.2 % (ref 11.9–14.6)
GLUCOSE SERPL-MCNC: 95 MG/DL (ref 65–100)
HCT VFR BLD AUTO: 32.3 % (ref 35.8–46.3)
HGB BLD-MCNC: 10.8 G/DL (ref 11.7–15.4)
IMM GRANULOCYTES # BLD AUTO: 0.2 K/UL (ref 0–0.5)
IMM GRANULOCYTES NFR BLD AUTO: 2 % (ref 0–5)
LYMPHOCYTES # BLD: 2.3 K/UL (ref 0.5–4.6)
LYMPHOCYTES NFR BLD: 23 % (ref 13–44)
MAGNESIUM SERPL-MCNC: 1.8 MG/DL (ref 1.8–2.4)
MCH RBC QN AUTO: 28.8 PG (ref 26.1–32.9)
MCHC RBC AUTO-ENTMCNC: 33.4 G/DL (ref 31.4–35)
MCV RBC AUTO: 86.1 FL (ref 82–102)
MONOCYTES # BLD: 1 K/UL (ref 0.1–1.3)
MONOCYTES NFR BLD: 10 % (ref 4–12)
NEUTS SEG # BLD: 6 K/UL (ref 1.7–8.2)
NEUTS SEG NFR BLD: 61 % (ref 43–78)
NRBC # BLD: 0 K/UL (ref 0–0.2)
PLATELET # BLD AUTO: 237 K/UL (ref 150–450)
PMV BLD AUTO: 9.4 FL (ref 9.4–12.3)
POTASSIUM SERPL-SCNC: 3.1 MMOL/L (ref 3.5–5.1)
RBC # BLD AUTO: 3.75 M/UL (ref 4.05–5.2)
SODIUM SERPL-SCNC: 138 MMOL/L (ref 136–146)
WBC # BLD AUTO: 9.7 K/UL (ref 4.3–11.1)

## 2024-04-10 PROCEDURE — 1100000000 HC RM PRIVATE

## 2024-04-10 PROCEDURE — 83735 ASSAY OF MAGNESIUM: CPT

## 2024-04-10 PROCEDURE — 97112 NEUROMUSCULAR REEDUCATION: CPT

## 2024-04-10 PROCEDURE — 6370000000 HC RX 637 (ALT 250 FOR IP)

## 2024-04-10 PROCEDURE — 36415 COLL VENOUS BLD VENIPUNCTURE: CPT

## 2024-04-10 PROCEDURE — 6370000000 HC RX 637 (ALT 250 FOR IP): Performed by: NURSE PRACTITIONER

## 2024-04-10 PROCEDURE — 85025 COMPLETE CBC W/AUTO DIFF WBC: CPT

## 2024-04-10 PROCEDURE — 6360000002 HC RX W HCPCS

## 2024-04-10 PROCEDURE — 6370000000 HC RX 637 (ALT 250 FOR IP): Performed by: INTERNAL MEDICINE

## 2024-04-10 PROCEDURE — 2580000003 HC RX 258

## 2024-04-10 PROCEDURE — 2580000003 HC RX 258: Performed by: INTERNAL MEDICINE

## 2024-04-10 PROCEDURE — 83036 HEMOGLOBIN GLYCOSYLATED A1C: CPT

## 2024-04-10 PROCEDURE — 70551 MRI BRAIN STEM W/O DYE: CPT

## 2024-04-10 PROCEDURE — 97164 PT RE-EVAL EST PLAN CARE: CPT

## 2024-04-10 PROCEDURE — 97530 THERAPEUTIC ACTIVITIES: CPT

## 2024-04-10 PROCEDURE — 80048 BASIC METABOLIC PNL TOTAL CA: CPT

## 2024-04-10 RX ORDER — DICYCLOMINE HCL 20 MG
20 TABLET ORAL
Status: DISCONTINUED | OUTPATIENT
Start: 2024-04-10 | End: 2024-04-17 | Stop reason: HOSPADM

## 2024-04-10 RX ORDER — GABAPENTIN 100 MG/1
200 CAPSULE ORAL 3 TIMES DAILY
Status: DISCONTINUED | OUTPATIENT
Start: 2024-04-10 | End: 2024-04-12

## 2024-04-10 RX ORDER — SODIUM CHLORIDE 9 MG/ML
INJECTION, SOLUTION INTRAVENOUS CONTINUOUS
Status: ACTIVE | OUTPATIENT
Start: 2024-04-10 | End: 2024-04-11

## 2024-04-10 RX ORDER — POTASSIUM CHLORIDE 20 MEQ/1
40 TABLET, EXTENDED RELEASE ORAL 2 TIMES DAILY WITH MEALS
Status: COMPLETED | OUTPATIENT
Start: 2024-04-10 | End: 2024-04-10

## 2024-04-10 RX ADMIN — DULOXETINE HYDROCHLORIDE 60 MG: 60 CAPSULE, DELAYED RELEASE ORAL at 08:26

## 2024-04-10 RX ADMIN — POTASSIUM CHLORIDE 40 MEQ: 1500 TABLET, EXTENDED RELEASE ORAL at 08:26

## 2024-04-10 RX ADMIN — DICYCLOMINE HYDROCHLORIDE 20 MG: 20 TABLET ORAL at 12:49

## 2024-04-10 RX ADMIN — MIDODRINE HYDROCHLORIDE 5 MG: 5 TABLET ORAL at 16:06

## 2024-04-10 RX ADMIN — GABAPENTIN 200 MG: 100 CAPSULE ORAL at 15:00

## 2024-04-10 RX ADMIN — ONDANSETRON 4 MG: 4 TABLET, ORALLY DISINTEGRATING ORAL at 21:16

## 2024-04-10 RX ADMIN — GABAPENTIN 200 MG: 100 CAPSULE ORAL at 08:26

## 2024-04-10 RX ADMIN — MIDODRINE HYDROCHLORIDE 5 MG: 5 TABLET ORAL at 12:49

## 2024-04-10 RX ADMIN — SUCRALFATE 1 G: 1 TABLET ORAL at 16:05

## 2024-04-10 RX ADMIN — SUCRALFATE 1 G: 1 TABLET ORAL at 21:16

## 2024-04-10 RX ADMIN — SUCRALFATE 1 G: 1 TABLET ORAL at 12:49

## 2024-04-10 RX ADMIN — DOCUSATE SODIUM 50 MG AND SENNOSIDES 8.6 MG 2 TABLET: 8.6; 5 TABLET, FILM COATED ORAL at 08:26

## 2024-04-10 RX ADMIN — MECLIZINE HYDROCHLORIDE 25 MG: 25 TABLET ORAL at 06:20

## 2024-04-10 RX ADMIN — ENOXAPARIN SODIUM 40 MG: 100 INJECTION SUBCUTANEOUS at 08:27

## 2024-04-10 RX ADMIN — SODIUM CHLORIDE, PRESERVATIVE FREE 10 ML: 5 INJECTION INTRAVENOUS at 08:28

## 2024-04-10 RX ADMIN — MECLIZINE HYDROCHLORIDE 25 MG: 25 TABLET ORAL at 12:49

## 2024-04-10 RX ADMIN — POTASSIUM CHLORIDE 40 MEQ: 1500 TABLET, EXTENDED RELEASE ORAL at 16:05

## 2024-04-10 RX ADMIN — DICYCLOMINE HYDROCHLORIDE 20 MG: 20 TABLET ORAL at 06:20

## 2024-04-10 RX ADMIN — DICYCLOMINE HYDROCHLORIDE 20 MG: 20 TABLET ORAL at 16:06

## 2024-04-10 RX ADMIN — PANTOPRAZOLE SODIUM 40 MG: 40 TABLET, DELAYED RELEASE ORAL at 16:05

## 2024-04-10 RX ADMIN — SODIUM CHLORIDE: 9 INJECTION, SOLUTION INTRAVENOUS at 16:05

## 2024-04-10 RX ADMIN — MIDODRINE HYDROCHLORIDE 5 MG: 5 TABLET ORAL at 08:26

## 2024-04-10 RX ADMIN — MECLIZINE HYDROCHLORIDE 25 MG: 25 TABLET ORAL at 16:06

## 2024-04-10 RX ADMIN — SODIUM CHLORIDE: 9 INJECTION, SOLUTION INTRAVENOUS at 06:22

## 2024-04-10 RX ADMIN — MECLIZINE HYDROCHLORIDE 25 MG: 25 TABLET ORAL at 23:14

## 2024-04-10 RX ADMIN — PANTOPRAZOLE SODIUM 40 MG: 40 TABLET, DELAYED RELEASE ORAL at 06:20

## 2024-04-10 RX ADMIN — GABAPENTIN 200 MG: 100 CAPSULE ORAL at 21:16

## 2024-04-10 RX ADMIN — LEVOTHYROXINE SODIUM 100 MCG: 0.1 TABLET ORAL at 06:20

## 2024-04-10 RX ADMIN — SUCRALFATE 1 G: 1 TABLET ORAL at 08:26

## 2024-04-10 ASSESSMENT — PAIN SCALES - GENERAL: PAINLEVEL_OUTOF10: 0

## 2024-04-11 LAB
ANION GAP SERPL CALC-SCNC: 6 MMOL/L (ref 2–11)
BASOPHILS # BLD: 0 K/UL (ref 0–0.2)
BASOPHILS NFR BLD: 1 % (ref 0–2)
BUN SERPL-MCNC: 7 MG/DL (ref 8–23)
CALCIUM SERPL-MCNC: 9.5 MG/DL (ref 8.3–10.4)
CHLORIDE SERPL-SCNC: 110 MMOL/L (ref 103–113)
CO2 SERPL-SCNC: 23 MMOL/L (ref 21–32)
CREAT SERPL-MCNC: 0.4 MG/DL (ref 0.6–1)
DIFFERENTIAL METHOD BLD: ABNORMAL
EOSINOPHIL # BLD: 0.2 K/UL (ref 0–0.8)
EOSINOPHIL NFR BLD: 3 % (ref 0.5–7.8)
ERYTHROCYTE [DISTWIDTH] IN BLOOD BY AUTOMATED COUNT: 13.3 % (ref 11.9–14.6)
EST. AVERAGE GLUCOSE BLD GHB EST-MCNC: 103 MG/DL
GLUCOSE SERPL-MCNC: 91 MG/DL (ref 65–100)
HBA1C MFR BLD: 5.2 % (ref 4.8–5.6)
HCT VFR BLD AUTO: 31.4 % (ref 35.8–46.3)
HGB BLD-MCNC: 10.7 G/DL (ref 11.7–15.4)
IMM GRANULOCYTES # BLD AUTO: 0.2 K/UL (ref 0–0.5)
IMM GRANULOCYTES NFR BLD AUTO: 2 % (ref 0–5)
LYMPHOCYTES # BLD: 2.1 K/UL (ref 0.5–4.6)
LYMPHOCYTES NFR BLD: 24 % (ref 13–44)
MCH RBC QN AUTO: 29 PG (ref 26.1–32.9)
MCHC RBC AUTO-ENTMCNC: 34.1 G/DL (ref 31.4–35)
MCV RBC AUTO: 85.1 FL (ref 82–102)
MONOCYTES # BLD: 0.9 K/UL (ref 0.1–1.3)
MONOCYTES NFR BLD: 10 % (ref 4–12)
NEUTS SEG # BLD: 5.4 K/UL (ref 1.7–8.2)
NEUTS SEG NFR BLD: 60 % (ref 43–78)
NRBC # BLD: 0 K/UL (ref 0–0.2)
PLATELET # BLD AUTO: 270 K/UL (ref 150–450)
PMV BLD AUTO: 9.2 FL (ref 9.4–12.3)
POTASSIUM SERPL-SCNC: 3.6 MMOL/L (ref 3.5–5.1)
RBC # BLD AUTO: 3.69 M/UL (ref 4.05–5.2)
SODIUM SERPL-SCNC: 139 MMOL/L (ref 136–146)
WBC # BLD AUTO: 8.8 K/UL (ref 4.3–11.1)

## 2024-04-11 PROCEDURE — 2580000003 HC RX 258

## 2024-04-11 PROCEDURE — 95992 CANALITH REPOSITIONING PROC: CPT

## 2024-04-11 PROCEDURE — 6360000002 HC RX W HCPCS

## 2024-04-11 PROCEDURE — 1100000000 HC RM PRIVATE

## 2024-04-11 PROCEDURE — 80048 BASIC METABOLIC PNL TOTAL CA: CPT

## 2024-04-11 PROCEDURE — 97530 THERAPEUTIC ACTIVITIES: CPT

## 2024-04-11 PROCEDURE — 99222 1ST HOSP IP/OBS MODERATE 55: CPT | Performed by: PSYCHIATRY & NEUROLOGY

## 2024-04-11 PROCEDURE — 93005 ELECTROCARDIOGRAM TRACING: CPT | Performed by: INTERNAL MEDICINE

## 2024-04-11 PROCEDURE — 6370000000 HC RX 637 (ALT 250 FOR IP): Performed by: INTERNAL MEDICINE

## 2024-04-11 PROCEDURE — 6360000002 HC RX W HCPCS: Performed by: INTERNAL MEDICINE

## 2024-04-11 PROCEDURE — 36415 COLL VENOUS BLD VENIPUNCTURE: CPT

## 2024-04-11 PROCEDURE — 85025 COMPLETE CBC W/AUTO DIFF WBC: CPT

## 2024-04-11 PROCEDURE — 6370000000 HC RX 637 (ALT 250 FOR IP): Performed by: NURSE PRACTITIONER

## 2024-04-11 RX ORDER — PROCHLORPERAZINE EDISYLATE 5 MG/ML
10 INJECTION INTRAMUSCULAR; INTRAVENOUS
Status: DISCONTINUED | OUTPATIENT
Start: 2024-04-11 | End: 2024-04-12

## 2024-04-11 RX ADMIN — DICYCLOMINE HYDROCHLORIDE 20 MG: 20 TABLET ORAL at 16:00

## 2024-04-11 RX ADMIN — SUCRALFATE 1 G: 1 TABLET ORAL at 16:00

## 2024-04-11 RX ADMIN — MIDODRINE HYDROCHLORIDE 5 MG: 5 TABLET ORAL at 16:00

## 2024-04-11 RX ADMIN — SUCRALFATE 1 G: 1 TABLET ORAL at 20:12

## 2024-04-11 RX ADMIN — MIDODRINE HYDROCHLORIDE 5 MG: 5 TABLET ORAL at 13:54

## 2024-04-11 RX ADMIN — PANTOPRAZOLE SODIUM 40 MG: 40 TABLET, DELAYED RELEASE ORAL at 16:00

## 2024-04-11 RX ADMIN — GABAPENTIN 200 MG: 100 CAPSULE ORAL at 20:12

## 2024-04-11 RX ADMIN — MECLIZINE HYDROCHLORIDE 25 MG: 25 TABLET ORAL at 10:57

## 2024-04-11 RX ADMIN — LEVOTHYROXINE SODIUM 100 MCG: 0.1 TABLET ORAL at 05:41

## 2024-04-11 RX ADMIN — SODIUM CHLORIDE, PRESERVATIVE FREE 10 ML: 5 INJECTION INTRAVENOUS at 08:01

## 2024-04-11 RX ADMIN — PROCHLORPERAZINE EDISYLATE 10 MG: 5 INJECTION INTRAMUSCULAR; INTRAVENOUS at 16:02

## 2024-04-11 RX ADMIN — PANTOPRAZOLE SODIUM 40 MG: 40 TABLET, DELAYED RELEASE ORAL at 05:41

## 2024-04-11 RX ADMIN — MIDODRINE HYDROCHLORIDE 5 MG: 5 TABLET ORAL at 08:00

## 2024-04-11 RX ADMIN — DULOXETINE HYDROCHLORIDE 60 MG: 60 CAPSULE, DELAYED RELEASE ORAL at 08:00

## 2024-04-11 RX ADMIN — ENOXAPARIN SODIUM 40 MG: 100 INJECTION SUBCUTANEOUS at 08:02

## 2024-04-11 RX ADMIN — SODIUM CHLORIDE, PRESERVATIVE FREE 10 ML: 5 INJECTION INTRAVENOUS at 20:14

## 2024-04-11 RX ADMIN — SUCRALFATE 1 G: 1 TABLET ORAL at 08:00

## 2024-04-11 RX ADMIN — SUCRALFATE 1 G: 1 TABLET ORAL at 13:55

## 2024-04-11 RX ADMIN — MECLIZINE HYDROCHLORIDE 25 MG: 25 TABLET ORAL at 05:41

## 2024-04-11 RX ADMIN — DICYCLOMINE HYDROCHLORIDE 20 MG: 20 TABLET ORAL at 05:41

## 2024-04-11 RX ADMIN — MECLIZINE HYDROCHLORIDE 25 MG: 25 TABLET ORAL at 22:35

## 2024-04-11 RX ADMIN — GABAPENTIN 200 MG: 100 CAPSULE ORAL at 13:54

## 2024-04-11 RX ADMIN — GABAPENTIN 200 MG: 100 CAPSULE ORAL at 08:00

## 2024-04-11 RX ADMIN — DICYCLOMINE HYDROCHLORIDE 20 MG: 20 TABLET ORAL at 10:57

## 2024-04-11 RX ADMIN — MECLIZINE HYDROCHLORIDE 25 MG: 25 TABLET ORAL at 16:02

## 2024-04-11 RX ADMIN — DOCUSATE SODIUM 50 MG AND SENNOSIDES 8.6 MG 2 TABLET: 8.6; 5 TABLET, FILM COATED ORAL at 08:00

## 2024-04-11 NOTE — CONSULTS
[facility] Hospitalist Consult   Admit Date:  3/31/2024  9:12 PM   Name:  Jesika Olsen   Age:  68 y.o.  Sex:  female  :  1955   MRN:  [id number]   Room:  [facility]/    Presenting/Chief Complaint: Abdominal Pain and Nausea    Reason(s) for Admission: Respiratory arrest (HCC) [R09.2]  Septicemia (HCC) [A41.9]  Septic shock (HCC) [A41.9, R65.21]  Acute respiratory failure, unspecified whether with hypoxia or hypercapnia (HCC) [J96.00]     Hospitalists consulted by Hernandez Gay DO for: assume primary role s/p extubation    History of Presenting Illness:   Ms. Olsen is a 68 y.o. CF with a PMH of JEREMI, HTN, HLD, DVT, hypothyroidism, bipolar 1, DM2 (no insulin) gastric bypass (Jesica-en-Y 2023) who presented with lower abdominal pain.  Patient found to have a lactic acid of 4.7 and began to have altered mental status.  Patient had CT head abdomen pelvis (given droperidol and morphine prior to CT) and upon returning from CT patient had respiratory arrest.  She did not lose her pulse.  She was intubated and sedated and taken to the ICU.  She was extubated on  and transferred to the medical floor.    Hospitalists were consulted to assume care of this patient.     The patient is not fully oriented on exam.  She is saturating 100% on room air.  Cultures remain negative.  Discontinue antibiotics.  Consult PT/OT.  Monitor closely for any change in condition.  Trend labs in the morning.    Assessment & Plan:     SIRS  Blood, urine and CSF cultures negative thus far  UA does not appear infectious  PCT 0.27 on admission   CT c/a/p no acute finding   MRSA swab negative; vancomycin discontinued  Discontinue cefepime    Respiratory arrest (HCC)  after receiving morphine and droperidol prior to CT requiring intubation  Successful extubation on  and transferred to medical floor  D-dimer elevated  CTA negative for PE  Negative for DVT    Hypothyroidism  T4 free 1.2 on Mar/31  Continue Synthroid    MDD (major 
Neurology Consult Note       History:   68-year-old female initially admitted several days ago with lower abdominal pain with elevated lactic acid and altered mental status. She is being seen for code S, which was called at 8:59 AM.  Neurology arrived to the bedside at 9:01 AM. The patient was last known normal several minutes prior when she was working with nursing staff, suddenly became less responsive and leaned to the left with slurred speech.  On my arrival symptoms had resolved. Initial NIHSS was 0. A CT of the head was obtained and no acute pathology.       Exam: Pertinent positives and negatives include:  NIHSS Score: 0  1a-Level of Consciousness 0  1b-What is Month/Age 0  1c-Open/Close Eyes&Hand 0  2 -Best Gaze 0  3 -Visual Fields 0  4 -Facial Palsy 0  5a-Motor-Left Arm 0  5b-Motor-Right Arm 0  6a-Motor-Left Leg 0  6b-Motor-Right Leg 0  7 -Limb Ataxia 0  8 -Sensory 0  9 -Best Language 0  10-Dysarthria 0  11-Extinction/Inattention 0        Assessment:     68-year-old female seen as a code S with abrupt onset of confusion, leaning to the left with slurred speech, several minutes prior to my arrival, after which symptoms resolved. Initial NIHSS was 0. CT of the head was obtained and no acute pathology. CTA of head neck was not obtained. The patient was not a candidate for tenecteplase because she has no discrete neurologic deficits . The patient was not a candidate for mechanical thrombectomy because of low NIH stroke scale.    Impression:  Transient alteration of consciousness, unspecified. Acute ischemic stroke is unlikely.  Continue medical care/investigations for alternative explanations.       Plan:     No further workup at this time.  Neurology will sign off.       Gustabo Suero DO  Neurology      I spent  30 minutes today with the patient, which included chart review, obtaining history from the patient/family/other providers, physical exam, documentation, and reviewing pertinent testing.      
Muscle stretch reflexes are absent throughout    Imaging and review of data: I personally reviewed the patient's MRI of the brain.  No acute intracranial pathology      Assessment and Plan: This is a 68-year-old woman for whom neurology was consulted for dizziness.  Her vertigo is positional such as when she turns her head to the side.  This likely represents BPPV.  In addition, she becomes dizzy and lightheaded upon standing which likely represents orthostatic hypotension.  She has neuropathy and orthostatic hypotension is very common in the setting of peripheral neuropathy, especially when the neuropathy also involves the hands.  She tells me that she does not have diabetes, but I see this as listed in her medical conditions.    Recommendations  Vestibular therapy for potential BPPV  Please check orthostatic vitals  Please check a hemoglobin A1c.  Diabetes is the likely cause of her neuropathy.  If her hemoglobin A1c is normal then I recommend adding a serum protein electrophoresis.  No further recommendations.  Neurology will sign off.          Cumulative time spent today was 55 minutes which included chart review, obtaining history (from patient, family, or other providers), review of images, examining the patient, and counseling the patient and/or family on medical condition.     
unexpected weight change.   Gastrointestinal:  Positive for abdominal pain and nausea. Negative for constipation, diarrhea and vomiting.   All other systems reviewed and are negative.       Physical Exam   /73   Pulse 88   Temp 97.9 °F (36.6 °C) (Oral)   Resp 18   Ht 1.753 m (5' 9\")   Wt 93.9 kg (207 lb)   SpO2 99%   BMI 30.57 kg/m²      Physical Exam  Vitals and nursing note reviewed.   Constitutional:       Appearance: Normal appearance.   HENT:      Head: Normocephalic and atraumatic.   Eyes:      Conjunctiva/sclera: Conjunctivae normal.   Cardiovascular:      Rate and Rhythm: Normal rate and regular rhythm.   Pulmonary:      Effort: Pulmonary effort is normal.      Breath sounds: Normal breath sounds.   Abdominal:      General: Abdomen is flat. Bowel sounds are normal. There is no distension.      Palpations: Abdomen is soft.      Tenderness: There is abdominal tenderness (TTP to LLQ to periumbilical region). There is no guarding or rebound.   Neurological:      General: No focal deficit present.      Mental Status: She is alert.   Psychiatric:         Mood and Affect: Mood normal.         Labs    CBC:  Recent Labs     04/08/24  0504   WBC 9.6   RBC 3.60*   HGB 10.4*   HCT 31.5*   MCV 87.5   RDW 13.2        CHEMISTRIES:  Recent Labs     04/08/24  0504      K 3.7      CO2 27   BUN 12   CREATININE 0.50*   GLUCOSE 84     PT/INR:No results for input(s): \"PROTIME\", \"INR\" in the last 72 hours.  APTT:No results for input(s): \"APTT\" in the last 72 hours.  LIVER PROFILE:No results for input(s): \"AST\", \"ALT\", \"BILIDIR\", \"BILITOT\", \"ALKPHOS\" in the last 72 hours.    Imaging/Diagnostics   CT HEAD WO CONTRAST    Result Date: 4/4/2024  No acute intracranial process. Stable small vessel ischemic disease and atrophy. 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    Assessment      Hospital Problems             Last Modified POA    * (Principal) Respiratory arrest (HCC) 
no

## 2024-04-12 LAB
EKG ATRIAL RATE: 87 BPM
EKG DIAGNOSIS: NORMAL
EKG P AXIS: 24 DEGREES
EKG P-R INTERVAL: 176 MS
EKG Q-T INTERVAL: 394 MS
EKG QRS DURATION: 86 MS
EKG QTC CALCULATION (BAZETT): 474 MS
EKG R AXIS: 2 DEGREES
EKG T AXIS: 56 DEGREES
EKG VENTRICULAR RATE: 87 BPM

## 2024-04-12 PROCEDURE — 2580000003 HC RX 258

## 2024-04-12 PROCEDURE — 86334 IMMUNOFIX E-PHORESIS SERUM: CPT

## 2024-04-12 PROCEDURE — 97530 THERAPEUTIC ACTIVITIES: CPT

## 2024-04-12 PROCEDURE — 6370000000 HC RX 637 (ALT 250 FOR IP): Performed by: NURSE PRACTITIONER

## 2024-04-12 PROCEDURE — 97112 NEUROMUSCULAR REEDUCATION: CPT

## 2024-04-12 PROCEDURE — 6360000002 HC RX W HCPCS

## 2024-04-12 PROCEDURE — 93010 ELECTROCARDIOGRAM REPORT: CPT | Performed by: INTERNAL MEDICINE

## 2024-04-12 PROCEDURE — 84155 ASSAY OF PROTEIN SERUM: CPT

## 2024-04-12 PROCEDURE — 1100000000 HC RM PRIVATE

## 2024-04-12 PROCEDURE — 82784 ASSAY IGA/IGD/IGG/IGM EACH: CPT

## 2024-04-12 PROCEDURE — 6370000000 HC RX 637 (ALT 250 FOR IP)

## 2024-04-12 PROCEDURE — 6370000000 HC RX 637 (ALT 250 FOR IP): Performed by: INTERNAL MEDICINE

## 2024-04-12 PROCEDURE — 6360000002 HC RX W HCPCS: Performed by: INTERNAL MEDICINE

## 2024-04-12 PROCEDURE — 6370000000 HC RX 637 (ALT 250 FOR IP): Performed by: PHYSICIAN ASSISTANT

## 2024-04-12 PROCEDURE — 6360000002 HC RX W HCPCS: Performed by: NURSE PRACTITIONER

## 2024-04-12 PROCEDURE — 84165 PROTEIN E-PHORESIS SERUM: CPT

## 2024-04-12 PROCEDURE — 36415 COLL VENOUS BLD VENIPUNCTURE: CPT

## 2024-04-12 RX ORDER — METOCLOPRAMIDE HYDROCHLORIDE 5 MG/ML
10 INJECTION INTRAMUSCULAR; INTRAVENOUS
Status: COMPLETED | OUTPATIENT
Start: 2024-04-12 | End: 2024-04-13

## 2024-04-12 RX ORDER — GABAPENTIN 400 MG/1
400 CAPSULE ORAL 3 TIMES DAILY
Status: DISCONTINUED | OUTPATIENT
Start: 2024-04-12 | End: 2024-04-17 | Stop reason: HOSPADM

## 2024-04-12 RX ORDER — FLUDROCORTISONE ACETATE 0.1 MG/1
0.1 TABLET ORAL DAILY
Status: DISCONTINUED | OUTPATIENT
Start: 2024-04-12 | End: 2024-04-16

## 2024-04-12 RX ADMIN — MECLIZINE HYDROCHLORIDE 25 MG: 25 TABLET ORAL at 05:23

## 2024-04-12 RX ADMIN — METOCLOPRAMIDE HYDROCHLORIDE 10 MG: 5 INJECTION INTRAMUSCULAR; INTRAVENOUS at 20:03

## 2024-04-12 RX ADMIN — FLUDROCORTISONE ACETATE 0.1 MG: 0.1 TABLET ORAL at 09:29

## 2024-04-12 RX ADMIN — PANTOPRAZOLE SODIUM 40 MG: 40 TABLET, DELAYED RELEASE ORAL at 05:23

## 2024-04-12 RX ADMIN — ENOXAPARIN SODIUM 40 MG: 100 INJECTION SUBCUTANEOUS at 09:25

## 2024-04-12 RX ADMIN — SODIUM CHLORIDE, PRESERVATIVE FREE 10 ML: 5 INJECTION INTRAVENOUS at 09:25

## 2024-04-12 RX ADMIN — MECLIZINE HYDROCHLORIDE 25 MG: 25 TABLET ORAL at 16:58

## 2024-04-12 RX ADMIN — ACETAMINOPHEN 650 MG: 325 TABLET ORAL at 09:25

## 2024-04-12 RX ADMIN — DOCUSATE SODIUM 50 MG AND SENNOSIDES 8.6 MG 2 TABLET: 8.6; 5 TABLET, FILM COATED ORAL at 09:25

## 2024-04-12 RX ADMIN — SUCRALFATE 1 G: 1 TABLET ORAL at 16:58

## 2024-04-12 RX ADMIN — ONDANSETRON 4 MG: 2 INJECTION INTRAMUSCULAR; INTRAVENOUS at 00:40

## 2024-04-12 RX ADMIN — SUCRALFATE 1 G: 1 TABLET ORAL at 14:52

## 2024-04-12 RX ADMIN — MECLIZINE HYDROCHLORIDE 25 MG: 25 TABLET ORAL at 20:02

## 2024-04-12 RX ADMIN — PROCHLORPERAZINE EDISYLATE 10 MG: 5 INJECTION INTRAMUSCULAR; INTRAVENOUS at 05:23

## 2024-04-12 RX ADMIN — GABAPENTIN 400 MG: 400 CAPSULE ORAL at 20:03

## 2024-04-12 RX ADMIN — METOCLOPRAMIDE HYDROCHLORIDE 10 MG: 5 INJECTION INTRAMUSCULAR; INTRAVENOUS at 09:29

## 2024-04-12 RX ADMIN — SUCRALFATE 1 G: 1 TABLET ORAL at 20:03

## 2024-04-12 RX ADMIN — LEVOTHYROXINE SODIUM 100 MCG: 0.1 TABLET ORAL at 05:23

## 2024-04-12 RX ADMIN — METOCLOPRAMIDE HYDROCHLORIDE 10 MG: 5 INJECTION INTRAMUSCULAR; INTRAVENOUS at 16:59

## 2024-04-12 RX ADMIN — DICYCLOMINE HYDROCHLORIDE 20 MG: 20 TABLET ORAL at 16:58

## 2024-04-12 RX ADMIN — SODIUM CHLORIDE, PRESERVATIVE FREE 10 ML: 5 INJECTION INTRAVENOUS at 20:03

## 2024-04-12 RX ADMIN — GABAPENTIN 400 MG: 400 CAPSULE ORAL at 09:29

## 2024-04-12 RX ADMIN — DICYCLOMINE HYDROCHLORIDE 20 MG: 20 TABLET ORAL at 09:29

## 2024-04-12 RX ADMIN — MECLIZINE HYDROCHLORIDE 25 MG: 25 TABLET ORAL at 12:07

## 2024-04-12 RX ADMIN — PROCHLORPERAZINE EDISYLATE 10 MG: 5 INJECTION INTRAMUSCULAR; INTRAVENOUS at 14:52

## 2024-04-12 RX ADMIN — POLYETHYLENE GLYCOL 3350 17 G: 17 POWDER, FOR SOLUTION ORAL at 09:25

## 2024-04-12 RX ADMIN — GABAPENTIN 400 MG: 400 CAPSULE ORAL at 14:52

## 2024-04-12 RX ADMIN — SUCRALFATE 1 G: 1 TABLET ORAL at 09:25

## 2024-04-12 RX ADMIN — PANTOPRAZOLE SODIUM 40 MG: 40 TABLET, DELAYED RELEASE ORAL at 16:58

## 2024-04-12 RX ADMIN — DICYCLOMINE HYDROCHLORIDE 20 MG: 20 TABLET ORAL at 05:23

## 2024-04-12 ASSESSMENT — PAIN SCALES - GENERAL: PAINLEVEL_OUTOF10: 0

## 2024-04-13 PROCEDURE — 6360000002 HC RX W HCPCS

## 2024-04-13 PROCEDURE — 6360000002 HC RX W HCPCS: Performed by: INTERNAL MEDICINE

## 2024-04-13 PROCEDURE — 1100000000 HC RM PRIVATE

## 2024-04-13 PROCEDURE — 6370000000 HC RX 637 (ALT 250 FOR IP): Performed by: INTERNAL MEDICINE

## 2024-04-13 PROCEDURE — 2580000003 HC RX 258

## 2024-04-13 PROCEDURE — 6370000000 HC RX 637 (ALT 250 FOR IP): Performed by: NURSE PRACTITIONER

## 2024-04-13 RX ORDER — METOCLOPRAMIDE HYDROCHLORIDE 5 MG/ML
10 INJECTION INTRAMUSCULAR; INTRAVENOUS
Status: COMPLETED | OUTPATIENT
Start: 2024-04-13 | End: 2024-04-14

## 2024-04-13 RX ADMIN — MECLIZINE HYDROCHLORIDE 25 MG: 25 TABLET ORAL at 04:53

## 2024-04-13 RX ADMIN — METOCLOPRAMIDE HYDROCHLORIDE 10 MG: 5 INJECTION INTRAMUSCULAR; INTRAVENOUS at 20:48

## 2024-04-13 RX ADMIN — FLUDROCORTISONE ACETATE 0.1 MG: 0.1 TABLET ORAL at 09:54

## 2024-04-13 RX ADMIN — DICYCLOMINE HYDROCHLORIDE 20 MG: 20 TABLET ORAL at 15:40

## 2024-04-13 RX ADMIN — DICYCLOMINE HYDROCHLORIDE 20 MG: 20 TABLET ORAL at 09:55

## 2024-04-13 RX ADMIN — METOCLOPRAMIDE HYDROCHLORIDE 10 MG: 5 INJECTION INTRAMUSCULAR; INTRAVENOUS at 04:53

## 2024-04-13 RX ADMIN — GABAPENTIN 400 MG: 400 CAPSULE ORAL at 20:48

## 2024-04-13 RX ADMIN — ENOXAPARIN SODIUM 40 MG: 100 INJECTION SUBCUTANEOUS at 09:55

## 2024-04-13 RX ADMIN — LEVOTHYROXINE SODIUM 100 MCG: 0.1 TABLET ORAL at 04:53

## 2024-04-13 RX ADMIN — GABAPENTIN 400 MG: 400 CAPSULE ORAL at 09:54

## 2024-04-13 RX ADMIN — DICYCLOMINE HYDROCHLORIDE 20 MG: 20 TABLET ORAL at 04:56

## 2024-04-13 RX ADMIN — METOCLOPRAMIDE HYDROCHLORIDE 10 MG: 5 INJECTION INTRAMUSCULAR; INTRAVENOUS at 17:57

## 2024-04-13 RX ADMIN — SUCRALFATE 1 G: 1 TABLET ORAL at 09:54

## 2024-04-13 RX ADMIN — DOCUSATE SODIUM 50 MG AND SENNOSIDES 8.6 MG 2 TABLET: 8.6; 5 TABLET, FILM COATED ORAL at 09:54

## 2024-04-13 RX ADMIN — METOCLOPRAMIDE HYDROCHLORIDE 10 MG: 5 INJECTION INTRAMUSCULAR; INTRAVENOUS at 11:47

## 2024-04-13 RX ADMIN — GABAPENTIN 400 MG: 400 CAPSULE ORAL at 15:40

## 2024-04-13 RX ADMIN — SUCRALFATE 1 G: 1 TABLET ORAL at 11:46

## 2024-04-13 RX ADMIN — SUCRALFATE 1 G: 1 TABLET ORAL at 20:48

## 2024-04-13 RX ADMIN — PANTOPRAZOLE SODIUM 40 MG: 40 TABLET, DELAYED RELEASE ORAL at 15:41

## 2024-04-13 RX ADMIN — SODIUM CHLORIDE, PRESERVATIVE FREE 10 ML: 5 INJECTION INTRAVENOUS at 20:48

## 2024-04-13 RX ADMIN — SUCRALFATE 1 G: 1 TABLET ORAL at 17:58

## 2024-04-13 RX ADMIN — PANTOPRAZOLE SODIUM 40 MG: 40 TABLET, DELAYED RELEASE ORAL at 04:52

## 2024-04-13 ASSESSMENT — PAIN SCALES - WONG BAKER: WONGBAKER_NUMERICALRESPONSE: HURTS A LITTLE BIT

## 2024-04-13 ASSESSMENT — PAIN SCALES - GENERAL: PAINLEVEL_OUTOF10: 0

## 2024-04-14 LAB
ANION GAP SERPL CALC-SCNC: 7 MMOL/L (ref 2–11)
BASOPHILS # BLD: 0.1 K/UL (ref 0–0.2)
BASOPHILS NFR BLD: 1 % (ref 0–2)
BUN SERPL-MCNC: 11 MG/DL (ref 8–23)
CALCIUM SERPL-MCNC: 9.6 MG/DL (ref 8.3–10.4)
CHLORIDE SERPL-SCNC: 107 MMOL/L (ref 103–113)
CO2 SERPL-SCNC: 26 MMOL/L (ref 21–32)
CORTIS AM PEAK SERPL-MCNC: 21.8 UG/DL (ref 7–25)
CREAT SERPL-MCNC: 0.6 MG/DL (ref 0.6–1)
DIFFERENTIAL METHOD BLD: ABNORMAL
EOSINOPHIL # BLD: 0.2 K/UL (ref 0–0.8)
EOSINOPHIL NFR BLD: 2 % (ref 0.5–7.8)
ERYTHROCYTE [DISTWIDTH] IN BLOOD BY AUTOMATED COUNT: 14.1 % (ref 11.9–14.6)
GLUCOSE SERPL-MCNC: 90 MG/DL (ref 65–100)
HCT VFR BLD AUTO: 35.7 % (ref 35.8–46.3)
HGB BLD-MCNC: 11.3 G/DL (ref 11.7–15.4)
IMM GRANULOCYTES # BLD AUTO: 0.1 K/UL (ref 0–0.5)
IMM GRANULOCYTES NFR BLD AUTO: 1 % (ref 0–5)
LYMPHOCYTES # BLD: 2.7 K/UL (ref 0.5–4.6)
LYMPHOCYTES NFR BLD: 27 % (ref 13–44)
MAGNESIUM SERPL-MCNC: 1.8 MG/DL (ref 1.8–2.4)
MCH RBC QN AUTO: 28 PG (ref 26.1–32.9)
MCHC RBC AUTO-ENTMCNC: 31.7 G/DL (ref 31.4–35)
MCV RBC AUTO: 88.4 FL (ref 82–102)
MONOCYTES # BLD: 1 K/UL (ref 0.1–1.3)
MONOCYTES NFR BLD: 10 % (ref 4–12)
NEUTS SEG # BLD: 6.1 K/UL (ref 1.7–8.2)
NEUTS SEG NFR BLD: 59 % (ref 43–78)
NRBC # BLD: 0.02 K/UL (ref 0–0.2)
PLATELET # BLD AUTO: 191 K/UL (ref 150–450)
PMV BLD AUTO: 9.8 FL (ref 9.4–12.3)
POTASSIUM SERPL-SCNC: 2.8 MMOL/L (ref 3.5–5.1)
RBC # BLD AUTO: 4.04 M/UL (ref 4.05–5.2)
SODIUM SERPL-SCNC: 140 MMOL/L (ref 136–146)
WBC # BLD AUTO: 10.1 K/UL (ref 4.3–11.1)

## 2024-04-14 PROCEDURE — 6370000000 HC RX 637 (ALT 250 FOR IP): Performed by: INTERNAL MEDICINE

## 2024-04-14 PROCEDURE — 6370000000 HC RX 637 (ALT 250 FOR IP): Performed by: NURSE PRACTITIONER

## 2024-04-14 PROCEDURE — 83735 ASSAY OF MAGNESIUM: CPT

## 2024-04-14 PROCEDURE — 1100000000 HC RM PRIVATE

## 2024-04-14 PROCEDURE — 2580000003 HC RX 258

## 2024-04-14 PROCEDURE — 6360000002 HC RX W HCPCS: Performed by: NURSE PRACTITIONER

## 2024-04-14 PROCEDURE — 6360000002 HC RX W HCPCS: Performed by: INTERNAL MEDICINE

## 2024-04-14 PROCEDURE — 36415 COLL VENOUS BLD VENIPUNCTURE: CPT

## 2024-04-14 PROCEDURE — 6360000002 HC RX W HCPCS

## 2024-04-14 PROCEDURE — 82533 TOTAL CORTISOL: CPT

## 2024-04-14 PROCEDURE — 80048 BASIC METABOLIC PNL TOTAL CA: CPT

## 2024-04-14 PROCEDURE — 85025 COMPLETE CBC W/AUTO DIFF WBC: CPT

## 2024-04-14 RX ORDER — METOCLOPRAMIDE HYDROCHLORIDE 5 MG/ML
10 INJECTION INTRAMUSCULAR; INTRAVENOUS
Status: DISCONTINUED | OUTPATIENT
Start: 2024-04-14 | End: 2024-04-14

## 2024-04-14 RX ORDER — POTASSIUM CHLORIDE 7.45 MG/ML
10 INJECTION INTRAVENOUS ONCE
Status: DISCONTINUED | OUTPATIENT
Start: 2024-04-14 | End: 2024-04-17 | Stop reason: HOSPADM

## 2024-04-14 RX ORDER — METOCLOPRAMIDE HYDROCHLORIDE 5 MG/ML
10 INJECTION INTRAMUSCULAR; INTRAVENOUS
Status: DISCONTINUED | OUTPATIENT
Start: 2024-04-14 | End: 2024-04-15

## 2024-04-14 RX ORDER — POTASSIUM CHLORIDE 7.45 MG/ML
10 INJECTION INTRAVENOUS
Status: DISCONTINUED | OUTPATIENT
Start: 2024-04-14 | End: 2024-04-14

## 2024-04-14 RX ADMIN — POTASSIUM BICARBONATE 40 MEQ: 782 TABLET, EFFERVESCENT ORAL at 13:08

## 2024-04-14 RX ADMIN — ENOXAPARIN SODIUM 40 MG: 100 INJECTION SUBCUTANEOUS at 09:16

## 2024-04-14 RX ADMIN — DICYCLOMINE HYDROCHLORIDE 20 MG: 20 TABLET ORAL at 09:16

## 2024-04-14 RX ADMIN — SODIUM CHLORIDE, PRESERVATIVE FREE 10 ML: 5 INJECTION INTRAVENOUS at 20:32

## 2024-04-14 RX ADMIN — SUCRALFATE 1 G: 1 TABLET ORAL at 13:08

## 2024-04-14 RX ADMIN — POTASSIUM CHLORIDE 10 MEQ: 7.46 INJECTION, SOLUTION INTRAVENOUS at 12:00

## 2024-04-14 RX ADMIN — PANTOPRAZOLE SODIUM 40 MG: 40 TABLET, DELAYED RELEASE ORAL at 15:06

## 2024-04-14 RX ADMIN — METOCLOPRAMIDE HYDROCHLORIDE 10 MG: 5 INJECTION INTRAMUSCULAR; INTRAVENOUS at 17:21

## 2024-04-14 RX ADMIN — LEVOTHYROXINE SODIUM 100 MCG: 0.1 TABLET ORAL at 04:56

## 2024-04-14 RX ADMIN — GABAPENTIN 400 MG: 400 CAPSULE ORAL at 15:06

## 2024-04-14 RX ADMIN — SUCRALFATE 1 G: 1 TABLET ORAL at 20:32

## 2024-04-14 RX ADMIN — METOCLOPRAMIDE HYDROCHLORIDE 10 MG: 5 INJECTION INTRAMUSCULAR; INTRAVENOUS at 13:07

## 2024-04-14 RX ADMIN — SUCRALFATE 1 G: 1 TABLET ORAL at 09:16

## 2024-04-14 RX ADMIN — METOCLOPRAMIDE HYDROCHLORIDE 10 MG: 5 INJECTION INTRAMUSCULAR; INTRAVENOUS at 04:53

## 2024-04-14 RX ADMIN — FLUDROCORTISONE ACETATE 0.1 MG: 0.1 TABLET ORAL at 09:16

## 2024-04-14 RX ADMIN — PROCHLORPERAZINE EDISYLATE 10 MG: 5 INJECTION INTRAMUSCULAR; INTRAVENOUS at 19:44

## 2024-04-14 RX ADMIN — GABAPENTIN 400 MG: 400 CAPSULE ORAL at 20:32

## 2024-04-14 RX ADMIN — DOCUSATE SODIUM 50 MG AND SENNOSIDES 8.6 MG 2 TABLET: 8.6; 5 TABLET, FILM COATED ORAL at 09:16

## 2024-04-14 RX ADMIN — SUCRALFATE 1 G: 1 TABLET ORAL at 17:21

## 2024-04-14 RX ADMIN — GABAPENTIN 400 MG: 400 CAPSULE ORAL at 09:16

## 2024-04-14 RX ADMIN — DICYCLOMINE HYDROCHLORIDE 20 MG: 20 TABLET ORAL at 15:06

## 2024-04-14 RX ADMIN — DICYCLOMINE HYDROCHLORIDE 20 MG: 20 TABLET ORAL at 04:56

## 2024-04-14 RX ADMIN — PANTOPRAZOLE SODIUM 40 MG: 40 TABLET, DELAYED RELEASE ORAL at 04:55

## 2024-04-14 RX ADMIN — SODIUM CHLORIDE, PRESERVATIVE FREE 10 ML: 5 INJECTION INTRAVENOUS at 09:17

## 2024-04-14 ASSESSMENT — PAIN SCALES - GENERAL
PAINLEVEL_OUTOF10: 0
PAINLEVEL_OUTOF10: 5

## 2024-04-15 LAB
ANION GAP SERPL CALC-SCNC: 5 MMOL/L (ref 2–11)
BASOPHILS # BLD: 0.1 K/UL (ref 0–0.2)
BASOPHILS NFR BLD: 1 % (ref 0–2)
BUN SERPL-MCNC: 10 MG/DL (ref 8–23)
CALCIUM SERPL-MCNC: 9.3 MG/DL (ref 8.3–10.4)
CHLORIDE SERPL-SCNC: 106 MMOL/L (ref 103–113)
CO2 SERPL-SCNC: 27 MMOL/L (ref 21–32)
CREAT SERPL-MCNC: 0.5 MG/DL (ref 0.6–1)
DIFFERENTIAL METHOD BLD: ABNORMAL
EOSINOPHIL # BLD: 0.2 K/UL (ref 0–0.8)
EOSINOPHIL NFR BLD: 3 % (ref 0.5–7.8)
ERYTHROCYTE [DISTWIDTH] IN BLOOD BY AUTOMATED COUNT: 14.1 % (ref 11.9–14.6)
GLUCOSE SERPL-MCNC: 83 MG/DL (ref 65–100)
HCT VFR BLD AUTO: 31.4 % (ref 35.8–46.3)
HGB BLD-MCNC: 10.3 G/DL (ref 11.7–15.4)
IMM GRANULOCYTES # BLD AUTO: 0.1 K/UL (ref 0–0.5)
IMM GRANULOCYTES NFR BLD AUTO: 1 % (ref 0–5)
LYMPHOCYTES # BLD: 2.6 K/UL (ref 0.5–4.6)
LYMPHOCYTES NFR BLD: 31 % (ref 13–44)
MAGNESIUM SERPL-MCNC: 1.5 MG/DL (ref 1.8–2.4)
MCH RBC QN AUTO: 28.8 PG (ref 26.1–32.9)
MCHC RBC AUTO-ENTMCNC: 32.8 G/DL (ref 31.4–35)
MCV RBC AUTO: 87.7 FL (ref 82–102)
MONOCYTES # BLD: 0.7 K/UL (ref 0.1–1.3)
MONOCYTES NFR BLD: 9 % (ref 4–12)
NEUTS SEG # BLD: 4.7 K/UL (ref 1.7–8.2)
NEUTS SEG NFR BLD: 55 % (ref 43–78)
NRBC # BLD: 0 K/UL (ref 0–0.2)
PLATELET # BLD AUTO: 200 K/UL (ref 150–450)
PMV BLD AUTO: 10.1 FL (ref 9.4–12.3)
POTASSIUM SERPL-SCNC: 2.9 MMOL/L (ref 3.5–5.1)
RBC # BLD AUTO: 3.58 M/UL (ref 4.05–5.2)
SARS-COV-2 RDRP RESP QL NAA+PROBE: NOT DETECTED
SODIUM SERPL-SCNC: 138 MMOL/L (ref 136–146)
SOURCE: NORMAL
WBC # BLD AUTO: 8.3 K/UL (ref 4.3–11.1)

## 2024-04-15 PROCEDURE — 6370000000 HC RX 637 (ALT 250 FOR IP): Performed by: NURSE PRACTITIONER

## 2024-04-15 PROCEDURE — 83735 ASSAY OF MAGNESIUM: CPT

## 2024-04-15 PROCEDURE — 80048 BASIC METABOLIC PNL TOTAL CA: CPT

## 2024-04-15 PROCEDURE — 97112 NEUROMUSCULAR REEDUCATION: CPT

## 2024-04-15 PROCEDURE — 97530 THERAPEUTIC ACTIVITIES: CPT

## 2024-04-15 PROCEDURE — 6360000002 HC RX W HCPCS

## 2024-04-15 PROCEDURE — 87635 SARS-COV-2 COVID-19 AMP PRB: CPT

## 2024-04-15 PROCEDURE — 36415 COLL VENOUS BLD VENIPUNCTURE: CPT

## 2024-04-15 PROCEDURE — 2580000003 HC RX 258

## 2024-04-15 PROCEDURE — 6360000002 HC RX W HCPCS: Performed by: INTERNAL MEDICINE

## 2024-04-15 PROCEDURE — 97116 GAIT TRAINING THERAPY: CPT

## 2024-04-15 PROCEDURE — 6370000000 HC RX 637 (ALT 250 FOR IP)

## 2024-04-15 PROCEDURE — 85025 COMPLETE CBC W/AUTO DIFF WBC: CPT

## 2024-04-15 PROCEDURE — 97110 THERAPEUTIC EXERCISES: CPT

## 2024-04-15 PROCEDURE — 6370000000 HC RX 637 (ALT 250 FOR IP): Performed by: INTERNAL MEDICINE

## 2024-04-15 PROCEDURE — 1100000000 HC RM PRIVATE

## 2024-04-15 RX ORDER — POTASSIUM CHLORIDE 20 MEQ/1
40 TABLET, EXTENDED RELEASE ORAL
Status: COMPLETED | OUTPATIENT
Start: 2024-04-15 | End: 2024-04-15

## 2024-04-15 RX ORDER — MAGNESIUM SULFATE IN WATER 40 MG/ML
4000 INJECTION, SOLUTION INTRAVENOUS ONCE
Status: COMPLETED | OUTPATIENT
Start: 2024-04-15 | End: 2024-04-15

## 2024-04-15 RX ORDER — METOCLOPRAMIDE HYDROCHLORIDE 5 MG/ML
10 INJECTION INTRAMUSCULAR; INTRAVENOUS
Status: DISCONTINUED | OUTPATIENT
Start: 2024-04-15 | End: 2024-04-17 | Stop reason: ALTCHOICE

## 2024-04-15 RX ADMIN — GABAPENTIN 400 MG: 400 CAPSULE ORAL at 09:08

## 2024-04-15 RX ADMIN — ENOXAPARIN SODIUM 40 MG: 100 INJECTION SUBCUTANEOUS at 09:16

## 2024-04-15 RX ADMIN — ONDANSETRON 4 MG: 4 TABLET, ORALLY DISINTEGRATING ORAL at 12:11

## 2024-04-15 RX ADMIN — LEVOTHYROXINE SODIUM 100 MCG: 0.1 TABLET ORAL at 04:56

## 2024-04-15 RX ADMIN — METOCLOPRAMIDE HYDROCHLORIDE 10 MG: 5 INJECTION INTRAMUSCULAR; INTRAVENOUS at 20:42

## 2024-04-15 RX ADMIN — GABAPENTIN 400 MG: 400 CAPSULE ORAL at 20:42

## 2024-04-15 RX ADMIN — SUCRALFATE 1 G: 1 TABLET ORAL at 12:10

## 2024-04-15 RX ADMIN — MAGNESIUM SULFATE HEPTAHYDRATE 4000 MG: 40 INJECTION, SOLUTION INTRAVENOUS at 09:15

## 2024-04-15 RX ADMIN — SODIUM CHLORIDE, PRESERVATIVE FREE 10 ML: 5 INJECTION INTRAVENOUS at 09:08

## 2024-04-15 RX ADMIN — SODIUM CHLORIDE, PRESERVATIVE FREE 5 ML: 5 INJECTION INTRAVENOUS at 20:42

## 2024-04-15 RX ADMIN — DICYCLOMINE HYDROCHLORIDE 20 MG: 20 TABLET ORAL at 15:47

## 2024-04-15 RX ADMIN — DICYCLOMINE HYDROCHLORIDE 20 MG: 20 TABLET ORAL at 12:10

## 2024-04-15 RX ADMIN — SUCRALFATE 1 G: 1 TABLET ORAL at 15:41

## 2024-04-15 RX ADMIN — GABAPENTIN 400 MG: 400 CAPSULE ORAL at 15:41

## 2024-04-15 RX ADMIN — PANTOPRAZOLE SODIUM 40 MG: 40 TABLET, DELAYED RELEASE ORAL at 04:56

## 2024-04-15 RX ADMIN — ONDANSETRON 4 MG: 4 TABLET, ORALLY DISINTEGRATING ORAL at 20:42

## 2024-04-15 RX ADMIN — POTASSIUM CHLORIDE 40 MEQ: 1500 TABLET, EXTENDED RELEASE ORAL at 15:41

## 2024-04-15 RX ADMIN — SUCRALFATE 1 G: 1 TABLET ORAL at 09:08

## 2024-04-15 RX ADMIN — SUCRALFATE 1 G: 1 TABLET ORAL at 20:42

## 2024-04-15 RX ADMIN — FLUDROCORTISONE ACETATE 0.1 MG: 0.1 TABLET ORAL at 09:07

## 2024-04-15 RX ADMIN — METOCLOPRAMIDE HYDROCHLORIDE 10 MG: 5 INJECTION INTRAMUSCULAR; INTRAVENOUS at 04:57

## 2024-04-15 RX ADMIN — POTASSIUM CHLORIDE 40 MEQ: 1500 TABLET, EXTENDED RELEASE ORAL at 12:10

## 2024-04-15 RX ADMIN — DICYCLOMINE HYDROCHLORIDE 20 MG: 20 TABLET ORAL at 04:57

## 2024-04-15 RX ADMIN — METOCLOPRAMIDE HYDROCHLORIDE 10 MG: 5 INJECTION INTRAMUSCULAR; INTRAVENOUS at 15:41

## 2024-04-15 RX ADMIN — PANTOPRAZOLE SODIUM 40 MG: 40 TABLET, DELAYED RELEASE ORAL at 15:41

## 2024-04-15 RX ADMIN — POTASSIUM CHLORIDE 40 MEQ: 1500 TABLET, EXTENDED RELEASE ORAL at 09:07

## 2024-04-15 ASSESSMENT — PAIN SCALES - GENERAL: PAINLEVEL_OUTOF10: 7

## 2024-04-15 NOTE — PLAN OF CARE
Problem: Safety - Adult  Goal: Free from fall injury  4/4/2024 1129 by Briana Anderson RN  Outcome: Progressing  4/3/2024 2155 by Hien Morales RN  Outcome: Progressing     Problem: Discharge Planning  Goal: Discharge to home or other facility with appropriate resources  4/4/2024 1129 by Briana Anderson RN  Outcome: Progressing  4/3/2024 2155 by Hien Morales RN  Outcome: Progressing     Problem: Risk for Elopement  Goal: Patient will not exit the unit/facility without proper excort  4/4/2024 1129 by Briana Anderson RN  Outcome: Progressing  4/3/2024 2155 by Hien Morales RN  Outcome: Progressing     Problem: Pain  Goal: Verbalizes/displays adequate comfort level or baseline comfort level  4/4/2024 1129 by Briana Anderson RN  Outcome: Progressing  4/3/2024 2155 by Hien Morales RN  Outcome: HH/HSPC Not Progressing     Problem: Safety - Medical Restraint  Goal: Remains free of injury from restraints (Restraint for Interference with Medical Device)  Description: INTERVENTIONS:  1. Determine that other, less restrictive measures have been tried or would not be effective before applying the restraint  2. Evaluate the patient's condition at the time of restraint application  3. Inform patient/family regarding the reason for restraint  4. Q2H: Monitor safety, psychosocial status, comfort, nutrition and hydration  4/4/2024 1129 by Briana Anderson RN  Outcome: Progressing  4/3/2024 2155 by Hien Morales RN  Outcome: HH/HSPC Not Progressing     Problem: Confusion  Goal: Confusion, delirium, dementia, or psychosis is improved or at baseline  Description: INTERVENTIONS:  1. Assess for possible contributors to thought disturbance, including medications, impaired vision or hearing, underlying metabolic abnormalities, dehydration, psychiatric diagnoses, and notify attending LIP  2. [place] high risk fall precautions, as indicated  3. Provide frequent short contacts to provide 
  Problem: Safety - Adult  Goal: Free from fall injury  4/7/2024 0800 by Briana Anderson RN  Outcome: Progressing  4/7/2024 0226 by Yanni Garcia RN  Outcome: Progressing  Flowsheets (Taken 4/6/2024 2027)  Free From Fall Injury: Instruct family/caregiver on patient safety     Problem: Discharge Planning  Goal: Discharge to home or other facility with appropriate resources  4/7/2024 0800 by Briana Anderson RN  Outcome: Progressing  4/7/2024 0226 by Yanni Garcia RN  Outcome: Progressing  Flowsheets (Taken 4/6/2024 2027)  Discharge to home or other facility with appropriate resources: Identify barriers to discharge with patient and caregiver     Problem: Risk for Elopement  Goal: Patient will not exit the unit/facility without proper excort  4/7/2024 0800 by Briana Anderson RN  Outcome: Progressing  Flowsheets (Taken 4/7/2024 0758)  Nursing Interventions for Elopement Risk: Assist with personal care needs such as toileting, eating, dressing, as needed to reduce the risk of wandering  4/7/2024 0226 by Yanni Garcia RN  Outcome: Progressing  Flowsheets (Taken 4/6/2024 2027)  Nursing Interventions for Elopement Risk: Assist with personal care needs such as toileting, eating, dressing, as needed to reduce the risk of wandering     Problem: Pain  Goal: Verbalizes/displays adequate comfort level or baseline comfort level  4/7/2024 0800 by Briana Anderson RN  Outcome: Progressing  4/7/2024 0226 by Yanni Garcia RN  Outcome: Progressing  Flowsheets (Taken 4/6/2024 2027)  Verbalizes/displays adequate comfort level or baseline comfort level: Encourage patient to monitor pain and request assistance     Problem: Safety - Medical Restraint  Goal: Remains free of injury from restraints (Restraint for Interference with Medical Device)  Description: INTERVENTIONS:  1. Determine that other, less restrictive measures have been tried or would not be effective before applying the restraint  2. Evaluate the patient's condition at the time of restraint 
  Problem: Safety - Adult  Goal: Free from fall injury  Outcome: Progressing     Problem: Discharge Planning  Goal: Discharge to home or other facility with appropriate resources  Outcome: Progressing  Flowsheets (Taken 4/11/2024 1943)  Discharge to home or other facility with appropriate resources: Identify barriers to discharge with patient and caregiver     Problem: Risk for Elopement  Goal: Patient will not exit the unit/facility without proper excort  Outcome: Progressing  Flowsheets (Taken 4/11/2024 1943)  Nursing Interventions for Elopement Risk: Assist with personal care needs such as toileting, eating, dressing, as needed to reduce the risk of wandering     Problem: Pain  Goal: Verbalizes/displays adequate comfort level or baseline comfort level  Outcome: Progressing     Problem: Safety - Medical Restraint  Goal: Remains free of injury from restraints (Restraint for Interference with Medical Device)  Description: INTERVENTIONS:  1. Determine that other, less restrictive measures have been tried or would not be effective before applying the restraint  2. Evaluate the patient's condition at the time of restraint application  3. Inform patient/family regarding the reason for restraint  4. Q2H: Monitor safety, psychosocial status, comfort, nutrition and hydration  Outcome: Progressing     Problem: Confusion  Goal: Confusion, delirium, dementia, or psychosis is improved or at baseline  Description: INTERVENTIONS:  1. Assess for possible contributors to thought disturbance, including medications, impaired vision or hearing, underlying metabolic abnormalities, dehydration, psychiatric diagnoses, and notify attending LIP  2. [place] high risk fall precautions, as indicated  3. Provide frequent short contacts to provide reality reorientation, refocusing and direction  4. Decrease environmental stimuli, including noise as appropriate  5. Monitor and intervene to maintain adequate nutrition, hydration, elimination, 
  Problem: Safety - Adult  Goal: Free from fall injury  Outcome: Progressing  Flowsheets (Taken 4/7/2024 1945)  Free From Fall Injury: Instruct family/caregiver on patient safety     Problem: Discharge Planning  Goal: Discharge to home or other facility with appropriate resources  Outcome: Progressing  Flowsheets (Taken 4/7/2024 1945)  Discharge to home or other facility with appropriate resources: Identify barriers to discharge with patient and caregiver     Problem: Risk for Elopement  Goal: Patient will not exit the unit/facility without proper excort  Outcome: Progressing  Flowsheets (Taken 4/7/2024 1945)  Nursing Interventions for Elopement Risk: Assist with personal care needs such as toileting, eating, dressing, as needed to reduce the risk of wandering     Problem: Pain  Goal: Verbalizes/displays adequate comfort level or baseline comfort level  Outcome: Progressing     Problem: Safety - Medical Restraint  Goal: Remains free of injury from restraints (Restraint for Interference with Medical Device)  Description: INTERVENTIONS:  1. Determine that other, less restrictive measures have been tried or would not be effective before applying the restraint  2. Evaluate the patient's condition at the time of restraint application  3. Inform patient/family regarding the reason for restraint  4. Q2H: Monitor safety, psychosocial status, comfort, nutrition and hydration  Outcome: Progressing     Problem: Confusion  Goal: Confusion, delirium, dementia, or psychosis is improved or at baseline  Description: INTERVENTIONS:  1. Assess for possible contributors to thought disturbance, including medications, impaired vision or hearing, underlying metabolic abnormalities, dehydration, psychiatric diagnoses, and notify attending LIP  2. [place] high risk fall precautions, as indicated  3. Provide frequent short contacts to provide reality reorientation, refocusing and direction  4. Decrease environmental stimuli, including 
improved  4/14/2024 2056 by Keily Farley RN  Outcome: Progressing  Flowsheets (Taken 4/14/2024 2039)  Care Plan - Patient's Chronic Conditions and Co-Morbidity Symptoms are Monitored and Maintained or Improved:   Monitor and assess patient's chronic conditions and comorbid symptoms for stability, deterioration, or improvement   Collaborate with multidisciplinary team to address chronic and comorbid conditions and prevent exacerbation or deterioration  4/14/2024 1033 by Ana Rodarte RN  Outcome: Progressing     Problem: Neurosensory - Adult  Goal: Achieves stable or improved neurological status  4/14/2024 2056 by Keily Farley RN  Outcome: Progressing  Flowsheets (Taken 4/14/2024 2039)  Achieves stable or improved neurological status: Assess for and report changes in neurological status  4/14/2024 1033 by Ana Rodarte RN  Outcome: Progressing  Goal: Achieves maximal functionality and self care  4/14/2024 2056 by Keily Farley RN  Outcome: Progressing  Flowsheets (Taken 4/14/2024 2039)  Achieves maximal functionality and self care: Encourage and assist patient to increase activity and self care with guidance from physical therapy/occupational therapy  4/14/2024 1033 by Ana Rodarte RN  Outcome: Progressing     Problem: Respiratory - Adult  Goal: Achieves optimal ventilation and oxygenation  4/14/2024 2056 by Keily Farley RN  Outcome: Progressing  4/14/2024 1033 by Ana Rodarte RN  Outcome: Progressing     Problem: Cardiovascular - Adult  Goal: Maintains optimal cardiac output and hemodynamic stability  4/14/2024 2056 by Keily Farley RN  Outcome: Progressing  4/14/2024 1033 by Ana Rodarte RN  Outcome: Progressing  Goal: Absence of cardiac dysrhythmias or at baseline  4/14/2024 2056 by Keily Farley RN  Outcome: Progressing  4/14/2024 1033 by Ana Rodarte RN  Outcome: Progressing     Problem: Skin/Tissue Integrity - Adult  Goal: Skin integrity remains 
Intact: Monitor for areas of redness and/or skin breakdown  4/12/2024 1123 by Pearl Newman RN  Outcome: Progressing  Goal: Incisions, wounds, or drain sites healing without S/S of infection  4/13/2024 0015 by Keily Farley RN  Outcome: Progressing  4/12/2024 1123 by Pearl Newman RN  Outcome: Progressing  Goal: Oral mucous membranes remain intact  4/13/2024 0015 by Keily Farley RN  Outcome: Progressing  4/12/2024 1123 by Pearl Newman RN  Outcome: Progressing     Problem: Musculoskeletal - Adult  Goal: Return mobility to safest level of function  4/13/2024 0015 by Keily Farley RN  Outcome: Progressing  4/12/2024 1123 by Pearl Newman RN  Outcome: Progressing  Goal: Maintain proper alignment of affected body part  4/13/2024 0015 by Keily Farley RN  Outcome: Progressing  4/12/2024 1123 by Pearl Newman RN  Outcome: Progressing  Goal: Return ADL status to a safe level of function  4/13/2024 0015 by Keily Farley RN  Outcome: Progressing  4/12/2024 1123 by Pearl Newman RN  Outcome: Progressing     Problem: Gastrointestinal - Adult  Goal: Maintains or returns to baseline bowel function  4/13/2024 0015 by Keily Farley RN  Outcome: Progressing  4/12/2024 1123 by Pearl Newamn RN  Outcome: Progressing  Goal: Maintains adequate nutritional intake  4/13/2024 0015 by Keily Farley RN  Outcome: Progressing  4/12/2024 1123 by Pearl Newman RN  Outcome: Progressing     Problem: Genitourinary - Adult  Goal: Absence of urinary retention  4/13/2024 0015 by Keily Farley RN  Outcome: Progressing  4/12/2024 1123 by Pearl Newman RN  Outcome: Progressing  Goal: Urinary catheter remains patent  4/13/2024 0015 by Keily Farley RN  Outcome: Progressing  4/12/2024 1123 by Pearl Newman RN  Outcome: Progressing     
function  Outcome: Progressing  Flowsheets (Taken 4/6/2024 2027)  Return Mobility to Safest Level of Function: Assess patient stability and activity tolerance for standing, transferring and ambulating with or without assistive devices  Goal: Maintain proper alignment of affected body part  Outcome: Progressing  Flowsheets (Taken 4/6/2024 2027)  Maintain proper alignment of affected body part: Support and protect limb and body alignment per provider's orders  Goal: Return ADL status to a safe level of function  Outcome: Progressing  Flowsheets (Taken 4/6/2024 2027)  Return ADL Status to a Safe Level of Function: Administer medication as ordered     Problem: Gastrointestinal - Adult  Goal: Maintains or returns to baseline bowel function  Outcome: Progressing  Flowsheets (Taken 4/6/2024 2027)  Maintains or returns to baseline bowel function: Assess bowel function  Goal: Maintains adequate nutritional intake  Outcome: Progressing  Flowsheets (Taken 4/6/2024 2027)  Maintains adequate nutritional intake: Monitor percentage of each meal consumed     Problem: Genitourinary - Adult  Goal: Absence of urinary retention  Outcome: Progressing  Flowsheets (Taken 4/6/2024 2027)  Absence of urinary retention: Assess patient’s ability to void and empty bladder  Goal: Urinary catheter remains patent  Outcome: Progressing  Flowsheets (Taken 4/6/2024 2027)  Urinary catheter remains patent: Assess patency of urinary catheter     
Progressing  4/13/2024 1556 by Franchesca Canseco, RN  Outcome: Progressing     
RN  Outcome: Progressing  4/4/2024 1129 by Briana Anderson RN  Outcome: Progressing     Problem: Genitourinary - Adult  Goal: Absence of urinary retention  4/4/2024 2318 by Angela Garcia RN  Outcome: Progressing  Flowsheets (Taken 4/4/2024 1911)  Absence of urinary retention: Assess patient’s ability to void and empty bladder  4/4/2024 1129 by Briana Anderson RN  Outcome: Progressing  Goal: Urinary catheter remains patent  4/4/2024 2318 by Angela Garcia RN  Outcome: Progressing  Flowsheets (Taken 4/4/2024 1911)  Urinary catheter remains patent: Assess patency of urinary catheter  4/4/2024 1129 by Briana Anderson RN  Outcome: Progressing

## 2024-04-15 NOTE — CARE COORDINATION
Immediate approval received for admission to [facility] via [facility] portal.  Patient is approved for 4/16-4/18 and authorization ID is [id number].  PPD completed.  Covid testing ordered at this time and patient placed on will call transport schedule for tomorrow; reference number [id number].  Facility liaison and attending updated.

## 2024-04-15 NOTE — CARE COORDINATION
Continuing to follow for ongoing transitions of care/discharge planning.  Previous CM documentation/report reviewed.  Message sent to [facility] admissions liaison to confirm that bed is still available for patient.  If bed available, patient will need insurance authorization with [facility] Medicare. Updated therapy documentation will be necessary as last notes are from last week.  Message sent to therapy supervisor to assist with expediting therapy evals/recs.  PPD completed 4/5 and updated covid testing can be obtained prior to discharge.

## 2024-04-16 LAB
ANION GAP SERPL CALC-SCNC: 3 MMOL/L (ref 2–11)
BASOPHILS # BLD: 0 K/UL (ref 0–0.2)
BASOPHILS NFR BLD: 1 % (ref 0–2)
BUN SERPL-MCNC: 11 MG/DL (ref 8–23)
CALCIUM SERPL-MCNC: 9.3 MG/DL (ref 8.3–10.4)
CHLORIDE SERPL-SCNC: 111 MMOL/L (ref 103–113)
CO2 SERPL-SCNC: 24 MMOL/L (ref 21–32)
CREAT SERPL-MCNC: 0.6 MG/DL (ref 0.6–1)
DIFFERENTIAL METHOD BLD: ABNORMAL
EOSINOPHIL # BLD: 0.2 K/UL (ref 0–0.8)
EOSINOPHIL NFR BLD: 3 % (ref 0.5–7.8)
ERYTHROCYTE [DISTWIDTH] IN BLOOD BY AUTOMATED COUNT: 14.6 % (ref 11.9–14.6)
GLUCOSE SERPL-MCNC: 84 MG/DL (ref 65–100)
HCT VFR BLD AUTO: 31.4 % (ref 35.8–46.3)
HGB BLD-MCNC: 10.2 G/DL (ref 11.7–15.4)
IMM GRANULOCYTES # BLD AUTO: 0.1 K/UL (ref 0–0.5)
IMM GRANULOCYTES NFR BLD AUTO: 1 % (ref 0–5)
LYMPHOCYTES # BLD: 2.2 K/UL (ref 0.5–4.6)
LYMPHOCYTES NFR BLD: 27 % (ref 13–44)
MCH RBC QN AUTO: 28.6 PG (ref 26.1–32.9)
MCHC RBC AUTO-ENTMCNC: 32.5 G/DL (ref 31.4–35)
MCV RBC AUTO: 88 FL (ref 82–102)
MONOCYTES # BLD: 0.7 K/UL (ref 0.1–1.3)
MONOCYTES NFR BLD: 8 % (ref 4–12)
NEUTS SEG # BLD: 5.1 K/UL (ref 1.7–8.2)
NEUTS SEG NFR BLD: 60 % (ref 43–78)
NRBC # BLD: 0 K/UL (ref 0–0.2)
PLATELET # BLD AUTO: 214 K/UL (ref 150–450)
PMV BLD AUTO: 9.7 FL (ref 9.4–12.3)
POTASSIUM SERPL-SCNC: 4.5 MMOL/L (ref 3.5–5.1)
RBC # BLD AUTO: 3.57 M/UL (ref 4.05–5.2)
SODIUM SERPL-SCNC: 138 MMOL/L (ref 136–146)
WBC # BLD AUTO: 8.3 K/UL (ref 4.3–11.1)

## 2024-04-16 PROCEDURE — 6370000000 HC RX 637 (ALT 250 FOR IP): Performed by: NURSE PRACTITIONER

## 2024-04-16 PROCEDURE — 6370000000 HC RX 637 (ALT 250 FOR IP): Performed by: INTERNAL MEDICINE

## 2024-04-16 PROCEDURE — 6360000002 HC RX W HCPCS

## 2024-04-16 PROCEDURE — 36415 COLL VENOUS BLD VENIPUNCTURE: CPT

## 2024-04-16 PROCEDURE — 97530 THERAPEUTIC ACTIVITIES: CPT

## 2024-04-16 PROCEDURE — 6370000000 HC RX 637 (ALT 250 FOR IP)

## 2024-04-16 PROCEDURE — 1100000000 HC RM PRIVATE

## 2024-04-16 PROCEDURE — 80048 BASIC METABOLIC PNL TOTAL CA: CPT

## 2024-04-16 PROCEDURE — 85025 COMPLETE CBC W/AUTO DIFF WBC: CPT

## 2024-04-16 PROCEDURE — 97112 NEUROMUSCULAR REEDUCATION: CPT

## 2024-04-16 PROCEDURE — 97535 SELF CARE MNGMENT TRAINING: CPT

## 2024-04-16 PROCEDURE — 6360000002 HC RX W HCPCS: Performed by: INTERNAL MEDICINE

## 2024-04-16 PROCEDURE — 6360000002 HC RX W HCPCS: Performed by: NURSE PRACTITIONER

## 2024-04-16 PROCEDURE — 2580000003 HC RX 258

## 2024-04-16 RX ORDER — FLUDROCORTISONE ACETATE 0.1 MG/1
0.2 TABLET ORAL DAILY
Status: DISCONTINUED | OUTPATIENT
Start: 2024-04-17 | End: 2024-04-17 | Stop reason: HOSPADM

## 2024-04-16 RX ADMIN — DICYCLOMINE HYDROCHLORIDE 20 MG: 20 TABLET ORAL at 17:01

## 2024-04-16 RX ADMIN — METOCLOPRAMIDE HYDROCHLORIDE 10 MG: 5 INJECTION INTRAMUSCULAR; INTRAVENOUS at 21:28

## 2024-04-16 RX ADMIN — SODIUM CHLORIDE, PRESERVATIVE FREE 5 ML: 5 INJECTION INTRAVENOUS at 21:28

## 2024-04-16 RX ADMIN — GABAPENTIN 400 MG: 400 CAPSULE ORAL at 13:41

## 2024-04-16 RX ADMIN — SUCRALFATE 1 G: 1 TABLET ORAL at 13:41

## 2024-04-16 RX ADMIN — PANTOPRAZOLE SODIUM 40 MG: 40 TABLET, DELAYED RELEASE ORAL at 17:01

## 2024-04-16 RX ADMIN — METOCLOPRAMIDE HYDROCHLORIDE 10 MG: 5 INJECTION INTRAMUSCULAR; INTRAVENOUS at 11:19

## 2024-04-16 RX ADMIN — METOCLOPRAMIDE HYDROCHLORIDE 10 MG: 5 INJECTION INTRAMUSCULAR; INTRAVENOUS at 17:01

## 2024-04-16 RX ADMIN — SUCRALFATE 1 G: 1 TABLET ORAL at 21:28

## 2024-04-16 RX ADMIN — PANTOPRAZOLE SODIUM 40 MG: 40 TABLET, DELAYED RELEASE ORAL at 06:27

## 2024-04-16 RX ADMIN — LEVOTHYROXINE SODIUM 100 MCG: 0.1 TABLET ORAL at 06:27

## 2024-04-16 RX ADMIN — FLUDROCORTISONE ACETATE 0.1 MG: 0.1 TABLET ORAL at 08:01

## 2024-04-16 RX ADMIN — GABAPENTIN 400 MG: 400 CAPSULE ORAL at 21:28

## 2024-04-16 RX ADMIN — ACETAMINOPHEN 650 MG: 325 TABLET ORAL at 09:12

## 2024-04-16 RX ADMIN — ENOXAPARIN SODIUM 40 MG: 100 INJECTION SUBCUTANEOUS at 08:01

## 2024-04-16 RX ADMIN — ONDANSETRON 4 MG: 4 TABLET, ORALLY DISINTEGRATING ORAL at 06:26

## 2024-04-16 RX ADMIN — SUCRALFATE 1 G: 1 TABLET ORAL at 08:00

## 2024-04-16 RX ADMIN — METOCLOPRAMIDE HYDROCHLORIDE 10 MG: 5 INJECTION INTRAMUSCULAR; INTRAVENOUS at 06:27

## 2024-04-16 RX ADMIN — DOCUSATE SODIUM 50 MG AND SENNOSIDES 8.6 MG 2 TABLET: 8.6; 5 TABLET, FILM COATED ORAL at 08:01

## 2024-04-16 RX ADMIN — DICYCLOMINE HYDROCHLORIDE 20 MG: 20 TABLET ORAL at 11:18

## 2024-04-16 RX ADMIN — DICYCLOMINE HYDROCHLORIDE 20 MG: 20 TABLET ORAL at 06:27

## 2024-04-16 RX ADMIN — SODIUM CHLORIDE, PRESERVATIVE FREE 10 ML: 5 INJECTION INTRAVENOUS at 08:05

## 2024-04-16 RX ADMIN — PROCHLORPERAZINE EDISYLATE 10 MG: 5 INJECTION INTRAMUSCULAR; INTRAVENOUS at 09:16

## 2024-04-16 RX ADMIN — GABAPENTIN 400 MG: 400 CAPSULE ORAL at 08:01

## 2024-04-16 RX ADMIN — SUCRALFATE 1 G: 1 TABLET ORAL at 17:01

## 2024-04-16 ASSESSMENT — PAIN DESCRIPTION - LOCATION
LOCATION: ABDOMEN
LOCATION: ABDOMEN

## 2024-04-16 ASSESSMENT — PAIN DESCRIPTION - ORIENTATION
ORIENTATION: INNER;MID
ORIENTATION: INNER;MID

## 2024-04-16 ASSESSMENT — PAIN DESCRIPTION - DESCRIPTORS
DESCRIPTORS: ACHING
DESCRIPTORS: ACHING

## 2024-04-16 ASSESSMENT — PAIN SCALES - GENERAL
PAINLEVEL_OUTOF10: 3
PAINLEVEL_OUTOF10: 3

## 2024-04-16 NOTE — CARE COORDINATION
Attending anticipates patient will be medically ready for discharge tomorrow.  [facility] confirms they can admit tomorrow.  Patient moved from will call transport list to 1300 tomorrow.  Daughter updated over phone and is agreeable to discharge plan.  2nd ILM completed.

## 2024-04-17 VITALS
WEIGHT: 207 LBS | TEMPERATURE: 97.3 F | DIASTOLIC BLOOD PRESSURE: 67 MMHG | BODY MASS INDEX: 30.66 KG/M2 | HEIGHT: 69 IN | SYSTOLIC BLOOD PRESSURE: 114 MMHG | HEART RATE: 88 BPM | RESPIRATION RATE: 16 BRPM | OXYGEN SATURATION: 98 %

## 2024-04-17 LAB
ALBUMIN SERPL ELPH-MCNC: 2.9 G/DL (ref 2.9–4.4)
ALBUMIN/GLOB SERPL: 1.2 (ref 0.7–1.7)
ALPHA1 GLOB SERPL ELPH-MCNC: 0.2 G/DL (ref 0–0.4)
ALPHA2 GLOB SERPL ELPH-MCNC: 0.6 G/DL (ref 0.4–1)
ANION GAP SERPL CALC-SCNC: 5 MMOL/L (ref 2–11)
B-GLOBULIN SERPL ELPH-MCNC: 1 G/DL (ref 0.7–1.3)
BASOPHILS # BLD: 0.1 K/UL (ref 0–0.2)
BASOPHILS NFR BLD: 1 % (ref 0–2)
BUN SERPL-MCNC: 12 MG/DL (ref 8–23)
CALCIUM SERPL-MCNC: 9.1 MG/DL (ref 8.3–10.4)
CHLORIDE SERPL-SCNC: 110 MMOL/L (ref 103–113)
CO2 SERPL-SCNC: 26 MMOL/L (ref 21–32)
CREAT SERPL-MCNC: 0.6 MG/DL (ref 0.6–1)
DIFFERENTIAL METHOD BLD: ABNORMAL
EOSINOPHIL # BLD: 0.2 K/UL (ref 0–0.8)
EOSINOPHIL NFR BLD: 3 % (ref 0.5–7.8)
ERYTHROCYTE [DISTWIDTH] IN BLOOD BY AUTOMATED COUNT: 14.6 % (ref 11.9–14.6)
GAMMA GLOB SERPL ELPH-MCNC: 0.6 G/DL (ref 0.4–1.8)
GLOBULIN SER-MCNC: 2.5 G/DL (ref 2.2–3.9)
GLUCOSE SERPL-MCNC: 100 MG/DL (ref 65–100)
HCT VFR BLD AUTO: 33.4 % (ref 35.8–46.3)
HGB BLD-MCNC: 10.4 G/DL (ref 11.7–15.4)
IGA SERPL-MCNC: 217 MG/DL (ref 87–352)
IGG SERPL-MCNC: 719 MG/DL (ref 586–1602)
IGM SERPL-MCNC: 36 MG/DL (ref 26–217)
IMM GRANULOCYTES # BLD AUTO: 0.1 K/UL (ref 0–0.5)
IMM GRANULOCYTES NFR BLD AUTO: 1 % (ref 0–5)
INTERPRETATION SERPL IEP-IMP: ABNORMAL
LYMPHOCYTES # BLD: 2.1 K/UL (ref 0.5–4.6)
LYMPHOCYTES NFR BLD: 29 % (ref 13–44)
M PROTEIN SERPL ELPH-MCNC: ABNORMAL G/DL
MCH RBC QN AUTO: 28.3 PG (ref 26.1–32.9)
MCHC RBC AUTO-ENTMCNC: 31.1 G/DL (ref 31.4–35)
MCV RBC AUTO: 91 FL (ref 82–102)
MONOCYTES # BLD: 0.7 K/UL (ref 0.1–1.3)
MONOCYTES NFR BLD: 10 % (ref 4–12)
NEUTS SEG # BLD: 4.2 K/UL (ref 1.7–8.2)
NEUTS SEG NFR BLD: 56 % (ref 43–78)
NRBC # BLD: 0 K/UL (ref 0–0.2)
PLATELET # BLD AUTO: 233 K/UL (ref 150–450)
PMV BLD AUTO: 9.8 FL (ref 9.4–12.3)
POTASSIUM SERPL-SCNC: 3.7 MMOL/L (ref 3.5–5.1)
PROT SERPL-MCNC: 5.4 G/DL (ref 6–8.5)
RBC # BLD AUTO: 3.67 M/UL (ref 4.05–5.2)
SODIUM SERPL-SCNC: 141 MMOL/L (ref 136–146)
WBC # BLD AUTO: 7.2 K/UL (ref 4.3–11.1)

## 2024-04-17 PROCEDURE — 36415 COLL VENOUS BLD VENIPUNCTURE: CPT

## 2024-04-17 PROCEDURE — 6360000002 HC RX W HCPCS

## 2024-04-17 PROCEDURE — 6370000000 HC RX 637 (ALT 250 FOR IP): Performed by: INTERNAL MEDICINE

## 2024-04-17 PROCEDURE — 6370000000 HC RX 637 (ALT 250 FOR IP): Performed by: HOSPITALIST

## 2024-04-17 PROCEDURE — 97535 SELF CARE MNGMENT TRAINING: CPT

## 2024-04-17 PROCEDURE — 85025 COMPLETE CBC W/AUTO DIFF WBC: CPT

## 2024-04-17 PROCEDURE — 97530 THERAPEUTIC ACTIVITIES: CPT

## 2024-04-17 PROCEDURE — 80048 BASIC METABOLIC PNL TOTAL CA: CPT

## 2024-04-17 PROCEDURE — 6370000000 HC RX 637 (ALT 250 FOR IP): Performed by: NURSE PRACTITIONER

## 2024-04-17 PROCEDURE — 6370000000 HC RX 637 (ALT 250 FOR IP)

## 2024-04-17 RX ORDER — ONDANSETRON 4 MG/1
4 TABLET, ORALLY DISINTEGRATING ORAL EVERY 6 HOURS PRN
Qty: 28 TABLET | Refills: 0 | Status: SHIPPED | OUTPATIENT
Start: 2024-04-17 | End: 2024-04-24

## 2024-04-17 RX ORDER — METOCLOPRAMIDE 10 MG/1
10 TABLET ORAL
Qty: 28 TABLET | Refills: 0 | Status: SHIPPED | OUTPATIENT
Start: 2024-04-17 | End: 2024-04-24

## 2024-04-17 RX ORDER — DICYCLOMINE HCL 20 MG
20 TABLET ORAL
Qty: 120 TABLET | Refills: 1 | Status: SHIPPED | OUTPATIENT
Start: 2024-04-17

## 2024-04-17 RX ORDER — METOCLOPRAMIDE 10 MG/1
10 TABLET ORAL
Status: DISCONTINUED | OUTPATIENT
Start: 2024-04-17 | End: 2024-04-17 | Stop reason: HOSPADM

## 2024-04-17 RX ORDER — OMEPRAZOLE 40 MG/1
40 CAPSULE, DELAYED RELEASE ORAL 2 TIMES DAILY
Qty: 90 CAPSULE | Refills: 1 | Status: SHIPPED | OUTPATIENT
Start: 2024-04-17

## 2024-04-17 RX ORDER — GABAPENTIN 400 MG/1
400 CAPSULE ORAL 3 TIMES DAILY
Qty: 90 CAPSULE | Refills: 0 | Status: SHIPPED | OUTPATIENT
Start: 2024-04-17 | End: 2024-05-17

## 2024-04-17 RX ORDER — FLUDROCORTISONE ACETATE 0.1 MG/1
0.2 TABLET ORAL DAILY
Qty: 60 TABLET | Refills: 1 | Status: SHIPPED | OUTPATIENT
Start: 2024-04-18 | End: 2024-05-18

## 2024-04-17 RX ADMIN — FLUDROCORTISONE ACETATE 0.2 MG: 0.1 TABLET ORAL at 07:52

## 2024-04-17 RX ADMIN — DOCUSATE SODIUM 50 MG AND SENNOSIDES 8.6 MG 2 TABLET: 8.6; 5 TABLET, FILM COATED ORAL at 07:52

## 2024-04-17 RX ADMIN — ENOXAPARIN SODIUM 40 MG: 100 INJECTION SUBCUTANEOUS at 07:53

## 2024-04-17 RX ADMIN — ONDANSETRON 4 MG: 4 TABLET, ORALLY DISINTEGRATING ORAL at 01:37

## 2024-04-17 RX ADMIN — SUCRALFATE 1 G: 1 TABLET ORAL at 07:53

## 2024-04-17 RX ADMIN — LEVOTHYROXINE SODIUM 100 MCG: 0.1 TABLET ORAL at 05:51

## 2024-04-17 RX ADMIN — GABAPENTIN 400 MG: 400 CAPSULE ORAL at 07:53

## 2024-04-17 RX ADMIN — ONDANSETRON 4 MG: 4 TABLET, ORALLY DISINTEGRATING ORAL at 12:21

## 2024-04-17 RX ADMIN — ACETAMINOPHEN 650 MG: 325 TABLET ORAL at 12:21

## 2024-04-17 RX ADMIN — METOCLOPRAMIDE 10 MG: 10 TABLET ORAL at 06:26

## 2024-04-17 RX ADMIN — SUCRALFATE 1 G: 1 TABLET ORAL at 12:21

## 2024-04-17 RX ADMIN — DICYCLOMINE HYDROCHLORIDE 20 MG: 20 TABLET ORAL at 12:21

## 2024-04-17 RX ADMIN — DICYCLOMINE HYDROCHLORIDE 20 MG: 20 TABLET ORAL at 05:50

## 2024-04-17 RX ADMIN — PANTOPRAZOLE SODIUM 40 MG: 40 TABLET, DELAYED RELEASE ORAL at 05:51

## 2024-04-17 ASSESSMENT — PAIN DESCRIPTION - DESCRIPTORS: DESCRIPTORS: ACHING;SORE

## 2024-04-17 ASSESSMENT — PAIN DESCRIPTION - LOCATION: LOCATION: ABDOMEN

## 2024-04-17 ASSESSMENT — PAIN SCALES - GENERAL: PAINLEVEL_OUTOF10: 5

## 2024-04-17 ASSESSMENT — PAIN DESCRIPTION - ORIENTATION: ORIENTATION: ANTERIOR

## 2024-04-17 NOTE — PROGRESS NOTES
Jesika Olsen  Admission Date: 3/31/2024         Daily Progress Note: 4/2/2024    The patient's chart is reviewed and the patient is discussed with the staff.    Background:    68 y.o. female with morbid obesity, JEREMI, HTN, HLD, DVT, hypothyroidism, bipolar 1, DM2 (no insulin) gastric bypass (Jesica-en-Y 9/2023) who presented to the emergency department with lower abdominal pain.  She recently been diagnosed with a UTI but had not started her antibiotics.  Patient found to have a lactic acid of 4.7 and began to have altered mental status.  Patient had CT head abdomen pelvis and upon returning from CT patient had respiratory arrest.  She did not lose her pulse.  She was intubated and sedated.  Patient does have findings consistent with urinary tract infection however other causes of her sepsis are currently being explored.  Patient has had a history of sepsis in the past related to a marginal ulcer at the anastomosis site of her Jesica-en-Y.  She more recently had admission to the hospital 3/8-3/13 for dehydration, ZEUS, nausea, vomiting diarrhea.     Subjective:     Difficult to arouse but did with tactile stimulation.  Knows where she is and states she had a bladder infection. Reports no further diarrhea.  Denies any pain.      Current Facility-Administered Medications   Medication Dose Route Frequency    tuberculin injection 5 Units  5 Units IntraDERmal Once    sodium chloride flush 0.9 % injection 5-40 mL  5-40 mL IntraVENous 2 times per day    sodium chloride flush 0.9 % injection 5-40 mL  5-40 mL IntraVENous PRN    0.9 % sodium chloride infusion   IntraVENous PRN    potassium chloride 20 mEq/50 mL IVPB (Central Line)  20 mEq IntraVENous PRN    Or    potassium chloride 10 mEq/100 mL IVPB (Peripheral Line)  10 mEq IntraVENous PRN    magnesium sulfate 2000 mg in 50 mL IVPB premix  2,000 mg IntraVENous PRN    enoxaparin (LOVENOX) injection 40 mg  40 mg SubCUTAneous Daily    ondansetron (ZOFRAN-ODT) 
               Jesika Olsen  Admission Date: 3/31/2024         Daily Progress Note: 4/3/2024    The patient's chart is reviewed and the patient is discussed with the staff.    Background:  68 y.o. female with morbid obesity, JEREMI, HTN, HLD, DVT, hypothyroidism, bipolar 1, DM2 (no insulin) gastric bypass (Jesica-en-Y 9/2023) who presented to the emergency department with lower abdominal pain.  She recently been diagnosed with a UTI but had not started her antibiotics.  Patient found to have a lactic acid of 4.7 and began to have altered mental status.  Patient had CT head abdomen pelvis and upon returning from CT patient had respiratory arrest.  She did not lose her pulse.  She was intubated and sedated.  Patient does have findings consistent with urinary tract infection however other causes of her sepsis are currently being explored.  Patient has had a history of sepsis in the past related to a marginal ulcer at the anastomosis site of her Jesica-en-Y.  She more recently had admission to the hospital 3/8-3/13 for dehydration, ZEUS, nausea, vomiting diarrhea.   Extubated on 4/1    Subjective:     Sitting up on side of bed. Working with PT. Remains on room air. Denies any cough, CARTER or SOB.     Current Facility-Administered Medications   Medication Dose Route Frequency    dextrose 5 % and 0.45 % NaCl with KCl 20 mEq infusion   IntraVENous Continuous    magnesium sulfate 2000 mg in 50 mL IVPB premix  2,000 mg IntraVENous Once    tuberculin injection 5 Units  5 Units IntraDERmal Once    DULoxetine (CYMBALTA) extended release capsule 60 mg  60 mg Oral Daily    gabapentin (NEURONTIN) capsule 100 mg  100 mg Oral Q12H    pantoprazole (PROTONIX) tablet 40 mg  40 mg Oral BID AC    sucralfate (CARAFATE) tablet 1 g  1 g Oral 4x Daily    levothyroxine (SYNTHROID) tablet 100 mcg  100 mcg Oral QAM AC    sodium chloride flush 0.9 % injection 5-40 mL  5-40 mL IntraVENous 2 times per day    sodium chloride flush 0.9 % injection 5-40 
       Hospitalist Progress Note   Admit Date:  3/31/2024  9:12 PM   Name:  Jesika Olsen   Age:  68 y.o.  Sex:  female  :  1955   MRN:  [id number]   Room:  [facility]    Presenting/Chief Complaint: Abdominal Pain and Nausea     Reason(s) for Admission: Respiratory arrest (HCC) [R09.2]  Septicemia (HCC) [A41.9]  Septic shock (HCC) [A41.9, R65.21]  Acute respiratory failure, unspecified whether with hypoxia or hypercapnia (HCC) [J96.00]     Hospital Course:   Please refer to the admission H&P for details of presentation.      In summary, Jesika Olsen is a 68 y.o. female with medical history significant for JEREMI, HTN, HLD, DVT, hypothyroidism, bipolar 1, DM2 (no insulin) gastric bypass (Jesica-en-Y 2023) who presented with lower abdominal pain.  Patient found to have a lactic acid of 4.7 and began to have altered mental status.  Patient had CT head abdomen pelvis (given droperidol and morphine prior to CT) and upon returning from CT patient had respiratory arrest.  She did not lose her pulse.  She was intubated and sedated and taken to the ICU.  Lumbar puncture performed.  She was extubated on 2024 and transferred to the medical floor.   Hospitalist service was consulted to assume care on 2024.  Physical therapy recommending STR and patient is pending placement.    MRI brain without any acute abnormalities but shows arachnoid cyst at the posterior fossa measuring 5.9 x 3.5 x 6.5 cm.    Subjective/24 hr Events (24) :  Patient is seen and examined at bedside.  No acute events reported overnight by nursing staff.  Patient laying in bed.  Continues to report nausea and dizziness.  Reports that dizziness is currently 3 out of 10 laying in bed but get worse to 10 out of 10 when sitting up or standing up.  Able to tolerate some food that her family brought in but intake is limited due to dizziness when eating.   No abdominal pain but nausea is limiting her intake. Feels like she regurgitate whatever 
       Hospitalist Progress Note   Admit Date:  3/31/2024  9:12 PM   Name:  Jesika Olsen   Age:  68 y.o.  Sex:  female  :  1955   MRN:  [id number]   Room:  [facility]    Presenting/Chief Complaint: Abdominal Pain and Nausea     Reason(s) for Admission: Respiratory arrest (HCC) [R09.2]  Septicemia (HCC) [A41.9]  Septic shock (HCC) [A41.9, R65.21]  Acute respiratory failure, unspecified whether with hypoxia or hypercapnia (HCC) [J96.00]     Hospital Course:   Please refer to the admission H&P for details of presentation.      In summary, Jesika Olsen is a 68 y.o. female with medical history significant for JEREMI, HTN, HLD, DVT, hypothyroidism, bipolar 1, DM2 (no insulin) gastric bypass (Jesica-en-Y 2023) who presented with lower abdominal pain.  Patient found to have a lactic acid of 4.7 and began to have altered mental status.  Patient had CT head abdomen pelvis (given droperidol and morphine prior to CT) and upon returning from CT patient had respiratory arrest.  She did not lose her pulse.  She was intubated and sedated and taken to the ICU.  Lumbar puncture performed.  She was extubated on 2024 and transferred to the medical floor.   Hospitalist service was consulted to assume care on 2024.  Physical therapy recommending STR and patient is pending placement.    MRI brain without any acute abnormalities but shows arachnoid cyst at the posterior fossa measuring 5.9 x 3.5 x 6.5 cm.    Subjective/24 hr Events (24) :  Patient is seen and examined at bedside.  No acute events reported overnight by nursing staff.  Patient laying in bed.  Reports improvement in her nausea with eating.  Dizziness also improved and able to work with physical therapy yesterday.    Denies any fever, chills, chest pain, shortness of breath.    Assessment & Plan:     SIRS  Infection has been ruled out.  Blood culture with CSF culture has been negative.  Urinalysis without any signs of urinary tract infection a urine 
       Hospitalist Progress Note   Admit Date:  3/31/2024  9:12 PM   Name:  Jesika Olsen   Age:  68 y.o.  Sex:  female  :  1955   MRN:  [id number]   Room:  [facility]    Presenting/Chief Complaint: Abdominal Pain and Nausea     Reason(s) for Admission: Respiratory arrest (HCC) [R09.2]  Septicemia (HCC) [A41.9]  Septic shock (HCC) [A41.9, R65.21]  Acute respiratory failure, unspecified whether with hypoxia or hypercapnia (HCC) [J96.00]     Hospital Course:   Please refer to the admission H&P for details of presentation.      In summary, Jesika Olsen is a 68 y.o. female with medical history significant for JEREMI, HTN, HLD, DVT, hypothyroidism, bipolar 1, DM2 (no insulin) gastric bypass (Jesica-en-Y 2023) who presented with lower abdominal pain.  Patient found to have a lactic acid of 4.7 and began to have altered mental status.  Patient had CT head abdomen pelvis (given droperidol and morphine prior to CT) and upon returning from CT patient had respiratory arrest.  She did not lose her pulse.  She was intubated and sedated and taken to the ICU.  Lumbar puncture performed.  She was extubated on 2024 and transferred to the medical floor.   Hospitalist service was consulted to assume care on 2024.  Physical therapy recommending STR and patient is pending placement.    MRI brain without any acute abnormalities but shows arachnoid cyst at the posterior fossa measuring 5.9 x 3.5 x 6.5 cm.    Subjective/24 hr Events (24) :  Patient is seen and examined at bedside.  No acute events reported overnight by nursing staff.  Patient laying in bed.  Continues to endorse poor PO intake due to nausea. Still with dizziness but has been working with PT and doing vestibular therapy.  No abdominal pain but nausea. States hospital food is also making her sick. Asked her to have her family bring in food.    Denies any fever, chills, chest pain, shortness of breath.    Assessment & Plan:     SIRS  Infection has been 
       Hospitalist Progress Note   Admit Date:  3/31/2024  9:12 PM   Name:  Jesika Olsen   Age:  68 y.o.  Sex:  female  :  1955   MRN:  [id number]   Room:  [facility]    Presenting/Chief Complaint: Abdominal Pain and Nausea     Reason(s) for Admission: Respiratory arrest (HCC) [R09.2]  Septicemia (HCC) [A41.9]  Septic shock (HCC) [A41.9, R65.21]  Acute respiratory failure, unspecified whether with hypoxia or hypercapnia (HCC) [J96.00]     Hospital Course:   Please refer to the admission H&P for details of presentation.      In summary, Jesika Olsen is a 68 y.o. female with medical history significant for JEREMI, HTN, HLD, DVT, hypothyroidism, bipolar 1, DM2 (no insulin) gastric bypass (Jesica-en-Y 2023) who presented with lower abdominal pain.  Patient found to have a lactic acid of 4.7 and began to have altered mental status.  Patient had CT head abdomen pelvis (given droperidol and morphine prior to CT) and upon returning from CT patient had respiratory arrest.  She did not lose her pulse.  She was intubated and sedated and taken to the ICU.  Lumbar puncture performed.  She was extubated on 2024 and transferred to the medical floor.   Hospitalist service was consulted to assume care on 2024.  Physical therapy recommending STR and patient is pending placement.    MRI brain without any acute abnormalities but shows arachnoid cyst at the posterior fossa measuring 5.9 x 3.5 x 6.5 cm.    Subjective/24 hr Events (24) :  Patient is seen and examined at bedside.  No acute events reported overnight by nursing staff.  Patient continues to have dizziness with nausea especially while working physical therapy.    Patient was able to tolerate food yesterday as family brought in food from outside.  Encourage patient to have family bring in more food.  Patient reports that she is working with physical therapy for vestibular rehab but have worsening dizziness with one of the sessions.  Currently laying in 
       Hospitalist Progress Note   Admit Date:  3/31/2024  9:12 PM   Name:  Jesika Olsen   Age:  68 y.o.  Sex:  female  :  1955   MRN:  [id number]   Room:  [facility]/    Presenting/Chief Complaint: Abdominal Pain and Nausea     Reason(s) for Admission: Respiratory arrest (HCC) [R09.2]  Septicemia (HCC) [A41.9]  Septic shock (HCC) [A41.9, R65.21]  Acute respiratory failure, unspecified whether with hypoxia or hypercapnia (HCC) [J96.00]     Hospital Course:   Ms. Olsen is a 68 y.o. CF with a PMH of JEREMI, HTN, HLD, DVT, hypothyroidism, bipolar 1, DM2 (no insulin) gastric bypass (Jesica-en-Y 2023) who presented with lower abdominal pain.  Patient found to have a lactic acid of 4.7 and began to have altered mental status.  Patient had CT head abdomen pelvis (given droperidol and morphine prior to CT) and upon returning from CT patient had respiratory arrest.  She did not lose her pulse.  She was intubated and sedated and taken to the ICU.  She was extubated on  and transferred to the medical floor.     Hospitalists were consulted to assume care of this patient on .    PT/OT recommends STR.    Subjective & 24hr Events:   No AEO.  No new complaints.  Approximately 30 minutes after I saw the patient a Code S was called overhead.  I reported to the bedside and then Neurology arrived.  The patient has no neurological deficits on neuroexam.  She apparently had a vasovagal event on the bedside commode.  CT head was still obtained but was nonacute.  Neurology has signed off.  The patient said she has been having frequent hiccups so I will give a couple of doses of baclofen to see if this helps her.  All cultures remain negative.  I expect that she will be ready for SNF rehab tomorrow.    Assessment & Plan:     SIRS  Blood and CSF cultures negative thus far  Urine culture finalized negative  UA did not appear infectious  PCT 0.27 on admission   CT head(s) no acute finding  CT c/a/p no acute finding   MRSA 
       Hospitalist Progress Note   Admit Date:  3/31/2024  9:12 PM   Name:  Jesika Olsen   Age:  68 y.o.  Sex:  female  :  1955   MRN:  [id number]   Room:  [facility]    Presenting/Chief Complaint: Abdominal Pain and Nausea     Reason(s) for Admission: Respiratory arrest (HCC) [R09.2]  Septicemia (HCC) [A41.9]  Septic shock (HCC) [A41.9, R65.21]  Acute respiratory failure, unspecified whether with hypoxia or hypercapnia (HCC) [J96.00]     Hospital Course:   Please refer to the admission H&P for details of presentation.      In summary, Jesika Olsen is a 68 y.o. female with medical history significant for JEREMI, HTN, HLD, DVT, hypothyroidism, bipolar 1, DM2 (no insulin) gastric bypass (Jesica-en-Y 2023) who presented with lower abdominal pain.  Patient found to have a lactic acid of 4.7 and began to have altered mental status.  Patient had CT head abdomen pelvis (given droperidol and morphine prior to CT) and upon returning from CT patient had respiratory arrest.  She did not lose her pulse.  She was intubated and sedated and taken to the ICU.  Lumbar puncture performed.  She was extubated on 2024 and transferred to the medical floor.   Hospitalist service was consulted to assume care on 2024.  Physical therapy recommending STR and patient is pending placement.    MRI brain without any acute abnormalities but shows arachnoid cyst at the posterior fossa measuring 5.9 x 3.5 x 6.5 cm.    Subjective/24 hr Events (04/15/24) :  Patient is seen and examined at bedside.  No acute events reported overnight by nursing staff.  Patient laying in bed.  Patient appears more energetic and looks more rested today.  Reports slight improvement in her dizziness and nausea.  She still cannot tolerate food from the hospital but was able to eat food that her family brought in.  Patient reports that her sister is going to bring in more food today.    Reports being able to get up and sit on the recliner for several 
       Hospitalist Progress Note   Admit Date:  3/31/2024  9:12 PM   Name:  Jesika Olsen   Age:  68 y.o.  Sex:  female  :  1955   MRN:  [id number]   Room:  [facility]/    Presenting/Chief Complaint: Abdominal Pain and Nausea     Reason(s) for Admission: Respiratory arrest (HCC) [R09.2]  Septicemia (HCC) [A41.9]  Septic shock (HCC) [A41.9, R65.21]  Acute respiratory failure, unspecified whether with hypoxia or hypercapnia (HCC) [J96.00]     Hospital Course:   Ms. Olsen is a 68 y.o. CF with a PMH of JEREMI, HTN, HLD, DVT, hypothyroidism, bipolar 1, DM2 (no insulin) gastric bypass (Jesica-en-Y 2023) who presented with lower abdominal pain.  Patient found to have a lactic acid of 4.7 and began to have altered mental status.  Patient had CT head abdomen pelvis (given droperidol and morphine prior to CT) and upon returning from CT patient had respiratory arrest.  She did not lose her pulse.  She was intubated and sedated and taken to the ICU.  Lumbar puncture performed.  She was extubated on  and transferred to the medical floor.     Hospitalists were consulted to assume care of this patient on .  All imaging was non acute.  The patient has had recurrent abd pain since her surgery in Sep/2023.    PT/OT recommends STR.  Medically ready for discharge.    Subjective & 24hr Events:   No AEO.  No new complaints.  She said her hiccups resolved w/ baclofen.  Stop IVF today.  Monitor her intake; her intake has been depressed since her surgery.  She is ready for discharge.    Assessment & Plan:     SIRS  Abd pain  Blood and CSF cultures negative x 4 days  Urine culture finalized negative  UA did not appear infectious  PCT 0.27 on admission   CT head(s) no acute finding  CT c/a/p no acute finding   MRSA swab negative; vancomycin discontinued  Discontinued cefepime  PRN Bentyl  WBC 21.9 on admission; WBC 11.9 on      Respiratory arrest (HCC)  after receiving morphine and droperidol prior to CT requiring 
ACUTE OCCUPATIONAL THERAPY GOALS:   (Developed with and agreed upon by patient and/or caregiver.)  1. Pt will toilet with min A   2. Pt will complete functional mobility for ADLs with min A using AD as needed  3. Pt will complete upper body bathing and dressing with min A using AE as needed  4. Pt will complete grooming with set up  5. Pt will demonstrate improved cognition to complete functional tasks with min or fewer cues  6. Pt will tolerate 23 minutes functional activity with min or fewer rest breaks to promote increased endurance for ADLs  7. Pt will complete bed mobility with CGA in prep for ADLs     Timeframe: 7 visits    OCCUPATIONAL THERAPY: Daily Note AM   OT Visit Days: 5   Time In/Out  OT Charge Capture  Rehab Caseload Tracker  OT Orders    Jesika Olsen is a 68 y.o. female   PRIMARY DIAGNOSIS: Respiratory arrest (HCC)  Respiratory arrest (HCC) [R09.2]  Septicemia (HCC) [A41.9]  Septic shock (HCC) [A41.9, R65.21]  Acute respiratory failure, unspecified whether with hypoxia or hypercapnia (HCC) [J96.00]       Inpatient: Payor: [facility] MEDICARE / Plan: [facility] MEDICARE COMPLETE / Product Type: *No Product type* /     ASSESSMENT:     REHAB RECOMMENDATIONS:   Recommendation to date pending progress:  Setting:  Short-term Rehab    Equipment:    To Be Determined     ASSESSMENT:  Ms. Olsen is doing fair today. Pt presents supine upon arrival. Pt able to perform bed mobility with supervision. Pt entered bathroom with contact guard assistance and no AD. Later exited bathroom and performed LB/UB dressing at edge of bed with standby assist. Pt performed functional mobility in room out to hallway with min A. Took outside in wheelchair. Returned back to floor and performed more mobility in room. Left in chair with all needs in reach. Making some progress with goals. Will continue to benefit from skilled OT during stay.       SUBJECTIVE:     Ms. Olsen states, \"that would be nice\"     Social/Functional Lives With: 
ACUTE OCCUPATIONAL THERAPY GOALS:   (Developed with and agreed upon by patient and/or caregiver.)  1. Pt will toilet with min A   2. Pt will complete functional mobility for ADLs with min A using AD as needed  3. Pt will complete upper body bathing and dressing with min A using AE as needed  4. Pt will complete grooming with set up  5. Pt will demonstrate improved cognition to complete functional tasks with min or fewer cues  6. Pt will tolerate 23 minutes functional activity with min or fewer rest breaks to promote increased endurance for ADLs  7. Pt will complete bed mobility with CGA in prep for ADLs     Timeframe: 7 visits    OCCUPATIONAL THERAPY: Daily Note AM   OT Visit Days: 6   Time In/Out  OT Charge Capture  Rehab Caseload Tracker  OT Orders    Jesika Olsen is a 68 y.o. female   PRIMARY DIAGNOSIS: Respiratory arrest (HCC)  Respiratory arrest (HCC) [R09.2]  Septicemia (HCC) [A41.9]  Septic shock (HCC) [A41.9, R65.21]  Acute respiratory failure, unspecified whether with hypoxia or hypercapnia (HCC) [J96.00]       Inpatient: Payor: [facility] MEDICARE / Plan: [facility] MEDICARE COMPLETE / Product Type: *No Product type* /     ASSESSMENT:     REHAB RECOMMENDATIONS:   Recommendation to date pending progress:  Setting:  Short-term Rehab    Equipment:    To Be Determined     ASSESSMENT:  Ms. Olsen is doing fair today. Pt presents supine upon arrival. Pt able to perform bed mobility with supervision. Pt entered bathroom and was able to perform shower with supervision. Pt did well with LB dressing only requiring standby assist. Pt did not feel well after shower so she returned to supine. Making progress with goals. Will continue to benefit from skilled OT during stay.       SUBJECTIVE:     Ms. Olsen states, \"I don't feel good\"     Social/Functional Lives With: Daughter  Type of Home: House  Home Layout: One level  Home Equipment:  (has Rolator and canes , but does not normally use)  Receives Help From: Family  ADL 
ACUTE OCCUPATIONAL THERAPY GOALS:   (Developed with and agreed upon by patient and/or caregiver.)  1. Pt will toilet with min A   2. Pt will complete functional mobility for ADLs with min A using AD as needed  3. Pt will complete upper body bathing and dressing with min A using AE as needed  4. Pt will complete grooming with set up  5. Pt will demonstrate improved cognition to complete functional tasks with min or fewer cues  6. Pt will tolerate 23 minutes functional activity with min or fewer rest breaks to promote increased endurance for ADLs  7. Pt will complete bed mobility with CGA in prep for ADLs     Timeframe: 7 visits    OCCUPATIONAL THERAPY: Daily Note PM   OT Visit Days: 2   Time In/Out  OT Charge Capture  Rehab Caseload Tracker  OT Orders    Jesika Olsen is a 68 y.o. female   PRIMARY DIAGNOSIS: Respiratory arrest (HCC)  Respiratory arrest (HCC) [R09.2]  Septicemia (HCC) [A41.9]  Septic shock (HCC) [A41.9, R65.21]  Acute respiratory failure, unspecified whether with hypoxia or hypercapnia (HCC) [J96.00]       Inpatient: Payor: [facility] MEDICARE / Plan: [facility] MEDICARE COMPLETE / Product Type: *No Product type* /     ASSESSMENT:     REHAB RECOMMENDATIONS:   Recommendation to date pending progress:  Setting:  Short-term Rehab    Equipment:    To Be Determined     ASSESSMENT:  Ms. Olsen is doing fair today. Pt presents supine upon arrival. Pt performed bed mobility mod I today with additional time. Pt demonstrates fair (+) sitting balance at edge of bed. C/o being dizzy. BP checked and patient was orthostatic (see PT note). Pt returned to supine. No progress made today. Will continue to benefit from skilled OT during stay.       SUBJECTIVE:     Ms. Olsen states, \"I am dizzy\"     Social/Functional Lives With: Daughter  Type of Home: House  Home Layout: One level  Home Equipment:  (has Rolator and canes , but does not normally use)  Receives Help From: Family  ADL Assistance: Independent  Homemaking 
ACUTE OCCUPATIONAL THERAPY GOALS:   (Developed with and agreed upon by patient and/or caregiver.)  1. Pt will toilet with min A   2. Pt will complete functional mobility for ADLs with min A using AD as needed  3. Pt will complete upper body bathing and dressing with min A using AE as needed  4. Pt will complete grooming with set up  5. Pt will demonstrate improved cognition to complete functional tasks with min or fewer cues  6. Pt will tolerate 23 minutes functional activity with min or fewer rest breaks to promote increased endurance for ADLs  7. Pt will complete bed mobility with CGA in prep for ADLs     Timeframe: 7 visits    OCCUPATIONAL THERAPY: Daily Note PM   OT Visit Days: 3   Time In/Out  OT Charge Capture  Rehab Caseload Tracker  OT Orders    Jesika Olsen is a 68 y.o. female   PRIMARY DIAGNOSIS: Respiratory arrest (HCC)  Respiratory arrest (HCC) [R09.2]  Septicemia (HCC) [A41.9]  Septic shock (HCC) [A41.9, R65.21]  Acute respiratory failure, unspecified whether with hypoxia or hypercapnia (HCC) [J96.00]       Inpatient: Payor: [facility] MEDICARE / Plan: [facility] MEDICARE COMPLETE / Product Type: *No Product type* /     ASSESSMENT:     REHAB RECOMMENDATIONS:   Recommendation to date pending progress:  Setting:  Short-term Rehab    Equipment:    To Be Determined     ASSESSMENT:  Ms. Olsen is doing fair today. Continue to c/o dizziness. Pt presents supine upon arrival. Pt performed bed mobility min A. Pt demonstrates fair (+) sitting balance at edge of bed. C/o being dizzy. Pt sat at edge of bed for ~10 minutes. Pt was able to stand and transfer over to chair. Sat there for a few minutes. Pt then returned to long sitting in bed for vestibular procedure. Pt did not want to get OOB after that. Left in supine. Some progress made towards goals today. Will continue to benefit from skilled OT during stay.       SUBJECTIVE:     Ms. Olsen states, \"I always feel bad\"     Social/Functional Lives With: Daughter  Type 
ACUTE OCCUPATIONAL THERAPY GOALS:   (Developed with and agreed upon by patient and/or caregiver.)  1. Pt will toilet with min A   2. Pt will complete functional mobility for ADLs with min A using AD as needed  3. Pt will complete upper body bathing and dressing with min A using AE as needed  4. Pt will complete grooming with set up  5. Pt will demonstrate improved cognition to complete functional tasks with min or fewer cues  6. Pt will tolerate 23 minutes functional activity with min or fewer rest breaks to promote increased endurance for ADLs  7. Pt will complete bed mobility with CGA in prep for ADLs    Timeframe: 7 visits      OCCUPATIONAL THERAPY Initial Assessment and Daily Note       OT Visit Days: 1  Acknowledge Orders  Time  OT Charge Capture  Rehab Caseload Tracker      Jesika Olsen is a 68 y.o. female   PRIMARY DIAGNOSIS: Respiratory arrest (HCC)  Respiratory arrest (HCC) [R09.2]  Septicemia (HCC) [A41.9]  Septic shock (HCC) [A41.9, R65.21]  Acute respiratory failure, unspecified whether with hypoxia or hypercapnia (HCC) [J96.00]       Reason for Referral: Generalized Muscle Weakness (M62.81)  Inpatient: Payor: [facility] MEDICARE / Plan: [facility] MEDICARE COMPLETE / Product Type: *No Product type* /     ASSESSMENT:     REHAB RECOMMENDATIONS:   Recommendation to date pending progress:  Setting:  Short-term Rehab    Equipment:    To Be Determined     ASSESSMENT:  Ms. Olsen was admitted with complaints of abdominal pain, nausea, and AMS. Pt went into respiratory arrest requiring intubation and ICU stay, now extubated and on room air. Pt presented generally weak with deficits in mobility, balance, activity tolerance, and cognition impacting ADLs. Pt minimally verbal and mostly not following commands, moving all limbs spontaneously. Pt flat and interacting only occasionally and inconsistently, likely d/t behavioral factors. Pt required max X2 for bed mobility and for repositioning (lying across bed with legs 
ACUTE PHYSICAL THERAPY GOALS:   (Developed with and agreed upon by patient and/or caregiver.)    Pt will perform bed mobility with Felisha in 7 therapy sessions.  Pt will perform sit-to-stand/ stand-to-sit transfers CGA in 7 therapy sessions.  Pt will ambulate 75 ft CGA with use of LRAD/no device and breaks as needed in 7 therapy sessions.  Pt will tolerate 5 minutes of standing activity with LRAD/no device in 7 therapy sessions.  Pt will perform standing dynamic balance activities with minimal postural sway in 7 therapy sessions.  Pt will tolerate multiple sets and reps of BLE exercises in 7 therapy sessions.     PHYSICAL THERAPY: Daily Note AM   (Link to Caseload Tracking: PT Visit Days : 5  Time In/Out PT Charge Capture  Rehab Caseload Tracker  Orders    Jesika Olsen is a 68 y.o. female   PRIMARY DIAGNOSIS: Respiratory arrest (HCC)  Respiratory arrest (HCC) [R09.2]  Septicemia (HCC) [A41.9]  Septic shock (HCC) [A41.9, R65.21]  Acute respiratory failure, unspecified whether with hypoxia or hypercapnia (HCC) [J96.00]       Inpatient: Payor: [facility] MEDICARE / Plan: [facility] MEDICARE COMPLETE / Product Type: *No Product type* /     ASSESSMENT:     REHAB RECOMMENDATIONS:   Recommendation to date pending progress:  Setting:  Short-term Rehab    Equipment:    To Be Determined     ASSESSMENT:  Pt presents supine on contact and agrees to therapy.  She worked on bed mobility, standing, ambulating into the bathroom and to the door using rolling walker and min.  She does ambulate at slower pace and cautious with turning.  She was taken outside in wheelchair and then returned to room where she ambulated back to her chair with same assist.  She is tolerating more mobility and was left up in chair.  Will continue with POC.    Pt will continue to benefit from skilled acute PT services to progress mobility.  From yesterday's note: pt does likely have a residual L vestibular hypofunction w/ decreased tolerance to movement.  Recommend 
ACUTE PHYSICAL THERAPY GOALS:   (Developed with and agreed upon by patient and/or caregiver.)    Pt will perform bed mobility with Felisha in 7 therapy sessions.  Pt will perform sit-to-stand/ stand-to-sit transfers CGA in 7 therapy sessions.  Pt will ambulate 75 ft CGA with use of LRAD/no device and breaks as needed in 7 therapy sessions.  Pt will tolerate 5 minutes of standing activity with LRAD/no device in 7 therapy sessions.  Pt will perform standing dynamic balance activities with minimal postural sway in 7 therapy sessions.  Pt will tolerate multiple sets and reps of BLE exercises in 7 therapy sessions.     PHYSICAL THERAPY: Daily Note PM   (Link to Caseload Tracking: PT Visit Days : 2  Time In/Out PT Charge Capture  Rehab Caseload Tracker  Orders    Jesika Olsen is a 68 y.o. female   PRIMARY DIAGNOSIS: Respiratory arrest (HCC)  Respiratory arrest (HCC) [R09.2]  Septicemia (HCC) [A41.9]  Septic shock (HCC) [A41.9, R65.21]  Acute respiratory failure, unspecified whether with hypoxia or hypercapnia (HCC) [J96.00]       Inpatient: Payor: [facility] MEDICARE / Plan: [facility] MEDICARE COMPLETE / Product Type: *No Product type* /     ASSESSMENT:     REHAB RECOMMENDATIONS:   Recommendation to date pending progress:  Setting:  Short-term Rehab    Equipment:    To Be Determined     ASSESSMENT:  Upon arrival, Ms. Olsen supine in bed, agreeable to therapy. Pt reporting 8/10 dizziness. During session, pt completed rolling to bilateral sides with SBA, supine to/ from sit transitions with SBA, STS transfers with SBA/ CGA, and ambulated bed <> commode and bed to chair with hand held assistance/ CGA. Pt reported the most severe dizziness/ vertigo during standing transitions.    During today's session, completed B supine roll test to assess for horizontal canal BPPV. Observed nystagmus with both sides, however, nystagmus more prominent and pt more symptomatic during R roll test. Therapist instructed pt through R BBQ roll maneuver 
ACUTE PHYSICAL THERAPY GOALS:   (Developed with and agreed upon by patient and/or caregiver.)    Pt will perform bed mobility with Felisha in 7 therapy sessions.  Pt will perform sit-to-stand/ stand-to-sit transfers CGA in 7 therapy sessions.  Pt will ambulate 75 ft CGA with use of LRAD/no device and breaks as needed in 7 therapy sessions.  Pt will tolerate 5 minutes of standing activity with LRAD/no device in 7 therapy sessions.  Pt will perform standing dynamic balance activities with minimal postural sway in 7 therapy sessions.  Pt will tolerate multiple sets and reps of BLE exercises in 7 therapy sessions.     PHYSICAL THERAPY: Daily Note PM   (Link to Caseload Tracking: PT Visit Days : 3  Time In/Out PT Charge Capture  Rehab Caseload Tracker  Orders    Jesika Olsen is a 68 y.o. female   PRIMARY DIAGNOSIS: Respiratory arrest (HCC)  Respiratory arrest (HCC) [R09.2]  Septicemia (HCC) [A41.9]  Septic shock (HCC) [A41.9, R65.21]  Acute respiratory failure, unspecified whether with hypoxia or hypercapnia (HCC) [J96.00]       Inpatient: Payor: [facility] MEDICARE / Plan: [facility] MEDICARE COMPLETE / Product Type: *No Product type* /     ASSESSMENT:     REHAB RECOMMENDATIONS:   Recommendation to date pending progress:  Setting:  Short-term Rehab    Equipment:    To Be Determined     ASSESSMENT:  Pt. Does not exhibit signs or symptoms of BPPV as both Roll Test & Shelby Hallpike were negative (no nystagmus elicited).  However, pt. Does likely have a residual L vestibular hypofunction w/ decreased tolerance to movement.  With compensatory techniques, pt's mobility was significantly improved this PM.  Pt. was able to progress gait, mobilize with less assistance, & tolerate OOB to chair this PM.  Pt. Cont. To mobilize below her baseline & benefits from cont. PT to address.    Pt will continue to benefit from skilled acute PT services to progress mobility. Recommend rehab once medically ready for discharge.     SUBJECTIVE:   Ms. 
ACUTE PHYSICAL THERAPY GOALS:   (Developed with and agreed upon by patient and/or caregiver.)  Pt will perform bed mobility with Felisha in 7 therapy sessions.  Pt will perform sit-to-stand/ stand-to-sit transfers CGA in 7 therapy sessions.  Pt will ambulate 75 ft CGA with use of LRAD/no device and breaks as needed in 7 therapy sessions.  Pt will tolerate 5 minutes of standing activity with LRAD/no device in 7 therapy sessions.  Pt will perform standing dynamic balance activities with minimal postural sway in 7 therapy sessions.  Pt will tolerate multiple sets and reps of BLE exercises in 7 therapy sessions.     Goals reviewed on 4/10/24 by RAMYA MONTAGUE, PT      PHYSICAL THERAPY Daily Note, Re-evaluation, and PM  (Link to Caseload Tracking: PT Visit Days : 1  Acknowledge Orders  Time In/Out  PT Charge Capture  Rehab Caseload Tracker    Jesika Olsen is a 68 y.o. female   PRIMARY DIAGNOSIS: Respiratory arrest (HCC)  Respiratory arrest (HCC) [R09.2]  Septicemia (HCC) [A41.9]  Septic shock (HCC) [A41.9, R65.21]  Acute respiratory failure, unspecified whether with hypoxia or hypercapnia (HCC) [J96.00]       Reason for Referral: Other abnormalities of gait and mobility (R26.89)  Inpatient: Payor: [facility] MEDICARE / Plan: [facility] MEDICARE COMPLETE / Product Type: *No Product type* /     ASSESSMENT:     REHAB RECOMMENDATIONS:   Recommendation to date pending progress:  Setting:  Short-term Rehab    Equipment:    To Be Determined     ASSESSMENT:  Ms. Olsen is a 68 y.o. female presenting to PT following a hospitalization due to abdominal pain, nausea, and AMS. Pt was intubated and sent to the ICU, then extubated on 4/1, now presenting with generalized weakness and confusion. PT re-evaluation completed on 4/10/24. Pt has had limited progress in therapy/ mobility due to persistent dizziness. During today's re-evaluation, therapist performed oculomotor screen. Pt with (+) gaze-holding nystagmus and abnormal saccades. During 
ACUTE PHYSICAL THERAPY GOALS:   (Developed with and agreed upon by patient and/or caregiver.)  Pt will perform bed mobility with Felisha in 7 therapy sessions.  Pt will perform sit-to-stand/ stand-to-sit transfers CGA in 7 therapy sessions.  Pt will ambulate 75 ft CGA with use of LRAD/no device and breaks as needed in 7 therapy sessions.  Pt will tolerate 5 minutes of standing activity with LRAD/no device in 7 therapy sessions.  Pt will perform standing dynamic balance activities with minimal postural sway in 7 therapy sessions.  Pt will tolerate multiple sets and reps of BLE exercises in 7 therapy sessions.     PHYSICAL THERAPY: Daily Note AM   (Link to Caseload Tracking: PT Visit Days : 3  Time In/Out PT Charge Capture  Rehab Caseload Tracker  Orders    Jesika Olsen is a 68 y.o. female   PRIMARY DIAGNOSIS: Respiratory arrest (HCC)  Respiratory arrest (HCC) [R09.2]  Septicemia (HCC) [A41.9]  Septic shock (HCC) [A41.9, R65.21]  Acute respiratory failure, unspecified whether with hypoxia or hypercapnia (HCC) [J96.00]       Inpatient: Payor: [facility] MEDICARE / Plan: [facility] MEDICARE COMPLETE / Product Type: *No Product type* /     ASSESSMENT:     REHAB RECOMMENDATIONS:   Recommendation to date pending progress:  Setting:  Short-term Rehab    Equipment:    To Be Determined     ASSESSMENT:  Ms. Olsen was found supine in bed and agreeable to PT. Pt moved from supine to seated EOB with Felisha, extra time, and used bedrail without cuing to complete bed mobility. Pt continues to present with a flat affect today and reported increased dizziness once EOB, resolving with seated rest. Pt performed 1x STS, reporting increased dizziness and returned to seated. BP was then taken in seated and in standing with a RW for pt safety. Pt was ultimately unable to remain standing long enough to attain BP and required to be returned to seated with a low BP reading. Pt was then returned to supine, with pt reporting increased nausea. Pt was 
ACUTE PHYSICAL THERAPY GOALS:   (Developed with and agreed upon by patient and/or caregiver.)  Pt will perform bed mobility with Felisha in 7 therapy sessions.  Pt will perform sit-to-stand/ stand-to-sit transfers CGA in 7 therapy sessions.  Pt will ambulate 75 ft CGA with use of LRAD/no device and breaks as needed in 7 therapy sessions.  Pt will tolerate 5 minutes of standing activity with LRAD/no device in 7 therapy sessions.  Pt will perform standing dynamic balance activities with minimal postural sway in 7 therapy sessions.  Pt will tolerate multiple sets and reps of BLE exercises in 7 therapy sessions.     PHYSICAL THERAPY: Daily Note AM   (Link to Caseload Tracking: PT Visit Days : 5  Time In/Out PT Charge Capture  Rehab Caseload Tracker  Orders    Jesika Olsen is a 68 y.o. female   PRIMARY DIAGNOSIS: Respiratory arrest (HCC)  Respiratory arrest (HCC) [R09.2]  Septicemia (HCC) [A41.9]  Septic shock (HCC) [A41.9, R65.21]  Acute respiratory failure, unspecified whether with hypoxia or hypercapnia (HCC) [J96.00]       Inpatient: Payor: [facility] MEDICARE / Plan: [facility] MEDICARE COMPLETE / Product Type: *No Product type* /     ASSESSMENT:     REHAB RECOMMENDATIONS:   Recommendation to date pending progress:  Setting:  Short-term Rehab    Equipment:    To Be Determined     ASSESSMENT:  Ms. Olsen was found supine in bed and agreeable to PT. Orthostatic BP was taken in supine, semi supine, seated, and standing and found to be WNL and improved today with pt continuing to endorse symptoms of dizziness. Pt moved from supine to seated EOB with Felisha, extra time, and used bedrail to complete bed mobility. Pt was then returned to supine, with pt reporting increased nausea. (see vitals tab for specific positional BP). During today's session, all activity was performed on RA. Pt made no objective progress towards goals today and continues to be limited by orthostatic BP changes. Pt continues to report pulsating abdominal pain 
ACUTE PHYSICAL THERAPY GOALS:   (Developed with and agreed upon by patient and/or caregiver.)  Pt will perform bed mobility with Felisha in 7 therapy sessions.  Pt will perform sit-to-stand/ stand-to-sit transfers CGA in 7 therapy sessions.  Pt will ambulate 75 ft CGA with use of LRAD/no device and breaks as needed in 7 therapy sessions.  Pt will tolerate 5 minutes of standing activity with LRAD/no device in 7 therapy sessions.  Pt will perform standing dynamic balance activities with minimal postural sway in 7 therapy sessions.  Pt will tolerate multiple sets and reps of BLE exercises in 7 therapy sessions.     PHYSICAL THERAPY: Daily Note PM   (Link to Caseload Tracking: PT Visit Days : 2  Time In/Out PT Charge Capture  Rehab Caseload Tracker  Orders    Jesika Olsen is a 68 y.o. female   PRIMARY DIAGNOSIS: Respiratory arrest (HCC)  Respiratory arrest (HCC) [R09.2]  Septicemia (HCC) [A41.9]  Septic shock (HCC) [A41.9, R65.21]  Acute respiratory failure, unspecified whether with hypoxia or hypercapnia (HCC) [J96.00]       Inpatient: Payor: [facility] MEDICARE / Plan: [facility] MEDICARE COMPLETE / Product Type: *No Product type* /     ASSESSMENT:     REHAB RECOMMENDATIONS:   Recommendation to date pending progress:  Setting:  Short-term Rehab    Equipment:    To Be Determined     ASSESSMENT:  Ms. Olsen was found supine in bed and agreeable to PT. Pt moved from supine to seated EOB with SBA, extra time, and cuing for bedrail use to complete bed mobility. Pt continues to present with a flat affect today and reported increased dizziness once EOB (see vitals). Pt was able to STS and ambulate 2x trials of 10' in room with CGA/Ed to steady. During ambulation, pt ambulated with increased trunk sway and trunk flexion, but did well to avoid any LOB or missteps this session. Pt reported some pulsating abdominal pain towards the end of the session and RN made aware. During today's session, all activity was performed on RA. Pt made 
ACUTE PHYSICAL THERAPY GOALS:   (Developed with and agreed upon by patient and/or caregiver.)  Pt will perform bed mobility with Felisha in 7 therapy sessions.  Pt will perform sit-to-stand/ stand-to-sit transfers CGA in 7 therapy sessions.  Pt will ambulate 75 ft CGA with use of LRAD/no device and breaks as needed in 7 therapy sessions.  Pt will tolerate 5 minutes of standing activity with LRAD/no device in 7 therapy sessions.  Pt will perform standing dynamic balance activities with minimal postural sway in 7 therapy sessions.  Pt will tolerate multiple sets and reps of BLE exercises in 7 therapy sessions.     PHYSICAL THERAPY: Daily Note PM   (Link to Caseload Tracking: PT Visit Days : 4  Time In/Out PT Charge Capture  Rehab Caseload Tracker  Orders    Jesika Olsen is a 68 y.o. female   PRIMARY DIAGNOSIS: Respiratory arrest (HCC)  Respiratory arrest (HCC) [R09.2]  Septicemia (HCC) [A41.9]  Septic shock (HCC) [A41.9, R65.21]  Acute respiratory failure, unspecified whether with hypoxia or hypercapnia (HCC) [J96.00]       Inpatient: Payor: [facility] MEDICARE / Plan: [facility] MEDICARE COMPLETE / Product Type: *No Product type* /     ASSESSMENT:     REHAB RECOMMENDATIONS:   Recommendation to date pending progress:  Setting:  Short-term Rehab    Equipment:    To Be Determined     ASSESSMENT:  Ms. Olsen was found supine in bed and agreeable to PT. Pt moved from supine to seated EOB with Felisha, extra time, and used bedrail without cuing to complete bed mobility. BP was taken in supine, seated EOB, and standing. Pt was ultimately unable to remain standing long enough to attain BP and required to be returned to seated with a low BP reading. Pt was then returned to supine, with pt reporting increased nausea. (see vitals tab for specific positional BP). During today's session, all activity was performed on RA. Pt made no objective progress towards goals today and continues to be limited by orthostatic BP changes. Pt will continue 
CH responded to Code Stroke. CH prayed silently for PT, PT's Family, and Staff. When appropriate, CH introduced self to PT. CH offered comforting spiritual presence and prayer. Later CH checked on PT. CH checked for unmet needs and offered support.       Rev. Екатерина Negron M.Div.    
GOALS:  LTG: Patient will maintain adequate hydration/nutrition with optimum safety and efficiency of swallowing function with PO intake without overt signs or symptoms of aspiration for the highest appropriate diet level.  STG:  Patient will consume easy to chew textures and thin liquids without overt signs or symptoms of airway compromise. UPGRADED 4/3/24  Patient will safely ingest diet trials during therapeutic feedings with SLP without overt signs or symptoms of respiratory compromise in efforts to advance diet. CONTINUE 4/3/24    SPEECH LANGUAGE PATHOLOGY: DYSPHAGIA Daily Note #1    Acknowledge Order  I  Therapy Time  I   Charges     I  Rehab Caseload Tracker      NAME: Jesika Olsen  : 1955  MRN: [id number]    ADMISSION DATE: 3/31/2024  PRIMARY DIAGNOSIS: Respiratory arrest (HCC)    ICD-10: Treatment Diagnosis: R13.11 Dysphagia, Oral Phase    RECOMMENDATIONS   Diet:    Easy to Chew  Thin Liquids    Medication: as tolerated   Compensatory Swallowing Strategies:   Remain upright for 30-45 minutes after meals  Small bites/sips  Upright as possible for all oral intake   Therapeutic Intervention:   Patient/family education  Dysphagia treatment   Patient continues to require skilled intervention:  Yes. Recommend ongoing speech therapy services during this hospitalization.     Anticipated Discharge Needs: Do not anticipate ongoing speech therapy needs upon discharge.      ASSESSMENT    Patient continues to present with prolonged mastication due to missing dentition, functional swallow initiation with solid and liquid consistencies. No overt clinical s/s of aspiration or airway compromise was observed throughout trials. Report that patient is consuming minimal PO intake primarily due to disinterest in food selections, therefore SLP inquired about preferred food items. Diet order updated accordingly.     Recommend Easy to Chew solids, thin liquids, meds as tolerated.    GENERAL    Subjective: Lying in bed, 
GOALS:  LTG: Patient will maintain adequate hydration/nutrition with optimum safety and efficiency of swallowing function with PO intake without overt signs or symptoms of aspiration for the highest appropriate diet level.  STG:  Patient will consume easy to chew textures and thin liquids without overt signs or symptoms of airway compromise. UPGRADED 4/3/24  Patient will safely ingest diet trials during therapeutic feedings with SLP without overt signs or symptoms of respiratory compromise in efforts to advance diet. CONTINUE 4/3/24    SPEECH LANGUAGE PATHOLOGY: DYSPHAGIA Discharge    Acknowledge Order  I  Therapy Time  I   Charges     I  Rehab Caseload Tracker      NAME: Jesika Olsen  : 1955  MRN: [id number]    ADMISSION DATE: 3/31/2024  PRIMARY DIAGNOSIS: Respiratory arrest (HCC)    ICD-10: Treatment Diagnosis: R13.11 Dysphagia, Oral Phase    RECOMMENDATIONS   Diet:    Easy to Chew  Thin Liquids    Medication: as tolerated   Compensatory Swallowing Strategies:   Remain upright for 30-45 minutes after meals  Small bites/sips  Upright as possible for all oral intake   Therapeutic Intervention:   Patient/family education  Dysphagia treatment   Patient continues to require skilled intervention:  No. Please re-consult if new concerns arise.      Anticipated Discharge Needs: Do not anticipate ongoing speech therapy needs upon discharge.      ASSESSMENT    Patient continues to present with prolonged mastication due to missing dentition, functional swallow initiation with solid and liquid consistencies. Trials at bedside with crackers and peanut butter. No overt clinical s/s of aspiration or airway compromise was observed throughout trials. Patient reports that she is eating more because she's liking the food better. SLP to DC, as patient is at swallow baseline.     Recommend Easy to Chew solids, thin liquids, meds as tolerated.    GENERAL    Subjective: Lying in bed, agreeable to consumption of crackers and 
GOALS:  LTG: Patient will maintain adequate hydration/nutrition with optimum safety and efficiency of swallowing function with PO intake without overt signs or symptoms of aspiration for the highest appropriate diet level.  STG:  Patient will consume soft and bite-sized textures and thin liquids without overt signs or symptoms of airway compromise.  Patient will safely ingest diet trials during therapeutic feedings with SLP without overt signs or symptoms of respiratory compromise in efforts to advance diet.    SPEECH LANGUAGE PATHOLOGY: DYSPHAGIA Initial Assessment    Acknowledge Order  I  Therapy Time  I   Charges     I  Rehab Caseload Tracker      NAME: Jesika Olsen  : 1955  MRN: [id number]    ADMISSION DATE: 3/31/2024  PRIMARY DIAGNOSIS: Septic shock (HCC)    ICD-10: Treatment Diagnosis: R13.11 Dysphagia, Oral Phase    RECOMMENDATIONS   Diet:    Soft and Bite-Sized  Thin Liquids    Medication: as tolerated   Compensatory Swallowing Strategies:   Remain upright for 30-45 minutes after meals  Small bites/sips  Upright as possible for all oral intake   Therapeutic Intervention:   Patient/family education  Dysphagia treatment   Patient continues to require skilled intervention:  Yes. Recommend ongoing speech therapy services during this hospitalization.     Anticipated Discharge Needs: Do not anticipate ongoing speech therapy needs upon discharge.      ASSESSMENT    Patient presents with prolonged mastication due to missing dentition, functional swallow initiation with solid and liquid consistencies. No overt clinical s/s of aspiration or airway compromise was observed throughout trials. Appropriate clearance of oral residue following bites and sips. Patient was minimally verbal throughout assessment, therefore recommending full cog assessment once patient is able to functionally participate.    Recommend Soft and Bite Sized solids, thin liquids, meds as tolerated.    GENERAL    Subjective: Lying in 
Initial visit made to patient and a prayer was provided. The patient appeared to be resting.      Drake Walker MDIV, ELEUTERIO Blandon   
PRN IV potassium replacement started after new line was placed by vascular access. Per NP, only give 4 bags replacement instead of the 6 because patient also receiving IV fluid with supplemental potassium.   
PT IV is occluded and will not flush.  Per Vascular, the pt has no more veins to place an IV.  Case management has already placed DC notes on the chart and pt is medically ready to leave tomorrow (4/17) at 1300.  Hospitalist advised I can take IV out and leave it out.   
PT K+ was 2.7 w/ this mornings labs.  Pt was given 4 bags of K+ and then continuous dextrose NaCL w/ Kcl was ordered.  Pt K+ recheck at 1838 was 3.1.  Messaged Hospitalist to see if additional 4 bags of K+ were needed per K+ protocol since continuous is running.  Per Hospitalist, the PRN protocol is not needed at this time w/ the continuous.  Recheck K+ in the AM with morning labs.  
Patient placed on PSV 10/8 at this time with no complications noted; RT will continue to monitor accordingly.    0942: Patient placed on low-rate SIMV at this time; MD notified  
Patient was intubated with a number 8.0 ET Tube. Tube placement verified by auscultation and ETCO2 monitor. ET Tube is secured at the 25 cm allen at the lip and on the bilateral side. Patient was intubated by  on the 1 attempt. Breath sounds are diminished. Patient is Negative for subcutaneous air and chest excursion is symmetric. Trachea is midline.  Patient is also Negative for cyanosis and is Negative for pitting edema.  Patient placed on ventilator on documented settings. All alarms are set and audible. Resuscitation bag is  at the head of the bed.      Ventilator Settings  Mode FIO2 Rate Tidal Volume Pressure PEEP I:E Ratio   AC/PRVC  100 %   20bpm   450 ml    8   1:1.2      Peak airway pressure:   18 cmH2O  Minute ventilation:  9.22 L/min      
Please sign and complete MRI screening form.   
Primary respiratory alkalosis and metabolic alkalosis noted on ABG.  No severe hypoxemia and the CXR is reassuringly normal.      Oxygen requirements are coming down and we plan to wean to extubate.  Unable to get any history at this time.     Leukocytosis is declining and anion ap has declined from 18 to 8.  On cefepime/vanco and checking MRSA swab.      LP:    Glucose 62  Protein 52  Culture pending  Cell count pending.      Suspect oversedation from narcotics was cause for respiratory arrest.  Check d-dimer, duplexes as PE can occasionally be a cause for primary respiratory alkalosis.      Suzy Day MD   
Pt rabago was pulled on 4/2.  She was bladder scanned and had 76mL in bladder.  Pt was re-bladder scanned due to not voiding all shift and was retaining 102mL.  Messaged hospitalist and they advised they would defer to day shift.  
RRT Clinical Rounding Nurse Progress Report      SUBJECTIVE: Patient assessed secondary to transfer from critical care.      Vitals:    04/02/24 0305 04/02/24 0320 04/02/24 0358 04/02/24 0730   BP: (!) 168/74  (!) 143/77 114/71   Pulse: 97 (!) 116 94 (!) 102   Resp: 24 30 19 21   Temp: 99.9 °F (37.7 °C)  98.8 °F (37.1 °C) 98.1 °F (36.7 °C)   TempSrc: Bladder  Axillary Axillary   SpO2: 100% 100% 100% 100%   Weight:       Height:            DETERIORATION INDEX SCORE: 40    ASSESSMENT:  Pt resting in bed NAD.  Pt extubated yesterday, tx from ICU this AM. Respirations even and unlabored.  Pt with no complaints at this time.        PLAN:  Will follow per RRT Clinical Rounding Program protocol.    Bassam Mckeon RN  [facility]: [phone]  [facility]: [phone]   
Rapid Covid sent to lab.  
Respiratory Mechanics completed and are as follows:    RR: 24  Ve: 12.2 L  Vt: 488  RSBI: 49  NIF: -27    Patient extubated to a 5L NC. Patient is able to communicate and is negative for stridor. Patient passed leak test. Breath sounds are clear. No complications with extubation.     Raul Lakhani RCP   
TRANSFER - IN REPORT:    Verbal report received from CORNELIUS Banegas on Jesika Olsen  being received from ER for routine progression of patient care      Report consisted of patient's Situation, Background, Assessment and   Recommendations(SBAR).     Information from the following report(s) ED SBAR was reviewed with the receiving nurse.    Opportunity for questions and clarification was provided.      Assessment completed upon patient's arrival to unit and care assumed.     
TRANSFER - OUT REPORT:    Verbal report given to CORNELIUS Bates (Juwan Hughes) on Jesika Olesn  being transferred to [facility] for routine progression of patient care       Report consisted of patient's Situation, Background, Assessment and   Recommendations(SBAR).     Information from the following report(s) Nurse Handoff Report was reviewed with the receiving nurse.           Lines:       Opportunity for questions and clarification was provided.             
TRANSFER - OUT REPORT:    Verbal report given to CORNELIUS Cortez on Jesika Olsen  being transferred to room 714 for routine progression of patient care       Report consisted of patient's Situation, Background, Assessment and   Recommendations(SBAR).     Information from the following report(s) Adult Overview was reviewed with the receiving nurse.           Lines:   Peripheral IV 03/31/24 Right Antecubital (Active)   Site Assessment Clean, dry & intact;Clean 04/01/24 2305   Line Status Flushed;Infusing 04/01/24 2305   Line Care Cap changed;Connections checked and tightened 04/01/24 2305   Phlebitis Assessment No symptoms 04/01/24 2305   Infiltration Assessment 0 04/01/24 2305   Alcohol Cap Used Yes 04/01/24 2305   Dressing Status Clean, dry & intact 04/01/24 2305   Dressing Type Transparent 04/01/24 2305       Peripheral IV Left Antecubital (Active)   Site Assessment Clean, dry & intact 04/01/24 2305   Line Status Infusing 04/01/24 2305   Line Care Cap changed;Connections checked and tightened 04/01/24 2305   Phlebitis Assessment No symptoms 04/01/24 2305   Infiltration Assessment 0 04/01/24 2305   Alcohol Cap Used Yes 04/01/24 2305   Dressing Status Intact 04/01/24 2305   Dressing Type Transparent 04/01/24 2305        Opportunity for questions and clarification was provided.      Patient transported with:  Registered Nurse    Room air, no drips, patient belongings bag. Cell phone, , and a purple wallet in a beige bag are left @ bedside in room 714. RN aware.       
US Guided PIV access-    Ultrasound was used to find the vein which was compressible and without any ultrasound features of an artery or nerve bundle. Skin was cleaned and disinfected prior to IV puncture.  Under real-time ultrasound guidance peripheral access was obtained in the right AC using 20 G 1.75\" Peripheral IV catheter after 1 attempt(s). Blood return was present and IV flushed without difficulty with no clinical signs of infiltration. IV dressing applied and no immediate complications noted. Patient tolerated the procedure well.      
US Guided PIV access-    Ultrasound was used to find the vein which was compressible and without any ultrasound features of an artery or nerve bundle. Skin was cleaned and disinfected prior to IV puncture.  Under real-time ultrasound guidance peripheral access was obtained in the right AC using 20 G 1.75\" Peripheral IV catheter after 1 attempt(s). Blood return was present and IV flushed without difficulty with no clinical signs of infiltration. IV dressing applied and no immediate complications noted. Patient tolerated the procedure well.      
US Guided PIV access-    Ultrasound was used to find the vein which was compressible and without any ultrasound features of an artery or nerve bundle. Skin was cleaned and disinfected prior to IV puncture.  Under real-time ultrasound guidance peripheral access was obtained in the right forearm using 22 G 1.75\" Peripheral IV catheter after 1 attempt(s). Blood return was present and IV flushed without difficulty with no clinical signs of infiltration. IV dressing applied and no immediate complications noted. Patient tolerated the procedure well.      
VANCO DAILY FOLLOW UP NOTE  [facility]   Pharmacy Pharmacokinetic Monitoring Service - Vancomycin    Consulting Provider: Marcelino   Indication: sepsis  Target Concentration: Goal AUC/DOLLY 400-600 mg*hr/L  Day of Therapy: 1  Additional Antimicrobials: cefepime    Pertinent Laboratory Values:   Wt Readings from Last 1 Encounters:   03/31/24 93.9 kg (207 lb)     Temp Readings from Last 1 Encounters:   03/31/24 97.9 °F (36.6 °C) (Axillary)     Recent Labs     03/31/24  2134 03/31/24  2144 04/01/24  0016   BUN  --  11  --    CREATININE  --  0.90  --    WBC  --  21.9*  --    PROCAL  --  0.27  --    LACTSEPSIS 4.7*  --  2.5*     Estimated Creatinine Clearance: 72 mL/min (based on SCr of 0.9 mg/dL).    No results found for: \"VANCOTROUGH\", \"VANCORANDOM\"    MRSA Nasal Swab: N/A. Non-respiratory infection    Assessment:  Date/Time Dose Concentration AUC         Note: Serum concentrations collected for AUC dosing may appear elevated if collected in close proximity to the dose administered, this is not necessarily an indication of toxicity    Plan:  Dosing recommendations based on Bayesian software  Start vancomycin 1500mg q24h.  Anticipated AUC of 542 and trough concentration of 13.4 at steady state  Renal labs as indicated   Vancomycin concentrations will be ordered as clinically appropriate   Pharmacy will continue to monitor patient and adjust therapy as indicated    Thank you for the consult,  Rossy Carrillo [facility]    
VANCO DAILY FOLLOW UP NOTE  [facility]   Pharmacy Pharmacokinetic Monitoring Service - Vancomycin    Consulting Provider: Marcelino   Indication: sepsis  Target Concentration: Goal AUC/DOLLY 400-600 mg*hr/L  Day of Therapy: 1  Additional Antimicrobials: cefepime    Pertinent Laboratory Values:   Wt Readings from Last 1 Encounters:   03/31/24 93.9 kg (207 lb)     Temp Readings from Last 1 Encounters:   04/01/24 98.3 °F (36.8 °C) (Bladder)     Recent Labs     03/31/24  2134 03/31/24  2144 04/01/24  0016 04/01/24  0534   BUN  --  11  --  9   CREATININE  --  0.90  --  0.70   WBC  --  21.9*  --  17.6*   PROCAL  --  0.27  --   --    LACTSEPSIS 4.7*  --  2.5*  --      Estimated Creatinine Clearance: 93 mL/min (based on SCr of 0.7 mg/dL).    No results found for: \"VANCOTROUGH\", \"VANCORANDOM\"    MRSA Nasal Swab: MRSA pending    Assessment:  Date/Time Dose Concentration AUC         Note: Serum concentrations collected for AUC dosing may appear elevated if collected in close proximity to the dose administered, this is not necessarily an indication of toxicity    Plan:  Dosing recommendations based on Bayesian software  Will continue the current vancomycin dose of 1500 mg IV every 24 hours for now  Renal labs as indicated   Vancomycin concentrations will be ordered as clinically appropriate   Pharmacy will continue to monitor patient and adjust therapy as indicated    Thank you for the consult,  Hector Ma RPH      
Ventilator check complete; patient has a #8.0 ET tube secured at the 22 at the teeth.  Patient is  sedated.  Patient is not able to follow commands.  Breath sounds are clear.  Trachea is midline, Negative for subcutaneous air, and chest excursion is symmetric. Patient is also Negative for cyanosis and is Negative for pitting edema.  All alarms are set and audible.  Resuscitation bag is  at the head of the bed.      Ventilator Settings  Mode FIO2 Rate Tidal Volume Ppl PEEP I:E Ratio   AC/PRVC  (S) 30 % (weaned) 16 bpm 450 mL 19 cm H20 8 cm H20 1:1.7      Peak airway pressure: 13 cm H20  Minute ventilation: 10.8 L      Raul Lakhani RCP   
    Anthropometric Measures:  Height: 175.3 cm (5' 9\")  Current Body Wt: 93.9 kg (207 lb 0.2 oz) (3/31), Weight source: Not Specified  BMI: 30.6, Obese Class 1 (BMI 30.0-34.9)     Ideal Body Weight (Kg) (Calculated): 66 kg (145 lbs), 142.8 %  BMI Category Obese Class 1 (BMI 30.0-34.9)  Estimated Daily Nutrient Needs:  Energy (kcal/day): 3635-2626 (15-20 kcal/kg) (Kcal/kg Weight used: 93.9 kg Current  Protein (g/day): 75-94 (0.8-1 g/kg) Weight Used: (Current) 93.9 kg  Fluid (ml/day):   (1 ml/kcal)    Nutrition Diagnosis:   Inadequate oral intake related to  (reduced appetite) as evidenced by vomiting, intake 0-25%, intake 51-75%, intake 26-50%  Nutrition Interventions:   Food and/or Nutrient Delivery: Continue Current Diet, Continue Oral Nutrition Supplement     Coordination of Nutrition Care: Continue to monitor while inpatient      Goals:   Previous Goal Met: Progressing toward Goal(s)  Active Goal: Meet at least 75% of estimated needs, by next RD assessment       Nutrition Monitoring and Evaluation:      Food/Nutrient Intake Outcomes: Food and Nutrient Intake, Supplement Intake  Physical Signs/Symptoms Outcomes: Weight, Meal Time Behavior    Discharge Planning:    Too soon to determine    Marely Parnell RD      
(BMI 30.0-34.9)     Ideal Body Weight (Kg) (Calculated): 66 kg (145 lbs), 142.8 %  BMI Category Obese Class 1 (BMI 30.0-34.9)  Estimated Daily Nutrient Needs:  Energy (kcal/day): 8085-7156 (15-20 kcal/kg) (Kcal/kg Weight used: 93.9 kg Current  Protein (g/day): 75-94 (0.8-1 g/kg) Weight Used: (Current) 93.9 kg  Fluid (ml/day):   (1 ml/kcal)    Nutrition Diagnosis:   Inadequate oral intake related to  (reduced appetite) as evidenced by vomiting, intake 0-25%, intake 51-75%, intake 26-50%  Nutrition Interventions:   Food and/or Nutrient Delivery: Continue Current Diet, Start Oral Nutrition Supplement     Coordination of Nutrition Care: Continue to monitor while inpatient      Goals:      Active Goal: Meet at least 75% of estimated needs, by next RD assessment       Nutrition Monitoring and Evaluation:      Food/Nutrient Intake Outcomes: Food and Nutrient Intake, Supplement Intake  Physical Signs/Symptoms Outcomes: Weight, Meal Time Behavior    Discharge Planning:    Too soon to determine    Marely Parnell RD      
Equipment:  (has Rolator and canes , but does not normally use)  Receives Help From: Family  ADL Assistance: Independent  Homemaking Assistance: Independent  Homemaking Responsibilities: Yes  Ambulation Assistance: Independent  Transfer Assistance: Independent  Active : No  Patient's  Info: daughter    OBJECTIVE:     LINES / DRAINS / AIRWAY: IV    RESTRICTIONS/PRECAUTIONS:  Restrictions/Precautions  Restrictions/Precautions: Fall Risk (dizziness/ vertigo)        PAIN: VITALS / O2:   Pre Treatment:    0        Post Treatment: 0 Vitals          Oxygen        MOBILITY: I Mod I S SBA CGA Min Mod Max Total  NT x2 Comments:   Bed Mobility    Rolling [] [] [] [] [] [] [] [] [] [x] []    Supine to Sit [] [] [] [] [] [] [] [] [] [x] []    Scooting [] [] [] [] [] [] [] [] [] [x] []    Sit to Supine [] [] [] [] [] [] [] [] [] [x] []    Transfers    Sit to Stand [] [] [] [] [x] [] [] [] [] [] []    Bed to Chair [] [] [] [] [] [] [] [] [] [x] []    Stand to Sit [] [] [] [x] [] [] [] [] [] [] []    Tub/Shower [] [] [] [] [] [] [] [] [] [x] []     Toilet [] [] [] [] [] [] [] [] [] [x] []      [] [] [] [] [] [] [] [] [] [] []    I=Independent, Mod I=Modified Independent, S=Supervision/Setup, SBA=Standby Assistance, CGA=Contact Guard Assistance, Min=Minimal Assistance, Mod=Moderate Assistance, Max=Maximal Assistance, Total=Total Assistance, NT=Not Tested    ACTIVITIES OF DAILY LIVING: I Mod I S SBA CGA Min Mod Max Total NT Comments   BASIC ADLs:              Upper Body   Bathing [] [] [] [] [] [] [] [] [] [x]     Lower Body Bathing [] [] [] [] [] [] [] [] [] [x]     Toileting [] [] [] [] [] [] [] [] [] [x]    Upper Body Dressing [] [] [] [] [] [] [] [] [] [x]    Lower Body Dressing [] [] [] [] [] [] [] [] [] [x]    Feeding [] [] [] [] [] [] [] [] [] [x]    Grooming [] [] [] [] [] [] [] [] [] [x]    Personal Device Care [] [] [] [] [] [] [] [] [] [x]    Functional Mobility [] [] [] [] [x] [] [] [] [] []    I=Independent, 
as negative.  Procalcitonin 0.27 on admission.  Imaging  (CT head, CT chest/abdomen/pelvis without any acute findings.  Given negative workup, antibiotics were discontinued.  -Leukocytosis of 21.9 on admission and has trended down to 11.9 on 4/4/2024.    Abdominal pain, nausea  Upon chart review, it appears to be chronic.  EGD on 1/2024 without any abnormalities.  Patient have visited ED frequently due to nausea.  -Continue with Protonix twice daily, Carafate 4 times daily.    Respiratory arrest  Appears to have happened after receiving morphine and droperidol prior to CT.  Required intubation and was successfully extubated on 4/1/2024.  CT PE was done due to elevated D-dimer but PE was negative.  Doppler negative for DVTs  -Now resolved.  On room air      Hypokalemia: Now resolved.  Hypertension: Blood pressure remained stable.  Corporately has been reduced to 3.25 mg twice daily.      Hypothyroidism: Continue with Synthroid.  MDD: Continue duloxetine      anticipated Discharge Arrangements:   Skilled Nursing Facility Pend insurance approval.     PT/OT evals and PPD ordered?  Therapy and PPD  Diet:  ADULT DIET; Easy to Chew; grilled cheese for lunch, eggs and fruit for breakfast  VTE prophylaxis: Lovenox  Code status: Full Code      Non-peripheral Lines and Tubes (if present):          Telemetry (if present):           Hospital Problems:  Principal Problem:    Respiratory arrest (HCC)  Active Problems:    Hypothyroidism    MDD (major depressive disorder)  Resolved Problems:    Septic shock (HCC)      Objective:   Patient Vitals for the past 24 hrs:   Temp Pulse Resp BP SpO2   04/07/24 0834 -- 88 -- (!) 149/98 --   04/07/24 0724 98.1 °F (36.7 °C) 88 17 (!) 149/86 96 %   04/06/24 2027 97.5 °F (36.4 °C) 71 22 (!) 142/79 96 %   04/06/24 1657 -- 74 -- (!) 153/93 --       Oxygen Therapy  SpO2: 96 %  Pulse Oximetry Type: Continuous  Pulse via Oximetry: 115 beats per minute  Pulse Oximeter Device Mode: Continuous  Pulse 
Assistance, NT=Not Tested    BALANCE: Good Fair+ Fair Fair- Poor NT Comments   Sitting Static [x] [] [] [] [] []    Sitting Dynamic [x] [] [] [] [] []              Standing Static [] [] [] [] [] [x]    Standing Dynamic [] [] [] [] [] [x]      GAIT: I Mod I S SBA CGA Min Mod Max Total  NT x2 Comments:   Level of Assistance [] [] [] [] [] [] [] [] [] [x] []    Distance   feet    DME N/A    Gait Quality N/A    Weightbearing Status      Stairs      I=Independent, Mod I=Modified Independent, S=Supervision, SBA=Standby Assistance, CGA=Contact Guard Assistance,   Min=Minimal Assistance, Mod=Moderate Assistance, Max=Maximal Assistance, Total=Total Assistance, NT=Not Tested    PLAN:   FREQUENCY AND DURATION: 3 times/week for duration of hospital stay or until stated goals are met, whichever comes first.    TREATMENT:   TREATMENT:   Therapeutic Activity (30 Minutes): Therapeutic activity included Rolling, Supine to Sit, Sit to Supine, Scooting, and Sitting balance  to improve functional Activity tolerance, Balance, and Mobility.  Neuromuscular Re-education (33 Minutes): Neuromuscular Re-education included vestibular assessment & canalith repositioning maneuvers to improve Balance and Functional Mobility.    TREATMENT GRID:  Vestibular Assessment revealed the following:  EOM - R horizontal nystagmus w/ R gaze  Saccades - intact (R horizontal nystagmus elicited w/ R gaze)  Head Impulse Test - Abnormal  Roll Test - R ageotropic nystagmus elicited >30 seconds in duration;  no nystagmus appreciated to L    Treatment - Pt. Performed Casani's maneuver for L sided horizontal canal BPPV    AFTER TREATMENT PRECAUTIONS: Alarm Activated, Bed/Chair Locked, Call light within reach, Chair, Needs within reach, and RN notified    INTERDISCIPLINARY COLLABORATION:  RN/ PCT and PT/ PTA      TIME IN/OUT:  Time In: 1435  Time Out: 1538  Minutes: 63    Ludivina Cintron, PT    
Oximetry: 115 beats per minute  Pulse Oximeter Device Mode: Continuous  Pulse Oximeter Device Location: Finger  O2 Device: None (Room air)  Oximetry Probe Site Changed: No  Skin Assessment: Clean, dry, & intact  Skin Protection for O2 Device: Yes  Orientation: Anterior, Bilateral  Location: Cheek  Intervention(s): Hydrocolloid Dressing  FiO2 : 30 %  PEEP/CPAP (cm H2O): 5 cm H20  O2 Flow Rate (L/min): 5 L/min  Vent Mode: SIMV/PRVC    Estimated body mass index is 30.57 kg/m² as calculated from the following:    Height as of this encounter: 1.753 m (5' 9\").    Weight as of this encounter: 93.9 kg (207 lb).    Intake/Output Summary (Last 24 hours) at 4/9/2024 1126  Last data filed at 4/9/2024 0815  Gross per 24 hour   Intake 717 ml   Output --   Net 717 ml         Physical Exam:     General:    Well nourished.    Head:  Normocephalic, atraumatic  Eyes:  Sclerae appear normal.  Pupils equally round.  ENT:  Nares appear normal.  Moist oral mucosa  Neck:  No restricted ROM.  Trachea midline   CV:   RRR.  No m/r/g.  No jugular venous distension.  Lungs:   CTAB.  No wheezing.  Symmetric expansion.  Abdomen:   Soft, slight tenderness to palpation in Epigastric region, nondistended.  Extremities: No cyanosis or clubbing.  No edema  Skin:     No rashes.  Normal coloration.   Warm and dry.    Neuro:  CN II-XII grossly intact.    Psych:  Normal mood and affect.      I have personally reviewed labs and tests:  Recent Labs:  Recent Results (from the past 48 hour(s))   CBC with Auto Differential    Collection Time: 04/08/24  5:04 AM   Result Value Ref Range    WBC 9.6 4.3 - 11.1 K/uL    RBC 3.60 (L) 4.05 - 5.2 M/uL    Hemoglobin 10.4 (L) 11.7 - 15.4 g/dL    Hematocrit 31.5 (L) 35.8 - 46.3 %    MCV 87.5 82 - 102 FL    MCH 28.9 26.1 - 32.9 PG    MCHC 33.0 31.4 - 35.0 g/dL    RDW 13.2 11.9 - 14.6 %    Platelets 252 150 - 450 K/uL    MPV 9.8 9.4 - 12.3 FL    nRBC 0.00 0.0 - 0.2 K/uL    Differential Type AUTOMATED      Neutrophils % 63 
calculated from the following:    Height as of this encounter: 1.753 m (5' 9\").    Weight as of this encounter: 93.9 kg (207 lb).    Intake/Output Summary (Last 24 hours) at 4/6/2024 1145  Last data filed at 4/5/2024 2300  Gross per 24 hour   Intake --   Output 300 ml   Net -300 ml           Physical Exam:   General:          NAD  Head:              Normocephalic, atraumatic  ENT:                Nares appear normal.  Moist oral mucosa  CV:                  RRR.  No m/r/g.   Lungs:             CTAB, No wheezing.  Unlabored on room air.  GI: + BS, soft. + epigastric TTP  Skin:                No rashes.  Normal coloration.   Warm and dry.    Neuro:             Alert, oriented.  PERRL.  Antigravity throughout.  Follows all commands.     Psych: Flat affect and mood    I have personally reviewed labs and tests:  Recent Labs:  Recent Results (from the past 48 hour(s))   PLEASE READ & DOCUMENT PPD TEST IN 72 HRS    Collection Time: 04/05/24 12:00 AM   Result Value Ref Range    PPD, (POC) Negative     mm Induration 0 0 - 5 mm   Potassium    Collection Time: 04/05/24  6:21 AM   Result Value Ref Range    Potassium 3.8 3.5 - 5.1 mmol/L       No results for input(s): \"COVID19\" in the last 72 hours.    Current Meds:  Current Facility-Administered Medications   Medication Dose Route Frequency    carvedilol (COREG) tablet 6.25 mg  6.25 mg Oral BID WC    hydrALAZINE (APRESOLINE) injection 10 mg  10 mg IntraVENous Q6H PRN    prochlorperazine (COMPAZINE) injection 10 mg  10 mg IntraVENous Q6H PRN    lactulose (CHRONULAC) 10 GM/15ML solution 20 g  20 g Oral BID PRN    sennosides-docusate sodium (SENOKOT-S) 8.6-50 MG tablet 2 tablet  2 tablet Oral Daily    dicyclomine (BENTYL) tablet 20 mg  20 mg Oral 4x Daily PRN    DULoxetine (CYMBALTA) extended release capsule 60 mg  60 mg Oral Daily    gabapentin (NEURONTIN) capsule 100 mg  100 mg Oral Q12H    pantoprazole (PROTONIX) tablet 40 mg  40 mg Oral BID AC    sucralfate (CARAFATE) tablet 1 
in here\"     Social/Functional Lives With: Daughter  Type of Home: House  Home Layout: One level  Home Equipment:  (has Rolator and canes , but does not normally use)  Receives Help From: Family  ADL Assistance: Independent  Homemaking Assistance: Independent  Homemaking Responsibilities: Yes  Ambulation Assistance: Independent  Transfer Assistance: Independent  Active : No  Patient's  Info: daughter    OBJECTIVE:     PAIN: VITALS / O2: PRECAUTION / LINES / DRAINS:   Pre Treatment: Reports nausea - RN notified         Post Treatment: No pain reported at rest Vitals        Oxygen      IV    RESTRICTIONS/PRECAUTIONS:                    GROSS EVALUATION:  Intact Impaired (Comments):   AROM []  Decreased global extension   PROM [] NT formally   Strength []  Generalized weakness.    Balance []  Fair trunk control, requiring intermittent support   Posture [] N/A   Sensation []  Equal and intact for BLEs   Coordination [x]      Tone []  NT formally   Edema [x]    Activity Tolerance []  Quick to fatigue    []      COGNITION/  PERCEPTION: Intact Impaired (Comments):   Orientation []  Oriented to self and location only   Vision []  Appears functionally intact   Hearing []  Appears functionally intact    Cognition  []  Flat affect. Poor safety awareness. Increased processing time     MOBILITY: I Mod I S SBA CGA Min Mod Max Total  NT x2 Comments:   Bed Mobility    Rolling [] [] [] [] [] [] [] [] [] [x] []    Supine to Sit [] [] [] [] [] [x] [] [] [] [] []    Scooting [] [] [] [] [] [] [x] [] [] [] [x]    Sit to Supine [] [] [] [] [] [] [x] [] [] [] []    Transfers    Sit to Stand [] [] [] [] [] [] [x] [] [] [] []    Bed to Chair [] [] [] [] [] [] [] [] [] [x] [] Refused   Stand to Sit [] [] [] [] [] [x] [x] [] [] [] []     [] [] [] [] [] [] [] [] [] [] []    I=Independent, Mod I=Modified Independent, S=Supervision, SBA=Standby Assistance, CGA=Contact Guard Assistance,   Min=Minimal Assistance, Mod=Moderate 
present):          Telemetry (if present):           Hospital Problems:  Principal Problem:    Respiratory arrest (HCC)  Active Problems:    Hypothyroidism    MDD (major depressive disorder)    DM2 (diabetes mellitus, type 2) (HCC)    Chronic nausea    Orthostatic hypotension    Dizziness  Resolved Problems:    Septic shock ([facility])      Objective:   Patient Vitals for the past 24 hrs:   Temp Pulse Resp BP SpO2   04/10/24 0936 97.5 °F (36.4 °C) 82 17 (!) 148/97 100 %   04/09/24 1932 97.7 °F (36.5 °C) 72 18 (!) 152/78 99 %       Oxygen Therapy  SpO2: 100 %  Pulse Oximetry Type: Continuous  Pulse via Oximetry: 115 beats per minute  Pulse Oximeter Device Mode: Continuous  Pulse Oximeter Device Location: Finger  O2 Device: None (Room air)  Oximetry Probe Site Changed: No  Skin Assessment: Clean, dry, & intact  Skin Protection for O2 Device: Yes  Orientation: Anterior, Bilateral  Location: Cheek  Intervention(s): Hydrocolloid Dressing  FiO2 : 30 %  PEEP/CPAP (cm H2O): 5 cm H20  O2 Flow Rate (L/min): 5 L/min  Vent Mode: SIMV/PRVC    Estimated body mass index is 30.57 kg/m² as calculated from the following:    Height as of this encounter: 1.753 m (5' 9\").    Weight as of this encounter: 93.9 kg (207 lb).    Intake/Output Summary (Last 24 hours) at 4/10/2024 1425  Last data filed at 4/9/2024 1721  Gross per 24 hour   Intake 222 ml   Output --   Net 222 ml         Physical Exam:     General:    Well nourished.    Head:  Normocephalic, atraumatic  Eyes:  Sclerae appear normal.  Pupils equally round.  ENT:  Nares appear normal.  Moist oral mucosa  Neck:  No restricted ROM.  Trachea midline   CV:   RRR.  No m/r/g.  No jugular venous distension.  Lungs:   CTAB.  No wheezing.  Symmetric expansion.  Abdomen:   Soft, slight tenderness to palpation in Epigastric region, nondistended.  Extremities: No cyanosis or clubbing.  No edema  Skin:     No rashes.  Normal coloration.   Warm and dry.    Neuro:  CN II-XII grossly intact.  
0.5 - 4.6 K/UL    Monocytes Absolute 0.8 0.1 - 1.3 K/UL    Eosinophils Absolute 0.2 0.0 - 0.8 K/UL    Basophils Absolute 0.1 0.0 - 0.2 K/UL    Immature Granulocytes Absolute 0.1 0.0 - 0.5 K/UL   Basic Metabolic Panel w/ Reflex to MG    Collection Time: 04/08/24  5:04 AM   Result Value Ref Range    Sodium 139 136 - 146 mmol/L    Potassium 3.7 3.5 - 5.1 mmol/L    Chloride 106 103 - 113 mmol/L    CO2 27 21 - 32 mmol/L    Anion Gap 6 2 - 11 mmol/L    Glucose 84 65 - 100 mg/dL    BUN 12 8 - 23 MG/DL    Creatinine 0.50 (L) 0.6 - 1.0 MG/DL    Est, Glom Filt Rate >90 >60 ml/min/1.73m2    Calcium 9.5 8.3 - 10.4 MG/DL   Transferrin Saturation    Collection Time: 04/08/24  5:04 AM   Result Value Ref Range    Iron 108 35 - 150 ug/dL    TIBC 214 (L) 250 - 450 ug/dL    TRANSFERRIN SATURATION 50 >20 %   Vitamin B12    Collection Time: 04/08/24  5:04 AM   Result Value Ref Range    Vitamin B-12 528 193 - 986 pg/mL   Ferritin    Collection Time: 04/08/24  5:04 AM   Result Value Ref Range    Ferritin 428 (H) 8 - 388 NG/ML   Folate    Collection Time: 04/08/24  5:04 AM   Result Value Ref Range    Folate 2.2 (L) 3.1 - 17.5 ng/mL   COVID-19, Rapid    Collection Time: 04/08/24  8:48 AM    Specimen: Nasopharyngeal   Result Value Ref Range    Source NASAL      SARS-CoV-2, Rapid Not detected NOTD         Recent Labs     04/08/24  0848   COVID19 Not detected       Current Meds:  Current Facility-Administered Medications   Medication Dose Route Frequency    carvedilol (COREG) tablet 3.125 mg  3.125 mg Oral BID WC    polyethylene glycol (GLYCOLAX) packet 17 g  17 g Oral BID PRN    hydrALAZINE (APRESOLINE) injection 10 mg  10 mg IntraVENous Q6H PRN    prochlorperazine (COMPAZINE) injection 10 mg  10 mg IntraVENous Q6H PRN    lactulose (CHRONULAC) 10 GM/15ML solution 20 g  20 g Oral BID PRN    sennosides-docusate sodium (SENOKOT-S) 8.6-50 MG tablet 2 tablet  2 tablet Oral Daily    dicyclomine (BENTYL) tablet 20 mg  20 mg Oral 4x Daily PRN    
Time: 04/02/24  6:14 AM   Result Value Ref Range    Magnesium 1.8 1.8 - 2.4 mg/dL   CBC    Collection Time: 04/03/24  6:14 AM   Result Value Ref Range    WBC 13.8 (H) 4.3 - 11.1 K/uL    RBC 3.70 (L) 4.05 - 5.2 M/uL    Hemoglobin 10.7 (L) 11.7 - 15.4 g/dL    Hematocrit 31.8 (L) 35.8 - 46.3 %    MCV 85.9 82 - 102 FL    MCH 28.9 26.1 - 32.9 PG    MCHC 33.6 31.4 - 35.0 g/dL    RDW 13.2 11.9 - 14.6 %    Platelets 238 150 - 450 K/uL    MPV 9.8 9.4 - 12.3 FL    nRBC 0.00 0.0 - 0.2 K/uL   Basic Metabolic Panel    Collection Time: 04/03/24  6:14 AM   Result Value Ref Range    Sodium 142 136 - 146 mmol/L    Potassium 2.7 (L) 3.5 - 5.1 mmol/L    Chloride 110 103 - 113 mmol/L    CO2 23 21 - 32 mmol/L    Anion Gap 9 2 - 11 mmol/L    Glucose 108 (H) 65 - 100 mg/dL    BUN 13 8 - 23 MG/DL    Creatinine 0.40 (L) 0.6 - 1.0 MG/DL    Est, Glom Filt Rate >90 >60 ml/min/1.73m2    Calcium 9.3 8.3 - 10.4 MG/DL       No results for input(s): \"COVID19\" in the last 72 hours.    Current Meds:  Current Facility-Administered Medications   Medication Dose Route Frequency    dextrose 5 % and 0.45 % NaCl with KCl 20 mEq infusion   IntraVENous Continuous    magnesium sulfate 2000 mg in 50 mL IVPB premix  2,000 mg IntraVENous Once    tuberculin injection 5 Units  5 Units IntraDERmal Once    DULoxetine (CYMBALTA) extended release capsule 60 mg  60 mg Oral Daily    gabapentin (NEURONTIN) capsule 100 mg  100 mg Oral Q12H    pantoprazole (PROTONIX) tablet 40 mg  40 mg Oral BID AC    sucralfate (CARAFATE) tablet 1 g  1 g Oral 4x Daily    levothyroxine (SYNTHROID) tablet 100 mcg  100 mcg Oral QAM AC    sodium chloride flush 0.9 % injection 5-40 mL  5-40 mL IntraVENous 2 times per day    sodium chloride flush 0.9 % injection 5-40 mL  5-40 mL IntraVENous PRN    0.9 % sodium chloride infusion   IntraVENous PRN    potassium chloride 10 mEq/100 mL IVPB (Peripheral Line)  10 mEq IntraVENous PRN    enoxaparin (LOVENOX) injection 40 mg  40 mg SubCUTAneous Daily    
Q6H PRN    prochlorperazine (COMPAZINE) injection 10 mg  10 mg IntraVENous Q6H PRN    lactulose (CHRONULAC) 10 GM/15ML solution 20 g  20 g Oral BID PRN    sennosides-docusate sodium (SENOKOT-S) 8.6-50 MG tablet 2 tablet  2 tablet Oral Daily    dicyclomine (BENTYL) tablet 20 mg  20 mg Oral 4x Daily PRN    DULoxetine (CYMBALTA) extended release capsule 60 mg  60 mg Oral Daily    pantoprazole (PROTONIX) tablet 40 mg  40 mg Oral BID AC    sucralfate (CARAFATE) tablet 1 g  1 g Oral 4x Daily    levothyroxine (SYNTHROID) tablet 100 mcg  100 mcg Oral QAM AC    sodium chloride flush 0.9 % injection 5-40 mL  5-40 mL IntraVENous 2 times per day    sodium chloride flush 0.9 % injection 5-40 mL  5-40 mL IntraVENous PRN    0.9 % sodium chloride infusion   IntraVENous PRN    enoxaparin (LOVENOX) injection 40 mg  40 mg SubCUTAneous Daily    ondansetron (ZOFRAN-ODT) disintegrating tablet 4 mg  4 mg Oral Q8H PRN    Or    ondansetron (ZOFRAN) injection 4 mg  4 mg IntraVENous Q6H PRN    acetaminophen (TYLENOL) tablet 650 mg  650 mg Oral Q6H PRN    Or    acetaminophen (TYLENOL) suppository 650 mg  650 mg Rectal Q6H PRN       Signed:  Sarita Leon MD    Part of this note may have been written by using a voice dictation software.  The note has been proof read but may still contain some grammatical/other typographical errors.

## 2024-04-17 NOTE — DISCHARGE SUMMARY
Mode: Continuous  Pulse Oximeter Device Location: Finger  O2 Device: None (Room air)  Oximetry Probe Site Changed: No  Skin Assessment: Clean, dry, & intact  Skin Protection for O2 Device: Yes  Orientation: Anterior, Bilateral  Location: Cheek  Intervention(s): Hydrocolloid Dressing  FiO2 : 30 %  PEEP/CPAP (cm H2O): 5 cm H20  O2 Flow Rate (L/min): 5 L/min  Vent Mode: SIMV/PRVC    Estimated body mass index is 30.57 kg/m² as calculated from the following:    Height as of this encounter: 1.753 m (5' 9\").    Weight as of this encounter: 93.9 kg (207 lb).    Intake/Output Summary (Last 24 hours) at 4/17/2024 1141  Last data filed at 4/17/2024 1047  Gross per 24 hour   Intake 600 ml   Output --   Net 600 ml         Physical Exam:    General:    Well nourished.  No overt distress  Head:  Normocephalic, atraumatic  Eyes:  Sclerae appear normal.  Pupils equally round.    HENT:  Nares appear normal, no drainage.  Moist mucous membranes  Neck:  No restricted ROM.  Trachea midline  CV:   RRR.  No m/r/g.  No JVD  Lungs:   CTAB.  No wheezing.  Respirations even, unlabored  Abdomen:   Soft, nontender, nondistended.    Extremities: Warm and dry.   No edema.    Skin:     No rashes.  Normal coloration  Neuro:  CN II-XII grossly intact.  Psych:  Normal mood and affect.    Signed:  Sarita Leon MD    Part of this note may have been written by using a voice dictation software.  The note has been proof read but may still contain some grammatical/other typographical errors.

## 2024-04-17 NOTE — CARE COORDINATION
Patient is medically ready for discharge to [facility] per attending provider.  Patient will discharge at 1300 to room 324 at [facility] via [facility] transport; reference number [id number].  Primary RN can call report to [phone].  PPD and Covid testing completed.  2nd ILM completed yesterday.  Daughter updated over phone and is agreeable to discharge plans.

## 2024-05-05 ENCOUNTER — APPOINTMENT (OUTPATIENT)
Dept: CT IMAGING | Age: 69
DRG: 690 | End: 2024-05-05
Payer: MEDICARE

## 2024-05-05 ENCOUNTER — HOSPITAL ENCOUNTER (INPATIENT)
Age: 69
LOS: 8 days | Discharge: NURSING, REHAB OR LONG-TERM CARE | DRG: 690 | End: 2024-05-13
Attending: EMERGENCY MEDICINE | Admitting: HOSPITALIST
Payer: MEDICARE

## 2024-05-05 DIAGNOSIS — R53.1 GENERAL WEAKNESS: ICD-10-CM

## 2024-05-05 DIAGNOSIS — R11.2 INTRACTABLE NAUSEA AND VOMITING: Primary | ICD-10-CM

## 2024-05-05 DIAGNOSIS — N30.01 ACUTE CYSTITIS WITH HEMATURIA: ICD-10-CM

## 2024-05-05 DIAGNOSIS — E87.6 HYPOKALEMIA: ICD-10-CM

## 2024-05-05 DIAGNOSIS — R09.2 RESPIRATORY ARREST (HCC): ICD-10-CM

## 2024-05-05 DIAGNOSIS — E83.42 HYPOMAGNESEMIA: ICD-10-CM

## 2024-05-05 PROBLEM — N39.0 ACUTE UTI: Status: ACTIVE | Noted: 2024-05-05

## 2024-05-05 PROBLEM — I16.0 HYPERTENSIVE URGENCY: Status: ACTIVE | Noted: 2024-05-05

## 2024-05-05 LAB
ALBUMIN SERPL-MCNC: 2.4 G/DL (ref 3.2–4.6)
ALBUMIN/GLOB SERPL: 0.9 (ref 1–1.9)
ALP SERPL-CCNC: 97 U/L (ref 35–104)
ALT SERPL-CCNC: 18 U/L (ref 12–65)
ANION GAP SERPL CALC-SCNC: 15 MMOL/L (ref 9–18)
APPEARANCE UR: ABNORMAL
AST SERPL-CCNC: 35 U/L (ref 15–37)
BACTERIA URNS QL MICRO: ABNORMAL /HPF
BASOPHILS # BLD: 0.1 K/UL (ref 0–0.2)
BASOPHILS NFR BLD: 0 % (ref 0–2)
BILIRUB SERPL-MCNC: 0.5 MG/DL (ref 0–1.2)
BILIRUB UR QL: NEGATIVE
BUN SERPL-MCNC: 11 MG/DL (ref 8–23)
CALCIUM SERPL-MCNC: 8.8 MG/DL (ref 8.8–10.2)
CASTS URNS QL MICRO: ABNORMAL /LPF
CHLORIDE SERPL-SCNC: 105 MMOL/L (ref 98–107)
CO2 SERPL-SCNC: 23 MMOL/L (ref 20–28)
COLOR UR: ABNORMAL
CREAT SERPL-MCNC: 0.48 MG/DL (ref 0.6–1.1)
CRYSTALS URNS QL MICRO: 0 /LPF
DIFFERENTIAL METHOD BLD: ABNORMAL
EKG ATRIAL RATE: 85 BPM
EKG DIAGNOSIS: NORMAL
EKG P AXIS: 62 DEGREES
EKG P-R INTERVAL: 241 MS
EKG Q-T INTERVAL: 444 MS
EKG QRS DURATION: 89 MS
EKG QTC CALCULATION (BAZETT): 525 MS
EKG R AXIS: 24 DEGREES
EKG T AXIS: 70 DEGREES
EKG VENTRICULAR RATE: 84 BPM
EOSINOPHIL # BLD: 0.1 K/UL (ref 0–0.8)
EOSINOPHIL NFR BLD: 1 % (ref 0.5–7.8)
EPI CELLS #/AREA URNS HPF: 0 /HPF
ERYTHROCYTE [DISTWIDTH] IN BLOOD BY AUTOMATED COUNT: 16.2 % (ref 11.9–14.6)
GLOBULIN SER CALC-MCNC: 2.8 G/DL (ref 2.3–3.5)
GLUCOSE SERPL-MCNC: 109 MG/DL (ref 70–99)
GLUCOSE UR STRIP.AUTO-MCNC: NEGATIVE MG/DL
HCT VFR BLD AUTO: 35.6 % (ref 35.8–46.3)
HGB BLD-MCNC: 11.5 G/DL (ref 11.7–15.4)
HGB UR QL STRIP: ABNORMAL
IMM GRANULOCYTES # BLD AUTO: 0.1 K/UL (ref 0–0.5)
IMM GRANULOCYTES NFR BLD AUTO: 1 % (ref 0–5)
KETONES UR QL STRIP.AUTO: ABNORMAL MG/DL
LACTATE SERPL-SCNC: 1.1 MMOL/L (ref 0.5–2)
LACTATE SERPL-SCNC: 1.3 MMOL/L (ref 0.5–2)
LEUKOCYTE ESTERASE UR QL STRIP.AUTO: ABNORMAL
LIPASE SERPL-CCNC: 10 U/L (ref 13–60)
LYMPHOCYTES # BLD: 1.5 K/UL (ref 0.5–4.6)
LYMPHOCYTES NFR BLD: 10 % (ref 13–44)
MAGNESIUM SERPL-MCNC: 1.6 MG/DL (ref 1.8–2.4)
MCH RBC QN AUTO: 28.9 PG (ref 26.1–32.9)
MCHC RBC AUTO-ENTMCNC: 32.3 G/DL (ref 31.4–35)
MCV RBC AUTO: 89.4 FL (ref 82–102)
MONOCYTES # BLD: 0.9 K/UL (ref 0.1–1.3)
MONOCYTES NFR BLD: 6 % (ref 4–12)
MUCOUS THREADS URNS QL MICRO: 0 /LPF
NEUTS SEG # BLD: 12.4 K/UL (ref 1.7–8.2)
NEUTS SEG NFR BLD: 82 % (ref 43–78)
NITRITE UR QL STRIP.AUTO: NEGATIVE
NRBC # BLD: 0 K/UL (ref 0–0.2)
OTHER OBSERVATIONS: ABNORMAL
PH UR STRIP: 8.5 (ref 5–9)
PLATELET # BLD AUTO: 336 K/UL (ref 150–450)
PMV BLD AUTO: 8.9 FL (ref 9.4–12.3)
POTASSIUM SERPL-SCNC: 2.8 MMOL/L (ref 3.5–5.1)
POTASSIUM SERPL-SCNC: 2.9 MMOL/L (ref 3.5–5.1)
POTASSIUM SERPL-SCNC: 3.1 MMOL/L (ref 3.5–5.1)
PROCALCITONIN SERPL-MCNC: 0.16 NG/ML (ref 0–0.1)
PROT SERPL-MCNC: 5.3 G/DL (ref 6.3–8.2)
PROT UR STRIP-MCNC: 100 MG/DL
RBC # BLD AUTO: 3.98 M/UL (ref 4.05–5.2)
RBC #/AREA URNS HPF: ABNORMAL /HPF
SODIUM SERPL-SCNC: 143 MMOL/L (ref 136–145)
SP GR UR REFRACTOMETRY: 1.02 (ref 1–1.02)
URINE CULTURE IF INDICATED: ABNORMAL
UROBILINOGEN UR QL STRIP.AUTO: 1 EU/DL (ref 0.2–1)
VIT B12 SERPL-MCNC: 488 PG/ML (ref 193–986)
WBC # BLD AUTO: 15.1 K/UL (ref 4.3–11.1)
WBC URNS QL MICRO: ABNORMAL /HPF
YEAST URNS QL MICRO: ABNORMAL

## 2024-05-05 PROCEDURE — 6360000002 HC RX W HCPCS: Performed by: FAMILY MEDICINE

## 2024-05-05 PROCEDURE — 93005 ELECTROCARDIOGRAM TRACING: CPT

## 2024-05-05 PROCEDURE — 2580000003 HC RX 258: Performed by: HOSPITALIST

## 2024-05-05 PROCEDURE — 2580000003 HC RX 258

## 2024-05-05 PROCEDURE — 6370000000 HC RX 637 (ALT 250 FOR IP): Performed by: HOSPITALIST

## 2024-05-05 PROCEDURE — 82607 VITAMIN B-12: CPT

## 2024-05-05 PROCEDURE — 80053 COMPREHEN METABOLIC PANEL: CPT

## 2024-05-05 PROCEDURE — 6360000002 HC RX W HCPCS: Performed by: HOSPITALIST

## 2024-05-05 PROCEDURE — 85025 COMPLETE CBC W/AUTO DIFF WBC: CPT

## 2024-05-05 PROCEDURE — 83605 ASSAY OF LACTIC ACID: CPT

## 2024-05-05 PROCEDURE — 83690 ASSAY OF LIPASE: CPT

## 2024-05-05 PROCEDURE — 84132 ASSAY OF SERUM POTASSIUM: CPT

## 2024-05-05 PROCEDURE — 83735 ASSAY OF MAGNESIUM: CPT

## 2024-05-05 PROCEDURE — 87040 BLOOD CULTURE FOR BACTERIA: CPT

## 2024-05-05 PROCEDURE — 74177 CT ABD & PELVIS W/CONTRAST: CPT

## 2024-05-05 PROCEDURE — 1100000003 HC PRIVATE W/ TELEMETRY

## 2024-05-05 PROCEDURE — 93010 ELECTROCARDIOGRAM REPORT: CPT | Performed by: INTERNAL MEDICINE

## 2024-05-05 PROCEDURE — 96365 THER/PROPH/DIAG IV INF INIT: CPT

## 2024-05-05 PROCEDURE — 6360000002 HC RX W HCPCS

## 2024-05-05 PROCEDURE — 87086 URINE CULTURE/COLONY COUNT: CPT

## 2024-05-05 PROCEDURE — 36415 COLL VENOUS BLD VENIPUNCTURE: CPT

## 2024-05-05 PROCEDURE — 96375 TX/PRO/DX INJ NEW DRUG ADDON: CPT

## 2024-05-05 PROCEDURE — 2580000003 HC RX 258: Performed by: EMERGENCY MEDICINE

## 2024-05-05 PROCEDURE — 6360000004 HC RX CONTRAST MEDICATION: Performed by: HOSPITALIST

## 2024-05-05 PROCEDURE — 87186 SC STD MICRODIL/AGAR DIL: CPT

## 2024-05-05 PROCEDURE — 81001 URINALYSIS AUTO W/SCOPE: CPT

## 2024-05-05 PROCEDURE — 87088 URINE BACTERIA CULTURE: CPT

## 2024-05-05 PROCEDURE — 84145 PROCALCITONIN (PCT): CPT

## 2024-05-05 PROCEDURE — 99285 EMERGENCY DEPT VISIT HI MDM: CPT

## 2024-05-05 RX ORDER — POTASSIUM CHLORIDE 20 MEQ/1
40 TABLET, EXTENDED RELEASE ORAL PRN
Status: DISCONTINUED | OUTPATIENT
Start: 2024-05-05 | End: 2024-05-13 | Stop reason: HOSPADM

## 2024-05-05 RX ORDER — ENOXAPARIN SODIUM 100 MG/ML
40 INJECTION SUBCUTANEOUS DAILY
Status: DISCONTINUED | OUTPATIENT
Start: 2024-05-05 | End: 2024-05-13 | Stop reason: HOSPADM

## 2024-05-05 RX ORDER — SCOLOPAMINE TRANSDERMAL SYSTEM 1 MG/1
1 PATCH, EXTENDED RELEASE TRANSDERMAL
Status: DISCONTINUED | OUTPATIENT
Start: 2024-05-05 | End: 2024-05-13 | Stop reason: HOSPADM

## 2024-05-05 RX ORDER — 0.9 % SODIUM CHLORIDE 0.9 %
1000 INTRAVENOUS SOLUTION INTRAVENOUS ONCE
Status: COMPLETED | OUTPATIENT
Start: 2024-05-05 | End: 2024-05-05

## 2024-05-05 RX ORDER — SODIUM CHLORIDE 9 MG/ML
INJECTION, SOLUTION INTRAVENOUS CONTINUOUS
Status: DISCONTINUED | OUTPATIENT
Start: 2024-05-05 | End: 2024-05-06

## 2024-05-05 RX ORDER — SODIUM CHLORIDE 0.9 % (FLUSH) 0.9 %
5-40 SYRINGE (ML) INJECTION EVERY 12 HOURS SCHEDULED
Status: DISCONTINUED | OUTPATIENT
Start: 2024-05-05 | End: 2024-05-13 | Stop reason: HOSPADM

## 2024-05-05 RX ORDER — ONDANSETRON 2 MG/ML
4 INJECTION INTRAMUSCULAR; INTRAVENOUS EVERY 6 HOURS PRN
Status: DISCONTINUED | OUTPATIENT
Start: 2024-05-05 | End: 2024-05-13 | Stop reason: HOSPADM

## 2024-05-05 RX ORDER — METOCLOPRAMIDE 10 MG/1
10 TABLET ORAL
Status: DISCONTINUED | OUTPATIENT
Start: 2024-05-05 | End: 2024-05-05

## 2024-05-05 RX ORDER — METOCLOPRAMIDE HYDROCHLORIDE 5 MG/ML
10 INJECTION INTRAMUSCULAR; INTRAVENOUS
Status: DISCONTINUED | OUTPATIENT
Start: 2024-05-05 | End: 2024-05-09

## 2024-05-05 RX ORDER — ACETAMINOPHEN 650 MG/1
650 SUPPOSITORY RECTAL EVERY 6 HOURS PRN
Status: DISCONTINUED | OUTPATIENT
Start: 2024-05-05 | End: 2024-05-13 | Stop reason: HOSPADM

## 2024-05-05 RX ORDER — ACETAMINOPHEN 325 MG/1
650 TABLET ORAL EVERY 6 HOURS PRN
Status: DISCONTINUED | OUTPATIENT
Start: 2024-05-05 | End: 2024-05-13 | Stop reason: HOSPADM

## 2024-05-05 RX ORDER — MECLIZINE HYDROCHLORIDE 25 MG/1
25 TABLET ORAL EVERY 6 HOURS SCHEDULED
Status: DISCONTINUED | OUTPATIENT
Start: 2024-05-05 | End: 2024-05-05

## 2024-05-05 RX ORDER — POTASSIUM CHLORIDE 7.45 MG/ML
10 INJECTION INTRAVENOUS
Status: DISPENSED | OUTPATIENT
Start: 2024-05-05 | End: 2024-05-05

## 2024-05-05 RX ORDER — LEVOTHYROXINE SODIUM 0.05 MG/1
100 TABLET ORAL
Status: DISCONTINUED | OUTPATIENT
Start: 2024-05-05 | End: 2024-05-11

## 2024-05-05 RX ORDER — ONDANSETRON 4 MG/1
4 TABLET, ORALLY DISINTEGRATING ORAL EVERY 8 HOURS PRN
Status: DISCONTINUED | OUTPATIENT
Start: 2024-05-05 | End: 2024-05-13 | Stop reason: HOSPADM

## 2024-05-05 RX ORDER — SUCRALFATE 1 G/1
1 TABLET ORAL 4 TIMES DAILY
Status: DISCONTINUED | OUTPATIENT
Start: 2024-05-05 | End: 2024-05-06

## 2024-05-05 RX ORDER — MAGNESIUM SULFATE IN WATER 40 MG/ML
2000 INJECTION, SOLUTION INTRAVENOUS PRN
Status: DISCONTINUED | OUTPATIENT
Start: 2024-05-05 | End: 2024-05-13 | Stop reason: HOSPADM

## 2024-05-05 RX ORDER — FLUDROCORTISONE ACETATE 0.1 MG/1
0.2 TABLET ORAL DAILY
Status: DISCONTINUED | OUTPATIENT
Start: 2024-05-05 | End: 2024-05-13 | Stop reason: HOSPADM

## 2024-05-05 RX ORDER — PANTOPRAZOLE SODIUM 40 MG/1
40 TABLET, DELAYED RELEASE ORAL
Status: DISCONTINUED | OUTPATIENT
Start: 2024-05-05 | End: 2024-05-13 | Stop reason: HOSPADM

## 2024-05-05 RX ORDER — POLYETHYLENE GLYCOL 3350 17 G/17G
17 POWDER, FOR SOLUTION ORAL DAILY PRN
Status: DISCONTINUED | OUTPATIENT
Start: 2024-05-05 | End: 2024-05-13 | Stop reason: HOSPADM

## 2024-05-05 RX ORDER — MECLIZINE HYDROCHLORIDE 25 MG/1
25 TABLET ORAL 3 TIMES DAILY PRN
Status: DISCONTINUED | OUTPATIENT
Start: 2024-05-05 | End: 2024-05-13 | Stop reason: HOSPADM

## 2024-05-05 RX ORDER — POTASSIUM CHLORIDE 7.45 MG/ML
10 INJECTION INTRAVENOUS PRN
Status: DISCONTINUED | OUTPATIENT
Start: 2024-05-05 | End: 2024-05-13 | Stop reason: HOSPADM

## 2024-05-05 RX ORDER — MAGNESIUM SULFATE IN WATER 40 MG/ML
2000 INJECTION, SOLUTION INTRAVENOUS ONCE
Status: COMPLETED | OUTPATIENT
Start: 2024-05-05 | End: 2024-05-05

## 2024-05-05 RX ORDER — HYDRALAZINE HYDROCHLORIDE 20 MG/ML
10 INJECTION INTRAMUSCULAR; INTRAVENOUS EVERY 6 HOURS PRN
Status: DISCONTINUED | OUTPATIENT
Start: 2024-05-05 | End: 2024-05-13 | Stop reason: HOSPADM

## 2024-05-05 RX ORDER — SODIUM CHLORIDE 0.9 % (FLUSH) 0.9 %
5-40 SYRINGE (ML) INJECTION PRN
Status: DISCONTINUED | OUTPATIENT
Start: 2024-05-05 | End: 2024-05-13 | Stop reason: HOSPADM

## 2024-05-05 RX ORDER — POTASSIUM CHLORIDE 7.45 MG/ML
10 INJECTION INTRAVENOUS
Status: COMPLETED | OUTPATIENT
Start: 2024-05-05 | End: 2024-05-05

## 2024-05-05 RX ORDER — DICYCLOMINE HCL 20 MG
20 TABLET ORAL
Status: DISCONTINUED | OUTPATIENT
Start: 2024-05-05 | End: 2024-05-13 | Stop reason: HOSPADM

## 2024-05-05 RX ORDER — SODIUM CHLORIDE 9 MG/ML
INJECTION, SOLUTION INTRAVENOUS PRN
Status: DISCONTINUED | OUTPATIENT
Start: 2024-05-05 | End: 2024-05-13 | Stop reason: HOSPADM

## 2024-05-05 RX ORDER — LACTOBACILLUS RHAMNOSUS GG 10B CELL
1 CAPSULE ORAL
Status: DISCONTINUED | OUTPATIENT
Start: 2024-05-06 | End: 2024-05-13 | Stop reason: HOSPADM

## 2024-05-05 RX ADMIN — ONDANSETRON 4 MG: 2 INJECTION INTRAMUSCULAR; INTRAVENOUS at 07:09

## 2024-05-05 RX ADMIN — ENOXAPARIN SODIUM 40 MG: 100 INJECTION SUBCUTANEOUS at 09:27

## 2024-05-05 RX ADMIN — METOCLOPRAMIDE 10 MG: 5 INJECTION, SOLUTION INTRAMUSCULAR; INTRAVENOUS at 18:08

## 2024-05-05 RX ADMIN — POTASSIUM CHLORIDE 10 MEQ: 7.46 INJECTION, SOLUTION INTRAVENOUS at 20:08

## 2024-05-05 RX ADMIN — HYDRALAZINE HYDROCHLORIDE 10 MG: 20 INJECTION INTRAMUSCULAR; INTRAVENOUS at 09:26

## 2024-05-05 RX ADMIN — POTASSIUM CHLORIDE 10 MEQ: 7.46 INJECTION, SOLUTION INTRAVENOUS at 13:02

## 2024-05-05 RX ADMIN — POTASSIUM CHLORIDE 10 MEQ: 7.46 INJECTION, SOLUTION INTRAVENOUS at 09:32

## 2024-05-05 RX ADMIN — SODIUM CHLORIDE: 9 INJECTION, SOLUTION INTRAVENOUS at 06:10

## 2024-05-05 RX ADMIN — POTASSIUM CHLORIDE 10 MEQ: 7.46 INJECTION, SOLUTION INTRAVENOUS at 06:12

## 2024-05-05 RX ADMIN — GABAPENTIN 400 MG: 300 CAPSULE ORAL at 20:03

## 2024-05-05 RX ADMIN — POTASSIUM CHLORIDE 10 MEQ: 7.46 INJECTION, SOLUTION INTRAVENOUS at 21:22

## 2024-05-05 RX ADMIN — SODIUM CHLORIDE: 9 INJECTION, SOLUTION INTRAVENOUS at 20:07

## 2024-05-05 RX ADMIN — POTASSIUM CHLORIDE 10 MEQ: 7.46 INJECTION, SOLUTION INTRAVENOUS at 18:07

## 2024-05-05 RX ADMIN — MAGNESIUM SULFATE HEPTAHYDRATE 2000 MG: 40 INJECTION, SOLUTION INTRAVENOUS at 04:39

## 2024-05-05 RX ADMIN — LEVOTHYROXINE SODIUM 100 MCG: 0.1 TABLET ORAL at 09:28

## 2024-05-05 RX ADMIN — IOPAMIDOL 100 ML: 755 INJECTION, SOLUTION INTRAVENOUS at 06:20

## 2024-05-05 RX ADMIN — SODIUM CHLORIDE, PRESERVATIVE FREE 10 ML: 5 INJECTION INTRAVENOUS at 09:29

## 2024-05-05 RX ADMIN — SODIUM CHLORIDE, PRESERVATIVE FREE 10 ML: 5 INJECTION INTRAVENOUS at 20:03

## 2024-05-05 RX ADMIN — POTASSIUM CHLORIDE 10 MEQ: 7.46 INJECTION, SOLUTION INTRAVENOUS at 11:46

## 2024-05-05 RX ADMIN — SODIUM CHLORIDE 1000 ML: 9 INJECTION, SOLUTION INTRAVENOUS at 03:57

## 2024-05-05 RX ADMIN — WATER 1000 MG: 1 INJECTION INTRAMUSCULAR; INTRAVENOUS; SUBCUTANEOUS at 03:54

## 2024-05-05 RX ADMIN — METOCLOPRAMIDE 10 MG: 5 INJECTION, SOLUTION INTRAMUSCULAR; INTRAVENOUS at 11:57

## 2024-05-05 RX ADMIN — POTASSIUM CHLORIDE 10 MEQ: 7.46 INJECTION, SOLUTION INTRAVENOUS at 10:32

## 2024-05-05 RX ADMIN — POTASSIUM CHLORIDE 10 MEQ: 7.46 INJECTION, SOLUTION INTRAVENOUS at 04:13

## 2024-05-05 RX ADMIN — SUCRALFATE 1 G: 1 TABLET ORAL at 20:03

## 2024-05-05 ASSESSMENT — PAIN SCALES - GENERAL: PAINLEVEL_OUTOF10: 3

## 2024-05-05 ASSESSMENT — PAIN - FUNCTIONAL ASSESSMENT: PAIN_FUNCTIONAL_ASSESSMENT: 0-10

## 2024-05-05 ASSESSMENT — PAIN DESCRIPTION - ORIENTATION: ORIENTATION: MID

## 2024-05-05 ASSESSMENT — PAIN DESCRIPTION - DESCRIPTORS: DESCRIPTORS: CRAMPING

## 2024-05-05 ASSESSMENT — PAIN DESCRIPTION - LOCATION: LOCATION: ABDOMEN

## 2024-05-05 NOTE — ED PROVIDER NOTES
respiratory failure with intubation.  She has been unable to ambulate without assistance.  Daughter states that she is helping her on and off the commode.  She did have a fall while at rehab.  She has been able to tolerate p.o. since her gastric bypass surgery in September.  Reports that she has also had chronic diarrhea.  She has had no fevers or chills.    The history is provided by the patient.       ROS     Review of Systems   Constitutional:  Negative for chills, fatigue and fever.   Gastrointestinal:  Positive for abdominal pain, diarrhea, nausea and vomiting.   Genitourinary:  Negative for dysuria.   Neurological:  Positive for weakness.   All other systems reviewed and are negative.       Physical Exam     Vitals signs and nursing note reviewed:  Vitals:    05/05/24 0141 05/05/24 0253   BP: (!) 148/99    Pulse: 83    Resp: 17    Temp: 98.3 °F (36.8 °C)    TempSrc: Oral    SpO2: 97%    Weight:  90.7 kg (200 lb)   Height:  1.753 m (5' 9\")      Physical Exam  Vitals and nursing note reviewed.   Constitutional:       Appearance: She is obese.   HENT:      Head: Normocephalic and atraumatic.      Mouth/Throat:      Mouth: Mucous membranes are dry.   Eyes:      Extraocular Movements: Extraocular movements intact.      Pupils: Pupils are equal, round, and reactive to light.   Cardiovascular:      Rate and Rhythm: Normal rate and regular rhythm.      Pulses: Normal pulses.      Heart sounds: Normal heart sounds.   Pulmonary:      Effort: Pulmonary effort is normal.      Breath sounds: Normal breath sounds.   Abdominal:      General: Abdomen is flat.      Palpations: Abdomen is soft.      Tenderness: There is abdominal tenderness (Suprapubic). There is no right CVA tenderness or left CVA tenderness.   Musculoskeletal:         General: Normal range of motion.      Cervical back: Normal range of motion.   Skin:     General: Skin is warm.      Capillary Refill: Capillary refill takes less than 2 seconds.   Neurological:

## 2024-05-05 NOTE — ED NOTES
Attempted IV x 1 without success, however was able to draw the blood      Alondra Ryan RN  05/05/24 0255

## 2024-05-05 NOTE — H&P
Hospitalist History and Physical   Admit Date:  2024  1:34 AM   Name:  Jesika Olsen   Age:  68 y.o.  Sex:  female  :  1955   MRN:  [id number]   Room:  [place] [address]    Presenting/Chief Complaint: Nausea     Reason(s) for Admission: Intractable nausea and vomiting [R11.2]     History of Present Illness:     67 years old female with past medical history of gastric bypass surgery in September, history of hypertension, diabetes type 2 improved since surgery, history of morbid obesity and lost almost 130 pounds since surgery, was discharged recently from hospital on .  Patient was admitted at that time with respiratory arrest and was intubated and eventually extubated and transferred to floor.  Physical therapy recommended for rehab and patient was discharged to rehab.  Patient reports rehab she was not getting enough sleep, not feeling very well so patient signed AGAINST MEDICAL ADVICE and went home.  Patient reports she was still feeling very weak and not able to mobilize herself.  Reports history of nausea, vomiting, abdominal pain, generalized weakness for past 1 week duration.  History of diarrhea intermittently.  Evaluated in emergency room, lab work shows potassium of 2.9, urinalysis shows evidence of UTI, white count is 15,000.  Emergency room physician requested admission of this patient for further evaluation and management.            Assessment & Plan:     Intractable nausea, vomiting, abdominal pain, diarrhea: Will request CT scan of abdomen to rule out any acute pathology which was not done in the emergency room.  History of EGD done in January which shows healing GJ anastomosis ulcer.    Urinary tract infection: Initiated on ceftriaxone, follow cultures.    Peripheral neuropathy: Continue on gabapentin.    Hypothyroidism: Continue on levothyroxine.    GERD: Continue on Protonix, sucralfate.    History of orthostatic hypotension: Placed on fludrocortisone during last admission we will

## 2024-05-05 NOTE — ED TRIAGE NOTES
Patient arrives via EMS from home c/o N/V, diarrhea and weakness. Ongoing problem since September 2023 s/p bariatric surgery. Worse past few days. States hypotension, per family. VSS. . Zofran 4mg given enroute.

## 2024-05-05 NOTE — ED NOTES
Ultrasound was used to find the vein which was compressible and does not have any features of an artery or nerve bundle. Skin was cleaned and disinfected prior to IV puncture. Under real-time ultrasound guidance, peripheral access was obtained in the L Antecubital 20G Peripheral IV catheter x 1 attempt/s. Blood return was present and IV flushed without difficulty. IV dressing applied, no immediate complications noted, and patient tolerated the procedure well.       Mann Lazcano RN  05/05/24 0444

## 2024-05-05 NOTE — ED NOTES
Ultrasound was used to find the vein which was compressible and does not have any features of an artery or nerve bundle. Skin was cleaned and disinfected prior to IV puncture. Under real-time ultrasound guidance, peripheral access was obtained in the R Antecubital 20G Peripheral IV catheter x 1 attempt/s. Blood return was present and IV flushed without difficulty. IV dressing applied, no immediate complications noted, and patient tolerated the procedure well.       Mann Lazcano RN  05/05/24 0028

## 2024-05-05 NOTE — ED NOTES
TRANSFER - OUT REPORT:    Verbal report given to Anthony SHIELDS  on Jesika Olsen  being transferred to 6th floor room 617 for routine progression of patient care       Report consisted of patient's Situation, Background, Assessment and   Recommendations(SBAR).     Information from the following report(s) ED SBAR was reviewed with the receiving nurse.    Alee Fall Assessment:    Presents to emergency department  because of falls (Syncope, seizure, or loss of consciousness): No  Age > 70: No  Altered Mental Status, Intoxication with alcohol or substance confusion (Disorientation, impaired judgment, poor safety awaremess, or inability to follow instructions): No  Impaired Mobility: Ambulates or transfers with assistive devices or assistance; Unable to ambulate or transer.: No  Nursing Judgement: No          Lines:   Peripheral IV 05/05/24 Distal;Right;Anterior Cephalic (Active)   Site Assessment Clean, dry & intact 05/05/24 0401   Line Status Normal saline locked 05/05/24 0401   Phlebitis Assessment No symptoms 05/05/24 0401   Infiltration Assessment 0 05/05/24 0401   Dressing Status Clean, dry & intact 05/05/24 0401   Dressing Type Transparent 05/05/24 0401       Peripheral IV 05/05/24 Left Antecubital (Active)   Site Assessment Clean, dry & intact 05/05/24 0444   Line Status Infusing 05/05/24 0444   Phlebitis Assessment No symptoms 05/05/24 0444   Infiltration Assessment 0 05/05/24 0444   Dressing Status Clean, dry & intact 05/05/24 0444   Dressing Type Transparent 05/05/24 0444        Opportunity for questions and clarification was provided.      Patient transported with:  Registered Nurse          Alondra Ryan RN  05/05/24 0633

## 2024-05-06 LAB
ALBUMIN SERPL-MCNC: 2 G/DL (ref 3.2–4.6)
ALBUMIN/GLOB SERPL: 0.7 (ref 1–1.9)
ALP SERPL-CCNC: 80 U/L (ref 35–104)
ALT SERPL-CCNC: 11 U/L (ref 12–65)
ANION GAP SERPL CALC-SCNC: 9 MMOL/L (ref 9–18)
AST SERPL-CCNC: 24 U/L (ref 15–37)
BASOPHILS # BLD: 0.1 K/UL (ref 0–0.2)
BASOPHILS NFR BLD: 1 % (ref 0–2)
BILIRUB SERPL-MCNC: 0.3 MG/DL (ref 0–1.2)
BUN SERPL-MCNC: 8 MG/DL (ref 8–23)
CALCIUM SERPL-MCNC: 8.3 MG/DL (ref 8.8–10.2)
CHLORIDE SERPL-SCNC: 110 MMOL/L (ref 98–107)
CO2 SERPL-SCNC: 25 MMOL/L (ref 20–28)
CREAT SERPL-MCNC: 0.38 MG/DL (ref 0.6–1.1)
DIFFERENTIAL METHOD BLD: ABNORMAL
EOSINOPHIL # BLD: 0.2 K/UL (ref 0–0.8)
EOSINOPHIL NFR BLD: 2 % (ref 0.5–7.8)
ERYTHROCYTE [DISTWIDTH] IN BLOOD BY AUTOMATED COUNT: 16.3 % (ref 11.9–14.6)
GLOBULIN SER CALC-MCNC: 2.8 G/DL (ref 2.3–3.5)
GLUCOSE SERPL-MCNC: 79 MG/DL (ref 70–99)
HCT VFR BLD AUTO: 31.8 % (ref 35.8–46.3)
HGB BLD-MCNC: 10 G/DL (ref 11.7–15.4)
IMM GRANULOCYTES # BLD AUTO: 0.1 K/UL (ref 0–0.5)
IMM GRANULOCYTES NFR BLD AUTO: 1 % (ref 0–5)
LYMPHOCYTES # BLD: 2 K/UL (ref 0.5–4.6)
LYMPHOCYTES NFR BLD: 20 % (ref 13–44)
MAGNESIUM SERPL-MCNC: 1.9 MG/DL (ref 1.8–2.4)
MCH RBC QN AUTO: 29 PG (ref 26.1–32.9)
MCHC RBC AUTO-ENTMCNC: 31.4 G/DL (ref 31.4–35)
MCV RBC AUTO: 92.2 FL (ref 82–102)
MONOCYTES # BLD: 0.7 K/UL (ref 0.1–1.3)
MONOCYTES NFR BLD: 7 % (ref 4–12)
NEUTS SEG # BLD: 7 K/UL (ref 1.7–8.2)
NEUTS SEG NFR BLD: 69 % (ref 43–78)
NRBC # BLD: 0 K/UL (ref 0–0.2)
PLATELET # BLD AUTO: 287 K/UL (ref 150–450)
PMV BLD AUTO: 9.3 FL (ref 9.4–12.3)
POTASSIUM SERPL-SCNC: 2.8 MMOL/L (ref 3.5–5.1)
POTASSIUM SERPL-SCNC: 3.2 MMOL/L (ref 3.5–5.1)
PROT SERPL-MCNC: 4.8 G/DL (ref 6.3–8.2)
RBC # BLD AUTO: 3.45 M/UL (ref 4.05–5.2)
SODIUM SERPL-SCNC: 143 MMOL/L (ref 136–145)
WBC # BLD AUTO: 10 K/UL (ref 4.3–11.1)

## 2024-05-06 PROCEDURE — 6370000000 HC RX 637 (ALT 250 FOR IP): Performed by: FAMILY MEDICINE

## 2024-05-06 PROCEDURE — 97535 SELF CARE MNGMENT TRAINING: CPT

## 2024-05-06 PROCEDURE — 85025 COMPLETE CBC W/AUTO DIFF WBC: CPT

## 2024-05-06 PROCEDURE — 36415 COLL VENOUS BLD VENIPUNCTURE: CPT

## 2024-05-06 PROCEDURE — 80053 COMPREHEN METABOLIC PANEL: CPT

## 2024-05-06 PROCEDURE — 6360000002 HC RX W HCPCS: Performed by: HOSPITALIST

## 2024-05-06 PROCEDURE — 6370000000 HC RX 637 (ALT 250 FOR IP): Performed by: INTERNAL MEDICINE

## 2024-05-06 PROCEDURE — 97161 PT EVAL LOW COMPLEX 20 MIN: CPT

## 2024-05-06 PROCEDURE — 1100000003 HC PRIVATE W/ TELEMETRY

## 2024-05-06 PROCEDURE — 84484 ASSAY OF TROPONIN QUANT: CPT

## 2024-05-06 PROCEDURE — 2500000003 HC RX 250 WO HCPCS: Performed by: NURSE PRACTITIONER

## 2024-05-06 PROCEDURE — 84132 ASSAY OF SERUM POTASSIUM: CPT

## 2024-05-06 PROCEDURE — 6360000002 HC RX W HCPCS: Performed by: FAMILY MEDICINE

## 2024-05-06 PROCEDURE — 83735 ASSAY OF MAGNESIUM: CPT

## 2024-05-06 PROCEDURE — 97165 OT EVAL LOW COMPLEX 30 MIN: CPT

## 2024-05-06 PROCEDURE — 6370000000 HC RX 637 (ALT 250 FOR IP): Performed by: HOSPITALIST

## 2024-05-06 PROCEDURE — 97530 THERAPEUTIC ACTIVITIES: CPT

## 2024-05-06 PROCEDURE — 2580000003 HC RX 258: Performed by: HOSPITALIST

## 2024-05-06 RX ORDER — SUCRALFATE 1 G/1
1 TABLET ORAL
Status: DISCONTINUED | OUTPATIENT
Start: 2024-05-06 | End: 2024-05-13 | Stop reason: HOSPADM

## 2024-05-06 RX ORDER — DEXTROSE MONOHYDRATE, SODIUM CHLORIDE, AND POTASSIUM CHLORIDE 50; 1.49; 4.5 G/1000ML; G/1000ML; G/1000ML
INJECTION, SOLUTION INTRAVENOUS CONTINUOUS
Status: DISCONTINUED | OUTPATIENT
Start: 2024-05-06 | End: 2024-05-10

## 2024-05-06 RX ADMIN — DICYCLOMINE HYDROCHLORIDE 20 MG: 20 TABLET ORAL at 11:39

## 2024-05-06 RX ADMIN — GABAPENTIN 400 MG: 300 CAPSULE ORAL at 08:29

## 2024-05-06 RX ADMIN — POTASSIUM CHLORIDE 10 MEQ: 7.46 INJECTION, SOLUTION INTRAVENOUS at 06:13

## 2024-05-06 RX ADMIN — Medication 1 CAPSULE: at 08:29

## 2024-05-06 RX ADMIN — METOCLOPRAMIDE 10 MG: 5 INJECTION, SOLUTION INTRAMUSCULAR; INTRAVENOUS at 16:21

## 2024-05-06 RX ADMIN — METOCLOPRAMIDE 10 MG: 5 INJECTION, SOLUTION INTRAMUSCULAR; INTRAVENOUS at 05:36

## 2024-05-06 RX ADMIN — SUCRALFATE 1 G: 1 TABLET ORAL at 08:29

## 2024-05-06 RX ADMIN — TUBERCULIN PURIFIED PROTEIN DERIVATIVE 5 UNITS: 5 INJECTION, SOLUTION INTRADERMAL at 16:22

## 2024-05-06 RX ADMIN — SUCRALFATE 1 G: 1 TABLET ORAL at 11:39

## 2024-05-06 RX ADMIN — GABAPENTIN 400 MG: 300 CAPSULE ORAL at 20:59

## 2024-05-06 RX ADMIN — SUCRALFATE 1 G: 1 TABLET ORAL at 21:00

## 2024-05-06 RX ADMIN — DICYCLOMINE HYDROCHLORIDE 20 MG: 20 TABLET ORAL at 05:38

## 2024-05-06 RX ADMIN — DEXTROSE MONOHYDRATE, SODIUM CHLORIDE, AND POTASSIUM CHLORIDE: 50; 4.5; 1.49 INJECTION, SOLUTION INTRAVENOUS at 09:26

## 2024-05-06 RX ADMIN — METOCLOPRAMIDE 10 MG: 5 INJECTION, SOLUTION INTRAMUSCULAR; INTRAVENOUS at 11:39

## 2024-05-06 RX ADMIN — DEXTROSE MONOHYDRATE, SODIUM CHLORIDE, AND POTASSIUM CHLORIDE: 50; 4.5; 1.49 INJECTION, SOLUTION INTRAVENOUS at 23:34

## 2024-05-06 RX ADMIN — DICYCLOMINE HYDROCHLORIDE 20 MG: 20 TABLET ORAL at 16:21

## 2024-05-06 RX ADMIN — SUCRALFATE 1 G: 1 TABLET ORAL at 16:21

## 2024-05-06 RX ADMIN — LEVOTHYROXINE SODIUM 100 MCG: 0.1 TABLET ORAL at 05:38

## 2024-05-06 RX ADMIN — SODIUM CHLORIDE, PRESERVATIVE FREE 10 ML: 5 INJECTION INTRAVENOUS at 21:01

## 2024-05-06 RX ADMIN — PANTOPRAZOLE SODIUM 40 MG: 40 TABLET, DELAYED RELEASE ORAL at 05:38

## 2024-05-06 RX ADMIN — CEFTRIAXONE SODIUM 2000 MG: 2 INJECTION, POWDER, FOR SOLUTION INTRAMUSCULAR; INTRAVENOUS at 04:25

## 2024-05-06 RX ADMIN — GABAPENTIN 400 MG: 300 CAPSULE ORAL at 13:38

## 2024-05-06 RX ADMIN — SODIUM CHLORIDE: 9 INJECTION, SOLUTION INTRAVENOUS at 08:31

## 2024-05-06 RX ADMIN — ENOXAPARIN SODIUM 40 MG: 100 INJECTION SUBCUTANEOUS at 08:29

## 2024-05-06 NOTE — PLAN OF CARE
Problem: Discharge Planning  Goal: Discharge to home or other facility with appropriate resources  Outcome: Progressing  Flowsheets (Taken 5/5/2024 1905)  Discharge to home or other facility with appropriate resources:   Identify barriers to discharge with patient and caregiver   Arrange for needed discharge resources and transportation as appropriate     Problem: Pain  Goal: Verbalizes/displays adequate comfort level or baseline comfort level  Outcome: Progressing     Problem: Safety - Adult  Goal: Free from fall injury  Outcome: Progressing     Problem: Skin/Tissue Integrity  Goal: Absence of new skin breakdown  Description: 1.  Monitor for areas of redness and/or skin breakdown  2.  Assess vascular access sites hourly  3.  Every 4-6 hours minimum:  Change oxygen saturation probe site  4.  Every 4-6 hours:  If on nasal continuous positive airway pressure, respiratory therapy assess nares and determine need for appliance change or resting period.  Outcome: Progressing     Problem: ABCDS Injury Assessment  Goal: Absence of physical injury  Outcome: Progressing

## 2024-05-07 ENCOUNTER — APPOINTMENT (OUTPATIENT)
Dept: CT IMAGING | Age: 69
DRG: 690 | End: 2024-05-07
Payer: MEDICARE

## 2024-05-07 LAB
ANION GAP SERPL CALC-SCNC: 9 MMOL/L (ref 9–18)
BUN SERPL-MCNC: 5 MG/DL (ref 8–23)
CALCIUM SERPL-MCNC: 8.4 MG/DL (ref 8.8–10.2)
CHLORIDE SERPL-SCNC: 107 MMOL/L (ref 98–107)
CO2 SERPL-SCNC: 23 MMOL/L (ref 20–28)
CREAT SERPL-MCNC: 0.41 MG/DL (ref 0.6–1.1)
EKG ATRIAL RATE: 93 BPM
EKG DIAGNOSIS: NORMAL
EKG P AXIS: 34 DEGREES
EKG P-R INTERVAL: 166 MS
EKG Q-T INTERVAL: 378 MS
EKG QRS DURATION: 82 MS
EKG QTC CALCULATION (BAZETT): 469 MS
EKG R AXIS: 12 DEGREES
EKG T AXIS: 78 DEGREES
EKG VENTRICULAR RATE: 93 BPM
GLUCOSE BLD STRIP.AUTO-MCNC: 123 MG/DL (ref 65–100)
GLUCOSE SERPL-MCNC: 96 MG/DL (ref 70–99)
MM INDURATION, POC: 0 MM (ref 0–5)
POTASSIUM SERPL-SCNC: 2.5 MMOL/L (ref 3.5–5.1)
PPD, POC: NEGATIVE
SERVICE CMNT-IMP: ABNORMAL
SODIUM SERPL-SCNC: 139 MMOL/L (ref 136–145)
TROPONIN T SERPL HS-MCNC: 19 NG/L (ref 0–14)

## 2024-05-07 PROCEDURE — 2580000003 HC RX 258: Performed by: NURSE PRACTITIONER

## 2024-05-07 PROCEDURE — 6360000002 HC RX W HCPCS: Performed by: FAMILY MEDICINE

## 2024-05-07 PROCEDURE — 2500000003 HC RX 250 WO HCPCS: Performed by: NURSE PRACTITIONER

## 2024-05-07 PROCEDURE — 6360000002 HC RX W HCPCS: Performed by: STUDENT IN AN ORGANIZED HEALTH CARE EDUCATION/TRAINING PROGRAM

## 2024-05-07 PROCEDURE — 2580000003 HC RX 258: Performed by: HOSPITALIST

## 2024-05-07 PROCEDURE — 36415 COLL VENOUS BLD VENIPUNCTURE: CPT

## 2024-05-07 PROCEDURE — 94760 N-INVAS EAR/PLS OXIMETRY 1: CPT

## 2024-05-07 PROCEDURE — 97530 THERAPEUTIC ACTIVITIES: CPT

## 2024-05-07 PROCEDURE — 93005 ELECTROCARDIOGRAM TRACING: CPT | Performed by: INTERNAL MEDICINE

## 2024-05-07 PROCEDURE — 93010 ELECTROCARDIOGRAM REPORT: CPT | Performed by: INTERNAL MEDICINE

## 2024-05-07 PROCEDURE — 1100000003 HC PRIVATE W/ TELEMETRY

## 2024-05-07 PROCEDURE — 70450 CT HEAD/BRAIN W/O DYE: CPT

## 2024-05-07 PROCEDURE — 6370000000 HC RX 637 (ALT 250 FOR IP): Performed by: INTERNAL MEDICINE

## 2024-05-07 PROCEDURE — 6370000000 HC RX 637 (ALT 250 FOR IP): Performed by: HOSPITALIST

## 2024-05-07 PROCEDURE — 80048 BASIC METABOLIC PNL TOTAL CA: CPT

## 2024-05-07 PROCEDURE — 6360000002 HC RX W HCPCS: Performed by: HOSPITALIST

## 2024-05-07 PROCEDURE — 99232 SBSQ HOSP IP/OBS MODERATE 35: CPT | Performed by: PSYCHIATRY & NEUROLOGY

## 2024-05-07 PROCEDURE — 6370000000 HC RX 637 (ALT 250 FOR IP): Performed by: FAMILY MEDICINE

## 2024-05-07 PROCEDURE — 6360000002 HC RX W HCPCS: Performed by: NURSE PRACTITIONER

## 2024-05-07 PROCEDURE — 82962 GLUCOSE BLOOD TEST: CPT

## 2024-05-07 RX ORDER — POTASSIUM CHLORIDE 7.45 MG/ML
10 INJECTION INTRAVENOUS
Status: DISPENSED | OUTPATIENT
Start: 2024-05-07 | End: 2024-05-07

## 2024-05-07 RX ADMIN — DICYCLOMINE HYDROCHLORIDE 20 MG: 20 TABLET ORAL at 09:42

## 2024-05-07 RX ADMIN — METOCLOPRAMIDE 10 MG: 5 INJECTION, SOLUTION INTRAMUSCULAR; INTRAVENOUS at 06:20

## 2024-05-07 RX ADMIN — POTASSIUM CHLORIDE 10 MEQ: 7.46 INJECTION, SOLUTION INTRAVENOUS at 22:00

## 2024-05-07 RX ADMIN — POTASSIUM CHLORIDE 10 MEQ: 7.46 INJECTION, SOLUTION INTRAVENOUS at 23:13

## 2024-05-07 RX ADMIN — ENOXAPARIN SODIUM 40 MG: 100 INJECTION SUBCUTANEOUS at 09:42

## 2024-05-07 RX ADMIN — DEXTROSE MONOHYDRATE, SODIUM CHLORIDE, AND POTASSIUM CHLORIDE: 50; 4.5; 1.49 INJECTION, SOLUTION INTRAVENOUS at 23:15

## 2024-05-07 RX ADMIN — SUCRALFATE 1 G: 1 TABLET ORAL at 15:15

## 2024-05-07 RX ADMIN — POTASSIUM CHLORIDE 10 MEQ: 7.46 INJECTION, SOLUTION INTRAVENOUS at 13:51

## 2024-05-07 RX ADMIN — POTASSIUM CHLORIDE 10 MEQ: 7.46 INJECTION, SOLUTION INTRAVENOUS at 06:26

## 2024-05-07 RX ADMIN — POTASSIUM CHLORIDE 10 MEQ: 7.46 INJECTION, SOLUTION INTRAVENOUS at 20:52

## 2024-05-07 RX ADMIN — HYDRALAZINE HYDROCHLORIDE 10 MG: 20 INJECTION INTRAMUSCULAR; INTRAVENOUS at 15:16

## 2024-05-07 RX ADMIN — WATER 1000 MG: 1 INJECTION INTRAMUSCULAR; INTRAVENOUS; SUBCUTANEOUS at 06:19

## 2024-05-07 RX ADMIN — GABAPENTIN 400 MG: 300 CAPSULE ORAL at 13:52

## 2024-05-07 RX ADMIN — METOCLOPRAMIDE 10 MG: 5 INJECTION, SOLUTION INTRAMUSCULAR; INTRAVENOUS at 09:43

## 2024-05-07 RX ADMIN — SODIUM CHLORIDE, PRESERVATIVE FREE 10 ML: 5 INJECTION INTRAVENOUS at 09:43

## 2024-05-07 RX ADMIN — POTASSIUM CHLORIDE 10 MEQ: 7.46 INJECTION, SOLUTION INTRAVENOUS at 17:05

## 2024-05-07 RX ADMIN — SUCRALFATE 1 G: 1 TABLET ORAL at 09:42

## 2024-05-07 RX ADMIN — SODIUM CHLORIDE, PRESERVATIVE FREE 10 ML: 5 INJECTION INTRAVENOUS at 20:53

## 2024-05-07 RX ADMIN — SUCRALFATE 1 G: 1 TABLET ORAL at 20:53

## 2024-05-07 RX ADMIN — PANTOPRAZOLE SODIUM 40 MG: 40 TABLET, DELAYED RELEASE ORAL at 06:20

## 2024-05-07 RX ADMIN — METOCLOPRAMIDE 10 MG: 5 INJECTION, SOLUTION INTRAMUSCULAR; INTRAVENOUS at 15:16

## 2024-05-07 RX ADMIN — DICYCLOMINE HYDROCHLORIDE 20 MG: 20 TABLET ORAL at 06:20

## 2024-05-07 RX ADMIN — GABAPENTIN 400 MG: 300 CAPSULE ORAL at 20:53

## 2024-05-07 RX ADMIN — SUCRALFATE 1 G: 1 TABLET ORAL at 06:20

## 2024-05-07 RX ADMIN — LEVOTHYROXINE SODIUM 100 MCG: 0.1 TABLET ORAL at 06:20

## 2024-05-07 RX ADMIN — Medication 1 CAPSULE: at 09:42

## 2024-05-07 RX ADMIN — DICYCLOMINE HYDROCHLORIDE 20 MG: 20 TABLET ORAL at 15:15

## 2024-05-07 RX ADMIN — GABAPENTIN 400 MG: 300 CAPSULE ORAL at 09:42

## 2024-05-07 NOTE — CARE COORDINATION
05/07/24 1328   Service Assessment   Patient Orientation Alert and Oriented;Person;Place;Situation   Cognition Alert   History Provided By Patient   Primary Caregiver Self   Support Systems Family Members;Children   Patient's Healthcare Decision Maker is: Legal Next of Kin   PCP Verified by CM Yes   Last Visit to PCP Within last 6 months   Can patient return to prior living arrangement Other (see comment)  (PT has recommended STR)   Family able to assist with home care needs: Yes   Would you like for me to discuss the discharge plan with any other family members/significant others, and if so, who? Yes   Financial Resources Medicare  ([facility] Medicare)   Social/Functional History   Lives With Daughter   Receives Help From Family   Discharge Planning   Living Arrangements Family Members     CM  met with pt at bedside, demographics and PCP verified, lives with her daughter. Emergency contact verified-Karin. She has only been home one week after STR at  [facility] and does not want to return there. Returned to [facility] after a fall. DME-bathroom and shower handicap assessible, RW cane, therapy belt and exercise bike. CM will send referrals to [facility] and [facility] per pt request. Will need to get insurance auth if gets a bed offer. PT/OT recommends STR. CM will continue to follow to assist with dc plan.

## 2024-05-08 LAB
ANION GAP SERPL CALC-SCNC: 12 MMOL/L (ref 9–18)
BACTERIA SPEC CULT: ABNORMAL
BACTERIA SPEC CULT: ABNORMAL
BASOPHILS # BLD: 0.1 K/UL (ref 0–0.2)
BASOPHILS NFR BLD: 1 % (ref 0–2)
BUN SERPL-MCNC: 3 MG/DL (ref 8–23)
CALCIUM SERPL-MCNC: 8.7 MG/DL (ref 8.8–10.2)
CHLORIDE SERPL-SCNC: 108 MMOL/L (ref 98–107)
CO2 SERPL-SCNC: 21 MMOL/L (ref 20–28)
CREAT SERPL-MCNC: 0.35 MG/DL (ref 0.6–1.1)
DIFFERENTIAL METHOD BLD: ABNORMAL
EOSINOPHIL # BLD: 0.3 K/UL (ref 0–0.8)
EOSINOPHIL NFR BLD: 3 % (ref 0.5–7.8)
ERYTHROCYTE [DISTWIDTH] IN BLOOD BY AUTOMATED COUNT: 16.4 % (ref 11.9–14.6)
GLUCOSE BLD STRIP.AUTO-MCNC: 109 MG/DL (ref 65–100)
GLUCOSE SERPL-MCNC: 96 MG/DL (ref 70–99)
HCT VFR BLD AUTO: 36 % (ref 35.8–46.3)
HGB BLD-MCNC: 11.7 G/DL (ref 11.7–15.4)
IMM GRANULOCYTES # BLD AUTO: 0.1 K/UL (ref 0–0.5)
IMM GRANULOCYTES NFR BLD AUTO: 1 % (ref 0–5)
LYMPHOCYTES # BLD: 2.2 K/UL (ref 0.5–4.6)
LYMPHOCYTES NFR BLD: 21 % (ref 13–44)
MAGNESIUM SERPL-MCNC: 1.5 MG/DL (ref 1.8–2.4)
MCH RBC QN AUTO: 29 PG (ref 26.1–32.9)
MCHC RBC AUTO-ENTMCNC: 32.5 G/DL (ref 31.4–35)
MCV RBC AUTO: 89.1 FL (ref 82–102)
MM INDURATION, POC: 0 MM (ref 0–5)
MONOCYTES # BLD: 0.8 K/UL (ref 0.1–1.3)
MONOCYTES NFR BLD: 8 % (ref 4–12)
NEUTS SEG # BLD: 6.9 K/UL (ref 1.7–8.2)
NEUTS SEG NFR BLD: 67 % (ref 43–78)
NRBC # BLD: 0 K/UL (ref 0–0.2)
PHOSPHATE SERPL-MCNC: 1.5 MG/DL (ref 2.5–4.5)
PLATELET # BLD AUTO: 352 K/UL (ref 150–450)
PMV BLD AUTO: 9.6 FL (ref 9.4–12.3)
POTASSIUM SERPL-SCNC: 2.9 MMOL/L (ref 3.5–5.1)
PPD, POC: NEGATIVE
RBC # BLD AUTO: 4.04 M/UL (ref 4.05–5.2)
SERVICE CMNT-IMP: ABNORMAL
SERVICE CMNT-IMP: ABNORMAL
SODIUM SERPL-SCNC: 140 MMOL/L (ref 136–145)
WBC # BLD AUTO: 10.3 K/UL (ref 4.3–11.1)

## 2024-05-08 PROCEDURE — 84100 ASSAY OF PHOSPHORUS: CPT

## 2024-05-08 PROCEDURE — 6360000002 HC RX W HCPCS: Performed by: STUDENT IN AN ORGANIZED HEALTH CARE EDUCATION/TRAINING PROGRAM

## 2024-05-08 PROCEDURE — 85025 COMPLETE CBC W/AUTO DIFF WBC: CPT

## 2024-05-08 PROCEDURE — 6370000000 HC RX 637 (ALT 250 FOR IP): Performed by: INTERNAL MEDICINE

## 2024-05-08 PROCEDURE — 6360000002 HC RX W HCPCS: Performed by: NURSE PRACTITIONER

## 2024-05-08 PROCEDURE — 6360000002 HC RX W HCPCS: Performed by: FAMILY MEDICINE

## 2024-05-08 PROCEDURE — 6370000000 HC RX 637 (ALT 250 FOR IP): Performed by: STUDENT IN AN ORGANIZED HEALTH CARE EDUCATION/TRAINING PROGRAM

## 2024-05-08 PROCEDURE — 6370000000 HC RX 637 (ALT 250 FOR IP): Performed by: FAMILY MEDICINE

## 2024-05-08 PROCEDURE — 76937 US GUIDE VASCULAR ACCESS: CPT

## 2024-05-08 PROCEDURE — 82962 GLUCOSE BLOOD TEST: CPT

## 2024-05-08 PROCEDURE — 6360000002 HC RX W HCPCS: Performed by: HOSPITALIST

## 2024-05-08 PROCEDURE — 83735 ASSAY OF MAGNESIUM: CPT

## 2024-05-08 PROCEDURE — 2580000003 HC RX 258: Performed by: HOSPITALIST

## 2024-05-08 PROCEDURE — 80048 BASIC METABOLIC PNL TOTAL CA: CPT

## 2024-05-08 PROCEDURE — 1100000003 HC PRIVATE W/ TELEMETRY

## 2024-05-08 PROCEDURE — 6370000000 HC RX 637 (ALT 250 FOR IP): Performed by: HOSPITALIST

## 2024-05-08 PROCEDURE — 2500000003 HC RX 250 WO HCPCS: Performed by: NURSE PRACTITIONER

## 2024-05-08 PROCEDURE — 36415 COLL VENOUS BLD VENIPUNCTURE: CPT

## 2024-05-08 PROCEDURE — 2580000003 HC RX 258: Performed by: NURSE PRACTITIONER

## 2024-05-08 RX ORDER — MAGNESIUM SULFATE 1 G/100ML
1000 INJECTION INTRAVENOUS ONCE
Status: COMPLETED | OUTPATIENT
Start: 2024-05-08 | End: 2024-05-08

## 2024-05-08 RX ORDER — LANOLIN ALCOHOL/MO/W.PET/CERES
400 CREAM (GRAM) TOPICAL DAILY
Status: COMPLETED | OUTPATIENT
Start: 2024-05-08 | End: 2024-05-10

## 2024-05-08 RX ORDER — POTASSIUM CHLORIDE 20 MEQ/1
40 TABLET, EXTENDED RELEASE ORAL ONCE
Status: COMPLETED | OUTPATIENT
Start: 2024-05-08 | End: 2024-05-08

## 2024-05-08 RX ORDER — POTASSIUM CHLORIDE 7.45 MG/ML
10 INJECTION INTRAVENOUS
Status: COMPLETED | OUTPATIENT
Start: 2024-05-08 | End: 2024-05-08

## 2024-05-08 RX ADMIN — SODIUM CHLORIDE, PRESERVATIVE FREE 10 ML: 5 INJECTION INTRAVENOUS at 21:52

## 2024-05-08 RX ADMIN — DICYCLOMINE HYDROCHLORIDE 20 MG: 20 TABLET ORAL at 16:54

## 2024-05-08 RX ADMIN — POTASSIUM & SODIUM PHOSPHATES POWDER PACK 280-160-250 MG 250 MG: 280-160-250 PACK at 21:52

## 2024-05-08 RX ADMIN — SUCRALFATE 1 G: 1 TABLET ORAL at 16:54

## 2024-05-08 RX ADMIN — HYDRALAZINE HYDROCHLORIDE 10 MG: 20 INJECTION INTRAMUSCULAR; INTRAVENOUS at 04:42

## 2024-05-08 RX ADMIN — METOCLOPRAMIDE 10 MG: 5 INJECTION, SOLUTION INTRAMUSCULAR; INTRAVENOUS at 10:08

## 2024-05-08 RX ADMIN — POTASSIUM CHLORIDE 40 MEQ: 1500 TABLET, EXTENDED RELEASE ORAL at 10:00

## 2024-05-08 RX ADMIN — POTASSIUM CHLORIDE 10 MEQ: 7.46 INJECTION, SOLUTION INTRAVENOUS at 13:29

## 2024-05-08 RX ADMIN — LEVOTHYROXINE SODIUM 100 MCG: 0.1 TABLET ORAL at 04:45

## 2024-05-08 RX ADMIN — SUCRALFATE 1 G: 1 TABLET ORAL at 21:52

## 2024-05-08 RX ADMIN — PANTOPRAZOLE SODIUM 40 MG: 40 TABLET, DELAYED RELEASE ORAL at 04:45

## 2024-05-08 RX ADMIN — SUCRALFATE 1 G: 1 TABLET ORAL at 10:00

## 2024-05-08 RX ADMIN — POTASSIUM & SODIUM PHOSPHATES POWDER PACK 280-160-250 MG 250 MG: 280-160-250 PACK at 13:28

## 2024-05-08 RX ADMIN — SUCRALFATE 1 G: 1 TABLET ORAL at 05:44

## 2024-05-08 RX ADMIN — MAGNESIUM GLUCONATE 500 MG ORAL TABLET 400 MG: 500 TABLET ORAL at 10:07

## 2024-05-08 RX ADMIN — DICYCLOMINE HYDROCHLORIDE 20 MG: 20 TABLET ORAL at 10:07

## 2024-05-08 RX ADMIN — GABAPENTIN 400 MG: 300 CAPSULE ORAL at 21:51

## 2024-05-08 RX ADMIN — METOCLOPRAMIDE 10 MG: 5 INJECTION, SOLUTION INTRAMUSCULAR; INTRAVENOUS at 16:54

## 2024-05-08 RX ADMIN — Medication 1 CAPSULE: at 09:27

## 2024-05-08 RX ADMIN — POTASSIUM CHLORIDE 10 MEQ: 7.46 INJECTION, SOLUTION INTRAVENOUS at 10:03

## 2024-05-08 RX ADMIN — MAGNESIUM SULFATE HEPTAHYDRATE 1000 MG: 1 INJECTION, SOLUTION INTRAVENOUS at 10:09

## 2024-05-08 RX ADMIN — ENOXAPARIN SODIUM 40 MG: 100 INJECTION SUBCUTANEOUS at 09:27

## 2024-05-08 RX ADMIN — DEXTROSE MONOHYDRATE, SODIUM CHLORIDE, AND POTASSIUM CHLORIDE: 50; 4.5; 1.49 INJECTION, SOLUTION INTRAVENOUS at 12:14

## 2024-05-08 RX ADMIN — POTASSIUM CHLORIDE 10 MEQ: 7.46 INJECTION, SOLUTION INTRAVENOUS at 12:14

## 2024-05-08 RX ADMIN — GABAPENTIN 400 MG: 300 CAPSULE ORAL at 09:27

## 2024-05-08 RX ADMIN — DICYCLOMINE HYDROCHLORIDE 20 MG: 20 TABLET ORAL at 05:44

## 2024-05-08 RX ADMIN — WATER 1000 MG: 1 INJECTION INTRAMUSCULAR; INTRAVENOUS; SUBCUTANEOUS at 03:42

## 2024-05-08 RX ADMIN — METOCLOPRAMIDE 10 MG: 5 INJECTION, SOLUTION INTRAMUSCULAR; INTRAVENOUS at 05:44

## 2024-05-08 RX ADMIN — POTASSIUM & SODIUM PHOSPHATES POWDER PACK 280-160-250 MG 250 MG: 280-160-250 PACK at 16:54

## 2024-05-08 RX ADMIN — GABAPENTIN 400 MG: 300 CAPSULE ORAL at 13:28

## 2024-05-08 RX ADMIN — DEXTROSE MONOHYDRATE, SODIUM CHLORIDE, AND POTASSIUM CHLORIDE: 50; 4.5; 1.49 INJECTION, SOLUTION INTRAVENOUS at 22:01

## 2024-05-08 RX ADMIN — SODIUM CHLORIDE, PRESERVATIVE FREE 10 ML: 5 INJECTION INTRAVENOUS at 09:28

## 2024-05-08 NOTE — PLAN OF CARE
Problem: Discharge Planning  Goal: Discharge to home or other facility with appropriate resources  Outcome: Progressing     Problem: Pain  Goal: Verbalizes/displays adequate comfort level or baseline comfort level  Outcome: Progressing     Problem: Safety - Adult  Goal: Free from fall injury  5/8/2024 1134 by Eun Padilla RN  Outcome: Progressing  5/8/2024 0037 by Lizeth Kessler RN  Outcome: Progressing     Problem: Skin/Tissue Integrity  Goal: Absence of new skin breakdown  Description: 1.  Monitor for areas of redness and/or skin breakdown  2.  Assess vascular access sites hourly  3.  Every 4-6 hours minimum:  Change oxygen saturation probe site  4.  Every 4-6 hours:  If on nasal continuous positive airway pressure, respiratory therapy assess nares and determine need for appliance change or resting period.  5/8/2024 1134 by Eun Padilla RN  Outcome: Progressing  5/8/2024 0037 by Lizeth Kessler RN  Outcome: Progressing     Problem: ABCDS Injury Assessment  Goal: Absence of physical injury  5/8/2024 1134 by Eun Padilla RN  Outcome: Progressing  5/8/2024 0037 by Lizeth Kessler RN  Outcome: Progressing

## 2024-05-08 NOTE — CARE COORDINATION
Dispo update:  TIMUR spoke to Ms. Olsen in room 617.  She accepts the short-term rehab bed offer at [facility].  TIMUR to start [facility] Medicare pre-cert in the morning after checking with the physician.

## 2024-05-09 LAB
ANION GAP SERPL CALC-SCNC: 10 MMOL/L (ref 9–18)
BASOPHILS # BLD: 0.1 K/UL (ref 0–0.2)
BASOPHILS NFR BLD: 1 % (ref 0–2)
BUN SERPL-MCNC: 2 MG/DL (ref 8–23)
CALCIUM SERPL-MCNC: 8.9 MG/DL (ref 8.8–10.2)
CHLORIDE SERPL-SCNC: 109 MMOL/L (ref 98–107)
CO2 SERPL-SCNC: 22 MMOL/L (ref 20–28)
CREAT SERPL-MCNC: 0.38 MG/DL (ref 0.6–1.1)
DIFFERENTIAL METHOD BLD: ABNORMAL
EOSINOPHIL # BLD: 0.2 K/UL (ref 0–0.8)
EOSINOPHIL NFR BLD: 2 % (ref 0.5–7.8)
ERYTHROCYTE [DISTWIDTH] IN BLOOD BY AUTOMATED COUNT: 16.7 % (ref 11.9–14.6)
GLUCOSE SERPL-MCNC: 89 MG/DL (ref 70–99)
HCT VFR BLD AUTO: 39.6 % (ref 35.8–46.3)
HGB BLD-MCNC: 12.7 G/DL (ref 11.7–15.4)
IMM GRANULOCYTES # BLD AUTO: 0.1 K/UL (ref 0–0.5)
IMM GRANULOCYTES NFR BLD AUTO: 1 % (ref 0–5)
LYMPHOCYTES # BLD: 2.4 K/UL (ref 0.5–4.6)
LYMPHOCYTES NFR BLD: 19 % (ref 13–44)
MAGNESIUM SERPL-MCNC: 1.7 MG/DL (ref 1.8–2.4)
MCH RBC QN AUTO: 29.3 PG (ref 26.1–32.9)
MCHC RBC AUTO-ENTMCNC: 32.1 G/DL (ref 31.4–35)
MCV RBC AUTO: 91.2 FL (ref 82–102)
MM INDURATION, POC: 0 MM (ref 0–5)
MONOCYTES # BLD: 0.8 K/UL (ref 0.1–1.3)
MONOCYTES NFR BLD: 7 % (ref 4–12)
NEUTS SEG # BLD: 8.6 K/UL (ref 1.7–8.2)
NEUTS SEG NFR BLD: 70 % (ref 43–78)
NRBC # BLD: 0 K/UL (ref 0–0.2)
PHOSPHATE SERPL-MCNC: 1.8 MG/DL (ref 2.5–4.5)
PLATELET # BLD AUTO: 367 K/UL (ref 150–450)
PMV BLD AUTO: 9.5 FL (ref 9.4–12.3)
POTASSIUM SERPL-SCNC: 3.9 MMOL/L (ref 3.5–5.1)
PPD, POC: NEGATIVE
RBC # BLD AUTO: 4.34 M/UL (ref 4.05–5.2)
SODIUM SERPL-SCNC: 141 MMOL/L (ref 136–145)
WBC # BLD AUTO: 12.1 K/UL (ref 4.3–11.1)

## 2024-05-09 PROCEDURE — 97530 THERAPEUTIC ACTIVITIES: CPT

## 2024-05-09 PROCEDURE — 36415 COLL VENOUS BLD VENIPUNCTURE: CPT

## 2024-05-09 PROCEDURE — 6360000002 HC RX W HCPCS: Performed by: HOSPITALIST

## 2024-05-09 PROCEDURE — 84100 ASSAY OF PHOSPHORUS: CPT

## 2024-05-09 PROCEDURE — 85025 COMPLETE CBC W/AUTO DIFF WBC: CPT

## 2024-05-09 PROCEDURE — 6360000002 HC RX W HCPCS: Performed by: FAMILY MEDICINE

## 2024-05-09 PROCEDURE — 97535 SELF CARE MNGMENT TRAINING: CPT

## 2024-05-09 PROCEDURE — 80048 BASIC METABOLIC PNL TOTAL CA: CPT

## 2024-05-09 PROCEDURE — 6370000000 HC RX 637 (ALT 250 FOR IP): Performed by: FAMILY MEDICINE

## 2024-05-09 PROCEDURE — 83735 ASSAY OF MAGNESIUM: CPT

## 2024-05-09 PROCEDURE — 6370000000 HC RX 637 (ALT 250 FOR IP): Performed by: HOSPITALIST

## 2024-05-09 PROCEDURE — 6370000000 HC RX 637 (ALT 250 FOR IP): Performed by: STUDENT IN AN ORGANIZED HEALTH CARE EDUCATION/TRAINING PROGRAM

## 2024-05-09 PROCEDURE — 6370000000 HC RX 637 (ALT 250 FOR IP): Performed by: INTERNAL MEDICINE

## 2024-05-09 PROCEDURE — 1100000003 HC PRIVATE W/ TELEMETRY

## 2024-05-09 RX ORDER — AMLODIPINE BESYLATE 10 MG/1
10 TABLET ORAL DAILY
Status: DISCONTINUED | OUTPATIENT
Start: 2024-05-09 | End: 2024-05-13 | Stop reason: HOSPADM

## 2024-05-09 RX ORDER — MAGNESIUM SULFATE 1 G/100ML
1000 INJECTION INTRAVENOUS ONCE
Status: DISCONTINUED | OUTPATIENT
Start: 2024-05-09 | End: 2024-05-13 | Stop reason: HOSPADM

## 2024-05-09 RX ADMIN — POTASSIUM & SODIUM PHOSPHATES POWDER PACK 280-160-250 MG 250 MG: 280-160-250 PACK at 21:43

## 2024-05-09 RX ADMIN — SUCRALFATE 1 G: 1 TABLET ORAL at 06:12

## 2024-05-09 RX ADMIN — SUCRALFATE 1 G: 1 TABLET ORAL at 12:41

## 2024-05-09 RX ADMIN — MAGNESIUM GLUCONATE 500 MG ORAL TABLET 400 MG: 500 TABLET ORAL at 08:20

## 2024-05-09 RX ADMIN — POTASSIUM & SODIUM PHOSPHATES POWDER PACK 280-160-250 MG 250 MG: 280-160-250 PACK at 08:21

## 2024-05-09 RX ADMIN — PANTOPRAZOLE SODIUM 40 MG: 40 TABLET, DELAYED RELEASE ORAL at 06:12

## 2024-05-09 RX ADMIN — GABAPENTIN 400 MG: 300 CAPSULE ORAL at 16:34

## 2024-05-09 RX ADMIN — ENOXAPARIN SODIUM 40 MG: 100 INJECTION SUBCUTANEOUS at 08:21

## 2024-05-09 RX ADMIN — DICYCLOMINE HYDROCHLORIDE 20 MG: 20 TABLET ORAL at 06:13

## 2024-05-09 RX ADMIN — GABAPENTIN 400 MG: 300 CAPSULE ORAL at 08:21

## 2024-05-09 RX ADMIN — Medication 1 CAPSULE: at 08:21

## 2024-05-09 RX ADMIN — LEVOTHYROXINE SODIUM 100 MCG: 0.1 TABLET ORAL at 06:12

## 2024-05-09 RX ADMIN — POTASSIUM & SODIUM PHOSPHATES POWDER PACK 280-160-250 MG 250 MG: 280-160-250 PACK at 16:34

## 2024-05-09 RX ADMIN — DICYCLOMINE HYDROCHLORIDE 20 MG: 20 TABLET ORAL at 16:34

## 2024-05-09 RX ADMIN — GABAPENTIN 400 MG: 300 CAPSULE ORAL at 21:43

## 2024-05-09 RX ADMIN — SUCRALFATE 1 G: 1 TABLET ORAL at 16:34

## 2024-05-09 RX ADMIN — ONDANSETRON 4 MG: 4 TABLET, ORALLY DISINTEGRATING ORAL at 13:09

## 2024-05-09 RX ADMIN — METOCLOPRAMIDE 10 MG: 5 INJECTION, SOLUTION INTRAMUSCULAR; INTRAVENOUS at 06:10

## 2024-05-09 RX ADMIN — DICYCLOMINE HYDROCHLORIDE 20 MG: 20 TABLET ORAL at 09:49

## 2024-05-09 RX ADMIN — POTASSIUM & SODIUM PHOSPHATES POWDER PACK 280-160-250 MG 250 MG: 280-160-250 PACK at 12:41

## 2024-05-09 RX ADMIN — SUCRALFATE 1 G: 1 TABLET ORAL at 21:43

## 2024-05-09 ASSESSMENT — PAIN DESCRIPTION - DESCRIPTORS: DESCRIPTORS: CRAMPING

## 2024-05-09 ASSESSMENT — PAIN SCALES - GENERAL: PAINLEVEL_OUTOF10: 7

## 2024-05-09 NOTE — CARE COORDINATION
Dispo update:  Ms. Olsen accepted short-term rehab bed offer from [facility].  From IDT rounds today, Dr. Chen said that she is stable for discharge.  CM started [facility] Medicare pre-cert with OT notes from today and PT notes from 5/7.  Auth submitted via [employer] web portal, with pending result \"reference number [id number] 05/09/2024 05/09/2024 Pending 0/0/0.\"  Await insurance approval.

## 2024-05-10 ENCOUNTER — APPOINTMENT (OUTPATIENT)
Dept: NON INVASIVE DIAGNOSTICS | Age: 69
DRG: 690 | End: 2024-05-10
Attending: STUDENT IN AN ORGANIZED HEALTH CARE EDUCATION/TRAINING PROGRAM
Payer: MEDICARE

## 2024-05-10 LAB
ANION GAP SERPL CALC-SCNC: 9 MMOL/L (ref 9–18)
BACTERIA SPEC CULT: NORMAL
BACTERIA SPEC CULT: NORMAL
BASOPHILS # BLD: 0.1 K/UL (ref 0–0.2)
BASOPHILS NFR BLD: 1 % (ref 0–2)
BUN SERPL-MCNC: 4 MG/DL (ref 8–23)
CALCIUM SERPL-MCNC: 9.3 MG/DL (ref 8.8–10.2)
CHLORIDE SERPL-SCNC: 109 MMOL/L (ref 98–107)
CO2 SERPL-SCNC: 24 MMOL/L (ref 20–28)
CREAT SERPL-MCNC: 0.41 MG/DL (ref 0.6–1.1)
DIFFERENTIAL METHOD BLD: ABNORMAL
ECHO AO ASC DIAM: 3.7 CM
ECHO AO ASCENDING AORTA INDEX: 1.79 CM/M2
ECHO AO ROOT DIAM: 3.7 CM
ECHO AO ROOT INDEX: 1.79 CM/M2
ECHO AV AREA PEAK VELOCITY: 2.8 CM2
ECHO AV AREA VTI: 2.6 CM2
ECHO AV AREA/BSA PEAK VELOCITY: 1.4 CM2/M2
ECHO AV AREA/BSA VTI: 1.3 CM2/M2
ECHO AV MEAN GRADIENT: 5 MMHG
ECHO AV MEAN VELOCITY: 1.1 M/S
ECHO AV PEAK GRADIENT: 9 MMHG
ECHO AV PEAK VELOCITY: 1.5 M/S
ECHO AV VELOCITY RATIO: 0.73
ECHO AV VTI: 25.5 CM
ECHO BSA: 2.1 M2
ECHO EST RA PRESSURE: 3 MMHG
ECHO IVC PROX: 1.9 CM
ECHO LA AREA 2C: 14.9 CM2
ECHO LA AREA 4C: 16.1 CM2
ECHO LA DIAMETER INDEX: 1.45 CM/M2
ECHO LA DIAMETER: 3 CM
ECHO LA MAJOR AXIS: 6.4 CM
ECHO LA MINOR AXIS: 5.9 CM
ECHO LA TO AORTIC ROOT RATIO: 0.81
ECHO LA VOL BP: 33 ML (ref 22–52)
ECHO LA VOL MOD A2C: 30 ML (ref 22–52)
ECHO LA VOL MOD A4C: 32 ML (ref 22–52)
ECHO LA VOL/BSA BIPLANE: 16 ML/M2 (ref 16–34)
ECHO LA VOLUME INDEX MOD A2C: 14 ML/M2 (ref 16–34)
ECHO LA VOLUME INDEX MOD A4C: 15 ML/M2 (ref 16–34)
ECHO LV E' LATERAL VELOCITY: 8 CM/S
ECHO LV E' SEPTAL VELOCITY: 8 CM/S
ECHO LV EDV A2C: 54 ML
ECHO LV EDV A4C: 73 ML
ECHO LV EDV INDEX A4C: 35 ML/M2
ECHO LV EDV NDEX A2C: 26 ML/M2
ECHO LV EJECTION FRACTION A2C: 65 %
ECHO LV EJECTION FRACTION A4C: 62 %
ECHO LV EJECTION FRACTION BIPLANE: 63 % (ref 55–100)
ECHO LV ESV A2C: 19 ML
ECHO LV ESV A4C: 28 ML
ECHO LV ESV INDEX A2C: 9 ML/M2
ECHO LV ESV INDEX A4C: 14 ML/M2
ECHO LV FRACTIONAL SHORTENING: 35 % (ref 28–44)
ECHO LV INTERNAL DIMENSION DIASTOLE INDEX: 1.93 CM/M2
ECHO LV INTERNAL DIMENSION DIASTOLIC: 4 CM (ref 3.9–5.3)
ECHO LV INTERNAL DIMENSION SYSTOLIC INDEX: 1.26 CM/M2
ECHO LV INTERNAL DIMENSION SYSTOLIC: 2.6 CM
ECHO LV IVSD: 1.1 CM (ref 0.6–0.9)
ECHO LV MASS 2D: 165.5 G (ref 67–162)
ECHO LV MASS INDEX 2D: 79.9 G/M2 (ref 43–95)
ECHO LV POSTERIOR WALL DIASTOLIC: 1.3 CM (ref 0.6–0.9)
ECHO LV RELATIVE WALL THICKNESS RATIO: 0.65
ECHO LVOT AREA: 3.8 CM2
ECHO LVOT AV VTI INDEX: 0.68
ECHO LVOT DIAM: 2.2 CM
ECHO LVOT MEAN GRADIENT: 3 MMHG
ECHO LVOT PEAK GRADIENT: 5 MMHG
ECHO LVOT PEAK VELOCITY: 1.1 M/S
ECHO LVOT STROKE VOLUME INDEX: 31.9 ML/M2
ECHO LVOT SV: 66.1 ML
ECHO LVOT VTI: 17.4 CM
ECHO MV A VELOCITY: 1.01 M/S
ECHO MV E DECELERATION TIME (DT): 212 MS
ECHO MV E VELOCITY: 0.57 M/S
ECHO MV E/A RATIO: 0.56
ECHO MV E/E' LATERAL: 7.13
ECHO MV E/E' RATIO (AVERAGED): 7.13
ECHO MV E/E' SEPTAL: 7.13
ECHO PV ACCELERATION TIME (AT): 98 MS
ECHO PV MAX VELOCITY: 1.4 M/S
ECHO PV PEAK GRADIENT: 8 MMHG
ECHO RV BASAL DIMENSION: 3 CM
ECHO RV FREE WALL PEAK S': 18 CM/S
ECHO RV INTERNAL DIMENSION: 2.9 CM
ECHO RV TAPSE: 2.3 CM (ref 1.7–?)
EOSINOPHIL # BLD: 0.2 K/UL (ref 0–0.8)
EOSINOPHIL NFR BLD: 2 % (ref 0.5–7.8)
ERYTHROCYTE [DISTWIDTH] IN BLOOD BY AUTOMATED COUNT: 16.9 % (ref 11.9–14.6)
GLUCOSE BLD STRIP.AUTO-MCNC: 106 MG/DL (ref 65–100)
GLUCOSE SERPL-MCNC: 84 MG/DL (ref 70–99)
HCT VFR BLD AUTO: 39.2 % (ref 35.8–46.3)
HGB BLD-MCNC: 12.4 G/DL (ref 11.7–15.4)
IMM GRANULOCYTES # BLD AUTO: 0.1 K/UL (ref 0–0.5)
IMM GRANULOCYTES NFR BLD AUTO: 1 % (ref 0–5)
LYMPHOCYTES # BLD: 2 K/UL (ref 0.5–4.6)
LYMPHOCYTES NFR BLD: 20 % (ref 13–44)
MAGNESIUM SERPL-MCNC: 1.8 MG/DL (ref 1.8–2.4)
MCH RBC QN AUTO: 29.4 PG (ref 26.1–32.9)
MCHC RBC AUTO-ENTMCNC: 31.6 G/DL (ref 31.4–35)
MCV RBC AUTO: 92.9 FL (ref 82–102)
MONOCYTES # BLD: 0.7 K/UL (ref 0.1–1.3)
MONOCYTES NFR BLD: 7 % (ref 4–12)
NEUTS SEG # BLD: 6.8 K/UL (ref 1.7–8.2)
NEUTS SEG NFR BLD: 69 % (ref 43–78)
NRBC # BLD: 0 K/UL (ref 0–0.2)
PHOSPHATE SERPL-MCNC: 3.2 MG/DL (ref 2.5–4.5)
PLATELET # BLD AUTO: 323 K/UL (ref 150–450)
PMV BLD AUTO: 10 FL (ref 9.4–12.3)
POTASSIUM SERPL-SCNC: 4.2 MMOL/L (ref 3.5–5.1)
RBC # BLD AUTO: 4.22 M/UL (ref 4.05–5.2)
SARS-COV-2 RDRP RESP QL NAA+PROBE: NOT DETECTED
SERVICE CMNT-IMP: ABNORMAL
SERVICE CMNT-IMP: NORMAL
SERVICE CMNT-IMP: NORMAL
SODIUM SERPL-SCNC: 142 MMOL/L (ref 136–145)
SOURCE: NORMAL
WBC # BLD AUTO: 9.8 K/UL (ref 4.3–11.1)

## 2024-05-10 PROCEDURE — 6370000000 HC RX 637 (ALT 250 FOR IP): Performed by: INTERNAL MEDICINE

## 2024-05-10 PROCEDURE — 76937 US GUIDE VASCULAR ACCESS: CPT

## 2024-05-10 PROCEDURE — 6370000000 HC RX 637 (ALT 250 FOR IP): Performed by: HOSPITALIST

## 2024-05-10 PROCEDURE — 83735 ASSAY OF MAGNESIUM: CPT

## 2024-05-10 PROCEDURE — 2580000003 HC RX 258: Performed by: HOSPITALIST

## 2024-05-10 PROCEDURE — 97530 THERAPEUTIC ACTIVITIES: CPT

## 2024-05-10 PROCEDURE — 93306 TTE W/DOPPLER COMPLETE: CPT

## 2024-05-10 PROCEDURE — 6370000000 HC RX 637 (ALT 250 FOR IP): Performed by: STUDENT IN AN ORGANIZED HEALTH CARE EDUCATION/TRAINING PROGRAM

## 2024-05-10 PROCEDURE — 85025 COMPLETE CBC W/AUTO DIFF WBC: CPT

## 2024-05-10 PROCEDURE — 80048 BASIC METABOLIC PNL TOTAL CA: CPT

## 2024-05-10 PROCEDURE — 82962 GLUCOSE BLOOD TEST: CPT

## 2024-05-10 PROCEDURE — 2580000003 HC RX 258: Performed by: STUDENT IN AN ORGANIZED HEALTH CARE EDUCATION/TRAINING PROGRAM

## 2024-05-10 PROCEDURE — 6370000000 HC RX 637 (ALT 250 FOR IP): Performed by: FAMILY MEDICINE

## 2024-05-10 PROCEDURE — 6360000002 HC RX W HCPCS: Performed by: HOSPITALIST

## 2024-05-10 PROCEDURE — 87635 SARS-COV-2 COVID-19 AMP PRB: CPT

## 2024-05-10 PROCEDURE — 36415 COLL VENOUS BLD VENIPUNCTURE: CPT

## 2024-05-10 PROCEDURE — 97112 NEUROMUSCULAR REEDUCATION: CPT

## 2024-05-10 PROCEDURE — 93306 TTE W/DOPPLER COMPLETE: CPT | Performed by: INTERNAL MEDICINE

## 2024-05-10 PROCEDURE — 84100 ASSAY OF PHOSPHORUS: CPT

## 2024-05-10 PROCEDURE — 99223 1ST HOSP IP/OBS HIGH 75: CPT | Performed by: INTERNAL MEDICINE

## 2024-05-10 PROCEDURE — 1100000003 HC PRIVATE W/ TELEMETRY

## 2024-05-10 RX ORDER — LANOLIN ALCOHOL/MO/W.PET/CERES
400 CREAM (GRAM) TOPICAL DAILY
Status: COMPLETED | OUTPATIENT
Start: 2024-05-10 | End: 2024-05-12

## 2024-05-10 RX ORDER — MINERAL OIL/HYDROPHIL PETROLAT
OINTMENT (GRAM) TOPICAL 2 TIMES DAILY PRN
Status: DISCONTINUED | OUTPATIENT
Start: 2024-05-10 | End: 2024-05-13 | Stop reason: HOSPADM

## 2024-05-10 RX ORDER — SULFAMETHOXAZOLE AND TRIMETHOPRIM 800; 160 MG/1; MG/1
1 TABLET ORAL EVERY 12 HOURS SCHEDULED
Status: DISCONTINUED | OUTPATIENT
Start: 2024-05-10 | End: 2024-05-13 | Stop reason: HOSPADM

## 2024-05-10 RX ORDER — SODIUM CHLORIDE, SODIUM LACTATE, POTASSIUM CHLORIDE, AND CALCIUM CHLORIDE .6; .31; .03; .02 G/100ML; G/100ML; G/100ML; G/100ML
1000 INJECTION, SOLUTION INTRAVENOUS ONCE
Status: COMPLETED | OUTPATIENT
Start: 2024-05-10 | End: 2024-05-10

## 2024-05-10 RX ADMIN — DICYCLOMINE HYDROCHLORIDE 20 MG: 20 TABLET ORAL at 17:16

## 2024-05-10 RX ADMIN — GABAPENTIN 400 MG: 300 CAPSULE ORAL at 14:35

## 2024-05-10 RX ADMIN — SULFAMETHOXAZOLE AND TRIMETHOPRIM 1 TABLET: 800; 160 TABLET ORAL at 17:19

## 2024-05-10 RX ADMIN — PANTOPRAZOLE SODIUM 40 MG: 40 TABLET, DELAYED RELEASE ORAL at 05:29

## 2024-05-10 RX ADMIN — DICYCLOMINE HYDROCHLORIDE 20 MG: 20 TABLET ORAL at 05:29

## 2024-05-10 RX ADMIN — GABAPENTIN 400 MG: 300 CAPSULE ORAL at 20:11

## 2024-05-10 RX ADMIN — ACETAMINOPHEN 650 MG: 325 TABLET ORAL at 20:11

## 2024-05-10 RX ADMIN — LEVOTHYROXINE SODIUM 100 MCG: 0.1 TABLET ORAL at 05:29

## 2024-05-10 RX ADMIN — GABAPENTIN 400 MG: 300 CAPSULE ORAL at 08:53

## 2024-05-10 RX ADMIN — POTASSIUM & SODIUM PHOSPHATES POWDER PACK 280-160-250 MG 250 MG: 280-160-250 PACK at 08:54

## 2024-05-10 RX ADMIN — SUCRALFATE 1 G: 1 TABLET ORAL at 12:11

## 2024-05-10 RX ADMIN — SUCRALFATE 1 G: 1 TABLET ORAL at 05:28

## 2024-05-10 RX ADMIN — SODIUM CHLORIDE, POTASSIUM CHLORIDE, SODIUM LACTATE AND CALCIUM CHLORIDE 1000 ML: 600; 310; 30; 20 INJECTION, SOLUTION INTRAVENOUS at 12:10

## 2024-05-10 RX ADMIN — POTASSIUM & SODIUM PHOSPHATES POWDER PACK 280-160-250 MG 250 MG: 280-160-250 PACK at 17:17

## 2024-05-10 RX ADMIN — Medication 1 CAPSULE: at 08:53

## 2024-05-10 RX ADMIN — SODIUM CHLORIDE, PRESERVATIVE FREE 10 ML: 5 INJECTION INTRAVENOUS at 20:11

## 2024-05-10 RX ADMIN — POTASSIUM & SODIUM PHOSPHATES POWDER PACK 280-160-250 MG 250 MG: 280-160-250 PACK at 12:11

## 2024-05-10 RX ADMIN — AMLODIPINE BESYLATE 10 MG: 10 TABLET ORAL at 08:54

## 2024-05-10 RX ADMIN — POTASSIUM & SODIUM PHOSPHATES POWDER PACK 280-160-250 MG 250 MG: 280-160-250 PACK at 20:22

## 2024-05-10 RX ADMIN — DICYCLOMINE HYDROCHLORIDE 20 MG: 20 TABLET ORAL at 12:11

## 2024-05-10 RX ADMIN — ENOXAPARIN SODIUM 40 MG: 100 INJECTION SUBCUTANEOUS at 08:54

## 2024-05-10 RX ADMIN — MAGNESIUM GLUCONATE 500 MG ORAL TABLET 400 MG: 500 TABLET ORAL at 08:53

## 2024-05-10 RX ADMIN — SUCRALFATE 1 G: 1 TABLET ORAL at 17:16

## 2024-05-10 RX ADMIN — SUCRALFATE 1 G: 1 TABLET ORAL at 20:11

## 2024-05-10 RX ADMIN — MAGNESIUM GLUCONATE 500 MG ORAL TABLET 400 MG: 500 TABLET ORAL at 08:55

## 2024-05-10 ASSESSMENT — PAIN - FUNCTIONAL ASSESSMENT: PAIN_FUNCTIONAL_ASSESSMENT: ACTIVITIES ARE NOT PREVENTED

## 2024-05-10 ASSESSMENT — PAIN SCALES - GENERAL
PAINLEVEL_OUTOF10: 0
PAINLEVEL_OUTOF10: 3

## 2024-05-10 ASSESSMENT — PAIN DESCRIPTION - ORIENTATION: ORIENTATION: MID

## 2024-05-10 ASSESSMENT — PAIN DESCRIPTION - LOCATION: LOCATION: HEAD

## 2024-05-10 ASSESSMENT — PAIN DESCRIPTION - DESCRIPTORS: DESCRIPTORS: ACHING

## 2024-05-10 NOTE — CARE COORDINATION
Dispo update:  [facility] ambulance transport had been set up for 12:30 pm today; however, in light of rapid response called today, transport cancelled and CM notified Ms. Irais Yuen of [facility].  She said that they do not do weekend admissions, so it would have to be Monday May 13 or later (last day that [facility] Medicare insurance pre-cert is valid).  CM will continue to follow and assist with discharge planning.

## 2024-05-10 NOTE — ICUWATCH
RRT Clinical Rounding Nurse Update    Vitals:    05/10/24 0831 05/10/24 1036 05/10/24 1037 05/10/24 1550   BP: (!) 148/89 103/77  131/79   Pulse: 87 (!) 101 (!) 112 93   Resp: 20   16   Temp: 97.9 °F (36.6 °C)   98.6 °F (37 °C)   TempSrc: Oral   Oral   SpO2: 96% 97%  95%   Weight:       Height:            DETERIORATION INDEX SCORE: 20    ASSESSMENT:  Previous outreach assessment was reviewed. There have been no significant changes since previous assessment. Remains alert, on her phone. No new syncopal episodes per primary RN. Remains on remote telemetry.     PLAN:  Will follow per RRT Clinical Rounding Program protocol.    Mariel De Los Santos RN  [facility]: [phone]  [facility]: [phone]

## 2024-05-10 NOTE — ICUWATCH
Rapid Response Team Note      Subjective: Responded to Rapid Response secondary to unresponsive episode while sitting in chair.    Summary: Patient had witnessed unresponsive episode where she slumped over while sitting in recliner. Patient was slid to bed by multiple Rns with transfer sheet and awakened- oriented and following commands. VSS and BGL WNL. Monitor room did not note any events on telemetry. Dr. Chen at bedside and reports patient has had previous assumed vasovagal episodes.   Patient was planned to discharge to rehab today, but provider cancelling discharge for now for closer monitoring. Dr. Chen placing orders for cardiology consult.       See Rapid Response/Code Blue Narrator for full documentation    Outcome: Patient to remain in current location. Will follow-up per Critical Care Clinical Rounding protocol.      Mariel De Los Santos RN  [facility]: [phone]  [facility]: [phone]

## 2024-05-10 NOTE — CONSULTS
am  5/10/2024 11:46 AM    Admit Date: 5/5/2024    Admit Diagnosis: Hypokalemia [E87.6]  Hypomagnesemia [E83.42]  General weakness [R53.1]  Acute cystitis with hematuria [N30.01]  Intractable nausea and vomiting [R11.2]      Subjective:    Patient : 67 yo female with PMH of JEREMI, type 2 diabetes, hypothyroidism, orthostatic hypotension, and posterior fossa arachnoid cyst. PSH includes gastric bypass surgery in 09/2023 presented with intractable nausea and vomiting. Cardiology consulted for recurrent syncopal episodes. Pt states she has had 3 episodes in 2 weeks. Previously, she had a syncopal episode during her last hospital stay a month ago as well. States she has never had syncopal episodes until her gastric bypass. She denies any triggers and states they happen randomly.    Objective:    /77   Pulse (!) 112   Temp 97.9 °F (36.6 °C) (Oral)   Resp 20   Ht 1.753 m (5' 9\")   Wt 90.7 kg (200 lb)   SpO2 97%   BMI 29.53 kg/m²     ROS:  General ROS: negative for - chills  Hematological and Lymphatic ROS: negative for - blood clots or jaundice  Respiratory ROS: positive for - shortness of breath  Cardiovascular ROS: no chest pain or dyspnea on exertion  Gastrointestinal ROS: no abdominal pain, change in bowel habits, or black or bloody stools  Neurological ROS: no TIA or stroke symptoms    Physical Exam:      Physical Examination: General appearance - Appearance: alert, well appearing, and in no distress.   Neck/lymph - supple, no significant adenopathy  Chest/CV - clear to auscultation, no wheezes, rales or rhonchi, symmetric air entry  Heart - normal rate, regular rhythm, normal S1, S2, no murmurs, rubs, clicks or gallops  Abdomen/GI - soft, nontender, nondistended, no masses or organomegaly   Musculoskeletal - no joint tenderness, deformity or swelling  Extremities - peripheral pulses normal, no pedal edema, no clubbing or cyanosis  Skin - normal coloration and turgor, no rashes, no suspicious skin lesions 
mg Rectal Q6H PRN Khoa Knutson MD        gabapentin (NEURONTIN) capsule 400 mg  400 mg Oral TID Khoa Knutson MD   400 mg at 05/06/24 2059    levothyroxine (SYNTHROID) tablet 100 mcg  100 mcg Oral QAM  Khoa Knutson MD   100 mcg at 05/07/24 0620    dicyclomine (BENTYL) tablet 20 mg  20 mg Oral TID  Khoa Knutson MD   20 mg at 05/07/24 0620    pantoprazole (PROTONIX) tablet 40 mg  40 mg Oral QAM  Khoa Knutson MD   40 mg at 05/07/24 0620    scopolamine (TRANSDERM-SCOP) transdermal patch 1 patch  1 patch TransDERmal Q72H Khoa Knutson MD   1 patch at 05/05/24 0933    [Held by provider] fludrocortisone (FLORINEF) tablet 0.2 mg  0.2 mg Oral Daily Khoa Knutson MD        hydrALAZINE (APRESOLINE) injection 10 mg  10 mg IntraVENous Q6H PRN Dover, Thu, DO   10 mg at 05/05/24 0926    meclizine (ANTIVERT) tablet 25 mg  25 mg Oral TID PRN Dover, Thu, DO        metoclopramide (REGLAN) injection 10 mg  10 mg IntraVENous TID AC Dover, Thu, DO   10 mg at 05/07/24 0620    lactobacillus (CULTURELLE) capsule 1 capsule  1 capsule Oral Daily with breakfast Dover, Thu, DO   1 capsule at 05/06/24 0829        Allergies   Allergen Reactions    Aspirin Swelling     Rash    Levofloxacin Swelling     Also, itching    Penicillins Anaphylaxis    Ziprasidone Hcl Rash       Review of Systems:  Not obtained due to emergent situation         Objective:     Vitals:    05/07/24 0824 05/07/24 0824 05/07/24 0830 05/07/24 0831   BP: (!) 163/93   (!) 174/91   Pulse:  97 95 93   Resp:   17    Temp:       TempSrc:       SpO2:  97% 98% 97%   Weight:       Height:            Physical Exam:  General - Well developed, well nourished, in mild distress.  Anxious   HEENT - Normocephalic, atraumatic. Conjunctiva, tympanic membranes, and oropharynx are clear.   Neck - Supple without masses, no bruits   Cardiovascular - Regular rate and rhythm. Normal S1, S2 without murmurs, rubs, or gallops.  Lungs - Clear to auscultation.  Abdomen - Soft,

## 2024-05-11 PROBLEM — R55 RECURRENT SYNCOPE: Status: ACTIVE | Noted: 2024-05-11

## 2024-05-11 PROBLEM — I31.39 PERICARDIAL EFFUSION: Status: ACTIVE | Noted: 2024-05-11

## 2024-05-11 LAB
ANION GAP SERPL CALC-SCNC: 8 MMOL/L (ref 9–18)
BASOPHILS # BLD: 0.1 K/UL (ref 0–0.2)
BASOPHILS NFR BLD: 1 % (ref 0–2)
BUN SERPL-MCNC: 6 MG/DL (ref 8–23)
CALCIUM SERPL-MCNC: 9.3 MG/DL (ref 8.8–10.2)
CHLORIDE SERPL-SCNC: 108 MMOL/L (ref 98–107)
CO2 SERPL-SCNC: 25 MMOL/L (ref 20–28)
CREAT SERPL-MCNC: 0.43 MG/DL (ref 0.6–1.1)
DIFFERENTIAL METHOD BLD: ABNORMAL
EOSINOPHIL # BLD: 0.2 K/UL (ref 0–0.8)
EOSINOPHIL NFR BLD: 2 % (ref 0.5–7.8)
ERYTHROCYTE [DISTWIDTH] IN BLOOD BY AUTOMATED COUNT: 16.7 % (ref 11.9–14.6)
ERYTHROCYTE [SEDIMENTATION RATE] IN BLOOD: 9 MM/HR (ref 0–30)
GLUCOSE SERPL-MCNC: 92 MG/DL (ref 70–99)
HCT VFR BLD AUTO: 37.2 % (ref 35.8–46.3)
HGB BLD-MCNC: 11.6 G/DL (ref 11.7–15.4)
IMM GRANULOCYTES # BLD AUTO: 0.1 K/UL (ref 0–0.5)
IMM GRANULOCYTES NFR BLD AUTO: 1 % (ref 0–5)
LYMPHOCYTES # BLD: 2.1 K/UL (ref 0.5–4.6)
LYMPHOCYTES NFR BLD: 27 % (ref 13–44)
MCH RBC QN AUTO: 28.7 PG (ref 26.1–32.9)
MCHC RBC AUTO-ENTMCNC: 31.2 G/DL (ref 31.4–35)
MCV RBC AUTO: 92.1 FL (ref 82–102)
MONOCYTES # BLD: 0.7 K/UL (ref 0.1–1.3)
MONOCYTES NFR BLD: 9 % (ref 4–12)
NEUTS SEG # BLD: 4.8 K/UL (ref 1.7–8.2)
NEUTS SEG NFR BLD: 60 % (ref 43–78)
NRBC # BLD: 0 K/UL (ref 0–0.2)
PHOSPHATE SERPL-MCNC: 3.4 MG/DL (ref 2.5–4.5)
PLATELET # BLD AUTO: 298 K/UL (ref 150–450)
PMV BLD AUTO: 8.8 FL (ref 9.4–12.3)
POTASSIUM SERPL-SCNC: 3.9 MMOL/L (ref 3.5–5.1)
RBC # BLD AUTO: 4.04 M/UL (ref 4.05–5.2)
SODIUM SERPL-SCNC: 141 MMOL/L (ref 136–145)
T4 FREE SERPL-MCNC: 0.5 NG/DL (ref 0.9–1.7)
TSH W FREE THYROID IF ABNORMAL: 76.1 UIU/ML (ref 0.27–4.2)
WBC # BLD AUTO: 7.9 K/UL (ref 4.3–11.1)

## 2024-05-11 PROCEDURE — 6370000000 HC RX 637 (ALT 250 FOR IP): Performed by: HOSPITALIST

## 2024-05-11 PROCEDURE — 84100 ASSAY OF PHOSPHORUS: CPT

## 2024-05-11 PROCEDURE — 36415 COLL VENOUS BLD VENIPUNCTURE: CPT

## 2024-05-11 PROCEDURE — 6370000000 HC RX 637 (ALT 250 FOR IP): Performed by: STUDENT IN AN ORGANIZED HEALTH CARE EDUCATION/TRAINING PROGRAM

## 2024-05-11 PROCEDURE — 1100000003 HC PRIVATE W/ TELEMETRY

## 2024-05-11 PROCEDURE — 6370000000 HC RX 637 (ALT 250 FOR IP): Performed by: FAMILY MEDICINE

## 2024-05-11 PROCEDURE — 85025 COMPLETE CBC W/AUTO DIFF WBC: CPT

## 2024-05-11 PROCEDURE — 6370000000 HC RX 637 (ALT 250 FOR IP): Performed by: INTERNAL MEDICINE

## 2024-05-11 PROCEDURE — 99232 SBSQ HOSP IP/OBS MODERATE 35: CPT | Performed by: INTERNAL MEDICINE

## 2024-05-11 PROCEDURE — 2580000003 HC RX 258: Performed by: HOSPITALIST

## 2024-05-11 PROCEDURE — 84443 ASSAY THYROID STIM HORMONE: CPT

## 2024-05-11 PROCEDURE — 80048 BASIC METABOLIC PNL TOTAL CA: CPT

## 2024-05-11 PROCEDURE — 6360000002 HC RX W HCPCS: Performed by: HOSPITALIST

## 2024-05-11 PROCEDURE — 84439 ASSAY OF FREE THYROXINE: CPT

## 2024-05-11 PROCEDURE — 85652 RBC SED RATE AUTOMATED: CPT

## 2024-05-11 PROCEDURE — 86140 C-REACTIVE PROTEIN: CPT

## 2024-05-11 RX ORDER — LEVOTHYROXINE SODIUM 0.15 MG/1
150 TABLET ORAL DAILY
Status: DISCONTINUED | OUTPATIENT
Start: 2024-05-12 | End: 2024-05-13 | Stop reason: HOSPADM

## 2024-05-11 RX ADMIN — GABAPENTIN 400 MG: 300 CAPSULE ORAL at 21:11

## 2024-05-11 RX ADMIN — SUCRALFATE 1 G: 1 TABLET ORAL at 16:29

## 2024-05-11 RX ADMIN — POTASSIUM & SODIUM PHOSPHATES POWDER PACK 280-160-250 MG 250 MG: 280-160-250 PACK at 09:24

## 2024-05-11 RX ADMIN — SULFAMETHOXAZOLE AND TRIMETHOPRIM 1 TABLET: 800; 160 TABLET ORAL at 09:24

## 2024-05-11 RX ADMIN — PANTOPRAZOLE SODIUM 40 MG: 40 TABLET, DELAYED RELEASE ORAL at 05:38

## 2024-05-11 RX ADMIN — LEVOTHYROXINE SODIUM 100 MCG: 0.1 TABLET ORAL at 05:37

## 2024-05-11 RX ADMIN — SODIUM CHLORIDE, PRESERVATIVE FREE 10 ML: 5 INJECTION INTRAVENOUS at 21:11

## 2024-05-11 RX ADMIN — DICYCLOMINE HYDROCHLORIDE 20 MG: 20 TABLET ORAL at 05:37

## 2024-05-11 RX ADMIN — GABAPENTIN 400 MG: 300 CAPSULE ORAL at 09:23

## 2024-05-11 RX ADMIN — SULFAMETHOXAZOLE AND TRIMETHOPRIM 1 TABLET: 800; 160 TABLET ORAL at 21:11

## 2024-05-11 RX ADMIN — SUCRALFATE 1 G: 1 TABLET ORAL at 05:37

## 2024-05-11 RX ADMIN — GABAPENTIN 400 MG: 300 CAPSULE ORAL at 14:24

## 2024-05-11 RX ADMIN — Medication 1 CAPSULE: at 09:24

## 2024-05-11 RX ADMIN — SODIUM CHLORIDE, PRESERVATIVE FREE 10 ML: 5 INJECTION INTRAVENOUS at 09:24

## 2024-05-11 RX ADMIN — SUCRALFATE 1 G: 1 TABLET ORAL at 21:11

## 2024-05-11 RX ADMIN — ENOXAPARIN SODIUM 40 MG: 100 INJECTION SUBCUTANEOUS at 09:24

## 2024-05-11 RX ADMIN — MAGNESIUM GLUCONATE 500 MG ORAL TABLET 400 MG: 500 TABLET ORAL at 09:24

## 2024-05-11 RX ADMIN — DICYCLOMINE HYDROCHLORIDE 20 MG: 20 TABLET ORAL at 16:29

## 2024-05-11 ASSESSMENT — PAIN SCALES - GENERAL: PAINLEVEL_OUTOF10: 0

## 2024-05-11 NOTE — PLAN OF CARE
Problem: Discharge Planning  Goal: Discharge to home or other facility with appropriate resources  5/11/2024 0751 by Marcelina Garcia RN  Outcome: Progressing  5/10/2024 2157 by Riana Woods RN  Outcome: Progressing     Problem: Pain  Goal: Verbalizes/displays adequate comfort level or baseline comfort level  5/11/2024 0751 by Marcelina Garcia RN  Outcome: Progressing  5/10/2024 2157 by Riana Woods RN  Outcome: Progressing  Flowsheets (Taken 5/10/2024 1920)  Verbalizes/displays adequate comfort level or baseline comfort level: Encourage patient to monitor pain and request assistance     Problem: Safety - Adult  Goal: Free from fall injury  5/10/2024 2157 by Riana Woods RN  Outcome: Progressing  Flowsheets (Taken 5/10/2024 1944)  Free From Fall Injury: Instruct family/caregiver on patient safety     Problem: Skin/Tissue Integrity  Goal: Absence of new skin breakdown  Description: 1.  Monitor for areas of redness and/or skin breakdown  2.  Assess vascular access sites hourly  3.  Every 4-6 hours minimum:  Change oxygen saturation probe site  4.  Every 4-6 hours:  If on nasal continuous positive airway pressure, respiratory therapy assess nares and determine need for appliance change or resting period.  5/10/2024 2157 by Riana Woods RN  Outcome: Progressing

## 2024-05-11 NOTE — PLAN OF CARE
Problem: Discharge Planning  Goal: Discharge to home or other facility with appropriate resources  5/10/2024 2157 by Riana Woods RN  Outcome: Progressing  5/10/2024 1430 by Marcelina Garcia RN  Outcome: Progressing     Problem: Pain  Goal: Verbalizes/displays adequate comfort level or baseline comfort level  5/10/2024 2157 by Riana Woods RN  Outcome: Progressing  Flowsheets (Taken 5/10/2024 1920)  Verbalizes/displays adequate comfort level or baseline comfort level: Encourage patient to monitor pain and request assistance  5/10/2024 1430 by Marcelina Garcia RN  Outcome: Progressing     Problem: Safety - Adult  Goal: Free from fall injury  5/10/2024 2157 by Riana Woods RN  Outcome: Progressing  Flowsheets (Taken 5/10/2024 1944)  Free From Fall Injury: Instruct family/caregiver on patient safety  5/10/2024 1430 by Marcelina Garcia RN  Outcome: Progressing     Problem: Skin/Tissue Integrity  Goal: Absence of new skin breakdown  Description: 1.  Monitor for areas of redness and/or skin breakdown  2.  Assess vascular access sites hourly  3.  Every 4-6 hours minimum:  Change oxygen saturation probe site  4.  Every 4-6 hours:  If on nasal continuous positive airway pressure, respiratory therapy assess nares and determine need for appliance change or resting period.  Outcome: Progressing     Problem: ABCDS Injury Assessment  Goal: Absence of physical injury  Outcome: Progressing  Flowsheets (Taken 5/10/2024 1944)  Absence of Physical Injury: Implement safety measures based on patient assessment

## 2024-05-12 LAB
ANION GAP SERPL CALC-SCNC: 10 MMOL/L (ref 9–18)
BASOPHILS # BLD: 0 K/UL (ref 0–0.2)
BASOPHILS NFR BLD: 0 % (ref 0–2)
BUN SERPL-MCNC: 9 MG/DL (ref 8–23)
CALCIUM SERPL-MCNC: 9 MG/DL (ref 8.8–10.2)
CHLORIDE SERPL-SCNC: 105 MMOL/L (ref 98–107)
CO2 SERPL-SCNC: 23 MMOL/L (ref 20–28)
CORTIS SERPL-MCNC: 14.8 UG/DL
CREAT SERPL-MCNC: 0.55 MG/DL (ref 0.6–1.1)
DIFFERENTIAL METHOD BLD: ABNORMAL
EOSINOPHIL # BLD: 0.3 K/UL (ref 0–0.8)
EOSINOPHIL NFR BLD: 3 % (ref 0.5–7.8)
ERYTHROCYTE [DISTWIDTH] IN BLOOD BY AUTOMATED COUNT: 16.7 % (ref 11.9–14.6)
GLUCOSE SERPL-MCNC: 86 MG/DL (ref 70–99)
HCT VFR BLD AUTO: 36.3 % (ref 35.8–46.3)
HGB BLD-MCNC: 11.4 G/DL (ref 11.7–15.4)
IMM GRANULOCYTES # BLD AUTO: 0.1 K/UL (ref 0–0.5)
IMM GRANULOCYTES NFR BLD AUTO: 1 % (ref 0–5)
LYMPHOCYTES # BLD: 1.8 K/UL (ref 0.5–4.6)
LYMPHOCYTES NFR BLD: 21 % (ref 13–44)
MCH RBC QN AUTO: 29.5 PG (ref 26.1–32.9)
MCHC RBC AUTO-ENTMCNC: 31.4 G/DL (ref 31.4–35)
MCV RBC AUTO: 93.8 FL (ref 82–102)
MONOCYTES # BLD: 0.9 K/UL (ref 0.1–1.3)
MONOCYTES NFR BLD: 10 % (ref 4–12)
NEUTS SEG # BLD: 5.8 K/UL (ref 1.7–8.2)
NEUTS SEG NFR BLD: 65 % (ref 43–78)
NRBC # BLD: 0 K/UL (ref 0–0.2)
PHOSPHATE SERPL-MCNC: 4 MG/DL (ref 2.5–4.5)
PLATELET # BLD AUTO: 306 K/UL (ref 150–450)
PMV BLD AUTO: 9.2 FL (ref 9.4–12.3)
POTASSIUM SERPL-SCNC: 3.9 MMOL/L (ref 3.5–5.1)
RBC # BLD AUTO: 3.87 M/UL (ref 4.05–5.2)
SODIUM SERPL-SCNC: 138 MMOL/L (ref 136–145)
WBC # BLD AUTO: 8.9 K/UL (ref 4.3–11.1)

## 2024-05-12 PROCEDURE — 84100 ASSAY OF PHOSPHORUS: CPT

## 2024-05-12 PROCEDURE — 6360000002 HC RX W HCPCS: Performed by: HOSPITALIST

## 2024-05-12 PROCEDURE — 85025 COMPLETE CBC W/AUTO DIFF WBC: CPT

## 2024-05-12 PROCEDURE — 99232 SBSQ HOSP IP/OBS MODERATE 35: CPT | Performed by: INTERNAL MEDICINE

## 2024-05-12 PROCEDURE — 6370000000 HC RX 637 (ALT 250 FOR IP): Performed by: HOSPITALIST

## 2024-05-12 PROCEDURE — 6370000000 HC RX 637 (ALT 250 FOR IP): Performed by: INTERNAL MEDICINE

## 2024-05-12 PROCEDURE — 2580000003 HC RX 258: Performed by: HOSPITALIST

## 2024-05-12 PROCEDURE — 80048 BASIC METABOLIC PNL TOTAL CA: CPT

## 2024-05-12 PROCEDURE — 82533 TOTAL CORTISOL: CPT

## 2024-05-12 PROCEDURE — 1100000000 HC RM PRIVATE

## 2024-05-12 PROCEDURE — 6370000000 HC RX 637 (ALT 250 FOR IP): Performed by: FAMILY MEDICINE

## 2024-05-12 PROCEDURE — 36415 COLL VENOUS BLD VENIPUNCTURE: CPT

## 2024-05-12 PROCEDURE — 6370000000 HC RX 637 (ALT 250 FOR IP): Performed by: STUDENT IN AN ORGANIZED HEALTH CARE EDUCATION/TRAINING PROGRAM

## 2024-05-12 RX ADMIN — PANTOPRAZOLE SODIUM 40 MG: 40 TABLET, DELAYED RELEASE ORAL at 04:20

## 2024-05-12 RX ADMIN — SUCRALFATE 1 G: 1 TABLET ORAL at 12:16

## 2024-05-12 RX ADMIN — DICYCLOMINE HYDROCHLORIDE 20 MG: 20 TABLET ORAL at 12:16

## 2024-05-12 RX ADMIN — ENOXAPARIN SODIUM 40 MG: 100 INJECTION SUBCUTANEOUS at 09:18

## 2024-05-12 RX ADMIN — SUCRALFATE 1 G: 1 TABLET ORAL at 21:45

## 2024-05-12 RX ADMIN — SUCRALFATE 1 G: 1 TABLET ORAL at 04:20

## 2024-05-12 RX ADMIN — SODIUM CHLORIDE, PRESERVATIVE FREE 10 ML: 5 INJECTION INTRAVENOUS at 21:46

## 2024-05-12 RX ADMIN — GABAPENTIN 400 MG: 300 CAPSULE ORAL at 09:18

## 2024-05-12 RX ADMIN — Medication 1 CAPSULE: at 09:18

## 2024-05-12 RX ADMIN — DICYCLOMINE HYDROCHLORIDE 20 MG: 20 TABLET ORAL at 15:37

## 2024-05-12 RX ADMIN — DICYCLOMINE HYDROCHLORIDE 20 MG: 20 TABLET ORAL at 05:10

## 2024-05-12 RX ADMIN — SULFAMETHOXAZOLE AND TRIMETHOPRIM 1 TABLET: 800; 160 TABLET ORAL at 21:45

## 2024-05-12 RX ADMIN — SULFAMETHOXAZOLE AND TRIMETHOPRIM 1 TABLET: 800; 160 TABLET ORAL at 09:18

## 2024-05-12 RX ADMIN — SODIUM CHLORIDE, PRESERVATIVE FREE 10 ML: 5 INJECTION INTRAVENOUS at 09:18

## 2024-05-12 RX ADMIN — ACETAMINOPHEN 650 MG: 325 TABLET ORAL at 03:23

## 2024-05-12 RX ADMIN — GABAPENTIN 400 MG: 300 CAPSULE ORAL at 21:45

## 2024-05-12 RX ADMIN — MAGNESIUM GLUCONATE 500 MG ORAL TABLET 400 MG: 500 TABLET ORAL at 09:18

## 2024-05-12 RX ADMIN — LEVOTHYROXINE SODIUM 150 MCG: 0.15 TABLET ORAL at 05:10

## 2024-05-12 RX ADMIN — SUCRALFATE 1 G: 1 TABLET ORAL at 15:37

## 2024-05-12 RX ADMIN — ONDANSETRON 4 MG: 4 TABLET, ORALLY DISINTEGRATING ORAL at 04:20

## 2024-05-12 ASSESSMENT — PAIN SCALES - GENERAL: PAINLEVEL_OUTOF10: 8

## 2024-05-12 ASSESSMENT — PAIN DESCRIPTION - LOCATION: LOCATION: ABDOMEN;HEAD

## 2024-05-12 NOTE — PLAN OF CARE
Problem: Discharge Planning  Goal: Discharge to home or other facility with appropriate resources  5/12/2024 1104 by Eun Padilla RN  Outcome: Progressing  5/11/2024 2313 by Kasey Wood RN  Outcome: Progressing     Problem: Pain  Goal: Verbalizes/displays adequate comfort level or baseline comfort level  5/12/2024 1104 by Eun Padilla RN  Outcome: Progressing  5/11/2024 2313 by Kasey Wood RN  Outcome: Progressing     Problem: Safety - Adult  Goal: Free from fall injury  5/12/2024 1104 by Eun Padilla RN  Outcome: Progressing  5/11/2024 2313 by Kasey Wood RN  Outcome: Progressing     Problem: Skin/Tissue Integrity  Goal: Absence of new skin breakdown  Description: 1.  Monitor for areas of redness and/or skin breakdown  2.  Assess vascular access sites hourly  3.  Every 4-6 hours minimum:  Change oxygen saturation probe site  4.  Every 4-6 hours:  If on nasal continuous positive airway pressure, respiratory therapy assess nares and determine need for appliance change or resting period.  5/12/2024 1104 by Eun Padilla RN  Outcome: Progressing  5/11/2024 2313 by Kasey Wood RN  Outcome: Progressing     Problem: ABCDS Injury Assessment  Goal: Absence of physical injury  5/12/2024 1104 by Eun Padilla RN  Outcome: Progressing  5/11/2024 2313 by Kasey Wood RN  Outcome: Progressing

## 2024-05-12 NOTE — FLOWSHEET NOTE
05/11/24 1952   Vital Signs   BP Location Right upper arm   BP Method Automatic   Patient Position Sitting   Orthostatic B/P and Pulse? Yes   Blood Pressure Lying 145/86   Pulse Lying 89 PER MINUTE   Blood Pressure Sitting 109/90   Pulse Sitting 111 PER MINUTE     Tech attempted orthostatic VS, pt refused to stand d/t feeling lightheaded and dizzy.

## 2024-05-13 VITALS
RESPIRATION RATE: 16 BRPM | DIASTOLIC BLOOD PRESSURE: 81 MMHG | BODY MASS INDEX: 28.02 KG/M2 | TEMPERATURE: 97.8 F | WEIGHT: 189.2 LBS | OXYGEN SATURATION: 95 % | SYSTOLIC BLOOD PRESSURE: 121 MMHG | HEIGHT: 69 IN | HEART RATE: 91 BPM

## 2024-05-13 LAB
BASOPHILS # BLD: 0 K/UL (ref 0–0.2)
BASOPHILS NFR BLD: 1 % (ref 0–2)
DIFFERENTIAL METHOD BLD: ABNORMAL
EOSINOPHIL # BLD: 0.3 K/UL (ref 0–0.8)
EOSINOPHIL NFR BLD: 3 % (ref 0.5–7.8)
ERYTHROCYTE [DISTWIDTH] IN BLOOD BY AUTOMATED COUNT: 16.6 % (ref 11.9–14.6)
HCT VFR BLD AUTO: 37.4 % (ref 35.8–46.3)
HGB BLD-MCNC: 11.9 G/DL (ref 11.7–15.4)
IMM GRANULOCYTES # BLD AUTO: 0.1 K/UL (ref 0–0.5)
IMM GRANULOCYTES NFR BLD AUTO: 1 % (ref 0–5)
LYMPHOCYTES # BLD: 1.8 K/UL (ref 0.5–4.6)
LYMPHOCYTES NFR BLD: 23 % (ref 13–44)
MCH RBC QN AUTO: 29.3 PG (ref 26.1–32.9)
MCHC RBC AUTO-ENTMCNC: 31.8 G/DL (ref 31.4–35)
MCV RBC AUTO: 92.1 FL (ref 82–102)
MONOCYTES # BLD: 0.9 K/UL (ref 0.1–1.3)
MONOCYTES NFR BLD: 12 % (ref 4–12)
NEUTS SEG # BLD: 4.8 K/UL (ref 1.7–8.2)
NEUTS SEG NFR BLD: 60 % (ref 43–78)
NRBC # BLD: 0 K/UL (ref 0–0.2)
PHOSPHATE SERPL-MCNC: 3.4 MG/DL (ref 2.5–4.5)
PLATELET # BLD AUTO: 302 K/UL (ref 150–450)
PMV BLD AUTO: 9.8 FL (ref 9.4–12.3)
RBC # BLD AUTO: 4.06 M/UL (ref 4.05–5.2)
WBC # BLD AUTO: 7.9 K/UL (ref 4.3–11.1)

## 2024-05-13 PROCEDURE — 2580000003 HC RX 258: Performed by: HOSPITALIST

## 2024-05-13 PROCEDURE — 6370000000 HC RX 637 (ALT 250 FOR IP): Performed by: STUDENT IN AN ORGANIZED HEALTH CARE EDUCATION/TRAINING PROGRAM

## 2024-05-13 PROCEDURE — 36415 COLL VENOUS BLD VENIPUNCTURE: CPT

## 2024-05-13 PROCEDURE — 97530 THERAPEUTIC ACTIVITIES: CPT

## 2024-05-13 PROCEDURE — 6370000000 HC RX 637 (ALT 250 FOR IP): Performed by: HOSPITALIST

## 2024-05-13 PROCEDURE — 84100 ASSAY OF PHOSPHORUS: CPT

## 2024-05-13 PROCEDURE — 6370000000 HC RX 637 (ALT 250 FOR IP): Performed by: FAMILY MEDICINE

## 2024-05-13 PROCEDURE — 6360000002 HC RX W HCPCS: Performed by: HOSPITALIST

## 2024-05-13 PROCEDURE — 6370000000 HC RX 637 (ALT 250 FOR IP): Performed by: INTERNAL MEDICINE

## 2024-05-13 PROCEDURE — 85025 COMPLETE CBC W/AUTO DIFF WBC: CPT

## 2024-05-13 RX ORDER — FLUDROCORTISONE ACETATE 0.1 MG/1
0.2 TABLET ORAL DAILY
Qty: 60 TABLET | Refills: 0
Start: 2024-05-13 | End: 2024-06-12

## 2024-05-13 RX ORDER — LEVOTHYROXINE SODIUM 0.15 MG/1
150 TABLET ORAL DAILY
Qty: 30 TABLET | Refills: 3 | Status: SHIPPED | OUTPATIENT
Start: 2024-05-14

## 2024-05-13 RX ORDER — SULFAMETHOXAZOLE AND TRIMETHOPRIM 800; 160 MG/1; MG/1
1 TABLET ORAL EVERY 12 HOURS SCHEDULED
Qty: 3 TABLET | Refills: 0 | Status: SHIPPED | OUTPATIENT
Start: 2024-05-13 | End: 2024-05-15

## 2024-05-13 RX ADMIN — SULFAMETHOXAZOLE AND TRIMETHOPRIM 1 TABLET: 800; 160 TABLET ORAL at 08:51

## 2024-05-13 RX ADMIN — SUCRALFATE 1 G: 1 TABLET ORAL at 05:05

## 2024-05-13 RX ADMIN — Medication 1 CAPSULE: at 08:51

## 2024-05-13 RX ADMIN — DICYCLOMINE HYDROCHLORIDE 20 MG: 20 TABLET ORAL at 10:56

## 2024-05-13 RX ADMIN — DICYCLOMINE HYDROCHLORIDE 20 MG: 20 TABLET ORAL at 05:05

## 2024-05-13 RX ADMIN — SUCRALFATE 1 G: 1 TABLET ORAL at 10:56

## 2024-05-13 RX ADMIN — LEVOTHYROXINE SODIUM 150 MCG: 0.15 TABLET ORAL at 05:05

## 2024-05-13 RX ADMIN — SODIUM CHLORIDE, PRESERVATIVE FREE 10 ML: 5 INJECTION INTRAVENOUS at 08:52

## 2024-05-13 RX ADMIN — ENOXAPARIN SODIUM 40 MG: 100 INJECTION SUBCUTANEOUS at 08:51

## 2024-05-13 RX ADMIN — PANTOPRAZOLE SODIUM 40 MG: 40 TABLET, DELAYED RELEASE ORAL at 05:05

## 2024-05-13 RX ADMIN — GABAPENTIN 400 MG: 300 CAPSULE ORAL at 08:51

## 2024-05-13 NOTE — PLAN OF CARE
Problem: Discharge Planning  Goal: Discharge to home or other facility with appropriate resources  5/13/2024 0827 by Eun Padilla RN  Outcome: Progressing  5/12/2024 2248 by Kasey Wood RN  Outcome: Progressing     Problem: Pain  Goal: Verbalizes/displays adequate comfort level or baseline comfort level  5/13/2024 0827 by Eun Padilla RN  Outcome: Progressing  5/12/2024 2248 by Kasey Wood RN  Outcome: Progressing     Problem: Safety - Adult  Goal: Free from fall injury  5/13/2024 0827 by Eun Padilla RN  Outcome: Progressing  5/12/2024 2248 by Kasey Wood RN  Outcome: Progressing     Problem: Skin/Tissue Integrity  Goal: Absence of new skin breakdown  Description: 1.  Monitor for areas of redness and/or skin breakdown  2.  Assess vascular access sites hourly  3.  Every 4-6 hours minimum:  Change oxygen saturation probe site  4.  Every 4-6 hours:  If on nasal continuous positive airway pressure, respiratory therapy assess nares and determine need for appliance change or resting period.  5/13/2024 0827 by Eun Padilla RN  Outcome: Progressing  5/12/2024 2248 by Kasey Wood RN  Outcome: Progressing     Problem: ABCDS Injury Assessment  Goal: Absence of physical injury  5/13/2024 0827 by Eun Padilla RN  Outcome: Progressing  5/12/2024 2248 by Kasey Wood RN  Outcome: Progressing

## 2024-05-13 NOTE — PLAN OF CARE
Problem: Discharge Planning  Goal: Discharge to home or other facility with appropriate resources  5/13/2024 1419 by Eun Padilla RN  Outcome: Adequate for Discharge  5/13/2024 0827 by Eun Padilla RN  Outcome: Progressing     Problem: Pain  Goal: Verbalizes/displays adequate comfort level or baseline comfort level  5/13/2024 1419 by Eun Padilla RN  Outcome: Adequate for Discharge  5/13/2024 0827 by Eun Padilla RN  Outcome: Progressing     Problem: Safety - Adult  Goal: Free from fall injury  5/13/2024 1419 by Eun Padilla RN  Outcome: Adequate for Discharge  5/13/2024 0827 by Eun Padilla RN  Outcome: Progressing     Problem: Skin/Tissue Integrity  Goal: Absence of new skin breakdown  Description: 1.  Monitor for areas of redness and/or skin breakdown  2.  Assess vascular access sites hourly  3.  Every 4-6 hours minimum:  Change oxygen saturation probe site  4.  Every 4-6 hours:  If on nasal continuous positive airway pressure, respiratory therapy assess nares and determine need for appliance change or resting period.  5/13/2024 1419 by Eun Padilla RN  Outcome: Adequate for Discharge  5/13/2024 0827 by Eun Padilla RN  Outcome: Progressing     Problem: ABCDS Injury Assessment  Goal: Absence of physical injury  5/13/2024 1419 by Eun Padilla RN  Outcome: Adequate for Discharge  5/13/2024 0827 by Eun Padilla RN  Outcome: Progressing

## 2024-05-13 NOTE — CARE COORDINATION
Pt is medically ready for dc. She will transport to [facility] at 3pm today via [facility], pt, daughter, MD, and nurse updated. Covid and [facility] is  up to date, pt will go to [address], call report to [phone]. Packet in soft chart.

## 2024-05-13 NOTE — DISCHARGE SUMMARY
% 3 0.5 - 7.8 %    Basophils % 1 0.0 - 2.0 %    Immature Granulocytes % 1 0.0 - 5.0 %    Neutrophils Absolute 4.8 1.7 - 8.2 K/UL    Lymphocytes Absolute 1.8 0.5 - 4.6 K/UL    Monocytes Absolute 0.9 0.1 - 1.3 K/UL    Eosinophils Absolute 0.3 0.0 - 0.8 K/UL    Basophils Absolute 0.0 0.0 - 0.2 K/UL    Immature Granulocytes Absolute 0.1 0.0 - 0.5 K/UL   Phosphorus    Collection Time: 05/13/24  5:05 AM   Result Value Ref Range    Phosphorus 3.4 2.5 - 4.5 MG/DL       No results for input(s): \"COVID19\" in the last 72 hours.    Recent Vital Data:  Patient Vitals for the past 24 hrs:   Temp Pulse Resp BP SpO2   05/13/24 0705 97.8 °F (36.6 °C) 91 16 121/81 95 %   05/13/24 0318 98.2 °F (36.8 °C) 92 16 111/61 97 %   05/12/24 1902 98.1 °F (36.7 °C) 92 18 107/75 95 %   05/12/24 1506 97.9 °F (36.6 °C) 88 18 (!) 157/96 97 %   05/12/24 1318 -- (!) 105 23 118/77 99 %       Oxygen Therapy  SpO2: 95 %  Pulse Oximeter Device Mode: Intermittent  O2 Device: None (Room air)    Estimated body mass index is 27.94 kg/m² as calculated from the following:    Height as of this encounter: 1.753 m (5' 9\").    Weight as of this encounter: 85.8 kg (189 lb 3.2 oz).    Intake/Output Summary (Last 24 hours) at 5/13/2024 1135  Last data filed at 5/13/2024 0520  Gross per 24 hour   Intake --   Output 450 ml   Net -450 ml         Physical Exam:    General:    Well nourished.  No overt distress flat affect  Head:  Normocephalic, atraumatic  Eyes:  Sclerae appear normal.  Pupils equally round.    HENT:  Nares appear normal, no drainage.  Moist mucous membranes  Neck:  No restricted ROM.  Trachea midline  CV:   RRR.  No m/r/g.  No JVD  Lungs:   CTAB.  No wheezing, rhonchi, or rales.  Respirations even, unlabored  Abdomen:   Soft, nontender, nondistended.    Extremities: Warm and dry.   No edema.    Skin:     No rashes.  Normal coloration  Neuro:  CN II-XII grossly intact.  Psych:  Normal mood and affect.    Signed:  Leola Deleon,     Part of this note

## 2024-05-13 NOTE — PROGRESS NOTES
[facility] CARDIOLOGY PROGRESS NOTE           5/11/2024 12:40 PM    Admit Date: 5/5/2024      Subjective:   -Telemetry overnight shows sinus rhythm with no significant arrhythmia  No weight charted  Intake versus output-not accurately charted  -Still says that she is very lightheaded and dizzy and cannot stand  Admits to over 130 pound weight loss since bariatric surgery last year.  Still complains of nausea although no vomiting overnight.      ROS:  Cardiovascular:  As noted above    Objective:      Vitals:    05/10/24 1550 05/10/24 1920 05/11/24 0356 05/11/24 0739   BP: 131/79 103/70 (!) 148/96 (!) 139/91   Pulse: 93 88 98 93   Resp: 16 18 18 19   Temp: 98.6 °F (37 °C) 98.1 °F (36.7 °C) 98.1 °F (36.7 °C) 98.2 °F (36.8 °C)   TempSrc: Oral Oral Oral Oral   SpO2: 95% 94% 96% 97%   Weight:       Height:           Physical Exam:  General-No Acute Distress, overweight  Neck- supple, no JVD  CV- regular rate and rhythm no MRG  Lung- clear bilaterally  Abd- soft, nontender, nondistended  Ext- no edema bilaterally.  Skin- warm and dry    Data Review:     Lab Results   Component Value Date/Time     05/11/2024 04:15 AM    K 3.9 05/11/2024 04:15 AM     05/11/2024 04:15 AM    CO2 25 05/11/2024 04:15 AM    BUN 6 05/11/2024 04:15 AM    CREATININE 0.43 05/11/2024 04:15 AM    GLUCOSE 92 05/11/2024 04:15 AM    CALCIUM 9.3 05/11/2024 04:15 AM         Lab Results   Component Value Date    WBC 7.9 05/11/2024    HGB 11.6 (L) 05/11/2024    HCT 37.2 05/11/2024    MCV 92.1 05/11/2024     05/11/2024      Lab Results   Component Value Date/Time    MG 1.8 05/10/2024 09:28 AM      Lab Results   Component Value Date    TSHELE 13.60 (H) 03/31/2024    QYX7IBS 9.530 (H) 03/09/2024 05/05/24    ECHO (TTE) COMPLETE (PRN CONTRAST/BUBBLE/STRAIN/3D) 05/10/2024  3:16 PM (Final)    Interpretation Summary    Left Ventricle: Normal left ventricular systolic function with a visually estimated EF of 60 - 65%. 
       Hospitalist Progress Note   Admit Date:  2024  1:34 AM   Name:  Jesika Olsen   Age:  68 y.o.  Sex:  female  :  1955   MRN:  [id number]   Room:  [facility]    Presenting/Chief Complaint: Nausea     Reason(s) for Admission: Hypokalemia [E87.6]  Hypomagnesemia [E83.42]  General weakness [R53.1]  Acute cystitis with hematuria [N30.01]  Intractable nausea and vomiting [R11.2]     Hospital Course:   68 y.o. CF w/ a PMH of JEREMI, bipolar 1, gastric bypass Jesica-en-Y 2023 who presented with generalized weakness, not able to mobilize herself associated with nausea/vomiting, abdominal pain of 1 week duration.     Of note, patient was admitted 3/31 - 2024 for abdominal pain and AMS associated with nausea and vomiting with dizziness.  She had CT a/p and subsequently had respiratory arrest suspicious due to droperidol and morphine given prior to CT.  She was admitted to the ICU and was intubated.  Hospital course at that time was complicated by nausea and dizziness with positive orthostatics.  All BP medications discontinued including Cymbalta to limit orthostatic hypotension.  She was started on Florinef.  MRI showed arachnoid cyst at the posterior fossa.  Neurology was consulted and believed patient had BPPV and recommended vestibular therapy with PT.  GI was consulted for nausea and vomiting and recommended PPI.  She was discharged to rehab but signed herself out AMA.     In the ER: K 2.8 and UA consistent with UTI.  WBC 15.1.  CT a/p on admission shows mild thickening of urinary bladder wall; correlate with cystitis.     Patient was admitted with acute on chronic intractable N/V associated with acute UTI.  She was started empirically on Rocephin; blood and urine cultures collected.  Supportive care for N/V which has been chronic.  Electrolytes replaced.  MRI last admission 4/10 and showed arachnoid cyst at the posterior fossa.  Neurology was consulted then and believed patient had BPPV and recommended 
       Hospitalist Progress Note   Admit Date:  2024  1:34 AM   Name:  Jesika Olsen   Age:  68 y.o.  Sex:  female  :  1955   MRN:  [id number]   Room:  [facility]    Presenting/Chief Complaint: Nausea     Reason(s) for Admission: Hypokalemia [E87.6]  Hypomagnesemia [E83.42]  General weakness [R53.1]  Acute cystitis with hematuria [N30.01]  Intractable nausea and vomiting [R11.2]     Hospital Course:   68 y.o. female with medical history of JEREMI, bipolar 1, gastric bypass Jesica-en-Y 2023 who presented with generalized weakness, not able to mobilize herself associated with nausea/vomiting, abdominal pain of 1 week duration.     Of note, patient was admitted 3/31 - 2024 for abdominal pain and AMS associated with nausea and vomiting with dizziness.  She had CTAP and subsequently had respiratory arrest suspicious due to droperidol and morphine given prior to CT. She was admitted to the ICU and was intubated.  Hospital course at that time was complicated by nausea and dizziness with positive orthostatics.  All BP medications discontinued including Cymbalta to limit orthostatic hypotension.  She was started on Florinef.  MRI was done and showed arachnoid cyst at the posterior fossa.  Neurology was consulted and believed patient had BPPV and recommended vestibular therapy with PT.  GI was consulted for nausea and vomiting and recommended PPI.  She was discharged to rehab but signed herself out of rehab AMA.     In ED, K2.8 and UA consistent with UTI. WBC 15.1. CTAP on admission shows mild thickening of urinary bladder wall correlate with cystitis.    Patient was admitted with acute on chronic intractable nausea vomiting associated with acute UTI.  She was started empirically on Rocephin pending urine culture and blood culture.  Supportive care for nausea and vomiting which has been chronic.  Electrolytes replaced.      Subjective & 24hr Events:   Pt stated she feels nauseous but no dizziness currently 
       Hospitalist Progress Note   Admit Date:  2024  1:34 AM   Name:  Jesika Olsen   Age:  68 y.o.  Sex:  female  :  1955   MRN:  [id number]   Room:  [facility]    Presenting/Chief Complaint: Nausea     Reason(s) for Admission: Hypokalemia [E87.6]  Hypomagnesemia [E83.42]  General weakness [R53.1]  Acute cystitis with hematuria [N30.01]  Intractable nausea and vomiting [R11.2]     Hospital Course:   68 y.o. CF w/ a PMH of JEREMI, bipolar 1, gastric bypass Jesica-en-Y 2023 who presented with generalized weakness, not able to mobilize herself associated with nausea/vomiting, abdominal pain of 1 week duration.     Of note, patient was admitted 3/31 - 2024 for abdominal pain and AMS associated with nausea and vomiting with dizziness.  She had CT a/p and subsequently had respiratory arrest suspicious due to droperidol and morphine given prior to CT.  She was admitted to the ICU and was intubated.  Hospital course at that time was complicated by nausea and dizziness with positive orthostatics.  All BP medications discontinued including Cymbalta to limit orthostatic hypotension.  She was started on Florinef.  MRI showed arachnoid cyst at the posterior fossa.  Neurology was consulted and believed patient had BPPV and recommended vestibular therapy with PT.  GI was consulted for nausea and vomiting and recommended PPI.  She was discharged to rehab but signed herself out AMA.     In the ER: K 2.8 and UA consistent with UTI.  WBC 15.1.  CT a/p on admission shows mild thickening of urinary bladder wall; correlate with cystitis.     Patient was admitted with acute on chronic intractable N/V associated with acute UTI.  She was started empirically on Rocephin; blood and urine cultures collected.  Supportive care for N/V which has been chronic.  Electrolytes replaced.  MRI last admission 4/10 and showed arachnoid cyst at the posterior fossa.  Neurology was consulted then and believed patient had BPPV and recommended 
       Hospitalist Progress Note   Admit Date:  2024  1:34 AM   Name:  Jesika Olsen   Age:  68 y.o.  Sex:  female  :  1955   MRN:  [id number]   Room:  [facility]    Presenting/Chief Complaint: Nausea     Reason(s) for Admission: Hypokalemia [E87.6]  Hypomagnesemia [E83.42]  General weakness [R53.1]  Acute cystitis with hematuria [N30.01]  Intractable nausea and vomiting [R11.2]     Hospital Course:   68 y.o. CF w/ a PMH of JEREMI, bipolar 1, gastric bypass Jesica-en-Y 2023 who presented with generalized weakness, not able to mobilize herself associated with nausea/vomiting, abdominal pain of 1 week duration.     Of note, patient was admitted 3/31 - 2024 for abdominal pain and AMS associated with nausea and vomiting with dizziness.  She had CT a/p and subsequently had respiratory arrest suspicious due to droperidol and morphine given prior to CT.  She was admitted to the ICU and was intubated.  Hospital course at that time was complicated by nausea and dizziness with positive orthostatics.  All BP medications discontinued including Cymbalta to limit orthostatic hypotension.  She was started on Florinef.  MRI showed arachnoid cyst at the posterior fossa.  Neurology was consulted and believed patient had BPPV and recommended vestibular therapy with PT.  GI was consulted for nausea and vomiting and recommended PPI.  She was discharged to rehab but signed herself out AMA.     In the ER: K 2.8 and UA consistent with UTI.  WBC 15.1.  CT a/p on admission shows mild thickening of urinary bladder wall; correlate with cystitis.     Patient was admitted with acute on chronic intractable N/V associated with acute UTI.  She was started empirically on Rocephin; blood and urine cultures collected.  Supportive care for N/V which has been chronic.  Electrolytes replaced.  MRI last admission 4/10 and showed arachnoid cyst at the posterior fossa.  Neurology was consulted then and believed patient had BPPV and recommended 
 attended Rapid Response.   provided prayer as medical staff provided care.   attempted face to face visit after initial care, thought patient declined, stating not today.    Peace be with you,  Signed by  ERIC Min[employer].   [phone]  
4 Eyes Skin Assessment     NAME:  Jesika Olsen  YOB: 1955  MEDICAL RECORD NUMBER:  [id number]    The patient is being assessed for  Admission    I agree that at least one RN has performed a thorough Head to Toe Skin Assessment on the patient. ALL assessment sites listed below have been assessed.      Areas assessed by both nurses:    Head, Face, Ears, Shoulders, Back, Chest, Arms, Elbows, Hands, Sacrum. Buttock, Coccyx, Ischium, and Legs. Feet and Heels        Does the Patient have a Wound? Yes wound(s) were present on assessment. LDA wound assessment was Initiated and completed by RN       Harshad Prevention initiated by RN: Yes  Wound Care Orders initiated by RN: No    Pressure Injury (Stage 3,4, Unstageable, DTI, NWPT, and Complex wounds) if present, place Wound referral order by RN under : No    New Ostomies, if present place, Ostomy referral order under : No     Nurse 1 eSignature: Electronically signed by Nancy Castillo RN on 5/5/24 at 6:56 AM EDT    **SHARE this note so that the co-signing nurse can place an eSignature**    Nurse 2 eSignature: Electronically signed by Riana Woods RN on 5/5/24 at 6:58 AM EDT    
ACUTE OCCUPATIONAL THERAPY GOALS:   (Developed with and agreed upon by patient and/or caregiver.)  1. Patient will complete total body bathing and dressing with CGA and adaptive equipment as needed.   2. Patient will complete toileting with CGA.   3. Patient will tolerate 25 minutes of OT treatment with 1-2 rest breaks to increase activity tolerance for ADLs.   4. Patient will complete functional transfers with SBA and adaptive equipment as needed.   5. Patient will complete functional mobility for household distances with CGA and appropriate safety awareness.   6. Patient will tolerate standing for 5 minutes to improve standing tolerance for ADL/hygiene.     Timeframe: 7 visits     OCCUPATIONAL THERAPY: Daily Note AM   OT Visit Days: 2   Time In/Out  OT Charge Capture  Rehab Caseload Tracker  OT Orders    Jesika Olsen is a 68 y.o. female   PRIMARY DIAGNOSIS: Intractable nausea and vomiting  Hypokalemia [E87.6]  Hypomagnesemia [E83.42]  General weakness [R53.1]  Acute cystitis with hematuria [N30.01]  Intractable nausea and vomiting [R11.2]       Inpatient: Payor: [facility] MEDICARE / Plan: [facility] MEDICARE COMPLETE / Product Type: *No Product type* /     ASSESSMENT:     REHAB RECOMMENDATIONS:   Recommendation to date pending progress:  Setting:  Short-term Rehab    Equipment:    To Be Determined     ASSESSMENT:  Ms. Olsen presents flat in the bed upon arrival. Pt has been having some issues with orthostatic BP per chart. Pt appears to be somewhat confused regarding her BP and continues to talk about her BP being elevated throughout session. Pt's /61 in supine and then 113/92 in sitting. Pt able to sit to the edge of the bed with minimal assistance and some additional time. Pt completely soiled in urine but reports she didn't call staff. Pt's skin irritated from the purewick. Pt worked on sitting tolerance edge of bed while participating in upper body bathing/dressing and grooming. Pt suddenly felt increased 
ACUTE OCCUPATIONAL THERAPY GOALS:   (Developed with and agreed upon by patient and/or caregiver.)  1. Patient will complete total body bathing and dressing with CGA and adaptive equipment as needed.   2. Patient will complete toileting with CGA.   3. Patient will tolerate 25 minutes of OT treatment with 1-2 rest breaks to increase activity tolerance for ADLs.   4. Patient will complete functional transfers with SBA and adaptive equipment as needed.   5. Patient will complete functional mobility for household distances with CGA and appropriate safety awareness.   6. Patient will tolerate standing for 5 minutes to improve standing tolerance for ADL/hygiene.     Timeframe: 7 visits     OCCUPATIONAL THERAPY: Daily Note PM   OT Visit Days: 3   Time In/Out  OT Charge Capture  Rehab Caseload Tracker  OT Orders    Jesika Olsen is a 68 y.o. female   PRIMARY DIAGNOSIS: Intractable nausea and vomiting  Hypokalemia [E87.6]  Hypomagnesemia [E83.42]  General weakness [R53.1]  Acute cystitis with hematuria [N30.01]  Intractable nausea and vomiting [R11.2]       Inpatient: Payor: [facility] MEDICARE / Plan: [facility] MEDICARE COMPLETE / Product Type: *No Product type* /     ASSESSMENT:     REHAB RECOMMENDATIONS:   Recommendation to date pending progress:  Setting:  Short-term Rehab    Equipment:    To Be Determined     ASSESSMENT:  Ms. Olsen presents in supine upon arrival. Pt with very flat affect but slightly agreeable with getting up for therapy with encouragement. Pt's BP was checked throughout session; see vital session of note. Pt completed supine to sit with CGA and required total assist to don socks while edge of bed. Pt c/o dizziness while edge of bed and completed one rep of sit to stand with min a and a rolling walker. Pt also c/o dizziness in standing. Pt returned to sitting and to supine before receiving further cues from therapists. Pt presents with decreased activity tolerance and generalized weakness. Continue OT POC. 
ACUTE OCCUPATIONAL THERAPY GOALS:   (Developed with and agreed upon by patient and/or caregiver.)  1. Patient will complete total body bathing and dressing with CGA and adaptive equipment as needed.   2. Patient will complete toileting with CGA.   3. Patient will tolerate 25 minutes of OT treatment with 1-2 rest breaks to increase activity tolerance for ADLs.   4. Patient will complete functional transfers with SBA and adaptive equipment as needed.   5. Patient will complete functional mobility for household distances with CGA and appropriate safety awareness.   6. Patient will tolerate standing for 5 minutes to improve standing tolerance for ADL/hygiene.    Timeframe: 7 visits       OCCUPATIONAL THERAPY Initial Assessment, Daily Note, and PM       OT Visit Days: 1  Acknowledge Orders  Time  OT Charge Capture  Rehab Caseload Tracker      Jesika Olsen is a 68 y.o. female   PRIMARY DIAGNOSIS: Intractable nausea and vomiting  Hypokalemia [E87.6]  Hypomagnesemia [E83.42]  General weakness [R53.1]  Acute cystitis with hematuria [N30.01]  Intractable nausea and vomiting [R11.2]       Reason for Referral: Generalized Muscle Weakness (M62.81)  Other lack of cordination (R27.8)  Inpatient: Payor: [facility] MEDICARE / Plan: [facility] MEDICARE COMPLETE / Product Type: *No Product type* /     ASSESSMENT:     REHAB RECOMMENDATIONS:   Recommendation to date pending progress:  Setting:  Short-term Rehab    Equipment:    To Be Determined     ASSESSMENT:  Ms. Olsen presents to the hospital with intractable N/V, hypokalemia, hypomagnesemia, general weakness, and acute cystitis. Pt lives with her daughter and is home alone while her daughter is at work. Pt reports typically she is modified independent but recently has been feeling weak and needing more assistance. Pt was min A for bed mobility, standing edge of bed multiple times, and taking steps over to the recliner. Pt reports barely able to make it to the chair today due to 
ACUTE PHYSICAL THERAPY GOALS:   (Developed with and agreed upon by patient and/or caregiver.)  (1.) Jesika Olsen  will move from supine to sit and sit to supine  with MODIFIED INDEPENDENCE within 7 treatment day(s).    (2.) Jesika Olsen will transfer from bed to chair and chair to bed with CONTACT GUARD ASSIST using the least restrictive device within 7 treatment day(s).    (3.) Jesika Olsen will ambulate with CONTACT GUARD ASSIST for 250 feet with the least restrictive device within 7 treatment day(s).   (4.) Jesika Olsen will perform standing static and dynamic balance activities x 10 minutes with CONTACT GUARD ASSIST to improve safety within 7 treatment day(s).  (5.) Jesika Olsen will perform therapeutic exercises x 15 min for HEP with INDEPENDENCE to improve strength, endurance, and functional mobility within 7 treatment day(s).     PHYSICAL THERAPY: Daily Note PM   (Link to Caseload Tracking: PT Visit Days : 2  Time In/Out PT Charge Capture  Rehab Caseload Tracker  Orders    Jesika Olsen is a 68 y.o. female   PRIMARY DIAGNOSIS: Intractable nausea and vomiting  Hypokalemia [E87.6]  Hypomagnesemia [E83.42]  General weakness [R53.1]  Acute cystitis with hematuria [N30.01]  Intractable nausea and vomiting [R11.2]       Inpatient: Payor: [facility] MEDICARE / Plan: [facility] MEDICARE COMPLETE / Product Type: *No Product type* /     ASSESSMENT:     REHAB RECOMMENDATIONS:   Recommendation to date pending progress:  Setting:  Short-term Rehab    Equipment:    To Be Determined     ASSESSMENT:  Ms. Olsen is supine on arrival, agreeable to mobility. Pt with Code S called this am, negative CVA; neuro feels possible orthostatics. Pt orthostatics taken this date, BP supine 173/97; unable to take in sitting/standing due to machine; BP after short ambulation to chair 122/74, pt reports dizziness, improved after 1 minute sitting. Pt with overall CGA bed mobility, good static sitting EOB; CGA transfers and 
ACUTE PHYSICAL THERAPY GOALS:   (Developed with and agreed upon by patient and/or caregiver.)  (1.) Jesika Olsen  will move from supine to sit and sit to supine  with MODIFIED INDEPENDENCE within 7 treatment day(s).    (2.) Jesika Olsen will transfer from bed to chair and chair to bed with CONTACT GUARD ASSIST using the least restrictive device within 7 treatment day(s).    (3.) Jesika Olsen will ambulate with CONTACT GUARD ASSIST for 250 feet with the least restrictive device within 7 treatment day(s).   (4.) Jesika Olsen will perform standing static and dynamic balance activities x 10 minutes with CONTACT GUARD ASSIST to improve safety within 7 treatment day(s).  (5.) Jesika Olsen will perform therapeutic exercises x 15 min for HEP with INDEPENDENCE to improve strength, endurance, and functional mobility within 7 treatment day(s).     PHYSICAL THERAPY: Daily Note PM   (Link to Caseload Tracking: PT Visit Days : 4  Time In/Out PT Charge Capture  Rehab Caseload Tracker  Orders    Jesika Olsen is a 68 y.o. female   PRIMARY DIAGNOSIS: Intractable nausea and vomiting  Hypokalemia [E87.6]  Hypomagnesemia [E83.42]  General weakness [R53.1]  Acute cystitis with hematuria [N30.01]  Intractable nausea and vomiting [R11.2]       Inpatient: Payor: [facility] MEDICARE / Plan: [facility] MEDICARE COMPLETE / Product Type: *No Product type* /     ASSESSMENT:     REHAB RECOMMENDATIONS:   Recommendation to date pending progress:  Setting:  Short-term Rehab    Equipment:    To Be Determined     ASSESSMENT:  Ms. Olsen was laying flat in bed on contact and somewhat agreeable to work with therapy. BP taken in supine, then in sitting. Pt stood and BP was attempted but pt had to sit down, then she laid down before BP was taken. She required min a to get to EOB and stand but returned supine with SBA. See BP in vital section below.      SUBJECTIVE:   Ms. Olsen states \"I'm nauseated\".     Social/Functional Lives With: 
Attempted to start infusion, IV site red, swollen, patient reporting coming from IV site.IV removed! IV access team now at bedside attempting to insert IV.  Attending , entered the room shortly after.. Per attending no need to re-start IV.Patient will be discharge today. Per  note, bed is available, pending insurance approval. Tele removed and placed at nurses station.             I  
EOS     Pt resting in bed at this time. Pt denies any needs or pain. Call light and personal belongings left within reach. Pt was irritable for the majority of this shift, but was able to take all of her medications and was compliant with all care. Pt encouraged to call with any needs. Hourly rounding completed during this shift. Bed in low/locked position, bed alarm in place. Bedside shift report to be given to oncoming nurse.     
EOS     Pt resting in bed at this time. Pt denies any needs or pain. Pt was cooperative but irritable. Call light and personal belongings left within reach. Pt encouraged to call with any needs. Hourly rounding completed during this shift. Bed in low/locked position, bed alarm in place. Bedside shift report to be given to oncoming nurse.     
Hourly rounding completed during shift. All needs met at this time. Bed L/L with call bell in reach, bed alarm on.  Report given to oncoming RN.   
Hourly rounding completed. Bed locked/low position. VSS. IV patent, clean,dry, and intact. PRN Hydralazine administered per MAR. K+ replaced per MAR with pending lab results. All safety measures in place per protocol. Report will be given to oncoming day shift nurse.VITALS:  /79   Pulse 91   Temp 97.3 °F (36.3 °C)   Resp 17   Ht 1.753 m (5' 9\")   Wt 90.7 kg (200 lb)   SpO2 95%   BMI 29.53 kg/m²     
Hourly rounds completed.  Uneventful shift, pt rested well.  Bed in low position, wheels locked, call light within reach.    
Hourly rounds performed this shift.  VS stable.  Pt medicated per MAR.  All known needs met at this time.  Bed low and locked, call light in reach.  Bedside shift report given to oncoming nurse.    
Hourly rounds performed this shift. Patient had a rapid response called this morning. The patient was on the bedside commode and she started leaning to the left. The patient was unresponsive. The patient had a CT done. PT/OT put the patient in the recliner earlier and the patient had another episode of not responding to staff and not bearing weight when sitting or standing. The patient is now in the bed alert and oriented. The patient is not able to tolerate potassium IV running at 100 mL/hr so it is running at 50 mL/hr. All six bags of potassium were not completed on my shift due to the patient not being able to tolerate the higher rate. Bed low and locked. Call light within reach. All needs met at this time.   
PT/OT took orthostatic vitals. BP was 82/60 lying in bed. Switched to a bigger cuff and BP lying down was 110/60. Pt sat on side of bed and BP was 113/69. After 15 minutes of sitting up, pt stated that she was dizzy and needed to lay back down. After laying down again, BP was 80/58. Standing BP was not attempted due to safety concerns.  
Physical Therapy Note:    Attempted to see patient this AM for physical therapy evaluation session. RN deferred as pt with N/V currently. Will follow and re-attempt as schedule permits/patient available. Thank you,    CIELO VALLADARES, PT     Rehab Caseload Tracker    
Physical Therapy Note:    Attempted to see patient this PM for physical therapy evaluation session. Lab at bedside attempting blood draw. Will follow and re-attempt as schedule permits/patient available. Thank you,    CIELO VALLADARES, PT     Rehab Caseload Tracker    
Placed 22g 2.5in B/New in left brachial vein with ultrasound assistance.      
Pt c/o abdominal pain this shift, medicated per MAR.  Now resting in bed comfortably.  Hourly rounds performed this shift.  VS stable.  Pt medicated per MAR.  All known needs met at this time.  Bed low and locked, call light in reach.  Bedside shift report given to oncoming nurse.    
Pt discharged from the floor at this time. Pt taken via ambulance to [facility]. Pt given education and discharge instructions and report called to the facility. Pt denies any needs, belongings taken with the patient. IV removed, dressing in place, no bleeding noted.   
Pt resting in bed at present time without complaints. Hourly rounds completed, all needs met this shift. Bed locked and low, call light within reach. Will give report to oncoming day shift nurse.   
Pt resting in bed at present time without complaints. Hourly rounds completed, all needs met this shift. Bed locked and low, call light within reach. Will give report to oncoming day shift nurse.   
Resting in bed. A/O x3. IVF infusing. Pt still with some nausea but no episodes of emesis nor diarrhea overnight. Unable to collect stool sample this shift. Hourly rounds completed, all needs met this shift. Bed in L/L with call light in reach. Report to be given to dayshift nurse.       
Spiritual Care Visit, follow up visit.    Visited with patient at bedside.    Prayed for patient's healing and health.    Visit by Jason Foote, Staff . Jacinto, Raymundo.RIRI., B.A.  
Spiritual Care Visit, initial visit. Code Rapid Response/Code S.    Patient was taken to CT, and returned to room afterward.    Prayed for patient's healing and health.    Visit by Jason Foote, Staff . Stephen., Raymundo.B., B.A.  
TRANSFER - IN REPORT:    Verbal report received from CORNELIUS Moreau on Jesika Olsen  being received from ED for routine progression of patient care      Report consisted of patient's Situation, Background, Assessment and   Recommendations(SBAR).     Information from the following report(s) Nurse Handoff Report, ED Encounter Summary, ED SBAR, MAR, and Recent Results was reviewed with the receiving nurse.    Opportunity for questions and clarification was provided.      Assessment completed upon patient's arrival to unit and care assumed.     
US Guided PIV access-    Ultrasound was used to find the vein which was compressible and without any ultrasound features of an artery or nerve bundle. Skin was cleaned and disinfected prior to IV puncture.  Under real-time ultrasound guidance peripheral access was obtained in the left forearm using 22 G 2.5\" Peripheral IV catheter after 1 attempt(s). Blood return was present and IV flushed without difficulty with no clinical signs of infiltration. IV dressing applied and no immediate complications noted. Patient tolerated the procedure well.    
dizziness and nausea. She quickly returned to supine. Rolling left and right with SBA for linen adjustments. Educated pt on importance of having the head of bed elevated throughout the day and drinking fluids to stay hydrated. RN notified of orthostatic drop. Pt left with needs in reach. Session limited by drop in BP and nausea.       SUBJECTIVE:   Ms. Olsen states \"The room is spinning\".     Social/Functional Lives With: Daughter  Type of Home: House  Home Layout: One level  Home Access: Ramped entrance  Home Equipment: Cane, Rollator  Receives Help From: Family  Ambulation Assistance: Independent  Transfer Assistance: Independent  OBJECTIVE:     PAIN: VITALS / O2: PRECAUTION / LINES / DRAINS:   Pre Treatment:    0/10      Post Treatment: 0/10 Vitals   supine 141/83  Sitting 90/65       Oxygen   RA External Catheter    RESTRICTIONS/PRECAUTIONS:  Restrictions/Precautions  Restrictions/Precautions: Fall Risk  Restrictions/Precautions: Fall Risk     MOBILITY: I Mod I S SBA CGA Min Mod Max Total  NT x2 Comments:   Bed Mobility    Rolling [] [] [] [] [] [] [] [] [] [] []    Supine to Sit [] [] [] [x] [] [] [] [] [] [] []    Scooting [] [] [] [x] [] [] [] [] [] [] []    Sit to Supine [] [] [] [x] [] [] [] [] [] [] []    Transfers    Sit to Stand [] [] [] [] [] [] [] [] [] [] []    Bed to Chair [] [] [] [] [] [] [] [] [] [] []    Stand to Sit [] [] [] [] [] [] [] [] [] [] []     [] [] [] [] [] [] [] [] [] [] []    I=Independent, Mod I=Modified Independent, S=Supervision, SBA=Standby Assistance, CGA=Contact Guard Assistance,   Min=Minimal Assistance, Mod=Moderate Assistance, Max=Maximal Assistance, Total=Total Assistance, NT=Not Tested    BALANCE: Good Fair+ Fair Fair- Poor NT Comments   Sitting Static [x] [] [] [] [] []    Sitting Dynamic [x] [] [] [] [] []              Standing Static [] [] [] [] [] []    Standing Dynamic [] [] [] [] [] []      GAIT: I Mod I S SBA CGA Min Mod Max Total  NT x2 Comments:   Level of 
ventricular systolic function with a visually estimated EF of 60 - 65%. Left ventricle size is normal. Mildly increased wall thickness. Normal wall motion. Normal diastolic function.    Pericardium: There is a small to moderate pericardial effusion of no hemodynamic significance (most prominent posteriorly).    Image quality is adequate.    Signed by: Rich Billy MD on 5/10/2024  3:16 PM     Assessment/Plan:       Principal Problem:    Intractable nausea and vomiting  Active Problems:    Orthostatic hypotension    Recurrent syncope    Pericardial effusion    Hypokalemia    Hypomagnesemia    Hypothyroidism    MDD (major depressive disorder)    DM2 (diabetes mellitus, type 2) (HCC)    Generalized weakness    Acute UTI    Hypertensive urgency  Resolved Problems:    * No resolved hospital problems. *     Recurrent syncope  Holter monitor should be considered-30-day mobile cardiac telemetry on discharge.  Continue telemetry while in the hospital.  Orthostatic vitals-please document these, use compression stockings, -a.m. cortisol level has been within normal range.  -Agree with holding amlodipine  -Please note orthostatic vitals in the system showed no significant drop in blood pressures with standing especially with holding amlodipine    Essential hypertension-used to be an issue in the past but has lost over 130 pounds following bariatric surgery-agree with holding all antihypertensives at this point.     Intractable nausea and vomiting  Could be associated with previous gastric bypass  -Per primary team/surgery  Continue antiemetics     Hypothyroidism  Levothyroxine 150mcg QD-dose has been increased  -Clearly elevated TSH and low free T4-treatment per primary team     JEREMI  Encourage use of CPAP  No complaints    Pericardial effusion  # Monitored-small to moderate-sized effusion but with no evidence of tamponade.  -baseline ESR and CRP were normal-non-inflammatory.  -Possibly associated with 
vestibular therapy with PT.  CT a/p on admission shows mild thickening of urinary bladder wall correlate with cystitis  Code stroke was called on 5/7 when she syncopized and CT head was normal.  Neurology signed off. Pt had RRT on 5/10 for syncope.       Subjective & 24hr Events:   Patient reports compliance with her home meds to include synthroid. Denies nausea/vomiting yesterday or today. Is again seen laying completely flat in bed.     Assessment & Plan:   Intractable nausea and vomiting, acute on chronic   Improved  Scopolamine patch in place.   Antiemetics prn, Meclizine prn,  Bentyl.  Tolerating diet advancement    Acute UTI  UCX: E. coli and pan sensitive  Abx: Bactrim D3    Recurrent syncope  Most likely neurogenic orthostatic hypotension vs  posterior fossa arachnoid cyst vs orthostatic hypotension r/t hypothyroidism  EKG and telemetry monitor showed no arrhythmias  Cardiology has seen  Recommend 30 day holter monitor at discharge  Recommend LA hose when out of bed and increased time in upright position as tolerated    Hypertensive urgency - resolved  Orthostatic hypotension  OH Likely related to HYPOthyroidism  (see below)  BP elevated--holding home Florinef for now  holding norvasc 10mg daily  Hydralazine prn  Cortisol level wnl  Add LA hose    Hypothyroidism  TSH high at 76, free t4 0.5  Increase synthroid dose to 150mcg on 5/11  Will need repeat TFT in 6 weeks.     Pericardial effusion  5/11  Small to moderate w/o evidence of tamponade  Cardiology has seen, will follow-up with repeat TTE in OP setting     Hypokalemia  Resolved      Hypomagnesemia  Resolved     History of GJ anastomosis ulcer  Patient was seen by Dr. Altamirano on 1/16/2024 and EGD was performed which showed healing GJ anastomotic ulcer   Cont Carafate and PPI     MDD (major depressive disorder)  Off of Cymbalta since last admission d/t orthostatic hypotension     DM2  hA1C 5.2 so no need for SSI     Generalized weakness  PT/OT/PPD   
with mod A x 2. Pt BP 80/58 in supine after activity. Pt rolling L and R for further cleaning with CGA/MIN A. Pt encouraged to elevate HOB using hospital bed for support throughout day. Pt seemed to have some confusion during session regarding BP and how she was doing overall. Pt making slow progress toward goals due to above limitations. Pt reports feeling very weak, has poor activity tolerance. PT to cont to follow for acute care needs.      SUBJECTIVE:   Ms. Olsen states, \"I just feel weak\"     Social/Functional Lives With: Daughter  Type of Home: House  Home Layout: One level  Home Access: Ramped entrance  Home Equipment: Cane, Rollator  Receives Help From: Family  Ambulation Assistance: Independent  Transfer Assistance: Independent  OBJECTIVE:     PAIN: VITALS / O2: PRECAUTION / LINES / DRAINS:   Pre Treatment:    0/10      Post Treatment: 0/10 Vitals    WDL    Oxygen   RA IV    RESTRICTIONS/PRECAUTIONS:  Restrictions/Precautions  Restrictions/Precautions: Fall Risk  Restrictions/Precautions: Fall Risk     MOBILITY: I Mod I S SBA CGA Min Mod Max Total  NT x2 Comments:   Bed Mobility    Rolling [] [] [] [] [x] [x] [] [] [] [] []    Supine to Sit [] [] [] [] [] [x] [] [] [] [] []    Scooting [] [] [] [x] [x] [] [] [] [] [] []    Sit to Supine [] [] [] [] [] [] [x] [] [] [] [x]    Transfers    Sit to Stand [] [] [] [] [] [] [] [] [] [x] []    Bed to Chair [] [] [] [] [] [] [] [] [] [x] []    Stand to Sit [] [] [] [] [] [] [] [] [] [x] []     [] [] [] [] [] [] [] [] [] [] []    I=Independent, Mod I=Modified Independent, S=Supervision, SBA=Standby Assistance, CGA=Contact Guard Assistance,   Min=Minimal Assistance, Mod=Moderate Assistance, Max=Maximal Assistance, Total=Total Assistance, NT=Not Tested    BALANCE: Good Fair+ Fair Fair- Poor NT Comments   Sitting Static [x] [] [] [] [] []    Sitting Dynamic [x] [] [] [] [] []              Standing Static [] [x] [] [] [] []    Standing Dynamic [] [x] [x] [] [] []  
times/week for duration of hospital stay or until stated goals are met, whichever comes first.    THERAPY PROGNOSIS: Good    PROBLEM LIST:   (Skilled intervention is medically necessary to address:)  Decreased ADL/Functional Activities  Decreased Activity Tolerance  Decreased Balance  Decreased Coordination  Decreased Gait Ability  Decreased Strength  Decreased Transfer Abilities INTERVENTIONS PLANNED:   (Benefits and precautions of physical therapy have been discussed with the patient.)  Self Care Training  Therapeutic Activity  Therapeutic Exercise/HEP  Neuromuscular Re-education  Gait Training  Education       TREATMENT:   EVALUATION: LOW COMPLEXITY: (Untimed Charge)  The initial evaluation charge encompasses clinical chart review, objective assessment, interpretation of assessment, and skilled monitoring of the patient's response to treatment in order to develop a plan of care.     TREATMENT:   Co-Treatment PT/OT necessary due to patient's decreased overall endurance/tolerance levels, as well as need for high level skilled assistance to complete functional transfers/mobility and functional tasks  Therapeutic Activity (17 Minutes): Therapeutic activity included Rolling, Supine to Sit, Scooting, Transfer Training, Ambulation on level ground, Sitting balance , and Standing balance to improve functional Activity tolerance, Balance, Coordination, Mobility, and Strength.    TREATMENT GRID:  N/A    AFTER TREATMENT PRECAUTIONS: Alarm Activated, Bed/Chair Locked, Call light within reach, Chair, and Needs within reach    INTERDISCIPLINARY COLLABORATION:  RN/ PCT and OT/ OLSON    EDUCATION: Education Given To: Patient  Education Provided: Role of Therapy;Plan of Care  Education Method: Verbal  Barriers to Learning: None  Education Outcome: Verbalized understanding    TIME IN/OUT:  Time In: 1446  Time Out: 1512  Minutes: 26    CIELO VALLADARES, PT    
Granulocytes % 1 0.0 - 5.0 %    Neutrophils Absolute 6.9 1.7 - 8.2 K/UL    Lymphocytes Absolute 2.2 0.5 - 4.6 K/UL    Monocytes Absolute 0.8 0.1 - 1.3 K/UL    Eosinophils Absolute 0.3 0.0 - 0.8 K/UL    Basophils Absolute 0.1 0.0 - 0.2 K/UL    Immature Granulocytes Absolute 0.1 0.0 - 0.5 K/UL   Magnesium    Collection Time: 05/08/24  6:24 AM   Result Value Ref Range    Magnesium 1.5 (L) 1.8 - 2.4 mg/dL   Phosphorus    Collection Time: 05/08/24  6:24 AM   Result Value Ref Range    Phosphorus 1.5 (L) 2.5 - 4.5 MG/DL       No results for input(s): \"COVID19\" in the last 72 hours.    Current Meds:  Current Facility-Administered Medications   Medication Dose Route Frequency    potassium & sodium phosphates (PHOS-NAK) 280-160-250 MG packet 250 mg  1 packet Oral 4x Daily    magnesium oxide (MAG-OX) tablet 400 mg  400 mg Oral Daily    dextrose 5 % and 0.45 % NaCl with KCl 20 mEq infusion   IntraVENous Continuous    sucralfate (CARAFATE) tablet 1 g  1 g Oral 4x Daily AC & HS    sodium chloride flush 0.9 % injection 5-40 mL  5-40 mL IntraVENous 2 times per day    sodium chloride flush 0.9 % injection 5-40 mL  5-40 mL IntraVENous PRN    0.9 % sodium chloride infusion   IntraVENous PRN    potassium chloride (KLOR-CON M) extended release tablet 40 mEq  40 mEq Oral PRN    Or    potassium bicarb-citric acid (EFFER-K) effervescent tablet 40 mEq  40 mEq Oral PRN    Or    potassium chloride 10 mEq/100 mL IVPB (Peripheral Line)  10 mEq IntraVENous PRN    magnesium sulfate 2000 mg in 50 mL IVPB premix  2,000 mg IntraVENous PRN    enoxaparin (LOVENOX) injection 40 mg  40 mg SubCUTAneous Daily    ondansetron (ZOFRAN-ODT) disintegrating tablet 4 mg  4 mg Oral Q8H PRN    Or    ondansetron (ZOFRAN) injection 4 mg  4 mg IntraVENous Q6H PRN    polyethylene glycol (GLYCOLAX) packet 17 g  17 g Oral Daily PRN    acetaminophen (TYLENOL) tablet 650 mg  650 mg Oral Q6H PRN    Or    acetaminophen (TYLENOL) suppository 650 mg  650 mg Rectal Q6H PRN    
   lactobacillus (CULTURELLE) capsule 1 capsule  1 capsule Oral Daily with breakfast       Signed:  Michael Olea, APRN - CNP    Part of this note may have been written by using a voice dictation software.  The note has been proof read but may still contain some grammatical/other typographical errors.    
mg  650 mg Oral Q6H PRN    Or    acetaminophen (TYLENOL) suppository 650 mg  650 mg Rectal Q6H PRN    gabapentin (NEURONTIN) capsule 400 mg  400 mg Oral TID    levothyroxine (SYNTHROID) tablet 100 mcg  100 mcg Oral QAM AC    dicyclomine (BENTYL) tablet 20 mg  20 mg Oral TID AC    pantoprazole (PROTONIX) tablet 40 mg  40 mg Oral QAM AC    scopolamine (TRANSDERM-SCOP) transdermal patch 1 patch  1 patch TransDERmal Q72H    [Held by provider] fludrocortisone (FLORINEF) tablet 0.2 mg  0.2 mg Oral Daily    hydrALAZINE (APRESOLINE) injection 10 mg  10 mg IntraVENous Q6H PRN    meclizine (ANTIVERT) tablet 25 mg  25 mg Oral TID PRN    lactobacillus (CULTURELLE) capsule 1 capsule  1 capsule Oral Daily with breakfast       Signed:  Leola Deleon DO    Part of this note may have been written by using a voice dictation software.  The note has been proof read but may still contain some grammatical/other typographical errors.

## 2024-05-15 LAB — CRP SERPL-MCNC: 6 MG/L (ref 0–10)

## 2024-05-25 ENCOUNTER — HOSPITAL ENCOUNTER (INPATIENT)
Age: 69
LOS: 12 days | Discharge: HOME | DRG: 872 | End: 2024-06-09
Attending: EMERGENCY MEDICINE | Admitting: HOSPITALIST
Payer: MEDICARE

## 2024-05-25 ENCOUNTER — APPOINTMENT (OUTPATIENT)
Dept: CT IMAGING | Age: 69
DRG: 872 | End: 2024-05-25
Payer: MEDICARE

## 2024-05-25 DIAGNOSIS — R10.9 ACUTE ABDOMINAL PAIN: Primary | ICD-10-CM

## 2024-05-25 DIAGNOSIS — N39.0 URINARY TRACT INFECTION WITHOUT HEMATURIA, SITE UNSPECIFIED: ICD-10-CM

## 2024-05-25 DIAGNOSIS — R11.10 INTRACTABLE VOMITING: ICD-10-CM

## 2024-05-25 LAB
ALBUMIN SERPL-MCNC: 2.5 G/DL (ref 3.2–4.6)
ALBUMIN/GLOB SERPL: 0.8 (ref 1–1.9)
ALP SERPL-CCNC: 106 U/L (ref 35–104)
ALT SERPL-CCNC: 11 U/L (ref 12–65)
AMORPH CRY URNS QL MICRO: ABNORMAL
ANION GAP SERPL CALC-SCNC: 16 MMOL/L (ref 9–18)
APPEARANCE UR: ABNORMAL
AST SERPL-CCNC: 34 U/L (ref 15–37)
BACTERIA URNS QL MICRO: ABNORMAL /HPF
BASOPHILS # BLD: 0 K/UL (ref 0–0.2)
BASOPHILS NFR BLD: 0 % (ref 0–2)
BILIRUB SERPL-MCNC: 0.4 MG/DL (ref 0–1.2)
BILIRUB UR QL: NEGATIVE
BUN SERPL-MCNC: 20 MG/DL (ref 8–23)
CALCIUM SERPL-MCNC: 9.4 MG/DL (ref 8.8–10.2)
CHLORIDE SERPL-SCNC: 104 MMOL/L (ref 98–107)
CO2 SERPL-SCNC: 19 MMOL/L (ref 20–28)
COLOR UR: ABNORMAL
CREAT SERPL-MCNC: 0.57 MG/DL (ref 0.6–1.1)
DIFFERENTIAL METHOD BLD: ABNORMAL
EOSINOPHIL # BLD: 0 K/UL (ref 0–0.8)
EOSINOPHIL NFR BLD: 0 % (ref 0.5–7.8)
EPI CELLS #/AREA URNS HPF: ABNORMAL /HPF
ERYTHROCYTE [DISTWIDTH] IN BLOOD BY AUTOMATED COUNT: 16.2 % (ref 11.9–14.6)
GLOBULIN SER CALC-MCNC: 3.3 G/DL (ref 2.3–3.5)
GLUCOSE BLD STRIP.AUTO-MCNC: 97 MG/DL (ref 65–100)
GLUCOSE SERPL-MCNC: 97 MG/DL (ref 70–99)
GLUCOSE UR STRIP.AUTO-MCNC: NEGATIVE MG/DL
HCT VFR BLD AUTO: 38.9 % (ref 35.8–46.3)
HGB BLD-MCNC: 12.8 G/DL (ref 11.7–15.4)
HGB UR QL STRIP: NEGATIVE
IMM GRANULOCYTES # BLD AUTO: 0.1 K/UL (ref 0–0.5)
IMM GRANULOCYTES NFR BLD AUTO: 1 % (ref 0–5)
KETONES UR QL STRIP.AUTO: ABNORMAL MG/DL
LACTATE SERPL-SCNC: 1.5 MMOL/L (ref 0.5–2)
LEUKOCYTE ESTERASE UR QL STRIP.AUTO: ABNORMAL
LIPASE SERPL-CCNC: 12 U/L (ref 13–60)
LYMPHOCYTES # BLD: 2 K/UL (ref 0.5–4.6)
LYMPHOCYTES NFR BLD: 15 % (ref 13–44)
MCH RBC QN AUTO: 29.3 PG (ref 26.1–32.9)
MCHC RBC AUTO-ENTMCNC: 32.9 G/DL (ref 31.4–35)
MCV RBC AUTO: 89 FL (ref 82–102)
MONOCYTES # BLD: 1 K/UL (ref 0.1–1.3)
MONOCYTES NFR BLD: 8 % (ref 4–12)
NEUTS SEG # BLD: 10.1 K/UL (ref 1.7–8.2)
NEUTS SEG NFR BLD: 76 % (ref 43–78)
NITRITE UR QL STRIP.AUTO: NEGATIVE
NRBC # BLD: 0 K/UL (ref 0–0.2)
OTHER OBSERVATIONS: ABNORMAL
PH UR STRIP: 7 (ref 5–9)
PLATELET # BLD AUTO: 429 K/UL (ref 150–450)
PMV BLD AUTO: 9.5 FL (ref 9.4–12.3)
POTASSIUM SERPL-SCNC: 4 MMOL/L (ref 3.5–5.1)
PROCALCITONIN SERPL-MCNC: 0.14 NG/ML (ref 0–0.1)
PROT SERPL-MCNC: 5.8 G/DL (ref 6.3–8.2)
PROT UR STRIP-MCNC: 30 MG/DL
RBC # BLD AUTO: 4.37 M/UL (ref 4.05–5.2)
RBC #/AREA URNS HPF: ABNORMAL /HPF
SERVICE CMNT-IMP: NORMAL
SODIUM SERPL-SCNC: 139 MMOL/L (ref 136–145)
SP GR UR REFRACTOMETRY: >1.035 (ref 1–1.02)
UROBILINOGEN UR QL STRIP.AUTO: 1 EU/DL (ref 0.2–1)
WBC # BLD AUTO: 13.3 K/UL (ref 4.3–11.1)
WBC URNS QL MICRO: ABNORMAL /HPF

## 2024-05-25 PROCEDURE — G0378 HOSPITAL OBSERVATION PER HR: HCPCS

## 2024-05-25 PROCEDURE — A4216 STERILE WATER/SALINE, 10 ML: HCPCS | Performed by: EMERGENCY MEDICINE

## 2024-05-25 PROCEDURE — 6360000002 HC RX W HCPCS: Performed by: HOSPITALIST

## 2024-05-25 PROCEDURE — 6360000004 HC RX CONTRAST MEDICATION: Performed by: EMERGENCY MEDICINE

## 2024-05-25 PROCEDURE — 96374 THER/PROPH/DIAG INJ IV PUSH: CPT

## 2024-05-25 PROCEDURE — 84145 PROCALCITONIN (PCT): CPT

## 2024-05-25 PROCEDURE — 2580000003 HC RX 258: Performed by: EMERGENCY MEDICINE

## 2024-05-25 PROCEDURE — 99285 EMERGENCY DEPT VISIT HI MDM: CPT

## 2024-05-25 PROCEDURE — 6370000000 HC RX 637 (ALT 250 FOR IP): Performed by: HOSPITALIST

## 2024-05-25 PROCEDURE — 82962 GLUCOSE BLOOD TEST: CPT

## 2024-05-25 PROCEDURE — 80053 COMPREHEN METABOLIC PANEL: CPT

## 2024-05-25 PROCEDURE — 87040 BLOOD CULTURE FOR BACTERIA: CPT

## 2024-05-25 PROCEDURE — 6360000002 HC RX W HCPCS: Performed by: EMERGENCY MEDICINE

## 2024-05-25 PROCEDURE — 96375 TX/PRO/DX INJ NEW DRUG ADDON: CPT

## 2024-05-25 PROCEDURE — 2580000003 HC RX 258: Performed by: HOSPITALIST

## 2024-05-25 PROCEDURE — 83605 ASSAY OF LACTIC ACID: CPT

## 2024-05-25 PROCEDURE — 85025 COMPLETE CBC W/AUTO DIFF WBC: CPT

## 2024-05-25 PROCEDURE — 96361 HYDRATE IV INFUSION ADD-ON: CPT

## 2024-05-25 PROCEDURE — 81001 URINALYSIS AUTO W/SCOPE: CPT

## 2024-05-25 PROCEDURE — 74177 CT ABD & PELVIS W/CONTRAST: CPT

## 2024-05-25 PROCEDURE — 83690 ASSAY OF LIPASE: CPT

## 2024-05-25 PROCEDURE — 96376 TX/PRO/DX INJ SAME DRUG ADON: CPT

## 2024-05-25 PROCEDURE — C9113 INJ PANTOPRAZOLE SODIUM, VIA: HCPCS | Performed by: EMERGENCY MEDICINE

## 2024-05-25 RX ORDER — ENOXAPARIN SODIUM 100 MG/ML
40 INJECTION SUBCUTANEOUS DAILY
Status: DISCONTINUED | OUTPATIENT
Start: 2024-05-26 | End: 2024-05-25

## 2024-05-25 RX ORDER — ONDANSETRON 2 MG/ML
4 INJECTION INTRAMUSCULAR; INTRAVENOUS
Status: COMPLETED | OUTPATIENT
Start: 2024-05-25 | End: 2024-05-25

## 2024-05-25 RX ORDER — PANTOPRAZOLE SODIUM 40 MG/1
40 TABLET, DELAYED RELEASE ORAL
Status: DISCONTINUED | OUTPATIENT
Start: 2024-05-26 | End: 2024-06-09 | Stop reason: HOSPADM

## 2024-05-25 RX ORDER — ONDANSETRON 4 MG/1
4 TABLET, ORALLY DISINTEGRATING ORAL EVERY 8 HOURS PRN
Status: DISCONTINUED | OUTPATIENT
Start: 2024-05-25 | End: 2024-06-09 | Stop reason: HOSPADM

## 2024-05-25 RX ORDER — GABAPENTIN 400 MG/1
400 CAPSULE ORAL 3 TIMES DAILY
Status: DISCONTINUED | OUTPATIENT
Start: 2024-05-25 | End: 2024-06-09 | Stop reason: HOSPADM

## 2024-05-25 RX ORDER — SUCRALFATE 1 G/1
1 TABLET ORAL 4 TIMES DAILY
Status: DISCONTINUED | OUTPATIENT
Start: 2024-05-25 | End: 2024-06-03

## 2024-05-25 RX ORDER — INSULIN LISPRO 100 [IU]/ML
0-8 INJECTION, SOLUTION INTRAVENOUS; SUBCUTANEOUS
Status: DISCONTINUED | OUTPATIENT
Start: 2024-05-26 | End: 2024-06-09 | Stop reason: HOSPADM

## 2024-05-25 RX ORDER — POTASSIUM CHLORIDE 20 MEQ/1
40 TABLET, EXTENDED RELEASE ORAL PRN
Status: DISCONTINUED | OUTPATIENT
Start: 2024-05-25 | End: 2024-05-28

## 2024-05-25 RX ORDER — SODIUM CHLORIDE 9 MG/ML
INJECTION, SOLUTION INTRAVENOUS PRN
Status: DISCONTINUED | OUTPATIENT
Start: 2024-05-25 | End: 2024-06-09 | Stop reason: HOSPADM

## 2024-05-25 RX ORDER — ACETAMINOPHEN 650 MG/1
650 SUPPOSITORY RECTAL EVERY 6 HOURS PRN
Status: DISCONTINUED | OUTPATIENT
Start: 2024-05-25 | End: 2024-06-09 | Stop reason: HOSPADM

## 2024-05-25 RX ORDER — INSULIN LISPRO 100 [IU]/ML
0-4 INJECTION, SOLUTION INTRAVENOUS; SUBCUTANEOUS NIGHTLY
Status: DISCONTINUED | OUTPATIENT
Start: 2024-05-25 | End: 2024-06-09 | Stop reason: HOSPADM

## 2024-05-25 RX ORDER — MAGNESIUM SULFATE IN WATER 40 MG/ML
2000 INJECTION, SOLUTION INTRAVENOUS PRN
Status: DISCONTINUED | OUTPATIENT
Start: 2024-05-25 | End: 2024-05-28

## 2024-05-25 RX ORDER — 0.9 % SODIUM CHLORIDE 0.9 %
500 INTRAVENOUS SOLUTION INTRAVENOUS ONCE
Status: COMPLETED | OUTPATIENT
Start: 2024-05-25 | End: 2024-05-25

## 2024-05-25 RX ORDER — DEXTROSE MONOHYDRATE 100 MG/ML
INJECTION, SOLUTION INTRAVENOUS CONTINUOUS PRN
Status: DISCONTINUED | OUTPATIENT
Start: 2024-05-25 | End: 2024-06-09 | Stop reason: HOSPADM

## 2024-05-25 RX ORDER — FLUDROCORTISONE ACETATE 0.1 MG/1
0.2 TABLET ORAL DAILY
Status: DISCONTINUED | OUTPATIENT
Start: 2024-05-26 | End: 2024-05-29

## 2024-05-25 RX ORDER — POLYETHYLENE GLYCOL 3350 17 G/17G
17 POWDER, FOR SOLUTION ORAL DAILY PRN
Status: DISCONTINUED | OUTPATIENT
Start: 2024-05-25 | End: 2024-06-09 | Stop reason: HOSPADM

## 2024-05-25 RX ORDER — SODIUM CHLORIDE 0.9 % (FLUSH) 0.9 %
5-40 SYRINGE (ML) INJECTION PRN
Status: DISCONTINUED | OUTPATIENT
Start: 2024-05-25 | End: 2024-06-09 | Stop reason: HOSPADM

## 2024-05-25 RX ORDER — POTASSIUM CHLORIDE 7.45 MG/ML
10 INJECTION INTRAVENOUS PRN
Status: DISCONTINUED | OUTPATIENT
Start: 2024-05-25 | End: 2024-05-28

## 2024-05-25 RX ORDER — SODIUM CHLORIDE 9 MG/ML
INJECTION, SOLUTION INTRAVENOUS CONTINUOUS
Status: ACTIVE | OUTPATIENT
Start: 2024-05-25 | End: 2024-05-26

## 2024-05-25 RX ORDER — IBUPROFEN 600 MG/1
1 TABLET ORAL PRN
Status: DISCONTINUED | OUTPATIENT
Start: 2024-05-25 | End: 2024-06-09 | Stop reason: HOSPADM

## 2024-05-25 RX ORDER — SODIUM CHLORIDE 0.9 % (FLUSH) 0.9 %
5-40 SYRINGE (ML) INJECTION EVERY 12 HOURS SCHEDULED
Status: DISCONTINUED | OUTPATIENT
Start: 2024-05-25 | End: 2024-06-08

## 2024-05-25 RX ORDER — ACETAMINOPHEN 325 MG/1
650 TABLET ORAL EVERY 6 HOURS PRN
Status: DISCONTINUED | OUTPATIENT
Start: 2024-05-25 | End: 2024-06-09 | Stop reason: HOSPADM

## 2024-05-25 RX ORDER — LEVOTHYROXINE SODIUM 0.07 MG/1
150 TABLET ORAL DAILY
Status: DISCONTINUED | OUTPATIENT
Start: 2024-05-26 | End: 2024-06-09 | Stop reason: HOSPADM

## 2024-05-25 RX ORDER — ONDANSETRON 2 MG/ML
4 INJECTION INTRAMUSCULAR; INTRAVENOUS EVERY 6 HOURS PRN
Status: DISCONTINUED | OUTPATIENT
Start: 2024-05-25 | End: 2024-06-09 | Stop reason: HOSPADM

## 2024-05-25 RX ORDER — LOPERAMIDE HYDROCHLORIDE 2 MG/1
4 CAPSULE ORAL 3 TIMES DAILY
Status: DISPENSED | OUTPATIENT
Start: 2024-05-25 | End: 2024-05-27

## 2024-05-25 RX ADMIN — IOPAMIDOL 100 ML: 755 INJECTION, SOLUTION INTRAVENOUS at 19:14

## 2024-05-25 RX ADMIN — SODIUM CHLORIDE: 9 INJECTION, SOLUTION INTRAVENOUS at 23:30

## 2024-05-25 RX ADMIN — ONDANSETRON 4 MG: 2 INJECTION INTRAMUSCULAR; INTRAVENOUS at 18:46

## 2024-05-25 RX ADMIN — LOPERAMIDE HYDROCHLORIDE 4 MG: 2 CAPSULE ORAL at 23:40

## 2024-05-25 RX ADMIN — SODIUM CHLORIDE, PRESERVATIVE FREE 10 ML: 5 INJECTION INTRAVENOUS at 23:44

## 2024-05-25 RX ADMIN — WATER 1000 MG: 1 INJECTION INTRAMUSCULAR; INTRAVENOUS; SUBCUTANEOUS at 21:48

## 2024-05-25 RX ADMIN — PANTOPRAZOLE SODIUM 40 MG: 40 INJECTION, POWDER, FOR SOLUTION INTRAVENOUS at 18:46

## 2024-05-25 RX ADMIN — GABAPENTIN 400 MG: 400 CAPSULE ORAL at 23:40

## 2024-05-25 RX ADMIN — ONDANSETRON 4 MG: 2 INJECTION INTRAMUSCULAR; INTRAVENOUS at 23:40

## 2024-05-25 RX ADMIN — SODIUM CHLORIDE 500 ML: 9 INJECTION, SOLUTION INTRAVENOUS at 18:43

## 2024-05-25 RX ADMIN — SUCRALFATE 1 G: 1 TABLET ORAL at 23:40

## 2024-05-25 ASSESSMENT — PAIN DESCRIPTION - LOCATION: LOCATION: ABDOMEN

## 2024-05-25 ASSESSMENT — PAIN DESCRIPTION - DESCRIPTORS: DESCRIPTORS: BURNING

## 2024-05-25 ASSESSMENT — PAIN SCALES - GENERAL
PAINLEVEL_OUTOF10: 0
PAINLEVEL_OUTOF10: 8

## 2024-05-25 NOTE — ED TRIAGE NOTES
Pt arrives via [facility] from [facility] for complaint of abdominal pain, N/V. Pain started last night.

## 2024-05-25 NOTE — ED PROVIDER NOTES
Emergency Department Provider Note       PCP: Paulette Negrete MD   Age: 68 y.o.   Sex: female     DISPOSITION Admitted 05/25/2024 09:55:07 PM       ICD-10-CM    1. Acute abdominal pain  R10.9       2. Urinary tract infection without hematuria, site unspecified  N39.0       3. Intractable vomiting  R11.10           Medical Decision Making   Abdominal pain and persistent vomiting patient with history of gastric bypass.  CT scan to check for internal hernia, bowel obstruction, diverticulitis pancreatitis or other etiologies.  IV fluids.  Nausea and pain control  Blood work appeared reasonable without obvious sepsis. CT scan with thickened. Bladder consistent with infection but no other acute abnormalities. Patient with persistent, nausea and vomiting. But cultures obtained. Patient given antibiotics. IV. Antibiotics given.. Hospitalist consult it for admission.     1 or more acute illnesses that pose a threat to life or bodily function.   Discussion with external consultants.  Chronic medical problems impacting care include Hi BMI and status post gastric bypass.    I independently ordered and reviewed each unique test.  I reviewed external records: provider visit note from outside specialist.   The patients assessment required an independent historian: EMS/daughter.  The reason they were needed is important historical information not provided by the patient.  I interpreted the CT Scan CT CT scanWithout obstruction.  My Independent EKG Interpretation: sinus rhythm, no evidence of arrhythmia      ST Segments:Normal ST segments - NO STEMI   Rate: 106  The patient was admitted and I have discussed patient management with the admitting provider.    Exclusion criteria - the patient is NOT to be included for SEP-1 Core Measure due to: May have criteria for sepsis, but does not meet criteria for severe sepsis or septic shock       History     68-year-old female complains of onset of central to left abdominal pain is

## 2024-05-26 LAB
GLUCOSE BLD STRIP.AUTO-MCNC: 83 MG/DL (ref 65–100)
GLUCOSE BLD STRIP.AUTO-MCNC: 86 MG/DL (ref 65–100)
GLUCOSE BLD STRIP.AUTO-MCNC: 86 MG/DL (ref 65–100)
GLUCOSE BLD STRIP.AUTO-MCNC: 91 MG/DL (ref 65–100)
SERVICE CMNT-IMP: NORMAL

## 2024-05-26 PROCEDURE — 6370000000 HC RX 637 (ALT 250 FOR IP): Performed by: HOSPITALIST

## 2024-05-26 PROCEDURE — 82962 GLUCOSE BLOOD TEST: CPT

## 2024-05-26 PROCEDURE — 2580000003 HC RX 258: Performed by: HOSPITALIST

## 2024-05-26 PROCEDURE — 6360000002 HC RX W HCPCS: Performed by: HOSPITALIST

## 2024-05-26 PROCEDURE — G0378 HOSPITAL OBSERVATION PER HR: HCPCS

## 2024-05-26 PROCEDURE — 96376 TX/PRO/DX INJ SAME DRUG ADON: CPT

## 2024-05-26 RX ORDER — SODIUM CHLORIDE 9 MG/ML
INJECTION, SOLUTION INTRAVENOUS CONTINUOUS
Status: DISCONTINUED | OUTPATIENT
Start: 2024-05-26 | End: 2024-05-27

## 2024-05-26 RX ADMIN — ONDANSETRON 4 MG: 2 INJECTION INTRAMUSCULAR; INTRAVENOUS at 20:24

## 2024-05-26 RX ADMIN — WATER 1000 MG: 1 INJECTION INTRAMUSCULAR; INTRAVENOUS; SUBCUTANEOUS at 20:24

## 2024-05-26 RX ADMIN — ONDANSETRON 4 MG: 2 INJECTION INTRAMUSCULAR; INTRAVENOUS at 14:15

## 2024-05-26 RX ADMIN — SODIUM CHLORIDE, PRESERVATIVE FREE 10 ML: 5 INJECTION INTRAVENOUS at 20:27

## 2024-05-26 NOTE — ED NOTES
TRANSFER - OUT REPORT:    Verbal report given to 6th floor RN on Jesika Olsen  being transferred to 6th floor for routine progression of patient care       Report consisted of patient's Situation, Background, Assessment and   Recommendations(SBAR).     Information from the following report(s) Nurse Handoff Report was reviewed with the receiving nurse.    Alee Fall Assessment:    Presents to emergency department  because of falls (Syncope, seizure, or loss of consciousness): No  Age > 70: No  Altered Mental Status, Intoxication with alcohol or substance confusion (Disorientation, impaired judgment, poor safety awaremess, or inability to follow instructions): No  Impaired Mobility: Ambulates or transfers with assistive devices or assistance; Unable to ambulate or transer.: Yes  Nursing Judgement: Yes          Lines:   Peripheral IV 05/25/24 Posterior;Right Hand (Active)        Opportunity for questions and clarification was provided.      Patient transported with:  Lynn Spears RN  05/25/24 1499

## 2024-05-26 NOTE — H&P
Lipase    Collection Time: 05/25/24  6:10 PM   Result Value Ref Range    Lipase 12 (L) 13 - 60 U/L   Urinalysis    Collection Time: 05/25/24  8:17 PM   Result Value Ref Range    Color, UA YELLOW/STRAW      Appearance TURBID      Specific Gravity, UA >1.035 (H) 1.001 - 1.023    pH, Urine 7.0 5.0 - 9.0      Protein, UA 30 (A) NEG mg/dL    Glucose, Ur Negative mg/dL    Ketones, Urine TRACE (A) NEG mg/dL    Bilirubin, Urine Negative NEG      Blood, Urine Negative NEG      Urobilinogen, Urine 1.0 0.2 - 1.0 EU/dL    Nitrite, Urine Negative NEG      Leukocyte Esterase, Urine SMALL (A) NEG      WBC, UA 5-10 0 /hpf    RBC, UA 0-3 0 /hpf    Epithelial Cells, UA 0-3 0 /hpf    BACTERIA, URINE 3+ (H) 0 /hpf    Amorphous Crystal 4+ (H) 0    Other observations RESULTS VERIFIED MANUALLY         Assessment:     1-  Acute UTI (urinary tract infection), residual, recurrent, recently pan-sensitive E coli in urine. A/w vomiting, diarrhea and dehydration    2- History of  NIDDM, JEREMI, gastric bypass, orthostatic hypotension, bipolar dz. Stable.    Plan:     Observation  Rocephin IV  Follow cultures  IVF hydration, gentle  Supportive Rx for Gi spx  Blood pressure control  Insulin scale  AM lab as needed    Continue essential home medications.  Patient is full code.  Further management depends on patient progress.  Thank you for the oppourtinity to contribute in the care of your patient.    Signed By: Miguel Mesa MD     May 25, 2024

## 2024-05-26 NOTE — ED NOTES
Patient completed PO challenge with no symptoms of nausea, vomiting, or abdominal pain. MD notified.      Mann Lazcano RN  05/25/24 [id number]

## 2024-05-27 LAB
GLUCOSE BLD STRIP.AUTO-MCNC: 75 MG/DL (ref 65–100)
GLUCOSE BLD STRIP.AUTO-MCNC: 77 MG/DL (ref 65–100)
GLUCOSE BLD STRIP.AUTO-MCNC: 94 MG/DL (ref 65–100)
GLUCOSE BLD STRIP.AUTO-MCNC: 95 MG/DL (ref 65–100)
GLUCOSE BLD STRIP.AUTO-MCNC: 98 MG/DL (ref 65–100)
HEMOCCULT STL QL: POSITIVE
SERVICE CMNT-IMP: NORMAL

## 2024-05-27 PROCEDURE — 82962 GLUCOSE BLOOD TEST: CPT

## 2024-05-27 PROCEDURE — 89055 LEUKOCYTE ASSESSMENT FECAL: CPT

## 2024-05-27 PROCEDURE — 87493 C DIFF AMPLIFIED PROBE: CPT

## 2024-05-27 PROCEDURE — 6360000002 HC RX W HCPCS: Performed by: HOSPITALIST

## 2024-05-27 PROCEDURE — 96375 TX/PRO/DX INJ NEW DRUG ADDON: CPT

## 2024-05-27 PROCEDURE — 82272 OCCULT BLD FECES 1-3 TESTS: CPT

## 2024-05-27 PROCEDURE — 2580000003 HC RX 258: Performed by: HOSPITALIST

## 2024-05-27 PROCEDURE — 96376 TX/PRO/DX INJ SAME DRUG ADON: CPT

## 2024-05-27 PROCEDURE — 87324 CLOSTRIDIUM AG IA: CPT

## 2024-05-27 PROCEDURE — G0378 HOSPITAL OBSERVATION PER HR: HCPCS

## 2024-05-27 PROCEDURE — 6370000000 HC RX 637 (ALT 250 FOR IP): Performed by: HOSPITALIST

## 2024-05-27 PROCEDURE — 87449 NOS EACH ORGANISM AG IA: CPT

## 2024-05-27 RX ORDER — METOCLOPRAMIDE HYDROCHLORIDE 5 MG/ML
10 INJECTION INTRAMUSCULAR; INTRAVENOUS EVERY 6 HOURS PRN
Status: DISCONTINUED | OUTPATIENT
Start: 2024-05-27 | End: 2024-06-03

## 2024-05-27 RX ORDER — METOCLOPRAMIDE 10 MG/1
10 TABLET ORAL 4 TIMES DAILY PRN
Status: DISCONTINUED | OUTPATIENT
Start: 2024-05-27 | End: 2024-06-03

## 2024-05-27 RX ADMIN — FLUDROCORTISONE ACETATE 0.2 MG: 0.1 TABLET ORAL at 10:25

## 2024-05-27 RX ADMIN — METOCLOPRAMIDE 10 MG: 5 INJECTION, SOLUTION INTRAMUSCULAR; INTRAVENOUS at 15:29

## 2024-05-27 RX ADMIN — SODIUM CHLORIDE, PRESERVATIVE FREE 10 ML: 5 INJECTION INTRAVENOUS at 21:10

## 2024-05-27 RX ADMIN — GABAPENTIN 400 MG: 400 CAPSULE ORAL at 15:25

## 2024-05-27 RX ADMIN — GABAPENTIN 400 MG: 400 CAPSULE ORAL at 10:31

## 2024-05-27 RX ADMIN — GABAPENTIN 400 MG: 400 CAPSULE ORAL at 21:12

## 2024-05-27 RX ADMIN — SUCRALFATE 1 G: 1 TABLET ORAL at 21:12

## 2024-05-27 RX ADMIN — LOPERAMIDE HYDROCHLORIDE 4 MG: 2 CAPSULE ORAL at 15:24

## 2024-05-27 RX ADMIN — WATER 1000 MG: 1 INJECTION INTRAMUSCULAR; INTRAVENOUS; SUBCUTANEOUS at 21:09

## 2024-05-27 RX ADMIN — SODIUM CHLORIDE, PRESERVATIVE FREE 10 ML: 5 INJECTION INTRAVENOUS at 10:30

## 2024-05-27 RX ADMIN — LOPERAMIDE HYDROCHLORIDE 4 MG: 2 CAPSULE ORAL at 10:25

## 2024-05-27 RX ADMIN — SUCRALFATE 1 G: 1 TABLET ORAL at 10:25

## 2024-05-27 ASSESSMENT — PAIN SCALES - GENERAL: PAINLEVEL_OUTOF10: 0

## 2024-05-27 NOTE — PLAN OF CARE
Problem: Discharge Planning  Goal: Discharge to home or other facility with appropriate resources  5/26/2024 2049 by Tejinder Temple RN  Outcome: Progressing  5/26/2024 0732 by Dalila Clay RN  Outcome: /[facility] Progressing     Problem: Pain  Goal: Verbalizes/displays adequate comfort level or baseline comfort level  5/26/2024 2049 by Tejinder Temple RN  Outcome: Progressing  5/26/2024 0732 by Dalila Clay RN  Outcome: /[facility] Progressing     Problem: Safety - Adult  Goal: Free from fall injury  5/26/2024 2049 by Tejinder Temple RN  Outcome: Progressing  5/26/2024 0732 by Dalila Clay RN  Outcome: /[facility] Progressing     Problem: Skin/Tissue Integrity  Goal: Absence of new skin breakdown  Description: 1.  Monitor for areas of redness and/or skin breakdown  2.  Assess vascular access sites hourly  3.  Every 4-6 hours minimum:  Change oxygen saturation probe site  4.  Every 4-6 hours:  If on nasal continuous positive airway pressure, respiratory therapy assess nares and determine need for appliance change or resting period.  5/26/2024 2049 by Tejinder Temple RN  Outcome: Progressing  5/26/2024 0732 by Dalila Clay RN  Outcome: /[facility] Progressing     Problem: ABCDS Injury Assessment  Goal: Absence of physical injury  5/26/2024 2049 by Tejinder Temple RN  Outcome: Progressing  5/26/2024 0732 by Dalila Clay RN  Outcome: /[facility] Progressing

## 2024-05-28 PROBLEM — R19.7 ACUTE DIARRHEA: Status: ACTIVE | Noted: 2024-05-28

## 2024-05-28 LAB
ALBUMIN SERPL-MCNC: 2 G/DL (ref 3.2–4.6)
ALBUMIN/GLOB SERPL: 0.9 (ref 1–1.9)
ALP SERPL-CCNC: 87 U/L (ref 35–104)
ALT SERPL-CCNC: 9 U/L (ref 12–65)
ANION GAP SERPL CALC-SCNC: 11 MMOL/L (ref 9–18)
AST SERPL-CCNC: 21 U/L (ref 15–37)
BILIRUB SERPL-MCNC: 0.3 MG/DL (ref 0–1.2)
BUN SERPL-MCNC: 13 MG/DL (ref 8–23)
C DIFF GDH STL QL: ABNORMAL
C DIFF TOX A+B STL QL IA: ABNORMAL
C DIFF TOXIN INTERPRETATION: ABNORMAL
C. DIFFICILE TOXIN MOLECULAR: POSITIVE
CALCIUM SERPL-MCNC: 8.6 MG/DL (ref 8.8–10.2)
CHLORIDE SERPL-SCNC: 108 MMOL/L (ref 98–107)
CO2 SERPL-SCNC: 21 MMOL/L (ref 20–28)
CREAT SERPL-MCNC: 0.42 MG/DL (ref 0.6–1.1)
ERYTHROCYTE [DISTWIDTH] IN BLOOD BY AUTOMATED COUNT: 16.6 % (ref 11.9–14.6)
GLOBULIN SER CALC-MCNC: 2.4 G/DL (ref 2.3–3.5)
GLUCOSE BLD STRIP.AUTO-MCNC: 78 MG/DL (ref 65–100)
GLUCOSE BLD STRIP.AUTO-MCNC: 85 MG/DL (ref 65–100)
GLUCOSE BLD STRIP.AUTO-MCNC: 88 MG/DL (ref 65–100)
GLUCOSE BLD STRIP.AUTO-MCNC: 92 MG/DL (ref 65–100)
GLUCOSE SERPL-MCNC: 94 MG/DL (ref 70–99)
HCT VFR BLD AUTO: 35.8 % (ref 35.8–46.3)
HGB BLD-MCNC: 11 G/DL (ref 11.7–15.4)
LACTATE SERPL-SCNC: 1.4 MMOL/L (ref 0.5–2)
MAGNESIUM SERPL-MCNC: 1.4 MG/DL (ref 1.8–2.4)
MCH RBC QN AUTO: 28.3 PG (ref 26.1–32.9)
MCHC RBC AUTO-ENTMCNC: 30.7 G/DL (ref 31.4–35)
MCV RBC AUTO: 92 FL (ref 82–102)
NRBC # BLD: 0.02 K/UL (ref 0–0.2)
PLATELET # BLD AUTO: 280 K/UL (ref 150–450)
PMV BLD AUTO: 9.2 FL (ref 9.4–12.3)
POTASSIUM SERPL-SCNC: 3 MMOL/L (ref 3.5–5.1)
PROT SERPL-MCNC: 4.4 G/DL (ref 6.3–8.2)
RBC # BLD AUTO: 3.89 M/UL (ref 4.05–5.2)
REFLEX: ABNORMAL
SERVICE CMNT-IMP: NORMAL
SODIUM SERPL-SCNC: 140 MMOL/L (ref 136–145)
WBC # BLD AUTO: 12.7 K/UL (ref 4.3–11.1)

## 2024-05-28 PROCEDURE — 36415 COLL VENOUS BLD VENIPUNCTURE: CPT

## 2024-05-28 PROCEDURE — 96376 TX/PRO/DX INJ SAME DRUG ADON: CPT

## 2024-05-28 PROCEDURE — 6370000000 HC RX 637 (ALT 250 FOR IP): Performed by: HOSPITALIST

## 2024-05-28 PROCEDURE — 82962 GLUCOSE BLOOD TEST: CPT

## 2024-05-28 PROCEDURE — 80053 COMPREHEN METABOLIC PANEL: CPT

## 2024-05-28 PROCEDURE — 2580000003 HC RX 258: Performed by: HOSPITALIST

## 2024-05-28 PROCEDURE — 6360000002 HC RX W HCPCS: Performed by: HOSPITALIST

## 2024-05-28 PROCEDURE — 1100000003 HC PRIVATE W/ TELEMETRY

## 2024-05-28 PROCEDURE — 92610 EVALUATE SWALLOWING FUNCTION: CPT

## 2024-05-28 PROCEDURE — 85027 COMPLETE CBC AUTOMATED: CPT

## 2024-05-28 PROCEDURE — 96365 THER/PROPH/DIAG IV INF INIT: CPT

## 2024-05-28 PROCEDURE — 83605 ASSAY OF LACTIC ACID: CPT

## 2024-05-28 PROCEDURE — 83735 ASSAY OF MAGNESIUM: CPT

## 2024-05-28 RX ORDER — SODIUM CHLORIDE AND POTASSIUM CHLORIDE 150; 900 MG/100ML; MG/100ML
INJECTION, SOLUTION INTRAVENOUS CONTINUOUS
Status: DISPENSED | OUTPATIENT
Start: 2024-05-28 | End: 2024-05-30

## 2024-05-28 RX ORDER — POTASSIUM CHLORIDE 20 MEQ/1
40 TABLET, EXTENDED RELEASE ORAL 2 TIMES DAILY WITH MEALS
Status: DISCONTINUED | OUTPATIENT
Start: 2024-05-28 | End: 2024-05-28 | Stop reason: SDUPTHER

## 2024-05-28 RX ORDER — MAGNESIUM SULFATE IN WATER 40 MG/ML
2000 INJECTION, SOLUTION INTRAVENOUS ONCE
Status: COMPLETED | OUTPATIENT
Start: 2024-05-28 | End: 2024-05-28

## 2024-05-28 RX ORDER — LANOLIN ALCOHOL/MO/W.PET/CERES
400 CREAM (GRAM) TOPICAL 2 TIMES DAILY
Status: DISCONTINUED | OUTPATIENT
Start: 2024-05-28 | End: 2024-06-06

## 2024-05-28 RX ORDER — VANCOMYCIN HYDROCHLORIDE 125 MG/1
125 CAPSULE ORAL 4 TIMES DAILY
Status: DISCONTINUED | OUTPATIENT
Start: 2024-05-28 | End: 2024-05-28

## 2024-05-28 RX ORDER — VANCOMYCIN HYDROCHLORIDE 125 MG/1
125 CAPSULE ORAL 4 TIMES DAILY
Status: DISCONTINUED | OUTPATIENT
Start: 2024-05-28 | End: 2024-06-02

## 2024-05-28 RX ADMIN — POTASSIUM CHLORIDE AND SODIUM CHLORIDE: 900; 150 INJECTION, SOLUTION INTRAVENOUS at 15:44

## 2024-05-28 RX ADMIN — GABAPENTIN 400 MG: 400 CAPSULE ORAL at 21:53

## 2024-05-28 RX ADMIN — POTASSIUM BICARBONATE 40 MEQ: 782 TABLET, EFFERVESCENT ORAL at 18:17

## 2024-05-28 RX ADMIN — SUCRALFATE 1 G: 1 TABLET ORAL at 21:53

## 2024-05-28 RX ADMIN — MAGNESIUM SULFATE HEPTAHYDRATE 2000 MG: 40 INJECTION, SOLUTION INTRAVENOUS at 10:08

## 2024-05-28 RX ADMIN — ONDANSETRON 4 MG: 2 INJECTION INTRAMUSCULAR; INTRAVENOUS at 22:10

## 2024-05-28 RX ADMIN — SODIUM CHLORIDE, PRESERVATIVE FREE 10 ML: 5 INJECTION INTRAVENOUS at 22:04

## 2024-05-28 RX ADMIN — MAGNESIUM GLUCONATE 500 MG ORAL TABLET 400 MG: 500 TABLET ORAL at 21:53

## 2024-05-28 RX ADMIN — GABAPENTIN 400 MG: 400 CAPSULE ORAL at 15:45

## 2024-05-28 RX ADMIN — VANCOMYCIN HYDROCHLORIDE 125 MG: 125 CAPSULE ORAL at 21:53

## 2024-05-28 RX ADMIN — VANCOMYCIN HYDROCHLORIDE 125 MG: 125 CAPSULE ORAL at 18:18

## 2024-05-28 RX ADMIN — GABAPENTIN 400 MG: 400 CAPSULE ORAL at 09:55

## 2024-05-28 RX ADMIN — VANCOMYCIN HYDROCHLORIDE 125 MG: 125 CAPSULE ORAL at 12:59

## 2024-05-28 RX ADMIN — LEVOTHYROXINE SODIUM 150 MCG: 0.07 TABLET ORAL at 05:30

## 2024-05-28 RX ADMIN — POTASSIUM BICARBONATE 40 MEQ: 782 TABLET, EFFERVESCENT ORAL at 12:21

## 2024-05-28 RX ADMIN — SUCRALFATE 1 G: 1 TABLET ORAL at 12:59

## 2024-05-28 RX ADMIN — ONDANSETRON 4 MG: 2 INJECTION INTRAMUSCULAR; INTRAVENOUS at 01:06

## 2024-05-28 RX ADMIN — PANTOPRAZOLE SODIUM 40 MG: 40 TABLET, DELAYED RELEASE ORAL at 05:31

## 2024-05-28 RX ADMIN — SUCRALFATE 1 G: 1 TABLET ORAL at 18:19

## 2024-05-28 RX ADMIN — SUCRALFATE 1 G: 1 TABLET ORAL at 09:55

## 2024-05-28 RX ADMIN — SODIUM CHLORIDE, PRESERVATIVE FREE 10 ML: 5 INJECTION INTRAVENOUS at 10:09

## 2024-05-28 RX ADMIN — FLUDROCORTISONE ACETATE 0.2 MG: 0.1 TABLET ORAL at 09:55

## 2024-05-28 RX ADMIN — MAGNESIUM GLUCONATE 500 MG ORAL TABLET 400 MG: 500 TABLET ORAL at 10:07

## 2024-05-28 ASSESSMENT — PAIN SCALES - GENERAL: PAINLEVEL_OUTOF10: 0

## 2024-05-28 NOTE — CONSULTS
Comprehensive Nutrition Assessment    Type and Reason for Visit: Initial, Consult  General Nutrition Management/other reason (Hospitalists)    Nutrition Recommendations/Plan:   Meals and Snacks:  Diet: Continue current order  Nutrition Supplement Therapy:  Medical food supplement therapy:  Initiate Ensure Enlive three times per day (this provides 350 kcal and 20 grams protein per bottle)     Malnutrition Assessment:  Malnutrition Status: At risk for malnutrition (Comment) (wt loss after gastric bypass, poor intake since surgery)    mild tricep wasting however NFPE otherwise unremarkable  Nutrition Assessment:  Nutrition History: Pt reports poor intake since her gastric bypass a year and a half ago. She stated she was able to take 4-5 tablespoons of food at a time. She stated she tried protein shakes however she did not like them because they were too sweet. She also reports nausea, vomiting, and diarrhea since surgery.      Do You Have Any Cultural, Latter day, or Ethnic Food Preferences?: No   Weight History: Pt reports ~138lb wt loss however she does not know how long this has been. She stated she has not been on a scale in a while. Per EMR wt hx review 9/29 304lb (bariatric clinic), 1/18 242lb (general surgery), 2/8 230lb (general surgery).  Nutrition Background:       PMH significant for UTI, DM, gastric bypass, bipolar, and orthostatic hypotension. Pt admitted with acute UTI and Cdiff.  Nutrition Interval:  Pt seen reclined in bed with Lindsay RN present. Pt reports hx as above. She stated she is only taking bites of her meals. Pt reports she is willing to trial Ensure. We discussed if it is too sweat she can mix it with milk or pour it over ice.     Current Nutrition Therapies:  ADULT DIET; Easy to Chew; 3 carb choices (45 gm/meal)    Current Intake:   Average Meal Intake: 1-25% Average Supplements Intake: None Ordered      Anthropometric Measures:  Height: 175.3 cm (5' 9\")  Current Body Wt: 83.4 kg (183 lb 13.8

## 2024-05-28 NOTE — ICUWATCH
RRT Clinical Rounding Nurse Progress Report      SUBJECTIVE: Patient assessed secondary to recent rapid response. Syncopal episode when sitting up.     Vitals:    05/28/24 0552 05/28/24 0739 05/28/24 1215 05/28/24 1226   BP: (!) 131/115 107/75 105/69 (!) 89/53   Pulse:  93 96 (!) 104   Resp:  16 16    Temp:  97.5 °F (36.4 °C)     TempSrc:  Oral     SpO2:  97%     Weight:       Height:            DETERIORATION INDEX SCORE: 34    ASSESSMENT:  Pt lying awake in bed, in NAD, trying to eat breakfast. Pt A&Ox4.  States she gets nauseous after taking a bite of food and has a bad gag reflex with some foods. Pt has not been eating much lately d/t this.  Does not have any IVF infusing at this time.  Lung sounds are clear. On room air.  NSR, HR 96 on remote telemetry.  BP soft, 100s/50s-- was 80s/50s earlier this am during RR during position changes.  Abd soft, nontender.  Voiding ok.  Pt denies any pain.  Wanting to go home and not back to rehab.        PLAN:  Will follow per RRT Clinical Rounding Program protocol. Dr. Mesa, Hospitalist at bedside to see pt.  Pt indicates she has not seen her Gen Sx MD at [facility] since her bypass surgery. Also mentions having had an EGD sometime in the last couple months but does not know why.  Mentions poor intake and trouble swallowing.  Orders placed.  PT/OT/SLP evals.  Dietician consult for nutrition management.  MD to review chart re: pt procedure hx, GI and Gen Sx notes, etc. MD also notified of CDIFF+ results.     Dea Askew, CORNELIUS  [facility]: [phone]  [facility]: [phone]

## 2024-05-28 NOTE — CARE COORDINATION
Pt chart reviewed for discharge planning. MSW met with pt at bedside, verified demographic information/ health insurance. Pt lives with daughter in one level home, states independent with ADLs, uses a walker to ambulate, and does not drive . PCP was confirmed, last seen in the office six months ago. Pt reports no outside services in the home. MSW will follow pt plan of care and assist with supportive care referrals pending pt clinical progress.  Please consult case management if specific needs arise.        05/28/24 1145   Service Assessment   Patient Orientation Alert and Oriented;Person;Place;Situation;Self   Cognition Alert   History Provided By Patient   Primary Caregiver Self   Support Systems Children   Patient's Healthcare Decision Maker is: Legal Next of Kin   PCP Verified by CM Yes   Last Visit to PCP Within last 6 months   Prior Functional Level Independent in ADLs/IADLs   Current Functional Level Independent in ADLs/IADLs   Can patient return to prior living arrangement Yes   Ability to make needs known: Good   Family able to assist with home care needs: Yes   Would you like for me to discuss the discharge plan with any other family members/significant others, and if so, who? Yes  (Daughter, Karin)   Financial Resources Medicare  ([facility])   Community Resources None   Social/Functional History   Lives With Daughter   Type of Home House   Home Layout One level   Home Access Level entry   Bathroom Shower/Tub Tub/Shower unit   Bathroom Toilet Standard   Bathroom Equipment Commode   Bathroom Accessibility Accessible   Home Equipment Walker - Rolling   Receives Help From Family   ADL Assistance Independent   Ambulation Assistance Independent   Active  Yes   Occupation Retired   Discharge Planning   Type of Residence House   Living Arrangements Children   Current Services Prior To Admission None   Potential Assistance Needed N/A   DME Ordered? No   Potential Assistance Purchasing Medications No   Type of

## 2024-05-28 NOTE — ICUWATCH
Rapid Response Team Note      Subjective: Responded to Rapid Response secondary to syncopal episode.    Summary: Pt was being assisted to the bathroom and had syncopal episode. Pt did not fall and hit her head or any body part. RN called RR and assisted pt back to bed.    On exam, pt was initially drowsy, but awakened to touch and alert. Oriented to self, place and time. No dysarthria, dysphagia or facial palsy noted. Pupils 3 round and briskly reactive. Following commands appropriately on all 4 extremities, no deficits noted. Denies headache. Initially placed on NRB by nursing, weaned off shortly after confirming adequate oxygenation, pt denies SOB. Defibrillator attached, 's initially. Skin pale, but dry and intact. Pt's only complaint is mild dizziness that she reports got better as time progressed.    Dr. Dover responded to the RR. Orders for labs, telemetry placed. 250cc NS bolus ordered and administered as well.    See Rapid Response/Code Blue Narrator for full documentation    Outcome: Patient to remain in current location. Will follow-up per Rapid Response Team Clinical Rounding protocol.      Compa Avila RN  [facility]: [phone]  [facility]: [phone]

## 2024-05-29 LAB
ANION GAP SERPL CALC-SCNC: 9 MMOL/L (ref 9–18)
BUN SERPL-MCNC: 11 MG/DL (ref 8–23)
CALCIUM SERPL-MCNC: 8.3 MG/DL (ref 8.8–10.2)
CHLORIDE SERPL-SCNC: 109 MMOL/L (ref 98–107)
CO2 SERPL-SCNC: 21 MMOL/L (ref 20–28)
CREAT SERPL-MCNC: 0.42 MG/DL (ref 0.6–1.1)
ERYTHROCYTE [DISTWIDTH] IN BLOOD BY AUTOMATED COUNT: 16.5 % (ref 11.9–14.6)
GLUCOSE BLD STRIP.AUTO-MCNC: 72 MG/DL (ref 65–100)
GLUCOSE BLD STRIP.AUTO-MCNC: 77 MG/DL (ref 65–100)
GLUCOSE BLD STRIP.AUTO-MCNC: 80 MG/DL (ref 65–100)
GLUCOSE BLD STRIP.AUTO-MCNC: 84 MG/DL (ref 65–100)
GLUCOSE BLD STRIP.AUTO-MCNC: 89 MG/DL (ref 65–100)
GLUCOSE SERPL-MCNC: 84 MG/DL (ref 70–99)
HCT VFR BLD AUTO: 33.4 % (ref 35.8–46.3)
HGB BLD-MCNC: 10.5 G/DL (ref 11.7–15.4)
MAGNESIUM SERPL-MCNC: 1.9 MG/DL (ref 1.8–2.4)
MCH RBC QN AUTO: 29.5 PG (ref 26.1–32.9)
MCHC RBC AUTO-ENTMCNC: 31.4 G/DL (ref 31.4–35)
MCV RBC AUTO: 93.8 FL (ref 82–102)
NRBC # BLD: 0 K/UL (ref 0–0.2)
PLATELET # BLD AUTO: 241 K/UL (ref 150–450)
PMV BLD AUTO: 9.6 FL (ref 9.4–12.3)
POTASSIUM SERPL-SCNC: 3.2 MMOL/L (ref 3.5–5.1)
RBC # BLD AUTO: 3.56 M/UL (ref 4.05–5.2)
SERVICE CMNT-IMP: NORMAL
SODIUM SERPL-SCNC: 139 MMOL/L (ref 136–145)
WBC # BLD AUTO: 9.7 K/UL (ref 4.3–11.1)

## 2024-05-29 PROCEDURE — 36415 COLL VENOUS BLD VENIPUNCTURE: CPT

## 2024-05-29 PROCEDURE — 97165 OT EVAL LOW COMPLEX 30 MIN: CPT

## 2024-05-29 PROCEDURE — 2580000003 HC RX 258: Performed by: HOSPITALIST

## 2024-05-29 PROCEDURE — 85027 COMPLETE CBC AUTOMATED: CPT

## 2024-05-29 PROCEDURE — 6360000002 HC RX W HCPCS: Performed by: HOSPITALIST

## 2024-05-29 PROCEDURE — 83735 ASSAY OF MAGNESIUM: CPT

## 2024-05-29 PROCEDURE — 92526 ORAL FUNCTION THERAPY: CPT

## 2024-05-29 PROCEDURE — 82962 GLUCOSE BLOOD TEST: CPT

## 2024-05-29 PROCEDURE — 97112 NEUROMUSCULAR REEDUCATION: CPT

## 2024-05-29 PROCEDURE — 80048 BASIC METABOLIC PNL TOTAL CA: CPT

## 2024-05-29 PROCEDURE — 97535 SELF CARE MNGMENT TRAINING: CPT

## 2024-05-29 PROCEDURE — 6370000000 HC RX 637 (ALT 250 FOR IP): Performed by: HOSPITALIST

## 2024-05-29 PROCEDURE — 1100000003 HC PRIVATE W/ TELEMETRY

## 2024-05-29 PROCEDURE — 97530 THERAPEUTIC ACTIVITIES: CPT

## 2024-05-29 PROCEDURE — 97161 PT EVAL LOW COMPLEX 20 MIN: CPT

## 2024-05-29 RX ORDER — FLUDROCORTISONE ACETATE 0.1 MG/1
0.2 TABLET ORAL DAILY
Status: DISCONTINUED | OUTPATIENT
Start: 2024-05-30 | End: 2024-06-03

## 2024-05-29 RX ORDER — FLUDROCORTISONE ACETATE 0.1 MG/1
0.1 TABLET ORAL 2 TIMES DAILY
Status: DISCONTINUED | OUTPATIENT
Start: 2024-05-29 | End: 2024-05-29

## 2024-05-29 RX ADMIN — VANCOMYCIN HYDROCHLORIDE 125 MG: 125 CAPSULE ORAL at 16:31

## 2024-05-29 RX ADMIN — LEVOTHYROXINE SODIUM 150 MCG: 0.07 TABLET ORAL at 05:11

## 2024-05-29 RX ADMIN — POTASSIUM BICARBONATE 40 MEQ: 782 TABLET, EFFERVESCENT ORAL at 10:36

## 2024-05-29 RX ADMIN — POTASSIUM CHLORIDE AND SODIUM CHLORIDE: 900; 150 INJECTION, SOLUTION INTRAVENOUS at 21:04

## 2024-05-29 RX ADMIN — SUCRALFATE 1 G: 1 TABLET ORAL at 16:26

## 2024-05-29 RX ADMIN — VANCOMYCIN HYDROCHLORIDE 125 MG: 125 CAPSULE ORAL at 21:05

## 2024-05-29 RX ADMIN — PANTOPRAZOLE SODIUM 40 MG: 40 TABLET, DELAYED RELEASE ORAL at 05:11

## 2024-05-29 RX ADMIN — PANTOPRAZOLE SODIUM 40 MG: 40 TABLET, DELAYED RELEASE ORAL at 16:26

## 2024-05-29 RX ADMIN — VANCOMYCIN HYDROCHLORIDE 125 MG: 125 CAPSULE ORAL at 13:09

## 2024-05-29 RX ADMIN — MAGNESIUM GLUCONATE 500 MG ORAL TABLET 400 MG: 500 TABLET ORAL at 09:00

## 2024-05-29 RX ADMIN — SODIUM CHLORIDE, PRESERVATIVE FREE 10 ML: 5 INJECTION INTRAVENOUS at 21:11

## 2024-05-29 RX ADMIN — GABAPENTIN 400 MG: 400 CAPSULE ORAL at 08:57

## 2024-05-29 RX ADMIN — ONDANSETRON 4 MG: 2 INJECTION INTRAMUSCULAR; INTRAVENOUS at 19:49

## 2024-05-29 RX ADMIN — SODIUM CHLORIDE, PRESERVATIVE FREE 10 ML: 5 INJECTION INTRAVENOUS at 09:01

## 2024-05-29 RX ADMIN — MAGNESIUM GLUCONATE 500 MG ORAL TABLET 400 MG: 500 TABLET ORAL at 21:05

## 2024-05-29 RX ADMIN — GABAPENTIN 400 MG: 400 CAPSULE ORAL at 14:30

## 2024-05-29 RX ADMIN — SUCRALFATE 1 G: 1 TABLET ORAL at 08:57

## 2024-05-29 RX ADMIN — FLUDROCORTISONE ACETATE 0.2 MG: 0.1 TABLET ORAL at 09:07

## 2024-05-29 RX ADMIN — SUCRALFATE 1 G: 1 TABLET ORAL at 12:05

## 2024-05-29 RX ADMIN — METOCLOPRAMIDE 10 MG: 10 TABLET ORAL at 12:05

## 2024-05-29 RX ADMIN — VANCOMYCIN HYDROCHLORIDE 125 MG: 125 CAPSULE ORAL at 09:02

## 2024-05-29 RX ADMIN — GABAPENTIN 400 MG: 400 CAPSULE ORAL at 21:05

## 2024-05-29 RX ADMIN — ONDANSETRON 4 MG: 2 INJECTION INTRAMUSCULAR; INTRAVENOUS at 05:23

## 2024-05-29 NOTE — ICUWATCH
RRT Clinical Rounding Nurse Update    Vitals:    05/28/24 1215 05/28/24 1226 05/28/24 1455 05/28/24 1937   BP: 105/69 (!) 89/53 105/72 113/72   Pulse: 96 (!) 104 87 88   Resp: 16   18   Temp:   99 °F (37.2 °C) 98.2 °F (36.8 °C)   TempSrc:    Oral   SpO2:   96% 97%   Weight:       Height:            DETERIORATION INDEX SCORE: 21    ASSESSMENT:  Previous outreach assessment and chart were reviewed. There have been no significant changes since previous assessment.    PLAN:  Will follow per RRT Clinical Rounding Program protocol.    Vianney Moreland RN  [facility]: [phone]  [facility]: [phone]

## 2024-05-29 NOTE — CARE COORDINATION
CM spoke with patient in room and daughter, cyndee on patients phone. CM discussed recommendation of short term rehab. Patient is agreeable. Patient and daughter would like referral sent to [facility] and [facility]. Referrals sent. Patient will need an insurance auth wants she has a bed offer.    Ricardo FIELDS, ACM  [place]

## 2024-05-29 NOTE — ICUWATCH
RRT Clinical Rounding Nurse Progress Report      SUBJECTIVE: Patient assessed secondary to recent rapid response.      Vitals:    05/28/24 1215 05/28/24 1226 05/28/24 1455 05/28/24 1937   BP: 105/69 (!) 89/53 105/72 113/72   Pulse: 96 (!) 104 87 88   Resp: 16   18   Temp:   99 °F (37.2 °C) 98.2 °F (36.8 °C)   TempSrc:    Oral   SpO2:   96% 97%   Weight:       Height:            DETERIORATION INDEX SCORE: 21    ASSESSMENT:  Pt is A&O x4 and appears to be in NAD at this time. O2 sat 97% on 2L NC, NSR on tele monitor. Pt denies any pain and voices no complaints. Chart reviewed.      PLAN:  Will follow per RRT Clinical Rounding Program protocol.    Vianney Moreland RN  [facility]: [phone]  [facility]: [phone]

## 2024-05-29 NOTE — ICUWATCH
RRT Clinical Rounding Nurse Progress Report      SUBJECTIVE: Called to assess patient secondary to Recent rapid response.      Vitals:    05/28/24 1455 05/28/24 1937 05/29/24 0416 05/29/24 0748   BP: 105/72 113/72 121/84 132/82   Pulse: 87 88 86 86   Resp:  18 18 18   Temp: 99 °F (37.2 °C) 98.2 °F (36.8 °C) 98.2 °F (36.8 °C) 98 °F (36.7 °C)   TempSrc:  Oral Oral Axillary   SpO2: 96% 97% 94% 98%   Weight:       Height:              ASSESSMENT:  On arrival to room, I found patient to be resting in bed quietly. Patient is alert and oriented X 4. Denies any pain or SOB. Respirations even and unlabored. Bilateral lung sounds clear on RA. PT denies any needs or concerns at this time. Recent labs/notes/vitals reviewed and pt discussed with primary RN.    PLAN:  No concern per primary RN. Will follow per RRT Clinical Rounding Program protocol. Primary RN to call with concerns.    Ru Nichols RN  [facility]: [phone]

## 2024-05-29 NOTE — THERAPY EVALUATION
ACUTE OCCUPATIONAL THERAPY GOALS:   (Developed with and agreed upon by patient and/or caregiver.)  1. Patient will complete lower body bathing and dressing with STANDBY ASSIST and adaptive equipment as needed.     2. Patient will complete toilet transfers and toileting with STANDBY ASSIST.  3. Patient will complete grooming ADL tasks at standing level with STANDBY ASSIST.  4. Patient will tolerate 30 minutes of OT treatment with 1-2 rest breaks to increase activity tolerance for ADLs.   5. Patient will complete functional transfers with STANDBY ASSIST and adaptive equipment as needed.   6. Patient will tolerate 10 minutes BUE exercises to increase strength for safe, functional transfers.     Timeframe: 7 visits        OCCUPATIONAL THERAPY Initial Assessment, Daily Note, and AM       OT Visit Days: 1  Acknowledge Orders  Time  OT Charge Capture  Rehab Caseload Tracker      Jesika Olsen is a 68 y.o. female   PRIMARY DIAGNOSIS: Acute UTI (urinary tract infection)  Acute abdominal pain [R10.9]  Intractable vomiting [R11.10]  Acute UTI (urinary tract infection) [N39.0]  Urinary tract infection without hematuria, site unspecified [N39.0]  Acute diarrhea [R19.7]       Reason for Referral: Generalized Muscle Weakness (M62.81)  Other lack of cordination (R27.8)  Difficulty in walking, Not elsewhere classified (R26.2)  Other abnormalities of gait and mobility (R26.89)  Inpatient: Payor: [facility] MEDICARE / Plan: [facility] MEDICARE COMPLETE / Product Type: *No Product type* /     ASSESSMENT:     REHAB RECOMMENDATIONS:   Recommendation to date pending progress:  Setting:  Short-term Rehab    Equipment:    To Be Determined     ASSESSMENT:  Ms. Olsen is a 68 y.o. female who presents to the hospital with abdominal pain, vomiting, and diarrhea. Positive finding for clostridium difficile. Admitted with acute UTI. PMHx of recurrent UTI, NIDDM, JEREMI, gastric bypass, orthostatic hypotension, bipolar disorder.    Due to history of

## 2024-05-29 NOTE — ICUWATCH
RRT Clinical Rounding Nurse Update    Vitals:    05/28/24 1937 05/29/24 0416 05/29/24 0748 05/29/24 1150   BP: 113/72 121/84 132/82 105/71   Pulse: 88 86 86 100   Resp: 18 18 18    Temp: 98.2 °F (36.8 °C) 98.2 °F (36.8 °C) 98 °F (36.7 °C)    TempSrc: Oral Oral Axillary    SpO2: 97% 94% 98%    Weight:       Height:            ASSESSMENT:  Previous outreach assessment was reviewed. There have been no significant clinical changes since the completion of the last dated Outreach assessment. No concerns per primary RN.    PLAN:  Will discharge from RRT Clinical Rounding Program per protocol. Please call if needed.    Ru Nichols RN  [facility]: [phone]

## 2024-05-30 LAB
BACTERIA SPEC CULT: NORMAL
BACTERIA SPEC CULT: NORMAL
GLUCOSE BLD STRIP.AUTO-MCNC: 75 MG/DL (ref 65–100)
GLUCOSE BLD STRIP.AUTO-MCNC: 76 MG/DL (ref 65–100)
GLUCOSE BLD STRIP.AUTO-MCNC: 76 MG/DL (ref 65–100)
GLUCOSE BLD STRIP.AUTO-MCNC: 88 MG/DL (ref 65–100)
GLUCOSE BLD STRIP.AUTO-MCNC: 89 MG/DL (ref 65–100)
SERVICE CMNT-IMP: NORMAL
SPECIMEN SOURCE: NORMAL
WBC RESULT, STOOL: NORMAL
WHITE BLOOD CELLS (WBC), STOOL: NORMAL

## 2024-05-30 PROCEDURE — 82962 GLUCOSE BLOOD TEST: CPT

## 2024-05-30 PROCEDURE — 2580000003 HC RX 258: Performed by: HOSPITALIST

## 2024-05-30 PROCEDURE — 6370000000 HC RX 637 (ALT 250 FOR IP): Performed by: HOSPITALIST

## 2024-05-30 PROCEDURE — 6360000002 HC RX W HCPCS: Performed by: HOSPITALIST

## 2024-05-30 PROCEDURE — 1100000003 HC PRIVATE W/ TELEMETRY

## 2024-05-30 PROCEDURE — 97530 THERAPEUTIC ACTIVITIES: CPT

## 2024-05-30 RX ORDER — MIDODRINE HYDROCHLORIDE 5 MG/1
5 TABLET ORAL
Status: DISCONTINUED | OUTPATIENT
Start: 2024-05-30 | End: 2024-05-30

## 2024-05-30 RX ADMIN — GABAPENTIN 400 MG: 400 CAPSULE ORAL at 09:51

## 2024-05-30 RX ADMIN — MAGNESIUM GLUCONATE 500 MG ORAL TABLET 400 MG: 500 TABLET ORAL at 09:51

## 2024-05-30 RX ADMIN — PANTOPRAZOLE SODIUM 40 MG: 40 TABLET, DELAYED RELEASE ORAL at 05:16

## 2024-05-30 RX ADMIN — SUCRALFATE 1 G: 1 TABLET ORAL at 09:51

## 2024-05-30 RX ADMIN — MAGNESIUM GLUCONATE 500 MG ORAL TABLET 400 MG: 500 TABLET ORAL at 19:47

## 2024-05-30 RX ADMIN — SODIUM CHLORIDE, PRESERVATIVE FREE 10 ML: 5 INJECTION INTRAVENOUS at 10:19

## 2024-05-30 RX ADMIN — GABAPENTIN 400 MG: 400 CAPSULE ORAL at 14:22

## 2024-05-30 RX ADMIN — FLUDROCORTISONE ACETATE 0.2 MG: 0.1 TABLET ORAL at 09:51

## 2024-05-30 RX ADMIN — SUCRALFATE 1 G: 1 TABLET ORAL at 19:48

## 2024-05-30 RX ADMIN — VANCOMYCIN HYDROCHLORIDE 125 MG: 125 CAPSULE ORAL at 14:23

## 2024-05-30 RX ADMIN — ONDANSETRON 4 MG: 2 INJECTION INTRAMUSCULAR; INTRAVENOUS at 03:42

## 2024-05-30 RX ADMIN — VANCOMYCIN HYDROCHLORIDE 125 MG: 125 CAPSULE ORAL at 16:38

## 2024-05-30 RX ADMIN — LEVOTHYROXINE SODIUM 150 MCG: 0.07 TABLET ORAL at 05:16

## 2024-05-30 RX ADMIN — SODIUM CHLORIDE, PRESERVATIVE FREE 10 ML: 5 INJECTION INTRAVENOUS at 19:48

## 2024-05-30 RX ADMIN — VANCOMYCIN HYDROCHLORIDE 125 MG: 125 CAPSULE ORAL at 19:48

## 2024-05-30 RX ADMIN — SUCRALFATE 1 G: 1 TABLET ORAL at 14:22

## 2024-05-30 RX ADMIN — SUCRALFATE 1 G: 1 TABLET ORAL at 16:38

## 2024-05-30 RX ADMIN — PANTOPRAZOLE SODIUM 40 MG: 40 TABLET, DELAYED RELEASE ORAL at 16:38

## 2024-05-30 RX ADMIN — VANCOMYCIN HYDROCHLORIDE 125 MG: 125 CAPSULE ORAL at 09:51

## 2024-05-30 RX ADMIN — GABAPENTIN 400 MG: 400 CAPSULE ORAL at 19:48

## 2024-05-30 NOTE — WOUND CARE
Consulted for Buttocks excoriation. Pt stated this all began with using a purewick in the ED and not being able to tolerate the suction on her skin. Educated on incontinence care and how moisture can also cause the superficial layer of the skin to soften and peel away. Pt is already self-repositioning weight frequently to offload d/t pain.     Blanchable redness/excoriation present, no large open wounds noted with palpated assessment. Unable to see full extent of excoriation d/t zinc cream already present. Recommend to continue use of zinc barrier cream BID/PRN with incontinence.

## 2024-05-31 LAB
GLUCOSE BLD STRIP.AUTO-MCNC: 76 MG/DL (ref 65–100)
GLUCOSE BLD STRIP.AUTO-MCNC: 76 MG/DL (ref 65–100)
GLUCOSE BLD STRIP.AUTO-MCNC: 78 MG/DL (ref 65–100)
GLUCOSE BLD STRIP.AUTO-MCNC: 84 MG/DL (ref 65–100)
GLUCOSE BLD STRIP.AUTO-MCNC: 97 MG/DL (ref 65–100)
SERVICE CMNT-IMP: NORMAL

## 2024-05-31 PROCEDURE — 2500000003 HC RX 250 WO HCPCS: Performed by: HOSPITALIST

## 2024-05-31 PROCEDURE — 97110 THERAPEUTIC EXERCISES: CPT

## 2024-05-31 PROCEDURE — 51798 US URINE CAPACITY MEASURE: CPT

## 2024-05-31 PROCEDURE — 97530 THERAPEUTIC ACTIVITIES: CPT

## 2024-05-31 PROCEDURE — 6370000000 HC RX 637 (ALT 250 FOR IP): Performed by: HOSPITALIST

## 2024-05-31 PROCEDURE — 2580000003 HC RX 258: Performed by: HOSPITALIST

## 2024-05-31 PROCEDURE — 1100000003 HC PRIVATE W/ TELEMETRY

## 2024-05-31 PROCEDURE — 6360000002 HC RX W HCPCS: Performed by: HOSPITALIST

## 2024-05-31 PROCEDURE — 82962 GLUCOSE BLOOD TEST: CPT

## 2024-05-31 RX ADMIN — SODIUM CHLORIDE, PRESERVATIVE FREE 10 ML: 5 INJECTION INTRAVENOUS at 20:52

## 2024-05-31 RX ADMIN — GABAPENTIN 400 MG: 400 CAPSULE ORAL at 13:11

## 2024-05-31 RX ADMIN — GABAPENTIN 400 MG: 400 CAPSULE ORAL at 09:34

## 2024-05-31 RX ADMIN — FLUDROCORTISONE ACETATE 0.2 MG: 0.1 TABLET ORAL at 09:34

## 2024-05-31 RX ADMIN — SUCRALFATE 1 G: 1 TABLET ORAL at 13:10

## 2024-05-31 RX ADMIN — ONDANSETRON 4 MG: 2 INJECTION INTRAMUSCULAR; INTRAVENOUS at 11:05

## 2024-05-31 RX ADMIN — GABAPENTIN 400 MG: 400 CAPSULE ORAL at 20:52

## 2024-05-31 RX ADMIN — MAGNESIUM GLUCONATE 500 MG ORAL TABLET 400 MG: 500 TABLET ORAL at 09:34

## 2024-05-31 RX ADMIN — MAGNESIUM GLUCONATE 500 MG ORAL TABLET 400 MG: 500 TABLET ORAL at 20:52

## 2024-05-31 RX ADMIN — VANCOMYCIN HYDROCHLORIDE 125 MG: 125 CAPSULE ORAL at 20:52

## 2024-05-31 RX ADMIN — SUCRALFATE 1 G: 1 TABLET ORAL at 09:34

## 2024-05-31 RX ADMIN — PANTOPRAZOLE SODIUM 40 MG: 40 TABLET, DELAYED RELEASE ORAL at 16:18

## 2024-05-31 RX ADMIN — SUCRALFATE 1 G: 1 TABLET ORAL at 20:51

## 2024-05-31 RX ADMIN — LEVOTHYROXINE SODIUM 150 MCG: 0.07 TABLET ORAL at 04:20

## 2024-05-31 RX ADMIN — VANCOMYCIN HYDROCHLORIDE 125 MG: 125 CAPSULE ORAL at 16:19

## 2024-05-31 RX ADMIN — SUCRALFATE 1 G: 1 TABLET ORAL at 16:19

## 2024-05-31 RX ADMIN — METOCLOPRAMIDE 10 MG: 5 INJECTION, SOLUTION INTRAMUSCULAR; INTRAVENOUS at 23:56

## 2024-05-31 RX ADMIN — PANTOPRAZOLE SODIUM 40 MG: 40 TABLET, DELAYED RELEASE ORAL at 04:20

## 2024-05-31 RX ADMIN — VANCOMYCIN HYDROCHLORIDE 125 MG: 125 CAPSULE ORAL at 09:34

## 2024-05-31 RX ADMIN — SODIUM CHLORIDE, PRESERVATIVE FREE 10 ML: 5 INJECTION INTRAVENOUS at 09:35

## 2024-05-31 RX ADMIN — TUBERCULIN PURIFIED PROTEIN DERIVATIVE 5 UNITS: 5 INJECTION, SOLUTION INTRADERMAL at 18:06

## 2024-05-31 RX ADMIN — VANCOMYCIN HYDROCHLORIDE 125 MG: 125 CAPSULE ORAL at 13:11

## 2024-05-31 NOTE — CARE COORDINATION
Chart reviewed and patient discussed in IDT rounds this AM. Patient has been accepted at [facility], pending insurance auth.     Ricardo FIELDS, BOGDAN [place]

## 2024-05-31 NOTE — CARE COORDINATION
Insurance requesting a Peer to Peer due by 6/3 at 1100. [phone], option 5. CM notified attending.    Ricardo PETERSONSW, ACM  [facility]

## 2024-06-01 LAB
ANION GAP SERPL CALC-SCNC: 10 MMOL/L (ref 9–18)
BUN SERPL-MCNC: 7 MG/DL (ref 8–23)
CALCIUM SERPL-MCNC: 8.5 MG/DL (ref 8.8–10.2)
CHLORIDE SERPL-SCNC: 107 MMOL/L (ref 98–107)
CO2 SERPL-SCNC: 23 MMOL/L (ref 20–28)
CORTIS BS SERPL-MCNC: 12 UG/DL
CREAT SERPL-MCNC: 0.31 MG/DL (ref 0.6–1.1)
ERYTHROCYTE [DISTWIDTH] IN BLOOD BY AUTOMATED COUNT: 16.3 % (ref 11.9–14.6)
GLUCOSE BLD STRIP.AUTO-MCNC: 75 MG/DL (ref 65–100)
GLUCOSE BLD STRIP.AUTO-MCNC: 76 MG/DL (ref 65–100)
GLUCOSE BLD STRIP.AUTO-MCNC: 86 MG/DL (ref 65–100)
GLUCOSE BLD STRIP.AUTO-MCNC: 88 MG/DL (ref 65–100)
GLUCOSE BLD STRIP.AUTO-MCNC: 91 MG/DL (ref 65–100)
GLUCOSE SERPL-MCNC: 80 MG/DL (ref 70–99)
HCT VFR BLD AUTO: 30.9 % (ref 35.8–46.3)
HGB BLD-MCNC: 9.9 G/DL (ref 11.7–15.4)
MCH RBC QN AUTO: 28.9 PG (ref 26.1–32.9)
MCHC RBC AUTO-ENTMCNC: 32 G/DL (ref 31.4–35)
MCV RBC AUTO: 90.4 FL (ref 82–102)
MM INDURATION, POC: 0 MM (ref 0–5)
NRBC # BLD: 0 K/UL (ref 0–0.2)
PLATELET # BLD AUTO: 268 K/UL (ref 150–450)
PMV BLD AUTO: 9.1 FL (ref 9.4–12.3)
POTASSIUM SERPL-SCNC: 3 MMOL/L (ref 3.5–5.1)
PPD, POC: NEGATIVE
RBC # BLD AUTO: 3.42 M/UL (ref 4.05–5.2)
SERVICE CMNT-IMP: NORMAL
SODIUM SERPL-SCNC: 140 MMOL/L (ref 136–145)
WBC # BLD AUTO: 9.4 K/UL (ref 4.3–11.1)

## 2024-06-01 PROCEDURE — 82533 TOTAL CORTISOL: CPT

## 2024-06-01 PROCEDURE — 80048 BASIC METABOLIC PNL TOTAL CA: CPT

## 2024-06-01 PROCEDURE — 36415 COLL VENOUS BLD VENIPUNCTURE: CPT

## 2024-06-01 PROCEDURE — 85027 COMPLETE CBC AUTOMATED: CPT

## 2024-06-01 PROCEDURE — 6360000002 HC RX W HCPCS: Performed by: HOSPITALIST

## 2024-06-01 PROCEDURE — 2580000003 HC RX 258: Performed by: HOSPITALIST

## 2024-06-01 PROCEDURE — 51798 US URINE CAPACITY MEASURE: CPT

## 2024-06-01 PROCEDURE — 82962 GLUCOSE BLOOD TEST: CPT

## 2024-06-01 PROCEDURE — 6370000000 HC RX 637 (ALT 250 FOR IP): Performed by: HOSPITALIST

## 2024-06-01 PROCEDURE — 1100000000 HC RM PRIVATE

## 2024-06-01 RX ADMIN — SODIUM CHLORIDE, PRESERVATIVE FREE 10 ML: 5 INJECTION INTRAVENOUS at 09:14

## 2024-06-01 RX ADMIN — GABAPENTIN 400 MG: 400 CAPSULE ORAL at 09:13

## 2024-06-01 RX ADMIN — FLUDROCORTISONE ACETATE 0.2 MG: 0.1 TABLET ORAL at 09:13

## 2024-06-01 RX ADMIN — VANCOMYCIN HYDROCHLORIDE 125 MG: 125 CAPSULE ORAL at 14:08

## 2024-06-01 RX ADMIN — MAGNESIUM GLUCONATE 500 MG ORAL TABLET 400 MG: 500 TABLET ORAL at 20:09

## 2024-06-01 RX ADMIN — SUCRALFATE 1 G: 1 TABLET ORAL at 17:11

## 2024-06-01 RX ADMIN — SUCRALFATE 1 G: 1 TABLET ORAL at 14:08

## 2024-06-01 RX ADMIN — SUCRALFATE 1 G: 1 TABLET ORAL at 09:14

## 2024-06-01 RX ADMIN — VANCOMYCIN HYDROCHLORIDE 125 MG: 125 CAPSULE ORAL at 20:09

## 2024-06-01 RX ADMIN — MAGNESIUM GLUCONATE 500 MG ORAL TABLET 400 MG: 500 TABLET ORAL at 09:14

## 2024-06-01 RX ADMIN — VANCOMYCIN HYDROCHLORIDE 125 MG: 125 CAPSULE ORAL at 17:11

## 2024-06-01 RX ADMIN — ONDANSETRON 4 MG: 2 INJECTION INTRAMUSCULAR; INTRAVENOUS at 15:38

## 2024-06-01 RX ADMIN — GABAPENTIN 400 MG: 400 CAPSULE ORAL at 14:08

## 2024-06-01 RX ADMIN — GABAPENTIN 400 MG: 400 CAPSULE ORAL at 20:09

## 2024-06-01 RX ADMIN — PANTOPRAZOLE SODIUM 40 MG: 40 TABLET, DELAYED RELEASE ORAL at 15:38

## 2024-06-01 RX ADMIN — PANTOPRAZOLE SODIUM 40 MG: 40 TABLET, DELAYED RELEASE ORAL at 04:35

## 2024-06-01 RX ADMIN — SUCRALFATE 1 G: 1 TABLET ORAL at 20:09

## 2024-06-01 RX ADMIN — SODIUM CHLORIDE, PRESERVATIVE FREE 10 ML: 5 INJECTION INTRAVENOUS at 20:09

## 2024-06-01 RX ADMIN — LEVOTHYROXINE SODIUM 150 MCG: 0.07 TABLET ORAL at 04:35

## 2024-06-01 RX ADMIN — POTASSIUM BICARBONATE 40 MEQ: 782 TABLET, EFFERVESCENT ORAL at 09:14

## 2024-06-01 RX ADMIN — VANCOMYCIN HYDROCHLORIDE 125 MG: 125 CAPSULE ORAL at 09:14

## 2024-06-01 ASSESSMENT — PAIN SCALES - GENERAL: PAINLEVEL_OUTOF10: 0

## 2024-06-01 NOTE — PLAN OF CARE
Problem: Discharge Planning  Goal: Discharge to home or other facility with appropriate resources  Outcome: Progressing  Flowsheets (Taken 5/31/2024 1938)  Discharge to home or other facility with appropriate resources:   Identify barriers to discharge with patient and caregiver   Arrange for needed discharge resources and transportation as appropriate   Identify discharge learning needs (meds, wound care, etc)     Problem: Pain  Goal: Verbalizes/displays adequate comfort level or baseline comfort level  Outcome: Progressing  Flowsheets (Taken 5/31/2024 1938)  Verbalizes/displays adequate comfort level or baseline comfort level:   Encourage patient to monitor pain and request assistance   Assess pain using appropriate pain scale   Administer analgesics based on type and severity of pain and evaluate response   Implement non-pharmacological measures as appropriate and evaluate response     Problem: Safety - Adult  Goal: Free from fall injury  Outcome: Progressing     Problem: Skin/Tissue Integrity  Goal: Absence of new skin breakdown  Description: 1.  Monitor for areas of redness and/or skin breakdown  2.  Assess vascular access sites hourly  3.  Every 4-6 hours minimum:  Change oxygen saturation probe site  4.  Every 4-6 hours:  If on nasal continuous positive airway pressure, respiratory therapy assess nares and determine need for appliance change or resting period.  Outcome: Progressing     Problem: ABCDS Injury Assessment  Goal: Absence of physical injury  Outcome: Progressing     Problem: Chronic Conditions and Co-morbidities  Goal: Patient's chronic conditions and co-morbidity symptoms are monitored and maintained or improved  Outcome: Progressing  Flowsheets (Taken 5/31/2024 1938)  Care Plan - Patient's Chronic Conditions and Co-Morbidity Symptoms are Monitored and Maintained or Improved:   Monitor and assess patient's chronic conditions and comorbid symptoms for stability, deterioration, or improvement    shortness of breath

## 2024-06-02 LAB
GLUCOSE BLD STRIP.AUTO-MCNC: 77 MG/DL (ref 65–100)
GLUCOSE BLD STRIP.AUTO-MCNC: 78 MG/DL (ref 65–100)
GLUCOSE BLD STRIP.AUTO-MCNC: 81 MG/DL (ref 65–100)
GLUCOSE BLD STRIP.AUTO-MCNC: 82 MG/DL (ref 65–100)
SERVICE CMNT-IMP: NORMAL

## 2024-06-02 PROCEDURE — 6360000002 HC RX W HCPCS: Performed by: HOSPITALIST

## 2024-06-02 PROCEDURE — 2580000003 HC RX 258: Performed by: HOSPITALIST

## 2024-06-02 PROCEDURE — 6370000000 HC RX 637 (ALT 250 FOR IP): Performed by: HOSPITALIST

## 2024-06-02 PROCEDURE — 82962 GLUCOSE BLOOD TEST: CPT

## 2024-06-02 PROCEDURE — 2500000003 HC RX 250 WO HCPCS: Performed by: HOSPITALIST

## 2024-06-02 PROCEDURE — 51702 INSERT TEMP BLADDER CATH: CPT

## 2024-06-02 PROCEDURE — 1100000000 HC RM PRIVATE

## 2024-06-02 RX ORDER — VANCOMYCIN HYDROCHLORIDE 125 MG/1
125 CAPSULE ORAL 4 TIMES DAILY
Status: DISCONTINUED | OUTPATIENT
Start: 2024-06-02 | End: 2024-06-03

## 2024-06-02 RX ORDER — FLUCONAZOLE 100 MG/1
150 TABLET ORAL DAILY
Status: COMPLETED | OUTPATIENT
Start: 2024-06-02 | End: 2024-06-04

## 2024-06-02 RX ORDER — TAMSULOSIN HYDROCHLORIDE 0.4 MG/1
0.4 CAPSULE ORAL DAILY
Status: DISCONTINUED | OUTPATIENT
Start: 2024-06-02 | End: 2024-06-03

## 2024-06-02 RX ORDER — VANCOMYCIN HYDROCHLORIDE 125 MG/1
125 CAPSULE ORAL 4 TIMES DAILY
Status: DISCONTINUED | OUTPATIENT
Start: 2024-06-02 | End: 2024-06-02

## 2024-06-02 RX ORDER — PROCHLORPERAZINE EDISYLATE 5 MG/ML
10 INJECTION INTRAMUSCULAR; INTRAVENOUS EVERY 6 HOURS PRN
Status: DISCONTINUED | OUTPATIENT
Start: 2024-06-02 | End: 2024-06-03

## 2024-06-02 RX ADMIN — GABAPENTIN 400 MG: 400 CAPSULE ORAL at 21:39

## 2024-06-02 RX ADMIN — TAMSULOSIN HYDROCHLORIDE 0.4 MG: 0.4 CAPSULE ORAL at 13:08

## 2024-06-02 RX ADMIN — MAGNESIUM GLUCONATE 500 MG ORAL TABLET 400 MG: 500 TABLET ORAL at 21:38

## 2024-06-02 RX ADMIN — FLUCONAZOLE 150 MG: 100 TABLET ORAL at 13:08

## 2024-06-02 RX ADMIN — MAGNESIUM GLUCONATE 500 MG ORAL TABLET 400 MG: 500 TABLET ORAL at 08:28

## 2024-06-02 RX ADMIN — PANTOPRAZOLE SODIUM 40 MG: 40 TABLET, DELAYED RELEASE ORAL at 04:37

## 2024-06-02 RX ADMIN — ANTI-FUNGAL POWDER MICONAZOLE NITRATE TALC FREE: 1.42 POWDER TOPICAL at 14:57

## 2024-06-02 RX ADMIN — LEVOTHYROXINE SODIUM 150 MCG: 0.07 TABLET ORAL at 04:37

## 2024-06-02 RX ADMIN — VANCOMYCIN HYDROCHLORIDE 125 MG: 125 CAPSULE ORAL at 13:09

## 2024-06-02 RX ADMIN — VANCOMYCIN HYDROCHLORIDE 125 MG: 125 CAPSULE ORAL at 21:54

## 2024-06-02 RX ADMIN — PANTOPRAZOLE SODIUM 40 MG: 40 TABLET, DELAYED RELEASE ORAL at 15:40

## 2024-06-02 RX ADMIN — POTASSIUM BICARBONATE 40 MEQ: 782 TABLET, EFFERVESCENT ORAL at 08:28

## 2024-06-02 RX ADMIN — SUCRALFATE 1 G: 1 TABLET ORAL at 15:40

## 2024-06-02 RX ADMIN — SODIUM CHLORIDE, PRESERVATIVE FREE 10 ML: 5 INJECTION INTRAVENOUS at 21:39

## 2024-06-02 RX ADMIN — SUCRALFATE 1 G: 1 TABLET ORAL at 11:46

## 2024-06-02 RX ADMIN — PROCHLORPERAZINE EDISYLATE 10 MG: 5 INJECTION INTRAMUSCULAR; INTRAVENOUS at 21:52

## 2024-06-02 RX ADMIN — GABAPENTIN 400 MG: 400 CAPSULE ORAL at 13:10

## 2024-06-02 RX ADMIN — GABAPENTIN 400 MG: 400 CAPSULE ORAL at 08:30

## 2024-06-02 RX ADMIN — VANCOMYCIN HYDROCHLORIDE 125 MG: 125 CAPSULE ORAL at 08:30

## 2024-06-02 RX ADMIN — VANCOMYCIN HYDROCHLORIDE 125 MG: 125 CAPSULE ORAL at 16:37

## 2024-06-02 RX ADMIN — SUCRALFATE 1 G: 1 TABLET ORAL at 21:39

## 2024-06-02 RX ADMIN — SODIUM CHLORIDE, PRESERVATIVE FREE 10 ML: 5 INJECTION INTRAVENOUS at 08:29

## 2024-06-02 RX ADMIN — ACETAMINOPHEN 650 MG: 325 TABLET ORAL at 00:12

## 2024-06-02 RX ADMIN — SUCRALFATE 1 G: 1 TABLET ORAL at 08:28

## 2024-06-02 RX ADMIN — FLUDROCORTISONE ACETATE 0.2 MG: 0.1 TABLET ORAL at 08:28

## 2024-06-02 ASSESSMENT — PAIN SCALES - GENERAL
PAINLEVEL_OUTOF10: 1
PAINLEVEL_OUTOF10: 0
PAINLEVEL_OUTOF10: 3

## 2024-06-02 ASSESSMENT — PAIN DESCRIPTION - LOCATION: LOCATION: PERINEUM

## 2024-06-02 ASSESSMENT — PAIN DESCRIPTION - ORIENTATION: ORIENTATION: MID

## 2024-06-02 ASSESSMENT — PAIN - FUNCTIONAL ASSESSMENT: PAIN_FUNCTIONAL_ASSESSMENT: PREVENTS OR INTERFERES SOME ACTIVE ACTIVITIES AND ADLS

## 2024-06-02 ASSESSMENT — PAIN DESCRIPTION - DESCRIPTORS: DESCRIPTORS: BURNING;SORE

## 2024-06-02 ASSESSMENT — PAIN DESCRIPTION - FREQUENCY: FREQUENCY: CONTINUOUS

## 2024-06-02 ASSESSMENT — PAIN DESCRIPTION - ONSET: ONSET: ON-GOING

## 2024-06-02 ASSESSMENT — PAIN DESCRIPTION - PAIN TYPE: TYPE: ACUTE PAIN

## 2024-06-02 NOTE — CARE COORDINATION
Pt chart reviewed. MSW received call from [facility] and was told peer to peer for patient was declined at this time.  Pt can apply for SNF for possible consideration and approval. MSW to notify pt of decision and follow up with plan. No needs expressed at this time for case management. Discharge to be determined. MSW will follow patient for care.

## 2024-06-03 LAB
GLUCOSE BLD STRIP.AUTO-MCNC: 79 MG/DL (ref 65–100)
GLUCOSE BLD STRIP.AUTO-MCNC: 81 MG/DL (ref 65–100)
GLUCOSE BLD STRIP.AUTO-MCNC: 86 MG/DL (ref 65–100)
GLUCOSE BLD STRIP.AUTO-MCNC: 91 MG/DL (ref 65–100)
GLUCOSE BLD STRIP.AUTO-MCNC: 93 MG/DL (ref 65–100)
MM INDURATION, POC: 0 MM (ref 0–5)
PPD, POC: NEGATIVE
SERVICE CMNT-IMP: NORMAL

## 2024-06-03 PROCEDURE — 6370000000 HC RX 637 (ALT 250 FOR IP): Performed by: HOSPITALIST

## 2024-06-03 PROCEDURE — 97530 THERAPEUTIC ACTIVITIES: CPT

## 2024-06-03 PROCEDURE — 2580000003 HC RX 258: Performed by: HOSPITALIST

## 2024-06-03 PROCEDURE — 97110 THERAPEUTIC EXERCISES: CPT

## 2024-06-03 PROCEDURE — 1100000000 HC RM PRIVATE

## 2024-06-03 PROCEDURE — 82962 GLUCOSE BLOOD TEST: CPT

## 2024-06-03 PROCEDURE — 6360000002 HC RX W HCPCS: Performed by: HOSPITALIST

## 2024-06-03 RX ORDER — PROMETHAZINE HYDROCHLORIDE 25 MG/1
25 TABLET ORAL EVERY 6 HOURS PRN
Status: DISCONTINUED | OUTPATIENT
Start: 2024-06-03 | End: 2024-06-03

## 2024-06-03 RX ORDER — METOCLOPRAMIDE 10 MG/1
5 TABLET ORAL 4 TIMES DAILY PRN
Status: DISCONTINUED | OUTPATIENT
Start: 2024-06-03 | End: 2024-06-06

## 2024-06-03 RX ORDER — METOCLOPRAMIDE 10 MG/1
10 TABLET ORAL
Status: DISCONTINUED | OUTPATIENT
Start: 2024-06-03 | End: 2024-06-06

## 2024-06-03 RX ORDER — FLUDROCORTISONE ACETATE 0.1 MG/1
0.2 TABLET ORAL 2 TIMES DAILY
Status: DISCONTINUED | OUTPATIENT
Start: 2024-06-03 | End: 2024-06-08

## 2024-06-03 RX ADMIN — GABAPENTIN 400 MG: 400 CAPSULE ORAL at 21:30

## 2024-06-03 RX ADMIN — PROCHLORPERAZINE EDISYLATE 10 MG: 5 INJECTION INTRAMUSCULAR; INTRAVENOUS at 12:13

## 2024-06-03 RX ADMIN — PANTOPRAZOLE SODIUM 40 MG: 40 TABLET, DELAYED RELEASE ORAL at 18:12

## 2024-06-03 RX ADMIN — MAGNESIUM GLUCONATE 500 MG ORAL TABLET 400 MG: 500 TABLET ORAL at 09:39

## 2024-06-03 RX ADMIN — FLUDROCORTISONE ACETATE 0.2 MG: 0.1 TABLET ORAL at 18:12

## 2024-06-03 RX ADMIN — SUCRALFATE 1 G: 1 TABLET ORAL at 09:39

## 2024-06-03 RX ADMIN — VANCOMYCIN HYDROCHLORIDE 125 MG: 125 CAPSULE ORAL at 09:40

## 2024-06-03 RX ADMIN — ANTI-FUNGAL POWDER MICONAZOLE NITRATE TALC FREE: 1.42 POWDER TOPICAL at 22:26

## 2024-06-03 RX ADMIN — MAGNESIUM GLUCONATE 500 MG ORAL TABLET 400 MG: 500 TABLET ORAL at 21:30

## 2024-06-03 RX ADMIN — FLUCONAZOLE 150 MG: 100 TABLET ORAL at 09:40

## 2024-06-03 RX ADMIN — METOCLOPRAMIDE HYDROCHLORIDE 10 MG: 10 TABLET ORAL at 21:30

## 2024-06-03 RX ADMIN — LEVOTHYROXINE SODIUM 150 MCG: 0.07 TABLET ORAL at 06:32

## 2024-06-03 RX ADMIN — FLUDROCORTISONE ACETATE 0.2 MG: 0.1 TABLET ORAL at 09:40

## 2024-06-03 RX ADMIN — PANTOPRAZOLE SODIUM 40 MG: 40 TABLET, DELAYED RELEASE ORAL at 06:32

## 2024-06-03 RX ADMIN — GABAPENTIN 400 MG: 400 CAPSULE ORAL at 09:40

## 2024-06-03 RX ADMIN — GABAPENTIN 400 MG: 400 CAPSULE ORAL at 14:09

## 2024-06-03 RX ADMIN — SUCRALFATE 1 G: 1 TABLET ORAL at 14:09

## 2024-06-03 RX ADMIN — TAMSULOSIN HYDROCHLORIDE 0.4 MG: 0.4 CAPSULE ORAL at 09:39

## 2024-06-03 RX ADMIN — SODIUM CHLORIDE, PRESERVATIVE FREE 10 ML: 5 INJECTION INTRAVENOUS at 12:14

## 2024-06-03 NOTE — PLAN OF CARE
Problem: Discharge Planning  Goal: Discharge to home or other facility with appropriate resources  Outcome: Not Progressing     Problem: Pain  Goal: Verbalizes/displays adequate comfort level or baseline comfort level  Outcome: Progressing     Problem: Safety - Adult  Goal: Free from fall injury  Outcome: Progressing     Problem: Discharge Planning  Goal: Discharge to home or other facility with appropriate resources  Outcome: Not Progressing

## 2024-06-03 NOTE — CARE COORDINATION
Patient requested to speak with this CM. CM spoke with patient in room. Patient reports she has discussed appealing insurance denial at [facility] with daughter, and has decided not to appeal. Patent would like to pursue short term rehab at [facility].     CM spoke with christine at [facility]. Patient is nearing co- pay days in her short term rehab benefit. Christine from [facility], reports if patient does not have a secondary, the co pay is $205 per day.     CM went and updated patient regarding co pay cost. Patient reports she does not have the money to pay this and does not have a secondary insurance. CM explained home health is the other option. CM suggested a family member stay with patient or to hire caregiver. Patient reports neither are an option at this time.    Patient is current unable to stand or ambulate due to low blood pressure. CM following to assist in discharge planning.     Ricardo FIELDS, ACM  [place]

## 2024-06-03 NOTE — CARE COORDINATION
CM explained to patient in room with daughter on speaker phone, patient's insurance has denied [facility] for acute rehab. Patient and daughter report they will be appealing this decision. CM asked for a back up option for a snf. Patient and daughter are agreeable to [facility].    Ricardo PETERSON, M  [place]

## 2024-06-04 LAB
GLUCOSE BLD STRIP.AUTO-MCNC: 77 MG/DL (ref 65–100)
GLUCOSE BLD STRIP.AUTO-MCNC: 80 MG/DL (ref 65–100)
GLUCOSE BLD STRIP.AUTO-MCNC: 80 MG/DL (ref 65–100)
GLUCOSE BLD STRIP.AUTO-MCNC: 81 MG/DL (ref 65–100)
GLUCOSE BLD STRIP.AUTO-MCNC: 84 MG/DL (ref 65–100)
SERVICE CMNT-IMP: NORMAL

## 2024-06-04 PROCEDURE — 6370000000 HC RX 637 (ALT 250 FOR IP): Performed by: HOSPITALIST

## 2024-06-04 PROCEDURE — 82962 GLUCOSE BLOOD TEST: CPT

## 2024-06-04 PROCEDURE — 1100000000 HC RM PRIVATE

## 2024-06-04 PROCEDURE — 99221 1ST HOSP IP/OBS SF/LOW 40: CPT | Performed by: NURSE PRACTITIONER

## 2024-06-04 RX ORDER — MIDODRINE HYDROCHLORIDE 5 MG/1
10 TABLET ORAL
Status: DISCONTINUED | OUTPATIENT
Start: 2024-06-04 | End: 2024-06-08

## 2024-06-04 RX ORDER — MIDODRINE HYDROCHLORIDE 5 MG/1
5 TABLET ORAL
Status: DISCONTINUED | OUTPATIENT
Start: 2024-06-04 | End: 2024-06-04

## 2024-06-04 RX ORDER — METRONIDAZOLE 500 MG/100ML
500 INJECTION, SOLUTION INTRAVENOUS EVERY 12 HOURS
Status: DISCONTINUED | OUTPATIENT
Start: 2024-06-04 | End: 2024-06-04

## 2024-06-04 RX ORDER — VANCOMYCIN HYDROCHLORIDE 125 MG/1
125 CAPSULE ORAL 4 TIMES DAILY
Status: DISPENSED | OUTPATIENT
Start: 2024-06-04 | End: 2024-06-09

## 2024-06-04 RX ADMIN — MIDODRINE HYDROCHLORIDE 10 MG: 5 TABLET ORAL at 18:25

## 2024-06-04 RX ADMIN — FLUDROCORTISONE ACETATE 0.2 MG: 0.1 TABLET ORAL at 09:19

## 2024-06-04 RX ADMIN — VANCOMYCIN HYDROCHLORIDE 125 MG: 125 CAPSULE ORAL at 21:26

## 2024-06-04 RX ADMIN — ANTI-FUNGAL POWDER MICONAZOLE NITRATE TALC FREE: 1.42 POWDER TOPICAL at 09:21

## 2024-06-04 RX ADMIN — ANTI-FUNGAL POWDER MICONAZOLE NITRATE TALC FREE: 1.42 POWDER TOPICAL at 21:28

## 2024-06-04 RX ADMIN — GABAPENTIN 400 MG: 400 CAPSULE ORAL at 21:26

## 2024-06-04 RX ADMIN — FLUDROCORTISONE ACETATE 0.2 MG: 0.1 TABLET ORAL at 21:26

## 2024-06-04 RX ADMIN — MIDODRINE HYDROCHLORIDE 5 MG: 5 TABLET ORAL at 12:50

## 2024-06-04 RX ADMIN — GABAPENTIN 400 MG: 400 CAPSULE ORAL at 14:46

## 2024-06-04 RX ADMIN — GABAPENTIN 400 MG: 400 CAPSULE ORAL at 09:19

## 2024-06-04 RX ADMIN — FLUCONAZOLE 150 MG: 100 TABLET ORAL at 09:19

## 2024-06-04 RX ADMIN — VANCOMYCIN HYDROCHLORIDE 125 MG: 125 CAPSULE ORAL at 18:26

## 2024-06-04 RX ADMIN — PANTOPRAZOLE SODIUM 40 MG: 40 TABLET, DELAYED RELEASE ORAL at 18:25

## 2024-06-04 RX ADMIN — MAGNESIUM GLUCONATE 500 MG ORAL TABLET 400 MG: 500 TABLET ORAL at 21:26

## 2024-06-04 RX ADMIN — MIDODRINE HYDROCHLORIDE 5 MG: 5 TABLET ORAL at 09:20

## 2024-06-04 RX ADMIN — METOCLOPRAMIDE HYDROCHLORIDE 10 MG: 10 TABLET ORAL at 18:25

## 2024-06-04 RX ADMIN — VANCOMYCIN HYDROCHLORIDE 125 MG: 125 CAPSULE ORAL at 12:50

## 2024-06-04 RX ADMIN — METOCLOPRAMIDE HYDROCHLORIDE 10 MG: 10 TABLET ORAL at 21:26

## 2024-06-04 RX ADMIN — METOCLOPRAMIDE HYDROCHLORIDE 10 MG: 10 TABLET ORAL at 12:25

## 2024-06-04 ASSESSMENT — PAIN SCALES - GENERAL: PAINLEVEL_OUTOF10: 0

## 2024-06-04 NOTE — WOUND CARE
Reconsulted for Groin/Buttocks excoriation, last seen 5/30. Excoriation still present. Continue to use zinc cream BID/PRN and okay to mix with antifungal cream as needed.

## 2024-06-04 NOTE — CONSULTS
NEUROSURGERY CONSULT NOTE:     CC: arachnoid cyst    Assessment and Plan:   -no emergent neurosurgical interventions needed  -continue to look for other origins to patients syncope and nausea    HPI:   Jesika Olsen68 y.o. female with a PMH of gastric bipass, bipolar disorder, recent stay in rehab after a hospitalization requiring ventilation who presents with nausea and vomiting as well as an inability to ambulate.  Patient states that for several months she has been noticing that her legs have been buckling underneath her.  They are not weak on physical exam and patient is able to move them without difficulty but patient states that she has a hard time keeping herself upright.  Patient states that since the gastric bypass she has had fairly persistent and constant nausea and vomiting.  Patient does note some headaches and vision disturbances.  MRI brain reveals an arachnoid cyst.  There is no need for any neurosurgical interventions at this time.  Continue to look for other origins of patient's syncopal episodes as well as nausea.    Past Medical History:   Diagnosis Date    History of gastric bypass 09/13/2023    JEREMI (obstructive sleep apnea)     can not tolerated c pap    Other ill-defined conditions(799.89)     Edema    Psychiatric disorder     Nervous breakdown 2007; bipolar, anxiety     Past Surgical History:   Procedure Laterality Date    CHEST SURGERY      Emypema; chest tubes 2006    CHOLECYSTECTOMY      GYN  2020    Ovary removed; tubal ligation    HYSTERECTOMY (CERVIX STATUS UNKNOWN)  2021    MT UNLISTED PROCEDURE ABDOMEN PERITONEUM & OMENTUM      Intestine \"twisted\" while pregnant with 2nd child    MT UNLISTED PROCEDURE CARDIAC SURGERY      Negative heart cath 2005    UPPER GASTROINTESTINAL ENDOSCOPY N/A 12/26/2023    EGD BIOPSY performed by Lizeth Carter MD at [facility] ENDOSCOPY    UPPER GASTROINTESTINAL ENDOSCOPY N/A 1/16/2024    EGD DIAGNOSTIC ONLY performed by Davy Altamirano MD at [facility] ENDOSCOPY

## 2024-06-05 LAB
GLUCOSE BLD STRIP.AUTO-MCNC: 74 MG/DL (ref 65–100)
GLUCOSE BLD STRIP.AUTO-MCNC: 78 MG/DL (ref 65–100)
GLUCOSE BLD STRIP.AUTO-MCNC: 79 MG/DL (ref 65–100)
GLUCOSE BLD STRIP.AUTO-MCNC: 80 MG/DL (ref 65–100)
GLUCOSE BLD STRIP.AUTO-MCNC: 84 MG/DL (ref 65–100)
SERVICE CMNT-IMP: NORMAL

## 2024-06-05 PROCEDURE — 1100000000 HC RM PRIVATE

## 2024-06-05 PROCEDURE — 51798 US URINE CAPACITY MEASURE: CPT

## 2024-06-05 PROCEDURE — 97530 THERAPEUTIC ACTIVITIES: CPT

## 2024-06-05 PROCEDURE — 6370000000 HC RX 637 (ALT 250 FOR IP): Performed by: HOSPITALIST

## 2024-06-05 PROCEDURE — 82962 GLUCOSE BLOOD TEST: CPT

## 2024-06-05 RX ORDER — SODIUM CHLORIDE AND POTASSIUM CHLORIDE 150; 900 MG/100ML; MG/100ML
INJECTION, SOLUTION INTRAVENOUS CONTINUOUS
Status: DISPENSED | OUTPATIENT
Start: 2024-06-05 | End: 2024-06-06

## 2024-06-05 RX ADMIN — METOCLOPRAMIDE HYDROCHLORIDE 10 MG: 10 TABLET ORAL at 17:10

## 2024-06-05 RX ADMIN — METOCLOPRAMIDE HYDROCHLORIDE 10 MG: 10 TABLET ORAL at 20:22

## 2024-06-05 RX ADMIN — METOCLOPRAMIDE HYDROCHLORIDE 10 MG: 10 TABLET ORAL at 11:52

## 2024-06-05 RX ADMIN — MAGNESIUM GLUCONATE 500 MG ORAL TABLET 400 MG: 500 TABLET ORAL at 20:21

## 2024-06-05 RX ADMIN — PANTOPRAZOLE SODIUM 40 MG: 40 TABLET, DELAYED RELEASE ORAL at 05:20

## 2024-06-05 RX ADMIN — VANCOMYCIN HYDROCHLORIDE 125 MG: 125 CAPSULE ORAL at 17:10

## 2024-06-05 RX ADMIN — GABAPENTIN 400 MG: 400 CAPSULE ORAL at 08:46

## 2024-06-05 RX ADMIN — MIDODRINE HYDROCHLORIDE 10 MG: 5 TABLET ORAL at 17:10

## 2024-06-05 RX ADMIN — VANCOMYCIN HYDROCHLORIDE 125 MG: 125 CAPSULE ORAL at 08:46

## 2024-06-05 RX ADMIN — LEVOTHYROXINE SODIUM 150 MCG: 0.07 TABLET ORAL at 05:20

## 2024-06-05 RX ADMIN — MIDODRINE HYDROCHLORIDE 10 MG: 5 TABLET ORAL at 08:47

## 2024-06-05 RX ADMIN — ANTI-FUNGAL POWDER MICONAZOLE NITRATE TALC FREE: 1.42 POWDER TOPICAL at 08:47

## 2024-06-05 RX ADMIN — FLUDROCORTISONE ACETATE 0.2 MG: 0.1 TABLET ORAL at 20:21

## 2024-06-05 RX ADMIN — METOCLOPRAMIDE HYDROCHLORIDE 10 MG: 10 TABLET ORAL at 05:20

## 2024-06-05 RX ADMIN — GABAPENTIN 400 MG: 400 CAPSULE ORAL at 21:30

## 2024-06-05 RX ADMIN — GABAPENTIN 400 MG: 400 CAPSULE ORAL at 13:12

## 2024-06-05 RX ADMIN — VANCOMYCIN HYDROCHLORIDE 125 MG: 125 CAPSULE ORAL at 13:11

## 2024-06-05 RX ADMIN — MIDODRINE HYDROCHLORIDE 10 MG: 5 TABLET ORAL at 11:52

## 2024-06-05 RX ADMIN — ANTI-FUNGAL POWDER MICONAZOLE NITRATE TALC FREE: 1.42 POWDER TOPICAL at 20:12

## 2024-06-05 RX ADMIN — VANCOMYCIN HYDROCHLORIDE 125 MG: 125 CAPSULE ORAL at 21:30

## 2024-06-05 RX ADMIN — FLUDROCORTISONE ACETATE 0.2 MG: 0.1 TABLET ORAL at 08:46

## 2024-06-05 RX ADMIN — PANTOPRAZOLE SODIUM 40 MG: 40 TABLET, DELAYED RELEASE ORAL at 17:10

## 2024-06-05 NOTE — CARE COORDINATION
Chart reviewed and patient discussed in IDT rounds this AM. Home health referral sent to [facility], they are considering but have not accepted yet.     Ricardo PETERSONSW, ACM  [facility]

## 2024-06-06 LAB
ANION GAP SERPL CALC-SCNC: 10 MMOL/L (ref 9–18)
BUN SERPL-MCNC: 8 MG/DL (ref 8–23)
CALCIUM SERPL-MCNC: 8.5 MG/DL (ref 8.8–10.2)
CHLORIDE SERPL-SCNC: 106 MMOL/L (ref 98–107)
CO2 SERPL-SCNC: 27 MMOL/L (ref 20–28)
CREAT SERPL-MCNC: 0.32 MG/DL (ref 0.6–1.1)
ERYTHROCYTE [DISTWIDTH] IN BLOOD BY AUTOMATED COUNT: 16.3 % (ref 11.9–14.6)
GLUCOSE BLD STRIP.AUTO-MCNC: 82 MG/DL (ref 65–100)
GLUCOSE BLD STRIP.AUTO-MCNC: 83 MG/DL (ref 65–100)
GLUCOSE BLD STRIP.AUTO-MCNC: 84 MG/DL (ref 65–100)
GLUCOSE BLD STRIP.AUTO-MCNC: 89 MG/DL (ref 65–100)
GLUCOSE BLD STRIP.AUTO-MCNC: 92 MG/DL (ref 65–100)
GLUCOSE SERPL-MCNC: 77 MG/DL (ref 70–99)
HCT VFR BLD AUTO: 31.6 % (ref 35.8–46.3)
HGB BLD-MCNC: 10.3 G/DL (ref 11.7–15.4)
MAGNESIUM SERPL-MCNC: 1.7 MG/DL (ref 1.8–2.4)
MCH RBC QN AUTO: 29.4 PG (ref 26.1–32.9)
MCHC RBC AUTO-ENTMCNC: 32.6 G/DL (ref 31.4–35)
MCV RBC AUTO: 90.3 FL (ref 82–102)
NRBC # BLD: 0 K/UL (ref 0–0.2)
PLATELET # BLD AUTO: 275 K/UL (ref 150–450)
PMV BLD AUTO: 9.4 FL (ref 9.4–12.3)
POTASSIUM SERPL-SCNC: 2.8 MMOL/L (ref 3.5–5.1)
RBC # BLD AUTO: 3.5 M/UL (ref 4.05–5.2)
SERVICE CMNT-IMP: NORMAL
SODIUM SERPL-SCNC: 142 MMOL/L (ref 136–145)
WBC # BLD AUTO: 9.3 K/UL (ref 4.3–11.1)

## 2024-06-06 PROCEDURE — 82962 GLUCOSE BLOOD TEST: CPT

## 2024-06-06 PROCEDURE — 1100000000 HC RM PRIVATE

## 2024-06-06 PROCEDURE — 6370000000 HC RX 637 (ALT 250 FOR IP): Performed by: HOSPITALIST

## 2024-06-06 PROCEDURE — 97112 NEUROMUSCULAR REEDUCATION: CPT

## 2024-06-06 PROCEDURE — 36415 COLL VENOUS BLD VENIPUNCTURE: CPT

## 2024-06-06 PROCEDURE — 97530 THERAPEUTIC ACTIVITIES: CPT

## 2024-06-06 PROCEDURE — 85027 COMPLETE CBC AUTOMATED: CPT

## 2024-06-06 PROCEDURE — 80048 BASIC METABOLIC PNL TOTAL CA: CPT

## 2024-06-06 PROCEDURE — 83735 ASSAY OF MAGNESIUM: CPT

## 2024-06-06 PROCEDURE — 97110 THERAPEUTIC EXERCISES: CPT

## 2024-06-06 RX ORDER — LANOLIN ALCOHOL/MO/W.PET/CERES
400 CREAM (GRAM) TOPICAL 3 TIMES DAILY
Status: DISCONTINUED | OUTPATIENT
Start: 2024-06-06 | End: 2024-06-07

## 2024-06-06 RX ADMIN — VANCOMYCIN HYDROCHLORIDE 125 MG: 125 CAPSULE ORAL at 13:48

## 2024-06-06 RX ADMIN — GABAPENTIN 400 MG: 400 CAPSULE ORAL at 09:54

## 2024-06-06 RX ADMIN — GABAPENTIN 400 MG: 400 CAPSULE ORAL at 13:48

## 2024-06-06 RX ADMIN — MAGNESIUM GLUCONATE 500 MG ORAL TABLET 400 MG: 500 TABLET ORAL at 20:37

## 2024-06-06 RX ADMIN — ANTI-FUNGAL POWDER MICONAZOLE NITRATE TALC FREE: 1.42 POWDER TOPICAL at 13:51

## 2024-06-06 RX ADMIN — POTASSIUM BICARBONATE 40 MEQ: 782 TABLET, EFFERVESCENT ORAL at 20:37

## 2024-06-06 RX ADMIN — METOCLOPRAMIDE HYDROCHLORIDE 10 MG: 10 TABLET ORAL at 05:42

## 2024-06-06 RX ADMIN — VANCOMYCIN HYDROCHLORIDE 125 MG: 125 CAPSULE ORAL at 09:53

## 2024-06-06 RX ADMIN — FLUDROCORTISONE ACETATE 0.2 MG: 0.1 TABLET ORAL at 09:53

## 2024-06-06 RX ADMIN — POTASSIUM BICARBONATE 40 MEQ: 782 TABLET, EFFERVESCENT ORAL at 10:00

## 2024-06-06 RX ADMIN — MAGNESIUM GLUCONATE 500 MG ORAL TABLET 400 MG: 500 TABLET ORAL at 13:48

## 2024-06-06 RX ADMIN — VANCOMYCIN HYDROCHLORIDE 125 MG: 125 CAPSULE ORAL at 20:37

## 2024-06-06 RX ADMIN — GABAPENTIN 400 MG: 400 CAPSULE ORAL at 20:37

## 2024-06-06 RX ADMIN — METOCLOPRAMIDE HYDROCHLORIDE 10 MG: 10 TABLET ORAL at 10:07

## 2024-06-06 RX ADMIN — PANTOPRAZOLE SODIUM 40 MG: 40 TABLET, DELAYED RELEASE ORAL at 05:42

## 2024-06-06 RX ADMIN — MIDODRINE HYDROCHLORIDE 10 MG: 5 TABLET ORAL at 13:48

## 2024-06-06 RX ADMIN — FLUDROCORTISONE ACETATE 0.2 MG: 0.1 TABLET ORAL at 20:36

## 2024-06-06 RX ADMIN — METOCLOPRAMIDE 5 MG: 10 TABLET ORAL at 10:01

## 2024-06-06 RX ADMIN — MIDODRINE HYDROCHLORIDE 10 MG: 5 TABLET ORAL at 18:19

## 2024-06-06 RX ADMIN — LEVOTHYROXINE SODIUM 150 MCG: 0.07 TABLET ORAL at 05:42

## 2024-06-06 RX ADMIN — ANTI-FUNGAL POWDER MICONAZOLE NITRATE TALC FREE: 1.42 POWDER TOPICAL at 20:40

## 2024-06-06 RX ADMIN — VANCOMYCIN HYDROCHLORIDE 125 MG: 125 CAPSULE ORAL at 18:19

## 2024-06-06 RX ADMIN — MIDODRINE HYDROCHLORIDE 10 MG: 5 TABLET ORAL at 09:53

## 2024-06-06 RX ADMIN — PANTOPRAZOLE SODIUM 40 MG: 40 TABLET, DELAYED RELEASE ORAL at 18:19

## 2024-06-06 ASSESSMENT — PAIN SCALES - GENERAL
PAINLEVEL_OUTOF10: 0
PAINLEVEL_OUTOF10: 1
PAINLEVEL_OUTOF10: 0

## 2024-06-07 LAB
GLUCOSE BLD STRIP.AUTO-MCNC: 101 MG/DL (ref 65–100)
GLUCOSE BLD STRIP.AUTO-MCNC: 75 MG/DL (ref 65–100)
GLUCOSE BLD STRIP.AUTO-MCNC: 75 MG/DL (ref 65–100)
GLUCOSE BLD STRIP.AUTO-MCNC: 82 MG/DL (ref 65–100)
GLUCOSE BLD STRIP.AUTO-MCNC: 85 MG/DL (ref 65–100)
MAGNESIUM SERPL-MCNC: 1.7 MG/DL (ref 1.8–2.4)
PHOSPHATE SERPL-MCNC: 3.7 MG/DL (ref 2.5–4.5)
POTASSIUM SERPL-SCNC: 2.9 MMOL/L (ref 3.5–5.1)
SERVICE CMNT-IMP: ABNORMAL
SERVICE CMNT-IMP: NORMAL

## 2024-06-07 PROCEDURE — 97164 PT RE-EVAL EST PLAN CARE: CPT

## 2024-06-07 PROCEDURE — 51798 US URINE CAPACITY MEASURE: CPT

## 2024-06-07 PROCEDURE — 82962 GLUCOSE BLOOD TEST: CPT

## 2024-06-07 PROCEDURE — 6370000000 HC RX 637 (ALT 250 FOR IP): Performed by: HOSPITALIST

## 2024-06-07 PROCEDURE — 1100000000 HC RM PRIVATE

## 2024-06-07 PROCEDURE — 97530 THERAPEUTIC ACTIVITIES: CPT

## 2024-06-07 PROCEDURE — 83735 ASSAY OF MAGNESIUM: CPT

## 2024-06-07 PROCEDURE — 84100 ASSAY OF PHOSPHORUS: CPT

## 2024-06-07 PROCEDURE — 84132 ASSAY OF SERUM POTASSIUM: CPT

## 2024-06-07 PROCEDURE — 36415 COLL VENOUS BLD VENIPUNCTURE: CPT

## 2024-06-07 PROCEDURE — 82525 ASSAY OF COPPER: CPT

## 2024-06-07 RX ORDER — LANOLIN ALCOHOL/MO/W.PET/CERES
800 CREAM (GRAM) TOPICAL 2 TIMES DAILY
Status: DISCONTINUED | OUTPATIENT
Start: 2024-06-07 | End: 2024-06-09 | Stop reason: HOSPADM

## 2024-06-07 RX ADMIN — LEVOTHYROXINE SODIUM 150 MCG: 0.07 TABLET ORAL at 05:14

## 2024-06-07 RX ADMIN — VANCOMYCIN HYDROCHLORIDE 125 MG: 125 CAPSULE ORAL at 23:27

## 2024-06-07 RX ADMIN — VANCOMYCIN HYDROCHLORIDE 125 MG: 125 CAPSULE ORAL at 09:34

## 2024-06-07 RX ADMIN — PANTOPRAZOLE SODIUM 40 MG: 40 TABLET, DELAYED RELEASE ORAL at 05:14

## 2024-06-07 RX ADMIN — FLUDROCORTISONE ACETATE 0.2 MG: 0.1 TABLET ORAL at 09:35

## 2024-06-07 RX ADMIN — GABAPENTIN 400 MG: 400 CAPSULE ORAL at 09:34

## 2024-06-07 RX ADMIN — PANTOPRAZOLE SODIUM 40 MG: 40 TABLET, DELAYED RELEASE ORAL at 18:01

## 2024-06-07 RX ADMIN — ANTI-FUNGAL POWDER MICONAZOLE NITRATE TALC FREE: 1.42 POWDER TOPICAL at 09:39

## 2024-06-07 RX ADMIN — GABAPENTIN 400 MG: 400 CAPSULE ORAL at 23:27

## 2024-06-07 RX ADMIN — POTASSIUM BICARBONATE 40 MEQ: 782 TABLET, EFFERVESCENT ORAL at 09:34

## 2024-06-07 RX ADMIN — MAGNESIUM GLUCONATE 500 MG ORAL TABLET 800 MG: 500 TABLET ORAL at 13:39

## 2024-06-07 RX ADMIN — VANCOMYCIN HYDROCHLORIDE 125 MG: 125 CAPSULE ORAL at 13:39

## 2024-06-07 RX ADMIN — FLUDROCORTISONE ACETATE 0.2 MG: 0.1 TABLET ORAL at 23:27

## 2024-06-07 RX ADMIN — GABAPENTIN 400 MG: 400 CAPSULE ORAL at 13:39

## 2024-06-07 RX ADMIN — MIDODRINE HYDROCHLORIDE 10 MG: 5 TABLET ORAL at 13:40

## 2024-06-07 RX ADMIN — MAGNESIUM GLUCONATE 500 MG ORAL TABLET 400 MG: 500 TABLET ORAL at 09:34

## 2024-06-07 RX ADMIN — POTASSIUM BICARBONATE 40 MEQ: 782 TABLET, EFFERVESCENT ORAL at 13:40

## 2024-06-07 RX ADMIN — MAGNESIUM GLUCONATE 500 MG ORAL TABLET 800 MG: 500 TABLET ORAL at 23:26

## 2024-06-07 RX ADMIN — VANCOMYCIN HYDROCHLORIDE 125 MG: 125 CAPSULE ORAL at 18:01

## 2024-06-07 NOTE — PLAN OF CARE
Problem: Discharge Planning  Goal: Discharge to home or other facility with appropriate resources  6/7/2024 0053 by Sylvie Gonzalez RN  Outcome: Progressing  Flowsheets (Taken 6/6/2024 1940)  Discharge to home or other facility with appropriate resources: Identify barriers to discharge with patient and caregiver  6/6/2024 1226 by Lindsay Dueñas RN  Outcome: Progressing     Problem: Pain  Goal: Verbalizes/displays adequate comfort level or baseline comfort level  6/7/2024 0053 by Sylvie Gonzalez RN  Outcome: Progressing  Flowsheets (Taken 6/6/2024 1940)  Verbalizes/displays adequate comfort level or baseline comfort level:   Encourage patient to monitor pain and request assistance   Assess pain using appropriate pain scale  6/6/2024 1226 by Lindsay Dueñas RN  Outcome: Progressing     Problem: Safety - Adult  Goal: Free from fall injury  6/7/2024 0053 by Sylvie Gonzalez RN  Outcome: Progressing  6/6/2024 1226 by Lindsay Dueñas RN  Outcome: Progressing     Problem: Skin/Tissue Integrity  Goal: Absence of new skin breakdown  Description: 1.  Monitor for areas of redness and/or skin breakdown  2.  Assess vascular access sites hourly  3.  Every 4-6 hours minimum:  Change oxygen saturation probe site  4.  Every 4-6 hours:  If on nasal continuous positive airway pressure, respiratory therapy assess nares and determine need for appliance change or resting period.  6/7/2024 0053 by Sylvie Gonzalez RN  Outcome: Progressing  6/6/2024 1226 by Lindsay Dueñas RN  Outcome: Progressing     Problem: ABCDS Injury Assessment  Goal: Absence of physical injury  6/7/2024 0053 by Sylvie Gonzalez RN  Outcome: Progressing  6/6/2024 1226 by Lindsay Dueñas RN  Outcome: Progressing     Problem: Chronic Conditions and Co-morbidities  Goal: Patient's chronic conditions and co-morbidity symptoms are monitored and maintained or improved  6/7/2024 0053 by Sylvie Gonzalez RN  Outcome: Progressing  Flowsheets (Taken 6/6/2024 1940)  Care Plan - Patient's

## 2024-06-07 NOTE — FLOWSHEET NOTE
Pt dizzy post orthostatic BP test    06/07/24 1039   Vital Signs   Orthostatic B/P and Pulse? Yes   Blood Pressure Lying 120/68   Pulse Lying 100 PER MINUTE   Blood Pressure Sitting 123/53   Pulse Sitting 103 PER MINUTE   Blood Pressure Standing 119/59   Pulse Standing 109 PER MINUTE

## 2024-06-07 NOTE — CARE COORDINATION
Chart reviewed and patient discussed in IDT rounds this AM. Patient 's insurance denied [facility]. Therapy recommending short term rehab, patient is in her copay days with no secondary and declines short term rehab at this time. Discharge plan is home with home health. Home health arranged through [facility]. Sliding board and wheelchair ordered through [facility]. Insurance covers a 18 inch wheelchair.insurance does not cover a sliding board, patient will have to purchase on her own. Patient aware.    Ricardo FIELDS, ACM  [place]

## 2024-06-08 PROBLEM — G93.0 ARACHNOID CYST: Status: ACTIVE | Noted: 2024-06-08

## 2024-06-08 PROBLEM — A04.72 C. DIFFICILE ENTERITIS: Status: ACTIVE | Noted: 2024-06-08

## 2024-06-08 LAB
ANION GAP SERPL CALC-SCNC: 10 MMOL/L (ref 9–18)
BUN SERPL-MCNC: 9 MG/DL (ref 8–23)
CALCIUM SERPL-MCNC: 8.2 MG/DL (ref 8.8–10.2)
CHLORIDE SERPL-SCNC: 107 MMOL/L (ref 98–107)
CO2 SERPL-SCNC: 26 MMOL/L (ref 20–28)
CREAT SERPL-MCNC: 0.34 MG/DL (ref 0.6–1.1)
GLUCOSE BLD STRIP.AUTO-MCNC: 76 MG/DL (ref 65–100)
GLUCOSE BLD STRIP.AUTO-MCNC: 80 MG/DL (ref 65–100)
GLUCOSE BLD STRIP.AUTO-MCNC: 85 MG/DL (ref 65–100)
GLUCOSE BLD STRIP.AUTO-MCNC: 94 MG/DL (ref 65–100)
GLUCOSE SERPL-MCNC: 80 MG/DL (ref 70–99)
POTASSIUM SERPL-SCNC: 2.8 MMOL/L (ref 3.5–5.1)
SERVICE CMNT-IMP: NORMAL
SODIUM SERPL-SCNC: 143 MMOL/L (ref 136–145)

## 2024-06-08 PROCEDURE — 6370000000 HC RX 637 (ALT 250 FOR IP): Performed by: PHYSICIAN ASSISTANT

## 2024-06-08 PROCEDURE — 80048 BASIC METABOLIC PNL TOTAL CA: CPT

## 2024-06-08 PROCEDURE — 2500000003 HC RX 250 WO HCPCS: Performed by: HOSPITALIST

## 2024-06-08 PROCEDURE — 6370000000 HC RX 637 (ALT 250 FOR IP): Performed by: HOSPITALIST

## 2024-06-08 PROCEDURE — 36415 COLL VENOUS BLD VENIPUNCTURE: CPT

## 2024-06-08 PROCEDURE — 82962 GLUCOSE BLOOD TEST: CPT

## 2024-06-08 PROCEDURE — 51798 US URINE CAPACITY MEASURE: CPT

## 2024-06-08 PROCEDURE — 1100000000 HC RM PRIVATE

## 2024-06-08 RX ORDER — DEXTROSE MONOHYDRATE, SODIUM CHLORIDE, AND POTASSIUM CHLORIDE 50; 1.49; 4.5 G/1000ML; G/1000ML; G/1000ML
INJECTION, SOLUTION INTRAVENOUS CONTINUOUS
Status: DISCONTINUED | OUTPATIENT
Start: 2024-06-08 | End: 2024-06-08

## 2024-06-08 RX ORDER — POTASSIUM CHLORIDE 20 MEQ/1
40 TABLET, EXTENDED RELEASE ORAL 3 TIMES DAILY
Status: DISCONTINUED | OUTPATIENT
Start: 2024-06-08 | End: 2024-06-09 | Stop reason: HOSPADM

## 2024-06-08 RX ORDER — FLUDROCORTISONE ACETATE 0.1 MG/1
0.1 TABLET ORAL 2 TIMES DAILY
Status: DISCONTINUED | OUTPATIENT
Start: 2024-06-08 | End: 2024-06-09 | Stop reason: HOSPADM

## 2024-06-08 RX ORDER — MIDODRINE HYDROCHLORIDE 5 MG/1
5 TABLET ORAL
Status: DISCONTINUED | OUTPATIENT
Start: 2024-06-08 | End: 2024-06-09 | Stop reason: HOSPADM

## 2024-06-08 RX ORDER — ENOXAPARIN SODIUM 100 MG/ML
40 INJECTION SUBCUTANEOUS EVERY 24 HOURS
Status: DISCONTINUED | OUTPATIENT
Start: 2024-06-09 | End: 2024-06-09 | Stop reason: HOSPADM

## 2024-06-08 RX ADMIN — GABAPENTIN 400 MG: 400 CAPSULE ORAL at 20:39

## 2024-06-08 RX ADMIN — VANCOMYCIN HYDROCHLORIDE 125 MG: 125 CAPSULE ORAL at 12:03

## 2024-06-08 RX ADMIN — POTASSIUM CHLORIDE 40 MEQ: 1500 TABLET, EXTENDED RELEASE ORAL at 12:03

## 2024-06-08 RX ADMIN — MIDODRINE HYDROCHLORIDE 10 MG: 5 TABLET ORAL at 09:59

## 2024-06-08 RX ADMIN — GABAPENTIN 400 MG: 400 CAPSULE ORAL at 13:58

## 2024-06-08 RX ADMIN — FLUDROCORTISONE ACETATE 0.1 MG: 0.1 TABLET ORAL at 20:37

## 2024-06-08 RX ADMIN — VANCOMYCIN HYDROCHLORIDE 125 MG: 125 CAPSULE ORAL at 09:59

## 2024-06-08 RX ADMIN — MIDODRINE HYDROCHLORIDE 5 MG: 5 TABLET ORAL at 16:09

## 2024-06-08 RX ADMIN — LEVOTHYROXINE SODIUM 150 MCG: 0.07 TABLET ORAL at 05:10

## 2024-06-08 RX ADMIN — MAGNESIUM GLUCONATE 500 MG ORAL TABLET 800 MG: 500 TABLET ORAL at 09:59

## 2024-06-08 RX ADMIN — POTASSIUM CHLORIDE 40 MEQ: 1500 TABLET, EXTENDED RELEASE ORAL at 20:38

## 2024-06-08 RX ADMIN — FLUDROCORTISONE ACETATE 0.2 MG: 0.1 TABLET ORAL at 09:59

## 2024-06-08 RX ADMIN — POTASSIUM BICARBONATE 40 MEQ: 782 TABLET, EFFERVESCENT ORAL at 09:59

## 2024-06-08 RX ADMIN — GABAPENTIN 400 MG: 400 CAPSULE ORAL at 09:59

## 2024-06-08 RX ADMIN — PANTOPRAZOLE SODIUM 40 MG: 40 TABLET, DELAYED RELEASE ORAL at 05:10

## 2024-06-08 RX ADMIN — VANCOMYCIN HYDROCHLORIDE 125 MG: 125 CAPSULE ORAL at 20:38

## 2024-06-08 RX ADMIN — PANTOPRAZOLE SODIUM 40 MG: 40 TABLET, DELAYED RELEASE ORAL at 16:09

## 2024-06-08 RX ADMIN — ANTI-FUNGAL POWDER MICONAZOLE NITRATE TALC FREE: 1.42 POWDER TOPICAL at 20:40

## 2024-06-08 RX ADMIN — MAGNESIUM GLUCONATE 500 MG ORAL TABLET 800 MG: 500 TABLET ORAL at 20:37

## 2024-06-08 RX ADMIN — MIDODRINE HYDROCHLORIDE 5 MG: 5 TABLET ORAL at 12:03

## 2024-06-08 RX ADMIN — ANTI-FUNGAL POWDER MICONAZOLE NITRATE TALC FREE: 1.42 POWDER TOPICAL at 12:03

## 2024-06-08 RX ADMIN — VANCOMYCIN HYDROCHLORIDE 125 MG: 125 CAPSULE ORAL at 16:09

## 2024-06-08 NOTE — PLAN OF CARE
Problem: Discharge Planning  Goal: Discharge to home or other facility with appropriate resources  Outcome: Progressing     Problem: Pain  Goal: Verbalizes/displays adequate comfort level or baseline comfort level  Outcome: Progressing  Flowsheets (Taken 6/7/2024 1920)  Verbalizes/displays adequate comfort level or baseline comfort level:   Encourage patient to monitor pain and request assistance   Assess pain using appropriate pain scale   Administer analgesics based on type and severity of pain and evaluate response   Implement non-pharmacological measures as appropriate and evaluate response     Problem: Safety - Adult  Goal: Free from fall injury  Outcome: Progressing     Problem: Skin/Tissue Integrity  Goal: Absence of new skin breakdown  Description: 1.  Monitor for areas of redness and/or skin breakdown  2.  Assess vascular access sites hourly  3.  Every 4-6 hours minimum:  Change oxygen saturation probe site  4.  Every 4-6 hours:  If on nasal continuous positive airway pressure, respiratory therapy assess nares and determine need for appliance change or resting period.  Outcome: Progressing     Problem: ABCDS Injury Assessment  Goal: Absence of physical injury  Outcome: Progressing     Problem: Chronic Conditions and Co-morbidities  Goal: Patient's chronic conditions and co-morbidity symptoms are monitored and maintained or improved  Outcome: Progressing  Flowsheets (Taken 6/7/2024 1920 by Arianna Roberts)  Care Plan - Patient's Chronic Conditions and Co-Morbidity Symptoms are Monitored and Maintained or Improved: Monitor and assess patient's chronic conditions and comorbid symptoms for stability, deterioration, or improvement

## 2024-06-09 ENCOUNTER — APPOINTMENT (OUTPATIENT)
Dept: GENERAL RADIOLOGY | Age: 69
DRG: 872 | End: 2024-06-09
Payer: MEDICARE

## 2024-06-09 ENCOUNTER — APPOINTMENT (OUTPATIENT)
Dept: CT IMAGING | Age: 69
DRG: 872 | End: 2024-06-09
Payer: MEDICARE

## 2024-06-09 ENCOUNTER — HOSPITAL ENCOUNTER (OUTPATIENT)
Age: 69
Setting detail: OBSERVATION
Discharge: HOME | DRG: 872 | End: 2024-06-09
Attending: EMERGENCY MEDICINE | Admitting: INTERNAL MEDICINE
Payer: MEDICARE

## 2024-06-09 VITALS
WEIGHT: 182.54 LBS | SYSTOLIC BLOOD PRESSURE: 147 MMHG | HEIGHT: 69 IN | HEART RATE: 96 BPM | RESPIRATION RATE: 16 BRPM | BODY MASS INDEX: 27.04 KG/M2 | DIASTOLIC BLOOD PRESSURE: 84 MMHG | TEMPERATURE: 97.7 F | OXYGEN SATURATION: 95 %

## 2024-06-09 VITALS
RESPIRATION RATE: 21 BRPM | OXYGEN SATURATION: 100 % | SYSTOLIC BLOOD PRESSURE: 119 MMHG | TEMPERATURE: 98.2 F | DIASTOLIC BLOOD PRESSURE: 78 MMHG | HEART RATE: 98 BPM

## 2024-06-09 DIAGNOSIS — R55 SYNCOPE, UNSPECIFIED SYNCOPE TYPE: Primary | ICD-10-CM

## 2024-06-09 DIAGNOSIS — I95.1 ORTHOSTATIC HYPOTENSION: ICD-10-CM

## 2024-06-09 DIAGNOSIS — R79.89 ELEVATED LACTIC ACID LEVEL: ICD-10-CM

## 2024-06-09 PROBLEM — A04.72 C. DIFFICILE ENTERITIS: Status: RESOLVED | Noted: 2024-06-08 | Resolved: 2024-06-09

## 2024-06-09 PROBLEM — E87.6 HYPOKALEMIA: Status: RESOLVED | Noted: 2023-12-28 | Resolved: 2024-06-09

## 2024-06-09 LAB
ALBUMIN SERPL-MCNC: 2.4 G/DL (ref 3.2–4.6)
ALBUMIN/GLOB SERPL: 0.8 (ref 1–1.9)
ALP SERPL-CCNC: 113 U/L (ref 35–104)
ALT SERPL-CCNC: 14 U/L (ref 12–65)
ANION GAP SERPL CALC-SCNC: 13 MMOL/L (ref 9–18)
ANION GAP SERPL CALC-SCNC: 8 MMOL/L (ref 9–18)
AST SERPL-CCNC: 28 U/L (ref 15–37)
BASOPHILS # BLD: 0.1 K/UL (ref 0–0.2)
BASOPHILS NFR BLD: 1 % (ref 0–2)
BILIRUB SERPL-MCNC: 0.5 MG/DL (ref 0–1.2)
BUN SERPL-MCNC: 11 MG/DL (ref 8–23)
BUN SERPL-MCNC: 11 MG/DL (ref 8–23)
CALCIUM SERPL-MCNC: 8.4 MG/DL (ref 8.8–10.2)
CALCIUM SERPL-MCNC: 9.3 MG/DL (ref 8.8–10.2)
CHLORIDE SERPL-SCNC: 110 MMOL/L (ref 98–107)
CHLORIDE SERPL-SCNC: 110 MMOL/L (ref 98–107)
CO2 SERPL-SCNC: 22 MMOL/L (ref 20–28)
CO2 SERPL-SCNC: 25 MMOL/L (ref 20–28)
CREAT SERPL-MCNC: 0.33 MG/DL (ref 0.6–1.1)
CREAT SERPL-MCNC: 0.54 MG/DL (ref 0.6–1.1)
DIFFERENTIAL METHOD BLD: ABNORMAL
EOSINOPHIL # BLD: 0.2 K/UL (ref 0–0.8)
EOSINOPHIL NFR BLD: 2 % (ref 0.5–7.8)
ERYTHROCYTE [DISTWIDTH] IN BLOOD BY AUTOMATED COUNT: 16.5 % (ref 11.9–14.6)
ETHANOL SERPL-MCNC: <11 MG/DL (ref 0–0.08)
GLOBULIN SER CALC-MCNC: 3.2 G/DL (ref 2.3–3.5)
GLUCOSE BLD STRIP.AUTO-MCNC: 78 MG/DL (ref 65–100)
GLUCOSE BLD STRIP.AUTO-MCNC: 86 MG/DL (ref 65–100)
GLUCOSE BLD STRIP.AUTO-MCNC: 89 MG/DL (ref 65–100)
GLUCOSE BLD STRIP.AUTO-MCNC: 96 MG/DL (ref 65–100)
GLUCOSE SERPL-MCNC: 109 MG/DL (ref 70–99)
GLUCOSE SERPL-MCNC: 88 MG/DL (ref 70–99)
HCT VFR BLD AUTO: 42.3 % (ref 35.8–46.3)
HGB BLD-MCNC: 12.9 G/DL (ref 11.7–15.4)
IMM GRANULOCYTES # BLD AUTO: 0 K/UL (ref 0–0.5)
IMM GRANULOCYTES NFR BLD AUTO: 0 % (ref 0–5)
LACTATE SERPL-SCNC: 3 MMOL/L (ref 0.5–2)
LYMPHOCYTES # BLD: 2.8 K/UL (ref 0.5–4.6)
LYMPHOCYTES NFR BLD: 26 % (ref 13–44)
MAGNESIUM SERPL-MCNC: 1.9 MG/DL (ref 1.8–2.4)
MCH RBC QN AUTO: 28.7 PG (ref 26.1–32.9)
MCHC RBC AUTO-ENTMCNC: 30.5 G/DL (ref 31.4–35)
MCV RBC AUTO: 94 FL (ref 82–102)
MONOCYTES # BLD: 0.7 K/UL (ref 0.1–1.3)
MONOCYTES NFR BLD: 6 % (ref 4–12)
NEUTS SEG # BLD: 6.9 K/UL (ref 1.7–8.2)
NEUTS SEG NFR BLD: 64 % (ref 43–78)
NRBC # BLD: 0 K/UL (ref 0–0.2)
PHOSPHATE SERPL-MCNC: 2.8 MG/DL (ref 2.5–4.5)
PLATELET # BLD AUTO: 386 K/UL (ref 150–450)
PMV BLD AUTO: 9.3 FL (ref 9.4–12.3)
POTASSIUM SERPL-SCNC: 3.8 MMOL/L (ref 3.5–5.1)
POTASSIUM SERPL-SCNC: 5 MMOL/L (ref 3.5–5.1)
PROCALCITONIN SERPL-MCNC: 0.21 NG/ML (ref 0–0.1)
PROT SERPL-MCNC: 5.6 G/DL (ref 6.3–8.2)
RBC # BLD AUTO: 4.5 M/UL (ref 4.05–5.2)
SERVICE CMNT-IMP: NORMAL
SODIUM SERPL-SCNC: 144 MMOL/L (ref 136–145)
SODIUM SERPL-SCNC: 144 MMOL/L (ref 136–145)
WBC # BLD AUTO: 10.7 K/UL (ref 4.3–11.1)

## 2024-06-09 PROCEDURE — 93005 ELECTROCARDIOGRAM TRACING: CPT | Performed by: EMERGENCY MEDICINE

## 2024-06-09 PROCEDURE — 85025 COMPLETE CBC W/AUTO DIFF WBC: CPT

## 2024-06-09 PROCEDURE — 84100 ASSAY OF PHOSPHORUS: CPT

## 2024-06-09 PROCEDURE — 6360000002 HC RX W HCPCS: Performed by: PHYSICIAN ASSISTANT

## 2024-06-09 PROCEDURE — 87040 BLOOD CULTURE FOR BACTERIA: CPT

## 2024-06-09 PROCEDURE — 6370000000 HC RX 637 (ALT 250 FOR IP): Performed by: HOSPITALIST

## 2024-06-09 PROCEDURE — 83735 ASSAY OF MAGNESIUM: CPT

## 2024-06-09 PROCEDURE — 82962 GLUCOSE BLOOD TEST: CPT

## 2024-06-09 PROCEDURE — 96361 HYDRATE IV INFUSION ADD-ON: CPT

## 2024-06-09 PROCEDURE — 70450 CT HEAD/BRAIN W/O DYE: CPT

## 2024-06-09 PROCEDURE — 36415 COLL VENOUS BLD VENIPUNCTURE: CPT

## 2024-06-09 PROCEDURE — 6370000000 HC RX 637 (ALT 250 FOR IP): Performed by: PHYSICIAN ASSISTANT

## 2024-06-09 PROCEDURE — 2580000003 HC RX 258: Performed by: EMERGENCY MEDICINE

## 2024-06-09 PROCEDURE — G0378 HOSPITAL OBSERVATION PER HR: HCPCS

## 2024-06-09 PROCEDURE — 71045 X-RAY EXAM CHEST 1 VIEW: CPT

## 2024-06-09 PROCEDURE — 80053 COMPREHEN METABOLIC PANEL: CPT

## 2024-06-09 PROCEDURE — 83605 ASSAY OF LACTIC ACID: CPT

## 2024-06-09 PROCEDURE — 84145 PROCALCITONIN (PCT): CPT

## 2024-06-09 PROCEDURE — 82077 ASSAY SPEC XCP UR&BREATH IA: CPT

## 2024-06-09 RX ORDER — METRONIDAZOLE 500 MG/100ML
500 INJECTION, SOLUTION INTRAVENOUS ONCE
Status: DISCONTINUED | OUTPATIENT
Start: 2024-06-09 | End: 2024-06-10 | Stop reason: HOSPADM

## 2024-06-09 RX ORDER — 0.9 % SODIUM CHLORIDE 0.9 %
1000 INTRAVENOUS SOLUTION INTRAVENOUS ONCE
Status: DISCONTINUED | OUTPATIENT
Start: 2024-06-09 | End: 2024-06-10 | Stop reason: HOSPADM

## 2024-06-09 RX ORDER — VANCOMYCIN HYDROCHLORIDE 125 MG/1
125 CAPSULE ORAL ONCE
Status: COMPLETED | OUTPATIENT
Start: 2024-06-09 | End: 2024-06-09

## 2024-06-09 RX ORDER — LANOLIN ALCOHOL/MO/W.PET/CERES
800 CREAM (GRAM) TOPICAL 2 TIMES DAILY
Qty: 30 TABLET | Refills: 0 | Status: SHIPPED | OUTPATIENT
Start: 2024-06-09

## 2024-06-09 RX ORDER — SODIUM CHLORIDE, SODIUM LACTATE, POTASSIUM CHLORIDE, CALCIUM CHLORIDE 600; 310; 30; 20 MG/100ML; MG/100ML; MG/100ML; MG/100ML
INJECTION, SOLUTION INTRAVENOUS CONTINUOUS
Status: DISCONTINUED | OUTPATIENT
Start: 2024-06-09 | End: 2024-06-10 | Stop reason: HOSPADM

## 2024-06-09 RX ORDER — 0.9 % SODIUM CHLORIDE 0.9 %
1000 INTRAVENOUS SOLUTION INTRAVENOUS ONCE
Status: COMPLETED | OUTPATIENT
Start: 2024-06-09 | End: 2024-06-09

## 2024-06-09 RX ORDER — POTASSIUM CHLORIDE 20 MEQ/1
40 TABLET, EXTENDED RELEASE ORAL DAILY
Qty: 60 TABLET | Refills: 0 | Status: SHIPPED | OUTPATIENT
Start: 2024-06-09

## 2024-06-09 RX ADMIN — FLUDROCORTISONE ACETATE 0.1 MG: 0.1 TABLET ORAL at 10:01

## 2024-06-09 RX ADMIN — POTASSIUM CHLORIDE 40 MEQ: 1500 TABLET, EXTENDED RELEASE ORAL at 13:23

## 2024-06-09 RX ADMIN — PANTOPRAZOLE SODIUM 40 MG: 40 TABLET, DELAYED RELEASE ORAL at 05:06

## 2024-06-09 RX ADMIN — GABAPENTIN 400 MG: 400 CAPSULE ORAL at 10:01

## 2024-06-09 RX ADMIN — VANCOMYCIN HYDROCHLORIDE 125 MG: 125 CAPSULE ORAL at 10:17

## 2024-06-09 RX ADMIN — GABAPENTIN 400 MG: 400 CAPSULE ORAL at 13:23

## 2024-06-09 RX ADMIN — ENOXAPARIN SODIUM 40 MG: 100 INJECTION SUBCUTANEOUS at 10:03

## 2024-06-09 RX ADMIN — POTASSIUM CHLORIDE 40 MEQ: 1500 TABLET, EXTENDED RELEASE ORAL at 10:01

## 2024-06-09 RX ADMIN — ANTI-FUNGAL POWDER MICONAZOLE NITRATE TALC FREE: 1.42 POWDER TOPICAL at 10:04

## 2024-06-09 RX ADMIN — SODIUM CHLORIDE 1000 ML: 9 INJECTION, SOLUTION INTRAVENOUS at 19:59

## 2024-06-09 RX ADMIN — PANTOPRAZOLE SODIUM 40 MG: 40 TABLET, DELAYED RELEASE ORAL at 15:58

## 2024-06-09 RX ADMIN — MAGNESIUM GLUCONATE 500 MG ORAL TABLET 800 MG: 500 TABLET ORAL at 10:01

## 2024-06-09 RX ADMIN — LEVOTHYROXINE SODIUM 150 MCG: 0.07 TABLET ORAL at 05:06

## 2024-06-09 NOTE — PROGRESS NOTES
Hospitalist Progress Note   Admit Date:  2024  5:59 PM   Name:  Jesika Olsen   Age:  68 y.o.  Sex:  female  :  1955   MRN:  [id number]   Room:  [facility]/    Presenting/Chief Complaint: Abdominal Pain     Reason(s) for Admission: Acute abdominal pain [R10.9]  Intractable vomiting [R11.10]  Acute UTI (urinary tract infection) [N39.0]  Urinary tract infection without hematuria, site unspecified [N39.0]  Acute diarrhea [R19.7]     Hospital Course:   68-year-old female w/ hx recurrent UTI, NIDDM, JEREMI, gastric bypass, orthostatic hypotension, bipolar dz, complains of lower abdominal pain, diarrhea, vomiting for last 2 days. No fever or chills. Initially thought UTI but cx negative. Tested positive for C. difficile. Placed on oral vancomycin. Has had issues with orthostatic hypotension and electrolyte derangement.     Subjective & 24hr Events:   Asking to rest  K is 2.8  No more diarrhea  Tells me that due to her gastric bypass she does take supplements at home but does not take potassium  Blood sugar marginally low but she is asymptomatic  Is a very hard stick and has refused an IV for    Assessment & Plan:       C. difficile enteritis  -Continue on oral vancomycin which is to , tx total 10 days      Hypokalemia, severe  - Will change from potassium bicarb packets to potassium chloride to see if that helps with absorption  - Given that she is status post gastric bypass she may not absorb electrolytes as well and her potassium levels may always be low  - Continue on oral magnesium to help keep her magnesium up so that we can replace her potassium      Orthostatic hypotension  - Was on Florinef 0.1 milligrams twice daily at home.  Here this was titrated up to 0.2 mg twice daily.  She was also put on midodrine 10 mg 3 times daily.  Unfortunately her blood pressure is rather labile and uptrending with these medications  - She likely has autonomic dysfunction  - Will decrease her Florinef back to 
  Physician Progress Note      PATIENT:               ADELE TIAN  CSN #:                  [id number]  :                       1955  ADMIT DATE:       2024 5:59 PM  DISCH DATE:  RESPONDING  PROVIDER #:        Miguel Mesa MD          QUERY TEXT:    Pt admitted with Abdominal Pain with vomiting. Pt noted to have +ve C.   difficile toxin Molecular. Patient also noted to have HR:110; RR: 23; WBC:   13.3 and Lac acid 1.5.  If possible, please document in the progress notes and   discharge summary if you are evaluating and /or treating any of the   following:    The medical record reflects the following:  Risk Factors: C/o Abd pain and vomiting  Clinical Indicators: HR: 110; 106; 102; 106; 101; 104; 103; 104; RR: 23; 25;   22; 20; wbc: 13.3, 12.7; Lac acid: 1.5; Procal: 0.14; Alk phos: 106; BCx: NG x   3 days; C-diff toxin: POSITIVE; Occult Blood, Fecal: Positive; UA: le: small;   wbc: 5-10; bact: 3+; CT Abd/Pelvis: Irregular thickening of the bladder wall   has progressed may represent cystitis     AP: \"Acute UTI (urinary tract infection), residual, recurrent, recently   pan-sensitive E coli in urine. A/w vomiting, diarrhea and dehydration\".     IM PN: \"C difficile colitis with heme+ stools, diarrhea improved\".    Treatment: Labs; UA; BCx; C-Diff toxin; Procal; Lac acid; CT Abd/Pelvis; IVF   hydration, gentle; Supportive Rx for Gi spx; Blood pressure control; Insulin   scale; IV ABx; Vancomycin po; Follow orthostatics and PT/ OT consult      Thank you,    [email or web address]  Options provided:  -- Sepsis due to C difficile colitis, present on admission  -- Sepsis due to C difficile colitis, present on admission, now resolved  -- C difficile colitis with heme+ stools without Sepsis  -- Sepsis was ruled out  -- Other - I will add my own diagnosis  -- Disagree - Not applicable / Not valid  -- Disagree - Clinically unable to determine / Unknown  -- Refer to Clinical 
2204 Pt bladder scan 364 ml residual   0004 Bladder scan 499 ml residual  0437 Bladder scan 564 ml residual  0453 Pt agreed to allow straight cath. Procedure explained  450 ml uop  0624 Hourly rounding completed. Bed locked/low position. VSS. IV patent, clean,dry, and intact. Pt had 3 loose stools.All safety measures in place per protocol. Report will be given to oncoming day shift nurse.VITALS:  BP (!) 134/94   Pulse 90   Temp 97.7 °F (36.5 °C) (Oral)   Resp 18   Ht 1.753 m (5' 9\")   Wt 82.8 kg (182 lb 8.7 oz)   SpO2 96%   BMI 26.96 kg/m²     
4 Eyes Skin Assessment     NAME:  Jesika Olsen  YOB: 1955  MEDICAL RECORD NUMBER:  [id number]    The patient is being assessed for  Admission    I agree that at least one RN has performed a thorough Head to Toe Skin Assessment on the patient. ALL assessment sites listed below have been assessed.      Areas assessed by both nurses:    Head, Face, Ears, Shoulders, Back, Chest, Arms, Elbows, Hands, Sacrum. Buttock, Coccyx, Ischium, and Legs. Feet and Heels        Does the Patient have a Wound? No noted wound(s)       Harshad Prevention initiated by RN: Yes  Wound Care Orders initiated by RN: No    Pressure Injury (Stage 3,4, Unstageable, DTI, NWPT, and Complex wounds) if present, place Wound referral order by RN under : No    New Ostomies, if present place, Ostomy referral order under : No     Nurse 1 eSignature: Electronically signed by Michelle Mcdaniel RN on 5/26/24 at 6:00 AM EDT    **SHARE this note so that the co-signing nurse can place an eSignature**    Nurse 2 eSignature: Electronically signed by Jose Armando Malone RN on 5/26/24 at 6:02 AM EDT    
[unfilled]    INTERNAL MEDICINE    Subjective:     68-year-old female w/ hx recurrent UTI, NIDDM, JEREMI, gastric bypass, orthostatic hypotension, bipolar dz,  complains of lower abdominal pain, diarrhea, vomiting for last 2 days. No fever or chills. No particular chest pain or shortness of breath. Patient for nursing facility. I reviewed old records. History of gastric bypass cholecystectomy and hysterectomy. Patient has a history of sleep apnea. History of diabetes as well. Patient was admitted from May 5 through 13 because of intractable vomiting. Had UTI at that time. Evidently had a GI consultation which recommended a PPI. Does not mention gastroparesis. There is evidently history of a marginal ulcer with her gastric bypass in the past as well.   On further questioning, she look dehydrated, mildly tachycardic, asking for ice.    Today, 2 loose stools yesterday and 2 today, had some dizziness but negative orthostatics when measured     Objective:     Physical Exam:  BP (!) 156/93   Pulse 80   Temp 98.1 °F (36.7 °C) (Oral)   Resp 16   Ht 1.753 m (5' 9\")   Wt 82.8 kg (182 lb 8.7 oz)   SpO2 98%   BMI 26.96 kg/m²   General appearance: awake, alert, cooperative, mild distress, appears stated age - obese, No icterus  Lungs: clear to auscultation bilaterally - Diminished bs bibasilar.   Heart: regular rate n rhythm, S1, S2 normal, no murmur, click, rub or gallop. -  Abdomen: soft, no tenderness, no distension, normal bowel sound, no masses, no organomegaly  Extremities: atraumatic, no cyanosis - Bilateral lower limbs edema none.  Skin: No rashes or ulceration.  Neurologic: Grossly intact     ECG: sinus rhythm     Data Review (Labs):   Recent Results (from the past 24 hour(s))   POCT Glucose    Collection Time: 06/06/24  5:29 PM   Result Value Ref Range    POC Glucose 82 65 - 100 mg/dL    Performed by: Shayne    POCT Glucose    Collection Time: 06/06/24  8:55 PM   Result Value Ref Range    POC 
[unfilled]    INTERNAL MEDICINE    Subjective:     68-year-old female w/ hx recurrent UTI, NIDDM, JEREMI, gastric bypass, orthostatic hypotension, bipolar dz,  complains of lower abdominal pain, diarrhea, vomiting for last 2 days. No fever or chills. No particular chest pain or shortness of breath. Patient for nursing facility. I reviewed old records. History of gastric bypass cholecystectomy and hysterectomy. Patient has a history of sleep apnea. History of diabetes as well. Patient was admitted from May 5 through 13 because of intractable vomiting. Had UTI at that time. Evidently had a GI consultation which recommended a PPI. Does not mention gastroparesis. There is evidently history of a marginal ulcer with her gastric bypass in the past as well.   On further questioning, she look dehydrated, mildly tachycardic, asking for ice.    Today, look and feel better, no diarrhea for 2-3 days, no fever, no dizziness     Objective:     Physical Exam:  /71   Pulse 100   Temp 98 °F (36.7 °C) (Axillary)   Resp 18   Ht 1.753 m (5' 9\")   Wt 86.2 kg (190 lb)   SpO2 98%   BMI 28.06 kg/m²   General appearance: awake, alert, cooperative, mild distress, appears stated age - obese, No icterus  Lungs: clear to auscultation bilaterally - Diminished bs bibasilar.   Heart: regular rate n rhythm, S1, S2 normal, no murmur, click, rub or gallop. -  Abdomen: soft, no tenderness, no distension, normal bowel sound, no masses, no organomegaly  Extremities: atraumatic, no cyanosis - Bilateral lower limbs edema none.  Skin: No rashes or ulceration.  Neurologic: Grossly intact     ECG: sinus rhythm     Data Review (Labs):   Recent Results (from the past 24 hour(s))   POCT Glucose    Collection Time: 05/28/24  7:35 PM   Result Value Ref Range    POC Glucose 78 65 - 100 mg/dL    Performed by: Karina    POCT Glucose    Collection Time: 05/29/24  2:48 AM   Result Value Ref Range    POC Glucose 80 65 - 100 mg/dL    Performed 
[unfilled]    INTERNAL MEDICINE    Subjective:     68-year-old female w/ hx recurrent UTI, NIDDM, JEREMI, gastric bypass, orthostatic hypotension, bipolar dz,  complains of lower abdominal pain, diarrhea, vomiting for last 2 days. No fever or chills. No particular chest pain or shortness of breath. Patient for nursing facility. I reviewed old records. History of gastric bypass cholecystectomy and hysterectomy. Patient has a history of sleep apnea. History of diabetes as well. Patient was admitted from May 5 through 13 because of intractable vomiting. Had UTI at that time. Evidently had a GI consultation which recommended a PPI. Does not mention gastroparesis. There is evidently history of a marginal ulcer with her gastric bypass in the past as well.   On further questioning, she look dehydrated, mildly tachycardic, asking for ice.    Today, look and feel better, no diarrhea or dizziness     Objective:     Physical Exam:  /80   Pulse 94   Temp 98.2 °F (36.8 °C) (Axillary)   Resp 17   Ht 1.753 m (5' 9\")   Wt 80.2 kg (176 lb 12.9 oz)   SpO2 96%   BMI 26.11 kg/m²   General appearance: awake, alert, cooperative, mild distress, appears stated age - obese, No icterus  Lungs: clear to auscultation bilaterally - Diminished bs bibasilar.   Heart: regular rate n rhythm, S1, S2 normal, no murmur, click, rub or gallop. -  Abdomen: soft, no tenderness, no distension, normal bowel sound, no masses, no organomegaly  Extremities: atraumatic, no cyanosis - Bilateral lower limbs edema none.  Skin: No rashes or ulceration.  Neurologic: Grossly intact     ECG: sinus rhythm     Data Review (Labs):   Recent Results (from the past 24 hour(s))   POCT Glucose    Collection Time: 06/02/24  4:01 PM   Result Value Ref Range    POC Glucose 78 65 - 100 mg/dL    Performed by: Fabrice    POCT Glucose    Collection Time: 06/02/24  8:05 PM   Result Value Ref Range    POC Glucose 82 65 - 100 mg/dL    Performed by: 
[unfilled]    INTERNAL MEDICINE    Subjective:     68-year-old female w/ hx recurrent UTI, NIDDM, JEREMI, gastric bypass, orthostatic hypotension, bipolar dz,  complains of lower abdominal pain, diarrhea, vomiting for last 2 days. No fever or chills. No particular chest pain or shortness of breath. Patient for nursing facility. I reviewed old records. History of gastric bypass cholecystectomy and hysterectomy. Patient has a history of sleep apnea. History of diabetes as well. Patient was admitted from May 5 through 13 because of intractable vomiting. Had UTI at that time. Evidently had a GI consultation which recommended a PPI. Does not mention gastroparesis. There is evidently history of a marginal ulcer with her gastric bypass in the past as well.   On further questioning, she look dehydrated, mildly tachycardic, asking for ice.    Today, look and feel better, no diarrhea or dizziness     Objective:     Physical Exam:  BP (!) 141/90   Pulse 93   Temp 97.6 °F (36.4 °C) (Oral)   Resp 20   Ht 1.753 m (5' 9\")   Wt 86.2 kg (190 lb)   SpO2 99%   BMI 28.06 kg/m²   General appearance: awake, alert, cooperative, mild distress, appears stated age - obese, No icterus  Lungs: clear to auscultation bilaterally - Diminished bs bibasilar.   Heart: regular rate n rhythm, S1, S2 normal, no murmur, click, rub or gallop. -  Abdomen: soft, no tenderness, no distension, normal bowel sound, no masses, no organomegaly  Extremities: atraumatic, no cyanosis - Bilateral lower limbs edema none.  Skin: No rashes or ulceration.  Neurologic: Grossly intact     ECG: sinus rhythm     Data Review (Labs):   Recent Results (from the past 24 hour(s))   POCT Glucose    Collection Time: 06/01/24  4:28 PM   Result Value Ref Range    POC Glucose 88 65 - 100 mg/dL    Performed by: Lb    POCT Glucose    Collection Time: 06/01/24  7:05 PM   Result Value Ref Range    POC Glucose 91 65 - 100 mg/dL    Performed by: Marisela  
[unfilled]    INTERNAL MEDICINE    Subjective:     68-year-old female w/ hx recurrent UTI, NIDDM, JEREMI, gastric bypass, orthostatic hypotension, bipolar dz,  complains of lower abdominal pain, diarrhea, vomiting for last 2 days. No fever or chills. No particular chest pain or shortness of breath. Patient for nursing facility. I reviewed old records. History of gastric bypass cholecystectomy and hysterectomy. Patient has a history of sleep apnea. History of diabetes as well. Patient was admitted from May 5 through 13 because of intractable vomiting. Had UTI at that time. Evidently had a GI consultation which recommended a PPI. Does not mention gastroparesis. There is evidently history of a marginal ulcer with her gastric bypass in the past as well.   On further questioning, she look dehydrated, mildly tachycardic, asking for ice.    Today, look and feel better, no diarrhea this am  Had dizziness when OOb this morning     Objective:     Physical Exam:  /72   Pulse 87   Temp 99 °F (37.2 °C)   Resp 16   Ht 1.753 m (5' 9\")   Wt 86.2 kg (190 lb)   SpO2 96%   BMI 28.06 kg/m²   General appearance: awake, alert, cooperative, mild distress, appears stated age - obese, No icterus  Lungs: clear to auscultation bilaterally - Diminished bs bibasilar.   Heart: regular rate n rhythm, S1, S2 normal, no murmur, click, rub or gallop. -  Abdomen: soft, no tenderness, no distension, normal bowel sound, no masses, no organomegaly  Extremities: atraumatic, no cyanosis - Bilateral lower limbs edema none.  Skin: No rashes or ulceration.  Neurologic: Grossly intact     ECG: sinus rhythm     Data Review (Labs):   Recent Results (from the past 24 hour(s))   POCT Glucose    Collection Time: 05/27/24  4:29 PM   Result Value Ref Range    POC Glucose 95 65 - 100 mg/dL    Performed by: Fabrice    POCT Glucose    Collection Time: 05/27/24  7:02 PM   Result Value Ref Range    POC Glucose 94 65 - 100 mg/dL    Performed by: 
[unfilled]    INTERNAL MEDICINE    Subjective:     68-year-old female w/ hx recurrent UTI, NIDDM, JEREMI, gastric bypass, orthostatic hypotension, bipolar dz,  complains of lower abdominal pain, diarrhea, vomiting for last 2 days. No fever or chills. No particular chest pain or shortness of breath. Patient for nursing facility. I reviewed old records. History of gastric bypass cholecystectomy and hysterectomy. Patient has a history of sleep apnea. History of diabetes as well. Patient was admitted from May 5 through 13 because of intractable vomiting. Had UTI at that time. Evidently had a GI consultation which recommended a PPI. Does not mention gastroparesis. There is evidently history of a marginal ulcer with her gastric bypass in the past as well.   On further questioning, she look dehydrated, mildly tachycardic, asking for ice.    Today, look and feel better, no significant diarrhea for 2-3 days, no fever, dizziness when get up occasionally     Objective:     Physical Exam:  /78   Pulse 85   Temp 97.9 °F (36.6 °C) (Oral)   Resp 16   Ht 1.753 m (5' 9\")   Wt 86.2 kg (190 lb)   SpO2 97%   BMI 28.06 kg/m²   General appearance: awake, alert, cooperative, mild distress, appears stated age - obese, No icterus  Lungs: clear to auscultation bilaterally - Diminished bs bibasilar.   Heart: regular rate n rhythm, S1, S2 normal, no murmur, click, rub or gallop. -  Abdomen: soft, no tenderness, no distension, normal bowel sound, no masses, no organomegaly  Extremities: atraumatic, no cyanosis - Bilateral lower limbs edema none.  Skin: No rashes or ulceration.  Neurologic: Grossly intact     ECG: sinus rhythm     Data Review (Labs):   Recent Results (from the past 24 hour(s))   POCT Glucose    Collection Time: 05/29/24  4:39 PM   Result Value Ref Range    POC Glucose 84 65 - 100 mg/dL    Performed by: Fabrice    POCT Glucose    Collection Time: 05/29/24  7:26 PM   Result Value Ref Range    POC Glucose 
[unfilled]    INTERNAL MEDICINE    Subjective:     68-year-old female w/ hx recurrent UTI, NIDDM, JEREMI, gastric bypass, orthostatic hypotension, bipolar dz,  complains of lower abdominal pain, diarrhea, vomiting for last 2 days. No fever or chills. No particular chest pain or shortness of breath. Patient for nursing facility. I reviewed old records. History of gastric bypass cholecystectomy and hysterectomy. Patient has a history of sleep apnea. History of diabetes as well. Patient was admitted from May 5 through 13 because of intractable vomiting. Had UTI at that time. Evidently had a GI consultation which recommended a PPI. Does not mention gastroparesis. There is evidently history of a marginal ulcer with her gastric bypass in the past as well.   On further questioning, she look dehydrated, mildly tachycardic, asking for ice.    Today, look and feel better, only small BM x1 per pt, no fever, dizziness when get up occasionally     Objective:     Physical Exam:  /70   Pulse 89   Temp 97.9 °F (36.6 °C) (Oral)   Resp 16   Ht 1.753 m (5' 9\")   Wt 86.2 kg (190 lb)   SpO2 95%   BMI 28.06 kg/m²   General appearance: awake, alert, cooperative, mild distress, appears stated age - obese, No icterus  Lungs: clear to auscultation bilaterally - Diminished bs bibasilar.   Heart: regular rate n rhythm, S1, S2 normal, no murmur, click, rub or gallop. -  Abdomen: soft, no tenderness, no distension, normal bowel sound, no masses, no organomegaly  Extremities: atraumatic, no cyanosis - Bilateral lower limbs edema none.  Skin: No rashes or ulceration.  Neurologic: Grossly intact     ECG: sinus rhythm     Data Review (Labs):   Recent Results (from the past 24 hour(s))   POCT Glucose    Collection Time: 05/30/24  4:42 PM   Result Value Ref Range    POC Glucose 89 65 - 100 mg/dL    Performed by: Fabrice    POCT Glucose    Collection Time: 05/30/24  7:17 PM   Result Value Ref Range    POC Glucose 88 65 - 100 
[unfilled]    INTERNAL MEDICINE    Subjective:     68-year-old female w/ hx recurrent UTI, NIDDM, JEREMI, gastric bypass, orthostatic hypotension, bipolar dz,  complains of lower abdominal pain, diarrhea, vomiting for last 2 days. No fever or chills. No particular chest pain or shortness of breath. Patient for nursing facility. I reviewed old records. History of gastric bypass cholecystectomy and hysterectomy. Patient has a history of sleep apnea. History of diabetes as well. Patient was admitted from May 5 through 13 because of intractable vomiting. Had UTI at that time. Evidently had a GI consultation which recommended a PPI. Does not mention gastroparesis. There is evidently history of a marginal ulcer with her gastric bypass in the past as well.   On further questioning, she look dehydrated, mildly tachycardic, asking for ice.    Today, look and feel better, only small BM x1 per pt, no fever, no reported dizziness     Objective:     Physical Exam:  /86   Pulse 88   Temp 97.5 °F (36.4 °C) (Oral)   Resp 16   Ht 1.753 m (5' 9\")   Wt 86.2 kg (190 lb)   SpO2 96%   BMI 28.06 kg/m²   General appearance: awake, alert, cooperative, mild distress, appears stated age - obese, No icterus  Lungs: clear to auscultation bilaterally - Diminished bs bibasilar.   Heart: regular rate n rhythm, S1, S2 normal, no murmur, click, rub or gallop. -  Abdomen: soft, no tenderness, no distension, normal bowel sound, no masses, no organomegaly  Extremities: atraumatic, no cyanosis - Bilateral lower limbs edema none.  Skin: No rashes or ulceration.  Neurologic: Grossly intact     ECG: sinus rhythm     Data Review (Labs):   Recent Results (from the past 24 hour(s))   POCT Glucose    Collection Time: 05/31/24  4:07 PM   Result Value Ref Range    POC Glucose 76 65 - 100 mg/dL    Performed by: Karina    POCT Glucose    Collection Time: 05/31/24  8:48 PM   Result Value Ref Range    POC Glucose 84 65 - 100 mg/dL    
[unfilled]    INTERNAL MEDICINE    Subjective:     68-year-old female w/ hx recurrent UTI, NIDDM, JEREMI, gastric bypass, orthostatic hypotension, bipolar dz,  complains of lower abdominal pain, diarrhea, vomiting for last 2 days. No fever or chills. No particular chest pain or shortness of breath. Patient for nursing facility. I reviewed old records. History of gastric bypass cholecystectomy and hysterectomy. Patient has a history of sleep apnea. History of diabetes as well. Patient was admitted from May 5 through 13 because of intractable vomiting. Had UTI at that time. Evidently had a GI consultation which recommended a PPI. Does not mention gastroparesis. There is evidently history of a marginal ulcer with her gastric bypass in the past as well.   On further questioning, she look dehydrated, mildly tachycardic, asking for ice.    Today, loose stools x3 yesterday, still orthostatic     Objective:     Physical Exam:  /83   Pulse 92   Temp 98.3 °F (36.8 °C) (Oral)   Resp 16   Ht 1.753 m (5' 9\")   Wt 82.8 kg (182 lb 8.7 oz)   SpO2 94%   BMI 26.96 kg/m²   General appearance: awake, alert, cooperative, mild distress, appears stated age - obese, No icterus  Lungs: clear to auscultation bilaterally - Diminished bs bibasilar.   Heart: regular rate n rhythm, S1, S2 normal, no murmur, click, rub or gallop. -  Abdomen: soft, no tenderness, no distension, normal bowel sound, no masses, no organomegaly  Extremities: atraumatic, no cyanosis - Bilateral lower limbs edema none.  Skin: No rashes or ulceration.  Neurologic: Grossly intact     ECG: sinus rhythm     Data Review (Labs):   Recent Results (from the past 24 hour(s))   POCT Glucose    Collection Time: 06/04/24  4:31 PM   Result Value Ref Range    POC Glucose 81 65 - 100 mg/dL    Performed by: Nydia    POCT Glucose    Collection Time: 06/04/24  9:05 PM   Result Value Ref Range    POC Glucose 80 65 - 100 mg/dL    Performed by: Sarah  
ACUTE PHYSICAL THERAPY GOALS:   (Developed with and agreed upon by patient and/or caregiver.)    (1.) Jesika Olsen  will move from supine to sit and sit to supine  and roll side to side with INDEPENDENT within 7 treatment day(s).    (2.) Jesika Olsen will transfer from bed to chair and chair to bed with MODIFIED INDEPENDENCE using the least restrictive device within 7 treatment day(s).    (3.) Jesika Olsen will ambulate with MODIFIED INDEPENDENCE for 150 feet with the least restrictive device within 7 treatment day(s).   (4.) Jesika Olsen will perform standing static and dynamic balance activities x 10 minutes with MODIFIED INDEPENDENCE to improve safety within 7 treatment day(s).  (6.) Jesika Olsen will perform therapeutic exercises x 10 min for HEP with MODIFIED INDEPENDENCE to improve strength, endurance, and functional mobility within 7 treatment day(s).       PHYSICAL THERAPY Initial Assessment, Daily Note, and AM  (Link to Caseload Tracking: PT Visit Days : 1  Acknowledge Orders  Time In/Out  PT Charge Capture  Rehab Caseload Tracker    Jesika Olsen is a 68 y.o. female   PRIMARY DIAGNOSIS: Acute UTI (urinary tract infection)  Acute abdominal pain [R10.9]  Intractable vomiting [R11.10]  Acute UTI (urinary tract infection) [N39.0]  Urinary tract infection without hematuria, site unspecified [N39.0]  Acute diarrhea [R19.7]       Reason for Referral: Generalized Muscle Weakness (M62.81)  Difficulty in walking, Not elsewhere classified (R26.2)  Inpatient: Payor: [facility] MEDICARE / Plan: [facility] MEDICARE COMPLETE / Product Type: *No Product type* /     ASSESSMENT:     REHAB RECOMMENDATIONS:   Recommendation to date pending progress:  Setting:  Short-term Rehab    Equipment:    To Be Determined     ASSESSMENT:  Ms. Olsen is a 68 year old female admitted to the hospital on 5/25/24 with abdominal pain, C-diff (+), UTI. Pt with medical history of gastric bypass, orthostatic hypotension, and 
ACUTE PHYSICAL THERAPY GOALS:   (Developed with and agreed upon by patient and/or caregiver.)    (1.) Jesika Olsen  will move from supine to sit and sit to supine  and roll side to side with INDEPENDENT within 7 treatment day(s).    (2.) Jesika Olsen will transfer from bed to chair and chair to bed with MODIFIED INDEPENDENCE using the least restrictive device within 7 treatment day(s).    (3.) Jesika Olsen will ambulate with MODIFIED INDEPENDENCE for 150 feet with the least restrictive device within 7 treatment day(s).   (4.) Jesika Olsen will perform standing static and dynamic balance activities x 10 minutes with MODIFIED INDEPENDENCE to improve safety within 7 treatment day(s).  (6.) Jesika Olsen will perform therapeutic exercises x 10 min for HEP with MODIFIED INDEPENDENCE to improve strength, endurance, and functional mobility within 7 treatment day(s).     PHYSICAL THERAPY: Daily Note AM   (Link to Caseload Tracking: PT Visit Days : 4  Time In/Out PT Charge Capture  Rehab Caseload Tracker  Orders    Jesika Olsen is a 68 y.o. female   PRIMARY DIAGNOSIS: Acute UTI (urinary tract infection)  Acute abdominal pain [R10.9]  Intractable vomiting [R11.10]  Acute UTI (urinary tract infection) [N39.0]  Urinary tract infection without hematuria, site unspecified [N39.0]  Acute diarrhea [R19.7]       Inpatient: Payor: [facility] MEDICARE / Plan: [facility] MEDICARE COMPLETE / Product Type: *No Product type* /     ASSESSMENT:     REHAB RECOMMENDATIONS:   Recommendation to date pending progress:  Setting:  Short-term Rehab    Equipment:    To Be Determined     ASSESSMENT:  Upon arrival, Ms. Olsen, supine in bed, agreeable to therapy. During today's session, pt rolled supine to L sidelying modified independent using bed rail and transitioned to sit at EOB with min A. Sit <> stand x 2 reps with RW. + orthostatics with BP 80/60 in stand. Pt c/o nausea and dizziness. Pt required rest breaks between each 
ACUTE PHYSICAL THERAPY GOALS:   (Developed with and agreed upon by patient and/or caregiver.)    (1.) Jesika Olsen  will move from supine to sit and sit to supine  and roll side to side with INDEPENDENT within 7 treatment day(s).    (2.) Jesika Olsen will transfer from bed to chair and chair to bed with MODIFIED INDEPENDENCE using the least restrictive device within 7 treatment day(s).    (3.) Jesika Olsen will ambulate with MODIFIED INDEPENDENCE for 150 feet with the least restrictive device within 7 treatment day(s).   (4.) Jesika Olsen will perform standing static and dynamic balance activities x 10 minutes with MODIFIED INDEPENDENCE to improve safety within 7 treatment day(s).  (6.) Jesika Olsen will perform therapeutic exercises x 10 min for HEP with MODIFIED INDEPENDENCE to improve strength, endurance, and functional mobility within 7 treatment day(s).     PHYSICAL THERAPY: Daily Note PM   (Link to Caseload Tracking: PT Visit Days : 5  Time In/Out PT Charge Capture  Rehab Caseload Tracker  Orders    Jesika Olsen is a 68 y.o. female   PRIMARY DIAGNOSIS: Acute UTI (urinary tract infection)  Acute abdominal pain [R10.9]  Intractable vomiting [R11.10]  Acute UTI (urinary tract infection) [N39.0]  Urinary tract infection without hematuria, site unspecified [N39.0]  Acute diarrhea [R19.7]       Inpatient: Payor: [facility] MEDICARE / Plan: [facility] MEDICARE COMPLETE / Product Type: *No Product type* /     ASSESSMENT:     REHAB RECOMMENDATIONS:   Recommendation to date pending progress:  Setting:  Short-term Rehab    Equipment:    To Be Determined     ASSESSMENT:  Upon arrival, Ms. Olsen, supine in bed, agreeable to therapy. Spoke with pt about trying lateral scooting transfer from EOB to chair, pt agreeable if able. Pt found to have had BM and needed assist with clean up, pt rolled supine to L and R side lying modified independent using bed rail, Pt BP in supine found to be 127/90, pt then 
ACUTE PHYSICAL THERAPY GOALS:   (Developed with and agreed upon by patient and/or caregiver.)    (1.) Jesika Olsen  will move from supine to sit and sit to supine  and roll side to side with INDEPENDENT within 7 treatment day(s).    (2.) Jesika Olsen will transfer from bed to chair and chair to bed with MODIFIED INDEPENDENCE using the least restrictive device within 7 treatment day(s).    (3.) Jesika Olsen will ambulate with MODIFIED INDEPENDENCE for 150 feet with the least restrictive device within 7 treatment day(s).   (4.) Jesika Olsen will perform standing static and dynamic balance activities x 10 minutes with MODIFIED INDEPENDENCE to improve safety within 7 treatment day(s).  (6.) Jesika Olsen will perform therapeutic exercises x 10 min for HEP with MODIFIED INDEPENDENCE to improve strength, endurance, and functional mobility within 7 treatment day(s).     PHYSICAL THERAPY: Daily Note PM   (Link to Caseload Tracking: PT Visit Days : 6  Time In/Out PT Charge Capture  Rehab Caseload Tracker  Orders    Jesika Olsen is a 68 y.o. female   PRIMARY DIAGNOSIS: Acute UTI (urinary tract infection)  Acute abdominal pain [R10.9]  Intractable vomiting [R11.10]  Acute UTI (urinary tract infection) [N39.0]  Urinary tract infection without hematuria, site unspecified [N39.0]  Acute diarrhea [R19.7]       Inpatient: Payor: [facility] MEDICARE / Plan: [facility] MEDICARE COMPLETE / Product Type: *No Product type* /     ASSESSMENT:     REHAB RECOMMENDATIONS:   Recommendation to date pending progress:  Setting:  Short-term Rehab    Equipment:    To Be Determined     ASSESSMENT:  Upon arrival, Ms. Olsen, supine in bed, agreeable to therapy. Spoke with pt about trying lateral scooting transfer from EOB to chair, pt agreeable if able. Pt BP in supine found to be 142/86, pt then transitioned to sit at EOB with min/mod A, pt BP in sitting was 140/89, then sat on EOB approx 3 mins BP was then 132/83, pt then 
ACUTE PHYSICAL THERAPY GOALS:   (Developed with and agreed upon by patient and/or caregiver.)    (1.) Jseika Olsen  will move from supine to sit and sit to supine  and roll side to side with INDEPENDENT within 7 treatment day(s).    (2.) Jesika Olsen will transfer from bed to chair and chair to bed with MODIFIED INDEPENDENCE using the least restrictive device within 7 treatment day(s).    (3.) Jesika Olsen will ambulate with MODIFIED INDEPENDENCE for 150 feet with the least restrictive device within 7 treatment day(s).   (4.) Jesika Olsen will perform standing static and dynamic balance activities x 10 minutes with MODIFIED INDEPENDENCE to improve safety within 7 treatment day(s).  (6.) Jesika Olsen will perform therapeutic exercises x 10 min for HEP with MODIFIED INDEPENDENCE to improve strength, endurance, and functional mobility within 7 treatment day(s).     PHYSICAL THERAPY: Daily Note AM   (Link to Caseload Tracking: PT Visit Days : 3  Time In/Out PT Charge Capture  Rehab Caseload Tracker  Orders    Jesika Olsen is a 68 y.o. female   PRIMARY DIAGNOSIS: Acute UTI (urinary tract infection)  Acute abdominal pain [R10.9]  Intractable vomiting [R11.10]  Acute UTI (urinary tract infection) [N39.0]  Urinary tract infection without hematuria, site unspecified [N39.0]  Acute diarrhea [R19.7]       Inpatient: Payor: [facility] MEDICARE / Plan: [facility] MEDICARE COMPLETE / Product Type: *No Product type* /     ASSESSMENT:     REHAB RECOMMENDATIONS:   Recommendation to date pending progress:  Setting:  Short-term Rehab    Equipment:    To Be Determined     ASSESSMENT:  Upon arrival, Ms. Olsen, supine in bed, agreeable to therapy. During today's session, pt rolled supine to L sidelying modified independent and transitioned to sit at EOB with min A. Pt c/o nausea and dizziness when in sit and only tolerated x 1 min. Pt returned to supine with SBA. Completed B LE therex x 15 reps with cues. Mobility 
ACUTE PHYSICAL THERAPY GOALS:   (Developed with and agreed upon by patient and/or caregiver.)  Goals updated 6/7/24  (1.) Jesika Olsen  will move from supine to sit and sit to supine  and roll side to side with SBA within 7 treatment day(s).    (2.) Jesika Olsen will transfer from bed to chair and chair to bed with SBA using the least restrictive device within 7 treatment day(s).    (3.) Jesika Olsen will ambulate with MIN A for 20 feet with the least restrictive device within 7 treatment day(s).   (4.) Jesika Olsen will perform sitting static and dynamic balance activities x 10 minutes with MODIFIED INDEPENDENCE to improve safety within 7 treatment day(s).  (6.) Jesika Olsen will perform therapeutic exercises x 10 min for HEP with MODIFIED INDEPENDENCE to improve strength, endurance, and functional mobility within 7 treatment day(s).    PHYSICAL THERAPY Daily Note, Re-evaluation, and PM  (Link to Caseload Tracking: PT Visit Days : 1  Acknowledge Orders  Time In/Out  PT Charge Capture  Rehab Caseload Tracker    Jesika Olsen is a 68 y.o. female   PRIMARY DIAGNOSIS: Acute UTI (urinary tract infection)  Acute abdominal pain [R10.9]  Intractable vomiting [R11.10]  Acute UTI (urinary tract infection) [N39.0]  Urinary tract infection without hematuria, site unspecified [N39.0]  Acute diarrhea [R19.7]       Reason for Referral: Generalized Muscle Weakness (M62.81)  Difficulty in walking, Not elsewhere classified (R26.2)  Inpatient: Payor: [facility] MEDICARE / Plan: [facility] MEDICARE COMPLETE / Product Type: *No Product type* /     ASSESSMENT:     REHAB RECOMMENDATIONS:   Recommendation to date pending progress:  Setting:  Short-term Rehab, pt is declining STR per CM, recommending HHPT    Equipment:     20\" wheelchair and sliding board     ASSESSMENT:  Ms. Olsen presents supine in bed, agreeable to therapy. Today pt able to perform some LE supine ex's with supervision, then required Mod A with supine 
After more discussion patient has agreed to in/out cath.  Done and 1000 ml clear yellow urine returned.  She remains very raw in belinda area and was a difficult cath due to pain.  This is the 3rd in/out.  Perfect serve to MD to see if we can leave it in.    
Agitated attitude over being checked on through the night.  Has not voided.  Bladder scan x 2 this shift and volume <600.  Brief changed, belinda care given.  No BM this shift.  Call light in reach.  
Bladder scan shows 800 ml in bladder.  Patient refusing to have in/out cath done.  
Bladder scanned at this time. Zero cc found.  
Bladder scanned patient to find 128cc in bladder.  
Bladder scanned to find 463 in bladder.  
Bladder scanned to find 700cc of urine. Patient cleaned from stool then IO cathed with 500cc removed from bladder.  
Comprehensive Nutrition Assessment    Type and Reason for Visit: Reassess  General Nutrition Management/other reason (Hospitalists)    Nutrition Recommendations/Plan:   Meals and Snacks:  Diet: Continue current order  Nutrition Supplement Therapy:  Medical food supplement therapy:  Continue Ensure Enlive three times per day (this provides 350 kcal and 20 grams protein per bottle)     Malnutrition Assessment:  Malnutrition Status: At risk for malnutrition (Comment) (wt loss after gastric bypass, poor intake since surgery)    mild tricep wasting however NFPE otherwise unremarkable  Nutrition Assessment:  Nutrition History: Pt reports poor intake since her gastric bypass a year and a half ago. She stated she was able to take 4-5 tablespoons of food at a time. She stated she tried protein shakes however she did not like them because they were too sweet. She also reports nausea, vomiting, and diarrhea since surgery.      Do You Have Any Cultural, Orthodox, or Ethnic Food Preferences?: No   Weight History: Pt reports ~138lb wt loss however she does not know how long this has been. She stated she has not been on a scale in a while. Per EMR wt hx review 9/29 304lb (bariatric clinic), 1/18 242lb (general surgery), 2/8 230lb (general surgery).  Nutrition Background:       PMH significant for UTI, DM, gastric bypass, bipolar, and orthostatic hypotension. Pt admitted with acute UTI and Cdiff.  Nutrition Interval:  Pt seen reclined in bed. She again reports she cannot eat much due to h/o gastric bypass. She reports 3 meals per day as she is only served 3 meals per day. Knows she needs to try to eat 6 times per day. Discussed intake at meal times and using Ensure between meals to mimic small frequent meals. She voiced understanding.    Current Nutrition Therapies:  ADULT DIET; Easy to Chew; 3 carb choices (45 gm/meal)  ADULT ORAL NUTRITION SUPPLEMENT; Breakfast, Lunch, Dinner; Standard High Calorie/High Protein Oral 
Comprehensive Nutrition Assessment    Type and Reason for Visit: Reassess  General Nutrition Management/other reason (Hospitalists)    Nutrition Recommendations/Plan:   Meals and Snacks:  Diet: Continue current order  Pt encouraged to have family bring in food items as she reports she eats better at home than here.    Nutrition Supplement Therapy:  Medical food supplement therapy:  Continue Ensure Enlive three times per day (this provides 350 kcal and 20 grams protein per bottle)  Pt encouraged to increase intake of oral supplements given minimal intake of foods, primary intake at this time of gingerale.    Nutrition Related Labs:  Adding Copper tomorrow per discussion with Dr. Mesa.   Mag and Phos added given ongoing electrolyte replacement needs.       Malnutrition Assessment:  Malnutrition Status: At risk for malnutrition (Comment) (wt loss after gastric bypass, poor intake since surgery)    mild tricep wasting however NFPE otherwise unremarkable  Nutrition Assessment:  Nutrition History: Pt reports poor intake since her gastric bypass a year and a half ago. She stated she was able to take 4-5 tablespoons of food at a time. She stated she tried protein shakes however she did not like them because they were too sweet. She also reports nausea, vomiting, and diarrhea since surgery.      Do You Have Any Cultural, Synagogue, or Ethnic Food Preferences?: No   Weight History: Pt reports ~138lb wt loss however she does not know how long this has been. She stated she has not been on a scale in a while. Per EMR wt hx review 9/29 304lb (bariatric clinic), 1/18 242lb (general surgery), 2/8 230lb (general surgery).  Nutrition Background:       PMH significant for UTI, DM, gastric bypass, bipolar, and orthostatic hypotension. Pt admitted with acute UTI and Cdiff.  Nutrition Interval:  +protonix, effer K 40 meq BID, Mg Ox 400 mg BID.    Oral intake primarily of gingerale throughout admission, attempts sips of ensure at times. 
Esteves catheter removed with no complications.  There is significant redness and swelling evident in perineum and labia.  Voiding trial education provided  
GOALS:  LTG: Patient will maintain adequate hydration/nutrition with optimum safety and efficiency of swallowing function with PO intake without overt signs or symptoms of aspiration for the highest appropriate diet level.  STG:  Patient will consume easy to chew textures and thin liquids without overt signs or symptoms of airway compromise.  Patient will perform effortful swallow, small bites and sips, remain upright for 20-30 min after any PO, and slow rate of PO intake to improve swallow safety with minimal cues 90% of the time.  Patient will safely ingest diet trials during therapeutic feedings with SLP without overt signs or symptoms of respiratory compromise in efforts to advance diet.    SPEECH LANGUAGE PATHOLOGY: DYSPHAGIA Initial Assessment    Acknowledge Order  I  Therapy Time  I   Charges     I  Rehab Caseload Tracker      NAME: Jesika Olsen  : 1955  MRN: [id number]    ADMISSION DATE: 2024  PRIMARY DIAGNOSIS: Acute UTI (urinary tract infection)    ICD-10: Treatment Diagnosis: R13.14 Dysphagia, Pharyngoesophageal Phase    RECOMMENDATIONS   Diet:    Easy to Chew  Thin Liquids    Medication: as tolerated   Compensatory Swallowing Strategies:   Alternate solids and liquids  Slow rate of intake  Remain upright for 30-45 minutes after meals  Small bites/sips   Therapeutic Intervention:   Patient/family education  Dysphagia treatment   Patient continues to require skilled intervention:  Yes. Recommend ongoing speech therapy services during this hospitalization.     Anticipated Discharge Needs: Do not anticipate ongoing speech therapy needs upon discharge.      ASSESSMENT    Patient presents with mild pharyngoesophageal dysphagia characterized by nausea and vomiting following PO intake if patient has consumed too much following gastric bypass. Patient states she is only able to tolerate 4-5 bites at a time due to gastric bypass.     Recommend ETC/thin    GENERAL    Subjective: Lying in bed, 
GOALS:  LTG: Patient will maintain adequate hydration/nutrition with optimum safety and efficiency of swallowing function with PO intake without overt signs or symptoms of aspiration for the highest appropriate diet level.  STG:  Patient will consume easy to chew textures and thin liquids without overt signs or symptoms of airway compromise. MET 24  Patient will perform effortful swallow, small bites and sips, remain upright for 20-30 min after any PO, and slow rate of PO intake to improve swallow safety with minimal cues 90% of the time. MET 24  Patient will safely ingest diet trials during therapeutic feedings with SLP without overt signs or symptoms of respiratory compromise in efforts to advance diet. MET 24    SPEECH LANGUAGE PATHOLOGY: DYSPHAGIA Daily Note #1 and Discharge    Acknowledge Order  I  Therapy Time  I   Charges     I  Rehab Caseload Tracker      NAME: Jesika Olsen  : 1955  MRN: [id number]    ADMISSION DATE: 2024  PRIMARY DIAGNOSIS: Acute UTI (urinary tract infection)    ICD-10: Treatment Diagnosis: R13.14 Dysphagia, Pharyngoesophageal Phase    RECOMMENDATIONS   Diet:    Easy to Chew  Thin Liquids    Medication: as tolerated   Compensatory Swallowing Strategies:   Alternate solids and liquids  Slow rate of intake  Remain upright for 30-45 minutes after meals  Small bites/sips   Therapeutic Intervention:   Patient/family education  Dysphagia treatment   Patient continues to require skilled intervention:  No. Please re-consult if new concerns arise.      Anticipated Discharge Needs: No additional speech therapy is indicated.      ASSESSMENT    Patient reports she is tolerating ETC/thin liquids without difficulty, reports that she does not have any oral or pharyngeal involvement, but that she can't consume more than 4-5 bites at a time without feeling nauseous and potentially vomiting. Feeling lightheaded after PT/OT, refusing solid oral intake at this time, but 
Hourly rounding completed on this shift. No new complaints at this time. All needs met. Pt is currently resting in bed. Will continue to monitor and give report to oncoming nurse.    
Hourly rounds complete this shift, no new complaints at this time, Patient refused to take am medication: bed in low, locked position, call light and bedside table within reach,  all needs met. Report to day shift nurse.    
Hourly rounds in progress. Patient is frustrated over belinda area raw/pain.  Esteves in per order.  No BM this shift.  Denies needs or pain at this time.  Bed in low locked position. Call light within reach.  Bed alarm on.  Will continue to monitor and give report to oncoming day shift nurse.    
Hourly rounds performed this shift. Bed lowered and locked. Call light within reach. All needs met at this time. Pt has had multiple BM's today and voiding. Post voiding bladder scan performed and 78mls were noted. Buttocks and vaginal area are red, peeling but blanchable. Bedside shift report will be given to oncoming nurse.    
MD notified loss of IV access.  Vascular access unable to put IV because of lack of veins.  They have recommended a port for the patient as she is not a PICC candidate due to vasculature  
No new complaints overnight. Had episodes of urinary incontinence and loose BMs overnight. Pericare provided. Hourly rounds completed, all needs met this shift. Bed in L/L with call light in reach. Report to be given to dayshift nurse.       
No new complaints. Bladder scanned again this AM; only showing 112mL. Pt did have another unmeasurable urine occurrence noted in her brief. Hourly rounds completed, all needs met this shift. Bed in L/L with call light in reach. Report to be given to dayshift nurse.       
No new complaints. Last bladder scan around 0530, showing 297cc. Pt did have an un-measurable urine occurrence/s. Pericare provided. Hourly rounds completed, all needs met this shift. Bed in L/L with call light in reach. Report to be given to dayshift nurse.       
Orders for rabago cath placement given at shift change; however, pt refused rabago cath at this time. Pt is A/O x4. Pt was bladder scanned to check for urinary retention; and only showed 197 cc. Pt did have one un-measurable urine occurrence in brief; provided pericare and diaper change.   
Patient in bed alert and oriented, following commands. NO bowel movement overnight. Denies pain. Patient states feels better, compared to yesterday. Minimal drinking and eating. Pt in room, patient diaper changed, small amount of stool noted. Incontinence care, gown change, zinc ointment applied, wound nurse now in room.  1429  Bladder scanned patient decrease urine output.697 cc in bladder, will contact attending.   1650  Straight cath yield 700 cc, nikita, malodorous urine, Patient tolerated fairly well. Total of 2 small bowel movements, incontinence care provided. Patient has no other needs at this time.     
Patient in bed, alert and oriented, following commands, attempted to eat breakfast but became nauseous. Tolerating med's whole, Patient verbalized has difficulty swallow large pills since, gastric bypass.   Overnight rapid, response. patient became dizzy upon standing which escalated to syncopal episode. IV left AC blood noted at insertion site. Will obtain orthostatics today.    10:41  Positive for c-diff, attending aware.   1837  Patient in bed, alert and oriented, tolerating meals in small amounts. One bowel movement today. No needs at change of shift.   
Patient in bed, alert and oriented, very agitated this morning. Refused orthostatics this morning. Denies pain. Patient refusing straight cath overnight. No bowel movements since change of shift.   1400  One small bowel movement, complete bed change. Water with ice and ginger ale provided. Patient has no additional needs at this time.   1821  Patient in bed, one bowel movement this shift. Tolerating meds, no needs expressed at change of shift.    
Patient sitting up in chair, alert and oriented.  Looks ill. Patient verbalizes that she wants diaper removed, and given a pull up.. \"I want to walk to the bathroom\"  intermittent periods of nausea. Denies pain at this time. NS infusing @ 50cc/hr. Tolerating small amount of jello and water.requested compazine, reports Zofran not working.   12:31 AM   Patient sleeping, no nausea at this time.   
Pt  bed in lowest position, wheels locked, and call light within reach. Hourly rounds completed. VSS.  Pt retaining, bladder scanned and straight cath per standing order.   
Pt bladder scanned per order. 351mls of urine noted at 1205. Pt had a small loose stool noted in brief. Buttocks is red and excoriated. No open areas noted at this time. Pericare provided. Antifungal cream applied to buttocks and antifungal powder applied to vaginal folds and under right breast. All needs met at this time.   
Pt medicated and IVF infusing per MAR. No c/o pain. She has received PRN zofran 2 times after taking her PM medications. Pt's blood sugar 78 at beginning of shift, repeat 02:00 blood sugar up to 80. Tele running NSR at 92 bpm and 87 per monitor room. She has had 2 unmeasured stool and urine occurrences. All known needs met at this time. Bed locked and lowered with call light within reach.   
Pt medicated and IVF infusing per MAR. Pt A&Ox4. No c/o pain. PRN zofran was administered to pt 2 times tonight. She did not receive insulin for a blood sugar of 72, 02:00 repeat was 78. She has had 2 unmeasured urine and stool occurrences. Tele order renewed, running NSR at 89 bpm and 81 bpm per monitor room. All known needs met at this time. Bed locked and lowered with call light within reach.   
Pt medicated per MAR. She received PRN zofran one time tonight, states that it helped her nausea. Pt's blood sugar was 94 at beginning of shift, 02:00 repeat was 88. Occult blood came back positive. C diff toxin and fecal leukocytes are currently in process. She has had 1 unmeasured urine and stool occurrences tonight. Rapid response was called around 5:40 due to pt fainting while on toilet, BP 72/42 while pt unresponsive on toilet, Spo2 84%, blood sugar 95. Tele, labs, and bedrest ordered, pt also placed on 2L NC, Spo2 at 98%. All known needs met at this time. Bed locked and lowered with call light within reach.   
Pt nauseous this shift and reported that medications have not been helping her symptoms.  Messaged Dr. Knutson, who ordered another med.  Pt reports that medicine did not help either.  Expresses frustration with medical team and the desire to go home with therapy.  Pt does not seem to have a good understanding of her medical situation, treatment, or plan.  Prefers to lie flat in bed and becomes very dizzy and nauseous with even slight movements.      Hourly rounds performed this shift.  VS stable.  Pt medicated per MAR.  All known needs met at this time.  Bed low and locked, call light in reach.  Bedside shift report given to oncoming nurse.    
Pt resting this am, irritable, complaining to staff about being woken up.  Am meds given and pt verbalizes she will call staff if she has any needs.     1245: Daughter at bedside to visit.    Pt denies needs at this time, NAD.Diflucan started today and topical nystatin powder for groin/belinda area. Hourly rounds completed. Bed in low and locked position, call light and personal items within reach. Will give bedside report to oncoming nurse.      
Pt resting, denies needs at this time.  A&Ox4.  Continue to reinforce she needs to sit up as high as tolerated to practice.  Continues to complain of nausea when sitting up or trying to stand.  Positive orthostatics.  BM x1. Perineum and buttocks severely excoriated and swollen. Hourly rounds completed.  Bed locked and lowered into lowest position.  Call light and personal items within reach.  Will give report to oncoming nurse.  
Pt resting, denies needs at this time.  Daughter at bedside.  Continues to have positive orthostatics.  BM x3.  Esteves removed but patient has yet to void.  Last bladder scan resulted 175.  Perineum and buttocks severely excoriated and swollen.  Hourly rounds completed.  Bed locked and lowered into lowest position.  Call light and personal items within reach.  Report given to night nurse.   
Pt resting, denies needs at this time.  Daughter at bedside.  Worked with PT today and orthostatics completed.  Positive ortho.  Hourly rounds completed.  Bed locked and lowered into lowest position.  Call light and personal items within reach.  Report given to night nurse.  
Pt was unable to take meds during shift due to N/V. Pt received Zofran per MAR with little relief. Rounds performed throughout shift. Pt denies needs at this time. Bed in low position, locked and call light/personal items within reach. Will report to day shift nurse.    
Stool walked to lab.   
TRANSFER - IN REPORT:    Verbal report received from Karin SHIELDS on Jesika Olsen  being received from emergency department for routine progression of patient care      Report consisted of patient's Situation, Background, Assessment and   Recommendations(SBAR). Routine progression of care.    Information from the following report(s) ED Encounter Summary was reviewed with the receiving nurse.    Opportunity for questions and clarification was provided.      Assessment completed upon patient's arrival to unit and care assumed.    
Karina    POCT Glucose    Collection Time: 05/27/24  2:17 AM   Result Value Ref Range    POC Glucose 75 65 - 100 mg/dL    Performed by: BibiPCDOT    POCT Glucose    Collection Time: 05/27/24  7:10 AM   Result Value Ref Range    POC Glucose 77 65 - 100 mg/dL    Performed by: GatoPCDOT    POCT Glucose    Collection Time: 05/27/24 11:32 AM   Result Value Ref Range    POC Glucose 98 65 - 100 mg/dL    Performed by: Jeff        Assessment:     1-  Acute UTI (urinary tract infection), residual, recurrent, recently pan-sensitive E coli in urine. A/w vomiting, diarrhea and dehydration    2- History of  NIDDM, JEREMI, gastric bypass, orthostatic hypotension, bipolar dz. Stable.    Plan:     Observation  Rocephin IV  Follow cultures/ negative so far  Pending stool studies  IVF hydration, gentle  Supportive Rx for Gi spx  Blood pressure control  Insulin scale  AM lab as needed    Dispo; home when improve    Signed By: Miguel Mesa MD     May 27, 2024       
Mobility limited due to dizziness today, however, pt did require less assistance with transfers today compared to yesterday. Pt will continue to benefit from skilled acute PT services to progress mobility. Recommend discharge to rehab once medically ready for discharge.      SUBJECTIVE:   Ms. Olsen states, \"I'm better\"     Social/Functional Lives With: Daughter  Type of Home: House  Home Layout: One level  Home Access: Level entry  Bathroom Shower/Tub: Tub/Shower unit  Bathroom Toilet: Standard  Bathroom Equipment: Commode  Bathroom Accessibility: Accessible  Home Equipment: Walker - Rolling  Receives Help From: Family  ADL Assistance: Independent  Ambulation Assistance: Independent  Transfer Assistance: Independent  Active : Yes  Occupation: Retired  OBJECTIVE:     PAIN: VITALS / O2: PRECAUTION / LINES / DRAINS:   Pre Treatment:    No pain reported      Post Treatment: No pain reported  Vitals   Vitals  Orthostatic B/P and Pulse?: Yes  Blood Pressure Lyin/87  Blood Pressure Sittin/81  Blood Pressure Standin/98    Oxygen    Telemetry     RESTRICTIONS/PRECAUTIONS:  Restrictions/Precautions  Restrictions/Precautions: Fall Risk (orthostatics)  Restrictions/Precautions: Fall Risk (orthostatics)     MOBILITY: I Mod I S SBA CGA Min Mod Max Total  NT x2 Comments:   Bed Mobility    Rolling [] [] [] [x] [] [] [] [] [] [] [] With use of bed rail   Supine to Sit [] [] [] [] [] [x] [] [] [] [] [] Assist required at upper trunk to transition upper body into sitting position   Scooting [] [] [] [x] [] [] [] [] [] [] []    Sit to Supine [] [] [] [x] [] [] [] [] [] [] []    Transfers    Sit to Stand [] [] [] [] [] [x] [] [] [] [] [] With RW   Bed to Chair [] [] [] [] [] [] [] [] [] [x] []    Stand to Sit [] [] [] [] [] [x] [] [] [] [] []     [] [] [] [] [] [] [] [] [] [] []    I=Independent, Mod I=Modified Independent, S=Supervision, SBA=Standby Assistance, CGA=Contact Guard Assistance,   Min=Minimal 
1.0 EU/dL    Nitrite, Urine Negative NEG      Leukocyte Esterase, Urine SMALL (A) NEG      WBC, UA 5-10 0 /hpf    RBC, UA 0-3 0 /hpf    Epithelial Cells, UA 0-3 0 /hpf    BACTERIA, URINE 3+ (H) 0 /hpf    Amorphous Crystal 4+ (H) 0    Other observations RESULTS VERIFIED MANUALLY     Lactic Acid    Collection Time: 05/25/24  9:39 PM   Result Value Ref Range    Lactic Acid 1.5 0.5 - 2.0 mmol/L   Culture, Blood 1    Collection Time: 05/25/24  9:39 PM    Specimen: Blood   Result Value Ref Range    Special Requests NO SPECIAL REQUESTS  RIGHT  HAND        Culture NO GROWTH AFTER 9 HOURS     Culture, Blood 1    Collection Time: 05/25/24  9:39 PM    Specimen: Blood   Result Value Ref Range    Special Requests NO SPECIAL REQUESTS  LEFT  Antecubital        Culture NO GROWTH AFTER 9 HOURS     POCT Glucose    Collection Time: 05/25/24 11:32 PM   Result Value Ref Range    POC Glucose 97 65 - 100 mg/dL    Performed by: [place]    POCT Glucose    Collection Time: 05/26/24  8:22 AM   Result Value Ref Range    POC Glucose 83 65 - 100 mg/dL    Performed by: Wyatt        Assessment:     1-  Acute UTI (urinary tract infection), residual, recurrent, recently pan-sensitive E coli in urine. A/w vomiting, diarrhea and dehydration    2- History of  NIDDM, JEREMI, gastric bypass, orthostatic hypotension, bipolar dz. Stable.    Plan:     Observation  Rocephin IV  Follow cultures, stool studies  IVF hydration, gentle  Supportive Rx for Gi spx  Blood pressure control  Insulin scale  AM lab as needed    Continue essential home medications.  Patient is full code.  Further management depends on patient progress.  Thank you for the oppourtinity to contribute in the care of your patient.    Signed By: Miguel Mesa MD     May 26, 2024       
urinary retention, Esteevs cath removed, to follow. Still need in and out cath    Plan:     Stopped Rocephin IV  Vancomycin po resumed  Florinef+ Midodrine at higher doses  IVF hydration, gentle  Supportive Rx for Gi spx  Blood pressure control  Insulin scale  AM lab as needed    Dispo; home when diarrhea and orthostasis improve more  Will not pay for SNF daily  I spoke to daughter, updated 6-16-24    Signed By: Miguel Mesa MD     June 6, 2024       
In: 1117  Time Out: 1143  Minutes: 26    Akila Garner, OT

## 2024-06-09 NOTE — PLAN OF CARE
Problem: Discharge Planning  Goal: Discharge to home or other facility with appropriate resources  Outcome: Progressing  Flowsheets (Taken 6/8/2024 1920 by Arianna Roberts)  Discharge to home or other facility with appropriate resources:   Identify barriers to discharge with patient and caregiver   Identify discharge learning needs (meds, wound care, etc)     Problem: Pain  Goal: Verbalizes/displays adequate comfort level or baseline comfort level  Outcome: Progressing     Problem: Safety - Adult  Goal: Free from fall injury  Outcome: Progressing     Problem: Skin/Tissue Integrity  Goal: Absence of new skin breakdown  Description: 1.  Monitor for areas of redness and/or skin breakdown  2.  Assess vascular access sites hourly  3.  Every 4-6 hours minimum:  Change oxygen saturation probe site  4.  Every 4-6 hours:  If on nasal continuous positive airway pressure, respiratory therapy assess nares and determine need for appliance change or resting period.  Outcome: Progressing     Problem: ABCDS Injury Assessment  Goal: Absence of physical injury  Outcome: Progressing     Problem: Chronic Conditions and Co-morbidities  Goal: Patient's chronic conditions and co-morbidity symptoms are monitored and maintained or improved  Outcome: Progressing  Flowsheets (Taken 6/8/2024 1920 by Arianna Roberts)  Care Plan - Patient's Chronic Conditions and Co-Morbidity Symptoms are Monitored and Maintained or Improved:   Monitor and assess patient's chronic conditions and comorbid symptoms for stability, deterioration, or improvement   Collaborate with multidisciplinary team to address chronic and comorbid conditions and prevent exacerbation or deterioration

## 2024-06-09 NOTE — ED TRIAGE NOTES
Pt arrives via pov in wheelchair. Pt states discharged today from the 6th floor and too weak to get in car to go back home. Pt denies LOC today. Pt reports \"things floating in the room\". Pt denies unilateral numbness/tingling. Denies chest discomfort or sob.

## 2024-06-09 NOTE — ED PROVIDER NOTES
Emergency Department Provider Note       PCP: Paulette Negrete MD   Age: 68 y.o.   Sex: female     DISPOSITION Decision To Transfer 06/09/2024 09:50:54 PM       ICD-10-CM    1. Syncope, unspecified syncope type  R55       2. Orthostatic hypotension  I95.1       3. Elevated lactic acid level  R79.89           Medical Decision Making     60-year-old female with history of hypothyroidism, diabetes mellitus, hypomagnesemia, orthostatic hypotension, C. difficile enteritis who presents via wheelchair after being discharged from the 6 floor earlier today. Patient reportedly too weak to get in car and go back home and had near syncopal episode. Patient reports \"things floating in the room\".  Patient initially noted to be hypotensive. Hypotension resolved after receiving IV fluid hydration. Initial lactic acid 3.0.   Cultures obtained  Procalcitonin 0.21. Patient completed course of oral vancomycin during hospital course. Flagyl 500 mg IV ordered. CT head with no acute intracranial abnormality noted. chest ray with no amatory studies.  Bedside ultrasound with mild to moderate pericardial effusion.  Noticed pericardial tamponade noted.  Discussed need for admission with patient and her daughter.  Hospitalist consulted for admission.  Case discussed with Dr. Webber who presented to bedside.     They are both angered with the care that she received at discharge and do not want to be admitted back to this facility.  Both patient and daughter are requesting that patient be transferred to [facility] as they are unhappy with the care that they have received.  Patient and daughter are requesting transfer to outside facility.  Transfer line contacted.  Case discussed with hospitalist, Dr. Purdy.  Awaiting bed at this time at [facility].    Patient and daughter are requesting transfer to outside hospital as they are not happy with the care that they received during recent admission.     Patient presentation most likely

## 2024-06-09 NOTE — CARE COORDINATION
Pt is for discharge home today with [facility].  Referral called/faxed to [facility] for follow up home care as ordered. DME (wheelchair) from [facility] has been delivered at bedside. No additional CM orders received or supportive care needs expressed at this time.       05/28/24 4315   Service Assessment   Patient Orientation Alert and Oriented;Person;Place;Situation;Self   Cognition Alert   History Provided By Patient   Primary Caregiver Self   Support Systems Children   Patient's Healthcare Decision Maker is: Legal Next of Kin   PCP Verified by CM Yes   Last Visit to PCP Within last 6 months   Prior Functional Level Independent in ADLs/IADLs   Current Functional Level Independent in ADLs/IADLs   Can patient return to prior living arrangement Yes   Ability to make needs known: Good   Family able to assist with home care needs: Yes   Would you like for me to discuss the discharge plan with any other family members/significant others, and if so, who? Yes  (Daughter, Karin)   Financial Resources Medicare  ([facility])   Community Resources None   Social/Functional History   Lives With Daughter   Type of Home House   Home Layout One level   Home Access Level entry   Bathroom Shower/Tub Tub/Shower unit   Bathroom Toilet Standard   Bathroom Equipment Commode   Bathroom Accessibility Accessible   Home Equipment Walker - Rolling   Receives Help From Family   ADL Assistance Independent   Ambulation Assistance Independent   Active  Yes   Occupation Retired   Discharge Planning   Type of Residence House   Living Arrangements Children   Current Services Prior To Admission None   Potential Assistance Needed N/A   DME Ordered? No   Potential Assistance Purchasing Medications No   Type of Home Care Services None   Patient expects to be discharged to: House   One/Two Story Residence One story   Services At/After Discharge   Transition of Care Consult (CM Consult) Discharge Planning;Home Health   Internal Home Health

## 2024-06-09 NOTE — DISCHARGE SUMMARY
TROPHS 27.1 03/31/2024 09:44 PM      Coags No results found for: \"PROTIME\", \"INR\", \"APTT\"   A1c Lab Results   Component Value Date/Time    LABA1C 5.2 04/10/2024 06:28 AM     04/10/2024 06:28 AM      Lipids No results found for: \"CHOL\", \"HDL\", \"CHOLHDLRATIO\", \"TRIG\"   Thyroid  Lab Results   Component Value Date/Time    TSHELE 76.10 05/11/2024 04:15 AM    AUD2KLO 9.530 03/09/2024 05:25 AM        Most Recent UA Lab Results   Component Value Date/Time    COLORU YELLOW/STRAW 05/25/2024 08:17 PM    APPEARANCE TURBID 05/25/2024 08:17 PM    PROTEINU 30 05/25/2024 08:17 PM    GLUCOSEU Negative 05/25/2024 08:17 PM    GLUCOSEU Negative 03/31/2024 10:38 PM    KETUA TRACE 05/25/2024 08:17 PM    BILIRUBINUR Negative 05/25/2024 08:17 PM    BLOODU Negative 05/25/2024 08:17 PM    UROBILINOGEN 1.0 05/25/2024 08:17 PM    NITRU Negative 05/25/2024 08:17 PM    LEUKOCYTESUR SMALL 05/25/2024 08:17 PM    WBCUA 5-10 05/25/2024 08:17 PM    RBCUA 0-3 05/25/2024 08:17 PM    BACTERIA 3+ 05/25/2024 08:17 PM    LABCAST HYALINE 05/05/2024 03:49 AM    MUCUS 0 05/05/2024 03:49 AM        Microbiology:  Results       Procedure Component Value Units Date/Time    C. difficile toxin Molecular [[id number]]  (Abnormal) Collected: 05/27/24 2059    Order Status: Completed Specimen: Stool from Miscellaneous sample Updated: 05/28/24 1042     C. difficile toxin Molecular Positive        Comment: This specimen is positive for toxigenic C difficile by DNA amplification.  Repeat testing is not recommended, samples received within 7 days of this positive result will be rejected. Assay is not approved to test for cure, since nucleic acids may persist after effective treatment and may prompt false positive results.  RESULTS VERIFIED, PHONED TO AND READ BACK BY  KAMLA MOORE RN @1042 ON 5.28.24 BRADY         Fecal leukocytes [[id number]] Collected: 05/27/24 2059    Order Status: Completed Specimen: Stool Updated: 05/30/24 8831     Sample Site STOOL        White

## 2024-06-10 LAB
EKG ATRIAL RATE: 104 BPM
EKG DIAGNOSIS: NORMAL
EKG P AXIS: 48 DEGREES
EKG P-R INTERVAL: 157 MS
EKG Q-T INTERVAL: 334 MS
EKG QRS DURATION: 88 MS
EKG QTC CALCULATION (BAZETT): 440 MS
EKG R AXIS: 82 DEGREES
EKG T AXIS: 62 DEGREES
EKG VENTRICULAR RATE: 104 BPM

## 2024-06-10 NOTE — ED NOTES
Pt and family refused further blood work at this time. Second set of blood culture has not been drawn.     Jaci Solares, RN  06/09/24 [id number]

## 2024-06-10 NOTE — ED NOTES
Pt family member (daughter) requesting to take patient to another facility. Daughter seems upset with the 6th floor discharging patient today. Pt was unable to stand when transferring patient into bed on ED arrival. md Amanda aware. Charge aware.     Jaci Solares RN  06/09/24 [id number]

## 2024-06-10 NOTE — PROGRESS NOTES
Discussed with patient and with daughter at bedside, very upset about discharge home today when still symptomatic and still unable to stand up. Family and patient requesting hospital admission to another facility. Will cancel inpatient admission at [facility]. Discussed further with Dr. Patel. No bill for this encounter. Hospitalist will sign off. All questions answered to best of ability.

## 2024-06-10 NOTE — ED NOTES
Pt brief changed. Cleansed and new brief placed after incontinence of stool.      Daughter at bedside.     Jaci Solares, RN  06/09/24 [id number]

## 2024-06-10 NOTE — ED NOTES
Pt left AMA without signing paperwork with daughter. Charge and flowers aware.     Jaci Solares RN  06/09/24 [id number]

## 2024-06-11 LAB — COPPER SERPL-MCNC: 59 UG/DL (ref 80–158)

## 2024-06-14 LAB
BACTERIA SPEC CULT: NORMAL
SERVICE CMNT-IMP: NORMAL

## 2024-07-15 ENCOUNTER — APPOINTMENT (OUTPATIENT)
Dept: GENERAL RADIOLOGY | Age: 69
End: 2024-07-15
Payer: MEDICARE

## 2024-07-15 ENCOUNTER — APPOINTMENT (OUTPATIENT)
Dept: CT IMAGING | Age: 69
End: 2024-07-15
Payer: MEDICARE

## 2024-07-15 ENCOUNTER — HOSPITAL ENCOUNTER (INPATIENT)
Age: 69
LOS: 17 days | Discharge: HOME | End: 2024-08-01
Attending: EMERGENCY MEDICINE | Admitting: INTERNAL MEDICINE
Payer: MEDICARE

## 2024-07-15 DIAGNOSIS — E87.5 HYPERKALEMIA: ICD-10-CM

## 2024-07-15 DIAGNOSIS — A41.9 SEPTICEMIA (HCC): Primary | ICD-10-CM

## 2024-07-15 DIAGNOSIS — N10 ACUTE PYELONEPHRITIS: ICD-10-CM

## 2024-07-15 DIAGNOSIS — F31.64 BIPOLAR DISORDER, CURRENT EPISODE MIXED, SEVERE, WITH PSYCHOTIC FEATURES (HCC): ICD-10-CM

## 2024-07-15 DIAGNOSIS — M79.89 LEFT UPPER EXTREMITY SWELLING: ICD-10-CM

## 2024-07-15 DIAGNOSIS — N17.9 AKI (ACUTE KIDNEY INJURY) (HCC): ICD-10-CM

## 2024-07-15 PROBLEM — E87.1 HYPONATREMIA: Status: ACTIVE | Noted: 2024-07-15

## 2024-07-15 PROBLEM — E87.20 METABOLIC ACIDOSIS: Status: ACTIVE | Noted: 2024-07-15

## 2024-07-15 PROBLEM — R62.7 ADULT FAILURE TO THRIVE: Status: ACTIVE | Noted: 2024-07-15

## 2024-07-15 PROBLEM — R65.20 SEVERE SEPSIS (HCC): Status: ACTIVE | Noted: 2024-07-15

## 2024-07-15 PROBLEM — N21.0 BLADDER STONES: Status: ACTIVE | Noted: 2024-07-15

## 2024-07-15 LAB
ALBUMIN SERPL-MCNC: 2.2 G/DL (ref 3.2–4.6)
ALBUMIN/GLOB SERPL: 0.6 (ref 1–1.9)
ALP SERPL-CCNC: 337 U/L (ref 35–104)
ALT SERPL-CCNC: 37 U/L (ref 12–65)
ANION GAP SERPL CALC-SCNC: 12 MMOL/L (ref 9–18)
APPEARANCE UR: ABNORMAL
AST SERPL-CCNC: 37 U/L (ref 15–37)
BACTERIA URNS QL MICRO: ABNORMAL /HPF
BASOPHILS # BLD: 0.1 K/UL (ref 0–0.2)
BASOPHILS NFR BLD: 0 % (ref 0–2)
BILIRUB SERPL-MCNC: 0.6 MG/DL (ref 0–1.2)
BILIRUB UR QL: ABNORMAL
BUN SERPL-MCNC: 45 MG/DL (ref 8–23)
CALCIUM SERPL-MCNC: 10 MG/DL (ref 8.8–10.2)
CHLORIDE SERPL-SCNC: 105 MMOL/L (ref 98–107)
CO2 SERPL-SCNC: 14 MMOL/L (ref 20–28)
COLOR UR: ABNORMAL
CREAT SERPL-MCNC: 0.99 MG/DL (ref 0.6–1.1)
DIFFERENTIAL METHOD BLD: ABNORMAL
EOSINOPHIL # BLD: 0 K/UL (ref 0–0.8)
EOSINOPHIL NFR BLD: 0 % (ref 0.5–7.8)
EPI CELLS #/AREA URNS HPF: ABNORMAL /HPF
ERYTHROCYTE [DISTWIDTH] IN BLOOD BY AUTOMATED COUNT: 16.4 % (ref 11.9–14.6)
GLOBULIN SER CALC-MCNC: 3.9 G/DL (ref 2.3–3.5)
GLUCOSE BLD STRIP.AUTO-MCNC: 106 MG/DL (ref 65–100)
GLUCOSE BLD STRIP.AUTO-MCNC: 112 MG/DL (ref 65–100)
GLUCOSE SERPL-MCNC: 130 MG/DL (ref 70–99)
GLUCOSE UR STRIP.AUTO-MCNC: NEGATIVE MG/DL
HCT VFR BLD AUTO: 42.3 % (ref 35.8–46.3)
HGB BLD-MCNC: 14.1 G/DL (ref 11.7–15.4)
HGB UR QL STRIP: ABNORMAL
IMM GRANULOCYTES # BLD AUTO: 0.2 K/UL (ref 0–0.5)
IMM GRANULOCYTES NFR BLD AUTO: 1 % (ref 0–5)
KETONES UR QL STRIP.AUTO: NEGATIVE MG/DL
LACTATE SERPL-SCNC: 2.5 MMOL/L (ref 0.5–2)
LACTATE SERPL-SCNC: 2.7 MMOL/L (ref 0.5–2)
LEUKOCYTE ESTERASE UR QL STRIP.AUTO: ABNORMAL
LYMPHOCYTES # BLD: 2.4 K/UL (ref 0.5–4.6)
LYMPHOCYTES NFR BLD: 12 % (ref 13–44)
MCH RBC QN AUTO: 29.7 PG (ref 26.1–32.9)
MCHC RBC AUTO-ENTMCNC: 33.3 G/DL (ref 31.4–35)
MCV RBC AUTO: 89.2 FL (ref 82–102)
MONOCYTES # BLD: 0.9 K/UL (ref 0.1–1.3)
MONOCYTES NFR BLD: 4 % (ref 4–12)
MUCOUS THREADS URNS QL MICRO: ABNORMAL /LPF
NEUTS SEG # BLD: 16.9 K/UL (ref 1.7–8.2)
NEUTS SEG NFR BLD: 83 % (ref 43–78)
NITRITE UR QL STRIP.AUTO: POSITIVE
NRBC # BLD: 0.22 K/UL (ref 0–0.2)
OTHER OBSERVATIONS: ABNORMAL
PH UR STRIP: 8.5 (ref 5–9)
PLATELET # BLD AUTO: 297 K/UL (ref 150–450)
PMV BLD AUTO: 10 FL (ref 9.4–12.3)
POTASSIUM SERPL-SCNC: 6.7 MMOL/L (ref 3.5–5.1)
PROT SERPL-MCNC: 6.1 G/DL (ref 6.3–8.2)
PROT UR STRIP-MCNC: 100 MG/DL
RBC # BLD AUTO: 4.74 M/UL (ref 4.05–5.2)
RBC #/AREA URNS HPF: ABNORMAL /HPF
SARS-COV-2 RDRP RESP QL NAA+PROBE: NOT DETECTED
SERVICE CMNT-IMP: ABNORMAL
SERVICE CMNT-IMP: ABNORMAL
SODIUM SERPL-SCNC: 131 MMOL/L (ref 136–145)
SOURCE: NORMAL
SP GR UR REFRACTOMETRY: 1.01 (ref 1–1.02)
TROPONIN T SERPL HS-MCNC: 18 NG/L (ref 0–14)
TROPONIN T SERPL HS-MCNC: 18 NG/L (ref 0–14)
UROBILINOGEN UR QL STRIP.AUTO: 0.2 EU/DL (ref 0.2–1)
WBC # BLD AUTO: 20.5 K/UL (ref 4.3–11.1)
WBC URNS QL MICRO: >100 /HPF

## 2024-07-15 PROCEDURE — 87040 BLOOD CULTURE FOR BACTERIA: CPT

## 2024-07-15 PROCEDURE — 87635 SARS-COV-2 COVID-19 AMP PRB: CPT

## 2024-07-15 PROCEDURE — 6360000002 HC RX W HCPCS: Performed by: EMERGENCY MEDICINE

## 2024-07-15 PROCEDURE — 74176 CT ABD & PELVIS W/O CONTRAST: CPT

## 2024-07-15 PROCEDURE — 82962 GLUCOSE BLOOD TEST: CPT

## 2024-07-15 PROCEDURE — 84484 ASSAY OF TROPONIN QUANT: CPT

## 2024-07-15 PROCEDURE — 80053 COMPREHEN METABOLIC PANEL: CPT

## 2024-07-15 PROCEDURE — 1100000000 HC RM PRIVATE

## 2024-07-15 PROCEDURE — 83605 ASSAY OF LACTIC ACID: CPT

## 2024-07-15 PROCEDURE — 85025 COMPLETE CBC W/AUTO DIFF WBC: CPT

## 2024-07-15 PROCEDURE — 6370000000 HC RX 637 (ALT 250 FOR IP): Performed by: INTERNAL MEDICINE

## 2024-07-15 PROCEDURE — 93005 ELECTROCARDIOGRAM TRACING: CPT | Performed by: EMERGENCY MEDICINE

## 2024-07-15 PROCEDURE — 2500000003 HC RX 250 WO HCPCS: Performed by: EMERGENCY MEDICINE

## 2024-07-15 PROCEDURE — 2580000003 HC RX 258: Performed by: EMERGENCY MEDICINE

## 2024-07-15 PROCEDURE — 71045 X-RAY EXAM CHEST 1 VIEW: CPT

## 2024-07-15 PROCEDURE — 99285 EMERGENCY DEPT VISIT HI MDM: CPT

## 2024-07-15 PROCEDURE — 84132 ASSAY OF SERUM POTASSIUM: CPT

## 2024-07-15 PROCEDURE — 81003 URINALYSIS AUTO W/O SCOPE: CPT

## 2024-07-15 PROCEDURE — 36415 COLL VENOUS BLD VENIPUNCTURE: CPT

## 2024-07-15 RX ORDER — ACETAMINOPHEN 325 MG/1
650 TABLET ORAL EVERY 6 HOURS PRN
Status: DISCONTINUED | OUTPATIENT
Start: 2024-07-15 | End: 2024-08-01 | Stop reason: HOSPADM

## 2024-07-15 RX ORDER — DEXTROSE MONOHYDRATE 100 MG/ML
INJECTION, SOLUTION INTRAVENOUS CONTINUOUS PRN
Status: DISCONTINUED | OUTPATIENT
Start: 2024-07-15 | End: 2024-08-01 | Stop reason: HOSPADM

## 2024-07-15 RX ORDER — ACETAMINOPHEN 650 MG/1
650 SUPPOSITORY RECTAL EVERY 6 HOURS PRN
Status: DISCONTINUED | OUTPATIENT
Start: 2024-07-15 | End: 2024-08-01 | Stop reason: HOSPADM

## 2024-07-15 RX ORDER — VANCOMYCIN HYDROCHLORIDE 125 MG/1
125 CAPSULE ORAL 4 TIMES DAILY
Status: DISPENSED | OUTPATIENT
Start: 2024-07-15 | End: 2024-07-30

## 2024-07-15 RX ORDER — SODIUM CHLORIDE 0.9 % (FLUSH) 0.9 %
5-40 SYRINGE (ML) INJECTION PRN
Status: DISCONTINUED | OUTPATIENT
Start: 2024-07-15 | End: 2024-08-01 | Stop reason: HOSPADM

## 2024-07-15 RX ORDER — ONDANSETRON 4 MG/1
4 TABLET, ORALLY DISINTEGRATING ORAL EVERY 8 HOURS PRN
Status: DISCONTINUED | OUTPATIENT
Start: 2024-07-15 | End: 2024-08-01 | Stop reason: HOSPADM

## 2024-07-15 RX ORDER — 0.9 % SODIUM CHLORIDE 0.9 %
1000 INTRAVENOUS SOLUTION INTRAVENOUS ONCE
Status: DISCONTINUED | OUTPATIENT
Start: 2024-07-15 | End: 2024-07-15

## 2024-07-15 RX ORDER — ONDANSETRON 2 MG/ML
4 INJECTION INTRAMUSCULAR; INTRAVENOUS EVERY 6 HOURS PRN
Status: DISCONTINUED | OUTPATIENT
Start: 2024-07-15 | End: 2024-08-01 | Stop reason: HOSPADM

## 2024-07-15 RX ORDER — SODIUM BICARBONATE 650 MG/1
650 TABLET ORAL 4 TIMES DAILY
Status: DISCONTINUED | OUTPATIENT
Start: 2024-07-15 | End: 2024-07-15

## 2024-07-15 RX ORDER — LEVOTHYROXINE SODIUM 0.15 MG/1
150 TABLET ORAL DAILY
Status: DISCONTINUED | OUTPATIENT
Start: 2024-07-16 | End: 2024-07-16

## 2024-07-15 RX ORDER — POLYETHYLENE GLYCOL 3350 17 G/17G
17 POWDER, FOR SOLUTION ORAL DAILY PRN
Status: DISCONTINUED | OUTPATIENT
Start: 2024-07-15 | End: 2024-08-01 | Stop reason: HOSPADM

## 2024-07-15 RX ORDER — 0.9 % SODIUM CHLORIDE 0.9 %
30 INTRAVENOUS SOLUTION INTRAVENOUS
Status: COMPLETED | OUTPATIENT
Start: 2024-07-15 | End: 2024-07-15

## 2024-07-15 RX ORDER — SODIUM CHLORIDE, SODIUM LACTATE, POTASSIUM CHLORIDE, AND CALCIUM CHLORIDE .6; .31; .03; .02 G/100ML; G/100ML; G/100ML; G/100ML
1000 INJECTION, SOLUTION INTRAVENOUS ONCE
Status: COMPLETED | OUTPATIENT
Start: 2024-07-15 | End: 2024-07-16

## 2024-07-15 RX ORDER — LANOLIN ALCOHOL/MO/W.PET/CERES
800 CREAM (GRAM) TOPICAL 2 TIMES DAILY
Status: DISCONTINUED | OUTPATIENT
Start: 2024-07-16 | End: 2024-07-18

## 2024-07-15 RX ORDER — SODIUM CHLORIDE 0.9 % (FLUSH) 0.9 %
5-40 SYRINGE (ML) INJECTION EVERY 12 HOURS SCHEDULED
Status: DISCONTINUED | OUTPATIENT
Start: 2024-07-16 | End: 2024-08-01 | Stop reason: HOSPADM

## 2024-07-15 RX ORDER — POTASSIUM CHLORIDE 7.45 MG/ML
10 INJECTION INTRAVENOUS PRN
Status: DISCONTINUED | OUTPATIENT
Start: 2024-07-15 | End: 2024-07-19

## 2024-07-15 RX ORDER — SODIUM POLYSTYRENE SULFONATE 15 G/60ML
30 SUSPENSION ORAL; RECTAL ONCE
Status: COMPLETED | OUTPATIENT
Start: 2024-07-15 | End: 2024-07-15

## 2024-07-15 RX ORDER — IBUPROFEN 600 MG/1
1 TABLET ORAL PRN
Status: DISCONTINUED | OUTPATIENT
Start: 2024-07-15 | End: 2024-08-01 | Stop reason: HOSPADM

## 2024-07-15 RX ORDER — SODIUM CHLORIDE 9 MG/ML
INJECTION, SOLUTION INTRAVENOUS PRN
Status: DISCONTINUED | OUTPATIENT
Start: 2024-07-15 | End: 2024-08-01 | Stop reason: HOSPADM

## 2024-07-15 RX ORDER — CALCIUM GLUCONATE 20 MG/ML
1000 INJECTION, SOLUTION INTRAVENOUS ONCE
Status: COMPLETED | OUTPATIENT
Start: 2024-07-15 | End: 2024-07-15

## 2024-07-15 RX ORDER — HEPARIN SODIUM 5000 [USP'U]/ML
5000 INJECTION, SOLUTION INTRAVENOUS; SUBCUTANEOUS EVERY 8 HOURS SCHEDULED
Status: DISCONTINUED | OUTPATIENT
Start: 2024-07-16 | End: 2024-08-01 | Stop reason: HOSPADM

## 2024-07-15 RX ORDER — POTASSIUM CHLORIDE 20 MEQ/1
40 TABLET, EXTENDED RELEASE ORAL PRN
Status: DISCONTINUED | OUTPATIENT
Start: 2024-07-15 | End: 2024-07-19

## 2024-07-15 RX ORDER — MAGNESIUM SULFATE IN WATER 40 MG/ML
2000 INJECTION, SOLUTION INTRAVENOUS PRN
Status: DISCONTINUED | OUTPATIENT
Start: 2024-07-15 | End: 2024-08-01 | Stop reason: HOSPADM

## 2024-07-15 RX ORDER — SODIUM POLYSTYRENE SULFONATE 15 G/60ML
30 SUSPENSION ORAL; RECTAL ONCE
Status: DISCONTINUED | OUTPATIENT
Start: 2024-07-15 | End: 2024-07-15

## 2024-07-15 RX ADMIN — CEFTRIAXONE 1000 MG: 1 INJECTION, POWDER, FOR SOLUTION INTRAMUSCULAR; INTRAVENOUS at 22:06

## 2024-07-15 RX ADMIN — DEXTROSE MONOHYDRATE 250 ML: 100 INJECTION, SOLUTION INTRAVENOUS at 22:23

## 2024-07-15 RX ADMIN — SODIUM POLYSTYRENE SULFONATE 30 G: 15 SUSPENSION ORAL; RECTAL at 22:35

## 2024-07-15 RX ADMIN — CALCIUM GLUCONATE 1000 MG: 20 INJECTION, SOLUTION INTRAVENOUS at 22:09

## 2024-07-15 RX ADMIN — SODIUM CHLORIDE 2178 ML: 9 INJECTION, SOLUTION INTRAVENOUS at 22:11

## 2024-07-15 ASSESSMENT — PAIN SCALES - GENERAL: PAINLEVEL_OUTOF10: 0

## 2024-07-15 ASSESSMENT — PAIN - FUNCTIONAL ASSESSMENT: PAIN_FUNCTIONAL_ASSESSMENT: 0-10

## 2024-07-15 NOTE — ED TRIAGE NOTES
Pt coming from home via [facility]. Home health called EMS d/t pt being pale, cold to the touch and confused. Pt was hospitalized last month for C diff. Pt is bed bound at baseline.     EMS vitals; /60, , , 95% RA

## 2024-07-15 NOTE — ED PROVIDER NOTES
Emergency Department Provider Note       PCP: Paulette Negrete MD   Age: 68 y.o.   Sex: female     DISPOSITION Decision To Admit 07/15/2024 09:45:30 PM       ICD-10-CM    1. Septicemia (HCC)  A41.9       2. Acute pyelonephritis  N10       3. ZEUS (acute kidney injury) (HCC)  N17.9       4. Hyperkalemia  E87.5           Medical Decision Making     Patient is a 68-year-old female presents via EMS from home after EMS was called for patient being pale cool to touch and confused.  No family or friends are present at bedside and history is limited.  In reviewing patient's chart it appears patient was admitted 5/25/2024 through 6/9/2024 for acute abdominal pain UTI acute diarrhea was diagnosed with C. difficile enteritis.  Patient does have a history of recurrent UTI, NIDDM, JEREMI, gastric bypass, cholecystectomy, hysterectomy, orthostatic hypotension, bipolar disorder          Altered Mental Status  Presenting symptoms: confusion    Severity:  Moderate  Episode history:  Multiple  Timing:  Intermittent  Progression:  Waxing and waning  Chronicity:  New  Context: not alcohol use, not dementia, not drug use and not nursing home resident    Associated symptoms: fever      Differential diagnosis includes was not limited to UTI, sepsis, DKA      Patient's physical exam is remarkable for generalized weakness and tachycardia.    Patient's laboratory testing is significant for Leukocytosis with a WBC count of 20.5, BUNs/CR of 45/0.99 with a potassium of 6.7 and I confirmed with lab no hemolysis.  Hyperkalemia protocol initiated.  NS 30 cc/kg administered.  Patient cultured and antibiotics given.  Will admit to hospitalist.     1 or more acute illnesses that pose a threat to life or bodily function.   Shared medical decision making was utilized in creating the patients health plan today.    I independently ordered and reviewed each unique test.       I interpreted the X-rays CXR NAD.  I interpreted the CT Scan A/P NO SBO.  My

## 2024-07-16 LAB
ANION GAP SERPL CALC-SCNC: 13 MMOL/L (ref 9–18)
BASOPHILS # BLD: 0 K/UL (ref 0–0.2)
BASOPHILS NFR BLD: 0 % (ref 0–2)
BUN SERPL-MCNC: 48 MG/DL (ref 8–23)
CALCIUM SERPL-MCNC: 9.5 MG/DL (ref 8.8–10.2)
CHLORIDE SERPL-SCNC: 107 MMOL/L (ref 98–107)
CO2 SERPL-SCNC: 16 MMOL/L (ref 20–28)
CO2 SERPL-SCNC: ABNORMAL MMOL/L (ref 20–28)
CREAT SERPL-MCNC: 0.99 MG/DL (ref 0.6–1.1)
DIFFERENTIAL METHOD BLD: ABNORMAL
EKG ATRIAL RATE: 106 BPM
EKG ATRIAL RATE: 107 BPM
EKG DIAGNOSIS: NORMAL
EKG DIAGNOSIS: NORMAL
EKG P AXIS: 40 DEGREES
EKG P AXIS: 46 DEGREES
EKG P-R INTERVAL: 159 MS
EKG P-R INTERVAL: 169 MS
EKG Q-T INTERVAL: 307 MS
EKG Q-T INTERVAL: 320 MS
EKG QRS DURATION: 91 MS
EKG QRS DURATION: 93 MS
EKG QTC CALCULATION (BAZETT): 408 MS
EKG QTC CALCULATION (BAZETT): 427 MS
EKG R AXIS: 20 DEGREES
EKG R AXIS: 43 DEGREES
EKG T AXIS: 67 DEGREES
EKG T AXIS: 73 DEGREES
EKG VENTRICULAR RATE: 106 BPM
EKG VENTRICULAR RATE: 107 BPM
EOSINOPHIL # BLD: 0 K/UL (ref 0–0.8)
EOSINOPHIL NFR BLD: 0 % (ref 0.5–7.8)
ERYTHROCYTE [DISTWIDTH] IN BLOOD BY AUTOMATED COUNT: 16.5 % (ref 11.9–14.6)
ERYTHROCYTE [DISTWIDTH] IN BLOOD BY AUTOMATED COUNT: NORMAL %
GLUCOSE BLD STRIP.AUTO-MCNC: 104 MG/DL (ref 65–100)
GLUCOSE BLD STRIP.AUTO-MCNC: 111 MG/DL (ref 65–100)
GLUCOSE BLD STRIP.AUTO-MCNC: 122 MG/DL (ref 65–100)
GLUCOSE BLD STRIP.AUTO-MCNC: 69 MG/DL (ref 65–100)
GLUCOSE BLD STRIP.AUTO-MCNC: 87 MG/DL (ref 65–100)
GLUCOSE SERPL-MCNC: 115 MG/DL (ref 70–99)
HCT VFR BLD AUTO: 39.7 % (ref 35.8–46.3)
HCT VFR BLD AUTO: NORMAL % (ref 35.8–46.3)
HGB BLD-MCNC: 12.9 G/DL (ref 11.7–15.4)
HGB BLD-MCNC: NORMAL G/DL (ref 11.7–15.4)
IMM GRANULOCYTES # BLD AUTO: 0.2 K/UL (ref 0–0.5)
IMM GRANULOCYTES NFR BLD AUTO: 1 % (ref 0–5)
LACTATE SERPL-SCNC: 2.8 MMOL/L (ref 0.5–2)
LYMPHOCYTES # BLD: 2 K/UL (ref 0.5–4.6)
LYMPHOCYTES NFR BLD: 13 % (ref 13–44)
MCH RBC QN AUTO: 29.9 PG (ref 26.1–32.9)
MCH RBC QN AUTO: NORMAL PG (ref 26.1–32.9)
MCHC RBC AUTO-ENTMCNC: 32.5 G/DL (ref 31.4–35)
MCHC RBC AUTO-ENTMCNC: NORMAL G/DL (ref 31.4–35)
MCV RBC AUTO: 92.1 FL (ref 82–102)
MCV RBC AUTO: NORMAL FL (ref 82–102)
MONOCYTES # BLD: 0.9 K/UL (ref 0.1–1.3)
MONOCYTES NFR BLD: 6 % (ref 4–12)
NEUTS SEG # BLD: 12.3 K/UL (ref 1.7–8.2)
NEUTS SEG NFR BLD: 80 % (ref 43–78)
NRBC # BLD: 0.12 K/UL (ref 0–0.2)
NRBC # BLD: NORMAL K/UL
PLATELET # BLD AUTO: 191 K/UL (ref 150–450)
PLATELET # BLD AUTO: NORMAL K/UL (ref 150–450)
PLATELET COMMENT: ADEQUATE
PMV BLD AUTO: 10.4 FL (ref 9.4–12.3)
PMV BLD AUTO: NORMAL FL (ref 9.4–12.3)
POTASSIUM SERPL-SCNC: 4.9 MMOL/L (ref 3.5–5.1)
POTASSIUM SERPL-SCNC: 6.3 MMOL/L (ref 3.5–5.1)
POTASSIUM SERPL-SCNC: ABNORMAL MMOL/L (ref 3.5–5.1)
RBC # BLD AUTO: 4.31 M/UL (ref 4.05–5.2)
RBC # BLD AUTO: NORMAL M/UL
RBC MORPH BLD: ABNORMAL
SERVICE CMNT-IMP: ABNORMAL
SERVICE CMNT-IMP: NORMAL
SERVICE CMNT-IMP: NORMAL
SODIUM SERPL-SCNC: 134 MMOL/L (ref 136–145)
WBC # BLD AUTO: 15.4 K/UL (ref 4.3–11.1)
WBC # BLD AUTO: NORMAL K/UL (ref 4.3–11.1)
WBC MORPH BLD: ABNORMAL

## 2024-07-16 PROCEDURE — 6370000000 HC RX 637 (ALT 250 FOR IP): Performed by: INTERNAL MEDICINE

## 2024-07-16 PROCEDURE — 93010 ELECTROCARDIOGRAM REPORT: CPT | Performed by: INTERNAL MEDICINE

## 2024-07-16 PROCEDURE — 1100000003 HC PRIVATE W/ TELEMETRY

## 2024-07-16 PROCEDURE — 99221 1ST HOSP IP/OBS SF/LOW 40: CPT | Performed by: NURSE PRACTITIONER

## 2024-07-16 PROCEDURE — 97161 PT EVAL LOW COMPLEX 20 MIN: CPT

## 2024-07-16 PROCEDURE — 2500000003 HC RX 250 WO HCPCS: Performed by: INTERNAL MEDICINE

## 2024-07-16 PROCEDURE — 82962 GLUCOSE BLOOD TEST: CPT

## 2024-07-16 PROCEDURE — 36415 COLL VENOUS BLD VENIPUNCTURE: CPT

## 2024-07-16 PROCEDURE — 97530 THERAPEUTIC ACTIVITIES: CPT

## 2024-07-16 PROCEDURE — 92610 EVALUATE SWALLOWING FUNCTION: CPT

## 2024-07-16 PROCEDURE — 82374 ASSAY BLOOD CARBON DIOXIDE: CPT

## 2024-07-16 PROCEDURE — 2580000003 HC RX 258: Performed by: INTERNAL MEDICINE

## 2024-07-16 PROCEDURE — 51798 US URINE CAPACITY MEASURE: CPT

## 2024-07-16 PROCEDURE — 97165 OT EVAL LOW COMPLEX 30 MIN: CPT

## 2024-07-16 PROCEDURE — 92523 SPEECH SOUND LANG COMPREHEN: CPT

## 2024-07-16 PROCEDURE — 51702 INSERT TEMP BLADDER CATH: CPT

## 2024-07-16 PROCEDURE — 80048 BASIC METABOLIC PNL TOTAL CA: CPT

## 2024-07-16 PROCEDURE — 97112 NEUROMUSCULAR REEDUCATION: CPT

## 2024-07-16 PROCEDURE — 85025 COMPLETE CBC W/AUTO DIFF WBC: CPT

## 2024-07-16 PROCEDURE — 84132 ASSAY OF SERUM POTASSIUM: CPT

## 2024-07-16 PROCEDURE — 6360000002 HC RX W HCPCS: Performed by: INTERNAL MEDICINE

## 2024-07-16 RX ORDER — SODIUM CHLORIDE 9 MG/ML
INJECTION, SOLUTION INTRAVENOUS CONTINUOUS
Status: DISCONTINUED | OUTPATIENT
Start: 2024-07-16 | End: 2024-07-16

## 2024-07-16 RX ORDER — SODIUM CHLORIDE 9 MG/ML
INJECTION, SOLUTION INTRAVENOUS CONTINUOUS
Status: ACTIVE | OUTPATIENT
Start: 2024-07-16 | End: 2024-07-17

## 2024-07-16 RX ADMIN — SODIUM CHLORIDE, PRESERVATIVE FREE 10 ML: 5 INJECTION INTRAVENOUS at 21:12

## 2024-07-16 RX ADMIN — SODIUM BICARBONATE: 84 INJECTION, SOLUTION INTRAVENOUS at 09:57

## 2024-07-16 RX ADMIN — HEPARIN SODIUM 5000 UNITS: 5000 INJECTION INTRAVENOUS; SUBCUTANEOUS at 06:06

## 2024-07-16 RX ADMIN — VANCOMYCIN HYDROCHLORIDE 125 MG: 125 CAPSULE ORAL at 09:07

## 2024-07-16 RX ADMIN — SODIUM BICARBONATE: 84 INJECTION, SOLUTION INTRAVENOUS at 03:10

## 2024-07-16 RX ADMIN — SODIUM CHLORIDE: 9 INJECTION, SOLUTION INTRAVENOUS at 09:57

## 2024-07-16 RX ADMIN — VANCOMYCIN HYDROCHLORIDE 125 MG: 125 CAPSULE ORAL at 14:14

## 2024-07-16 RX ADMIN — VANCOMYCIN HYDROCHLORIDE 125 MG: 125 CAPSULE ORAL at 21:12

## 2024-07-16 RX ADMIN — MAGNESIUM GLUCONATE 500 MG ORAL TABLET 800 MG: 500 TABLET ORAL at 09:07

## 2024-07-16 RX ADMIN — MAGNESIUM GLUCONATE 500 MG ORAL TABLET 800 MG: 500 TABLET ORAL at 04:21

## 2024-07-16 RX ADMIN — ACETAMINOPHEN 650 MG: 325 TABLET ORAL at 16:27

## 2024-07-16 RX ADMIN — CEFTRIAXONE 1000 MG: 1 INJECTION, POWDER, FOR SOLUTION INTRAMUSCULAR; INTRAVENOUS at 21:11

## 2024-07-16 RX ADMIN — HEPARIN SODIUM 5000 UNITS: 5000 INJECTION INTRAVENOUS; SUBCUTANEOUS at 14:14

## 2024-07-16 RX ADMIN — HEPARIN SODIUM 5000 UNITS: 5000 INJECTION INTRAVENOUS; SUBCUTANEOUS at 21:12

## 2024-07-16 RX ADMIN — SODIUM CHLORIDE: 9 INJECTION, SOLUTION INTRAVENOUS at 20:19

## 2024-07-16 RX ADMIN — SODIUM CHLORIDE, POTASSIUM CHLORIDE, SODIUM LACTATE AND CALCIUM CHLORIDE 1000 ML: 600; 310; 30; 20 INJECTION, SOLUTION INTRAVENOUS at 00:49

## 2024-07-16 RX ADMIN — VANCOMYCIN HYDROCHLORIDE 125 MG: 125 CAPSULE ORAL at 16:27

## 2024-07-16 RX ADMIN — VANCOMYCIN HYDROCHLORIDE 125 MG: 125 CAPSULE ORAL at 04:21

## 2024-07-16 RX ADMIN — MAGNESIUM GLUCONATE 500 MG ORAL TABLET 800 MG: 500 TABLET ORAL at 21:11

## 2024-07-16 RX ADMIN — LEVOTHYROXINE SODIUM 175 MCG: 0.07 TABLET ORAL at 09:07

## 2024-07-16 ASSESSMENT — PAIN SCALES - GENERAL
PAINLEVEL_OUTOF10: 0
PAINLEVEL_OUTOF10: 9

## 2024-07-16 NOTE — PROGRESS NOTES
Comprehensive Nutrition Assessment    Type and Reason for Visit: Initial, Consult  General Nutrition Management/other reason (Hospitalists)    Nutrition Recommendations/Plan:   Meals and Snacks:  Diet: Continue current diet; additional texture modifications per SLP  Nutrition Supplement Therapy:  Medical food supplement therapy:  Initiate Nepro three times per day (this provides 420 kcal and 19 grams protein per bottle)  1:1 feed per discussion with SLP and RN      Malnutrition Assessment:  Malnutrition Status: At risk for malnutrition (Comment) (sig weight loss s/p GBP, minimal PO intake since GBP, mild wasting noted through NFPE as noted below)    Mild orbital and wasting to hand noted. Difficult to assess with minimal pt participation in NFPE.    Nutrition Assessment:  Nutrition History: 7/16: RD unable to obtain detailed nutrition history at this time.   From 5/28 assessment:  Pt reports poor intake since her gastric bypass a year and a half ago. She stated she was able to take 4-5 tablespoons of food at a time. She stated she tried protein shakes however she did not like them because they were too sweet. She also reports nausea, vomiting, and diarrhea since surgery.      Do You Have Any Cultural, Quaker, or Ethnic Food Preferences?: No (ELBA)   Weight History: 5/8/23: 321# (cardio OV), 8/14/23: 317# (bariatrics OV), 1/18/24: 242# (Gen Surg OV)  6/4/24: 183# (previous hospitalization  CBW: 163# (7/16 bed scale weight)  49% body weight loss in 1 year (s/p gastric bypass).   Nutrition Background:       PMH: MDD, hypothyroidism, hiatal hernia, T2DM, RYGB in September of 2023. Presented with lethargy and weakness. Admitted with severe sepsis,  metabolic acidosis, hyperkalemia, hyponatremia, and ZEUS.  Nutrition Interval:  7/16: SLP downgrades diet to soft and bite size  Visited with patient in room. CORNELIUS Elena present at time of RD assessment. Patient minimally interactive with RD and RN. RD observed 0% of

## 2024-07-16 NOTE — CONSULTS
Urology Consult    Requesting MD:     Patient: Jesika Olsen MRN: [id number]  SSN: [id number]    YOB: 1955  Age: 68 y.o.  Sex: female      Subjective:      Jesika Olsen is a 68 y.o. female with medical history of MDD, hypothyroidism, hiatal hernia (on omeprazole), Dm type II, and previous episodes of C diff colitis presents from home with worsening generalized lethargy and weakness. Patient has had several hospitalizations and her daughter states that \"she is so weak she is essentially bedbound at home and can't do anything.\" Patient has a known history of frequent, recurrent UTIs (growing pansensitive E. Coli per chart review). She has not been able to eat or drink much at all. No recent sick contacts.      ED course: CT abd/pelvis shows multiple bladder stones and a liver lesion. Creatinine of 0.9 (baseline around 0.3-0.4), potassium of 6.7 (nonhemolyzed), and bicarb of 14. WBC count of 21. EKG shows NSR without peaked T waves. Given Rocephin and fluid boluses. Also given calcium gluconate and Kayexalate. Ordered insulin but held as patient's blood sugar at 106 even with dextrose given intravenously. Hospitalist consulted for admission.     Urology consult for bladder stones seen on CT.    Patient seen at bedside. AMS, poor historian. No family at bedside.    Past Medical History:   Diagnosis Date    History of gastric bypass 09/13/2023    JEREMI (obstructive sleep apnea)     can not tolerated c pap    Other ill-defined conditions(799.89)     Edema    Psychiatric disorder     Nervous breakdown 2007; bipolar, anxiety     Past Surgical History:   Procedure Laterality Date    CHEST SURGERY      Emypema; chest tubes 2006    CHOLECYSTECTOMY      GYN  2020    Ovary removed; tubal ligation    HYSTERECTOMY (CERVIX STATUS UNKNOWN)  2021    UT UNLISTED PROCEDURE ABDOMEN PERITONEUM & OMENTUM      Intestine \"twisted\" while pregnant with 2nd child    UT UNLISTED PROCEDURE CARDIAC SURGERY      Negative  for that written in the History of Present Illness.    Objective:     Vitals:    07/16/24 0423 07/16/24 0656 07/16/24 0727 07/16/24 0920   BP:   104/79    Pulse:  93 92    Resp:   18    Temp:       TempSrc:   Axillary    SpO2:   100%    Weight: 73.8 kg (162 lb 11.2 oz)      Height:    1.753 m (5' 9.02\")        Physical Exam:  GENERAL ASSESSMENT: alert,no acute distress and no anxiety, depression or agitation  Chest: normal work of breathing  CVS exam: normal rate, regular rhythm, normal S1, S2, no murmurs, rubs, clicks or gallops.  ABDOMEN: not done  Neurological exam reveals alert, oriented, normal speech, no focal findings or movement disorder noted.  FEMALE GENITOURINARY EXAM: not done  MALE GENITAL EXAM: not done    Assessment:   Voiding spontaneously. WBC 14.3. UA pos for nitrites. Bp low.    CT ABDOMEN PELVIS WO CONTRAST Additional Contrast? None  Order: [id number]  Status: Final result       Visible to patient: Yes (not seen)       Next appt: None    0 Result Notes  Details    Reading Physician Reading Date Result Priority   Chris Dover MD  [phone] 7/15/2024      Narrative & Impression  CT OF THE ABDOMEN AND PELVIS     INDICATION: Confused     TECHNIQUE: Multiple 2D axial images were obtained through the abdomen and pelvis  without IV contrast.  Oral contrast was used for bowel opacification.  Radiation  dose reduction techniques were used for this study:  All CT scans performed at  this facility use one or all of the following: Automated exposure control,  adjustment of the mA and/or kVp according to patient's size, iterative  reconstruction.     COMPARISON: None     FINDINGS:  - LUNG BASES: No infiltrates or masses.     - LIVER: Mostly fatty. Cystic area within the right lobe 2.9 x 3.2 cm.  - GALLBLADDER/BILE DUCTS: Cholecystectomy changes. Dilatation.  - PANCREAS: Normal.  - SPLEEN: Normal.     - ADRENALS: Enlargement of the left adrenal gland. This suggests hyperplasia.  The right adrenal gland

## 2024-07-16 NOTE — PROGRESS NOTES
Continuous difficulties from  to obtain blood with peripheral stick for lab work. Several attempts have been unsuccessful. Dr Mccormick is aware.  to bedside at this time to re-attempt, verbalized understanding that Potassium lab is priority. Dr Mccormick aware that lactic acid lab may not be able to draw related  to difficulty obtaining blood.

## 2024-07-16 NOTE — PROGRESS NOTES
BS 69, patient unable to take Glucose tablets per CORNELIUS Barros. Dinner tray provided, daughter at bedside feeding patient dinner. Patient is alert and without complaints.

## 2024-07-16 NOTE — CARE COORDINATION
MSN, CM:  spoke with patient and daughter this afternoon about discharge planning.  Patient lives with her daughter in own home with no steps for entrance.  Patient is dependent on daughter for most ADL's and requires a w/c for mobility.  Patient is current with [facility] but denies any palliative care, rehab, or home oxygen currently.  Patient was admitted for weakness, AMS and HH RN stated patient was cold to the touch.  CT revealed multiply bladder stones and liver lesion.  Urology consulted.  IV fluids and abx started.  PT/OT consulted for evaluation and recommendations.  Case Management will continue to follow for any discharge needs.       07/16/24 9506   Service Assessment   Patient Orientation Alert and Oriented   Cognition Alert   History Provided By Patient;Child/Family   Primary Caregiver Self   Accompanied By/Relationship daughter   Support Systems Children   Patient's Healthcare Decision Maker is: Legal Next of Kin   PCP Verified by CM Yes   Last Visit to PCP Within last 3 months   Prior Functional Level Assistance with the following:;Housework;Cooking;Shopping;Mobility   Current Functional Level Other (see comment)  (PT/OT consulted)   Can patient return to prior living arrangement Yes   Ability to make needs known: Good   Would you like for me to discuss the discharge plan with any other family members/significant others, and if so, who? Yes  (Daughter)   Financial Resources Medicare   Community Resources ECF/Home Care  ([facility])   Social/Functional History   Lives With Daughter   Type of Home House   Home Layout One level   Home Access Level entry   Bathroom Shower/Tub Tub/Shower unit   Bathroom Toilet Standard   Bathroom Equipment None   Bathroom Accessibility Accessible   Home Equipment Wheelchair - Manual   Receives Help From Family   ADL Assistance Independent   Homemaking Assistance Independent   Homemaking Responsibilities Yes   Ambulation Assistance Non-ambulatory   Transfer Assistance

## 2024-07-16 NOTE — PLAN OF CARE
Problem: Discharge Planning  Goal: Discharge to home or other facility with appropriate resources  Outcome: Progressing  Flowsheets  Taken 7/16/2024 0233 by Maye Coates RN  Discharge to home or other facility with appropriate resources: Identify barriers to discharge with patient and caregiver  Taken 7/16/2024 0200 by Maye Coates RN  Discharge to home or other facility with appropriate resources: Identify barriers to discharge with patient and caregiver     Problem: Safety - Adult  Goal: Free from fall injury  Outcome: Progressing  Flowsheets (Taken 7/16/2024 0151 by Maye Coates RN)  Free From Fall Injury: Instruct family/caregiver on patient safety     Problem: Pain  Goal: Verbalizes/displays adequate comfort level or baseline comfort level  Outcome: Progressing  Flowsheets (Taken 7/16/2024 0212 by Maye Coates RN)  Verbalizes/displays adequate comfort level or baseline comfort level:   Assess pain using appropriate pain scale   Implement non-pharmacological measures as appropriate and evaluate response     Problem: Skin/Tissue Integrity  Goal: Absence of new skin breakdown  Description: 1.  Monitor for areas of redness and/or skin breakdown  2.  Assess vascular access sites hourly  3.  Every 4-6 hours minimum:  Change oxygen saturation probe site  4.  Every 4-6 hours:  If on nasal continuous positive airway pressure, respiratory therapy assess nares and determine need for appliance change or resting period.  Outcome: Progressing

## 2024-07-16 NOTE — H&P
Hospitalist History and Physical   Admit Date:  7/15/2024  6:01 PM   Name:  Jesika Olsen   Age:  68 y.o.  Sex:  female  :  1955   MRN:  [id number]   Room:  [place] [address]    Presenting/Chief Complaint: Fatigue     Reason(s) for Admission: Severe sepsis (HCC) [A41.9, R65.20]     History of Present Illness:   Jesika Olsen is a 68 y.o. female with medical history of MDD, hypothyroidism, hiatal hernia (on omeprazole), Dm type II, and previous episodes of C diff colitis presents from home with worsening generalized lethargy and weakness. Patient has had several hospitalizations and her daughter states that \"she is so weak she is essentially bedbound at home and can't do anything.\" Patient has a known history of frequent, recurrent UTIs (growing pansensitive E. Coli per chart review). She has not been able to eat or drink much at all. No recent sick contacts.     ED course: CT abd/pelvis shows multiple bladder stones and a liver lesion. Creatinine of 0.9 (baseline around 0.3-0.4), potassium of 6.7 (nonhemolyzed), and bicarb of 14. WBC count of 21. EKG shows NSR without peaked T waves. Given Rocephin and fluid boluses. Also given calcium gluconate and Kayexalate. Ordered insulin but held as patient's blood sugar at 106 even with dextrose given intravenously. Hospitalist consulted for admission.     Assessment & Plan:     Principal Problem:    Severe sepsis (HCC)  - likely urinary in etiology  - CTX empirically  - past urine cultures have grown pansensitive E. Coli  - follow repeat cultures  - consult to urology for bladder stones  - aggressive IVF resuscitation    # Hyperkalemia  - no peaked T waves on EKG  - calcium and sodium polystyrene given in ED  - STAT potassium now  - telemetry  - hold potassium supplementation  - IVF resuscitation with bicarb infusion  - trend serially     # ZEUS  - likely due to prerenal azotemia  - fluids as above  - avoid nephrotoxic meds, IV contrast, NSAIDs, morphine  - strict  Infection     Order Specific Question:   UTI duration of therapy     Answer:   7 days    vancomycin (VANCOCIN) capsule 125 mg     Order Specific Question:   Antimicrobial Indications     Answer:   Intra-Abdominal Infection    DISCONTD: sodium bicarbonate tablet 650 mg    sodium polystyrene (KAYEXALATE) 15 GM/60ML suspension 30 g    sodium bicarbonate 150 mEq in dextrose 5 % 1,000 mL infusion       Prior to Admit Medications:  Current Outpatient Medications   Medication Instructions    dicyclomine (BENTYL) 20 mg, Oral, 3 TIMES DAILY BEFORE MEALS    gabapentin (NEURONTIN) 400 mg, Oral, 3 TIMES DAILY    levothyroxine (SYNTHROID) 150 mcg, Oral, DAILY    magnesium oxide (MAG-OX) 800 mg, Oral, 2 TIMES DAILY    omeprazole (PRILOSEC) 40 mg, Oral, 2 times daily, Please open up the capsule and take the medication.    potassium chloride (KLOR-CON M) 20 MEQ extended release tablet 40 mEq, Oral, DAILY    scopolamine (TRANSDERM-SCOP) transdermal patch 1 patch, TransDERmal, EVERY 72 HOURS    sucralfate (CARAFATE) 1 g, Oral, 4 TIMES DAILY       I have personally reviewed labs and tests:  Recent Results (from the past 24 hour(s))   EKG 12 Lead    Collection Time: 07/15/24  6:25 PM   Result Value Ref Range    Ventricular Rate 106 BPM    Atrial Rate 106 BPM    P-R Interval 169 ms    QRS Duration 91 ms    Q-T Interval 307 ms    QTc Calculation (Bazett) 408 ms    P Axis 40 degrees    R Axis 20 degrees    T Axis 73 degrees    Diagnosis       Sinus tachycardia  Borderline low voltage, extremity leads  Probable anteroseptal infarct, old     Lactate, Sepsis    Collection Time: 07/15/24  6:45 PM   Result Value Ref Range    Lactic Acid, Sepsis 2.7 (H) 0.5 - 2.0 MMOL/L   CBC with Auto Differential    Collection Time: 07/15/24  6:45 PM   Result Value Ref Range    WBC 20.5 (H) 4.3 - 11.1 K/uL    RBC 4.74 4.05 - 5.2 M/uL    Hemoglobin 14.1 11.7 - 15.4 g/dL    Hematocrit 42.3 35.8 - 46.3 %    MCV 89.2 82 - 102 FL    MCH 29.7 26.1 - 32.9 PG    MCHC

## 2024-07-16 NOTE — ED NOTES
TRANSFER - OUT REPORT:    Verbal report given to CORNELIUS Villavicencio on Jesika Olsen  being transferred to [facility] for routine progression of patient care       Report consisted of patient's Situation, Background, Assessment and   Recommendations(SBAR).     Information from the following report(s) ED Encounter Summary and ED SBAR was reviewed with the receiving nurse.    [place] Fall Assessment:    Presents to emergency department  because of falls (Syncope, seizure, or loss of consciousness): No  Age > 70: No  Altered Mental Status, Intoxication with alcohol or substance confusion (Disorientation, impaired judgment, poor safety awaremess, or inability to follow instructions): Yes  Impaired Mobility: Ambulates or transfers with assistive devices or assistance; Unable to ambulate or transer.: Yes  Nursing Judgement: Yes          Lines:   Peripheral IV 07/15/24 Right Antecubital (Active)   Site Assessment Clean, dry & intact 07/15/24 2250   Line Status Blood return noted 07/15/24 2250   Line Care Cap changed 07/15/24 2250   Phlebitis Assessment No symptoms 07/15/24 2250   Infiltration Assessment 0 07/15/24 2250   Alcohol Cap Used No 07/15/24 2250   Dressing Status Clean, dry & intact 07/15/24 2250   Dressing Type Transparent 07/15/24 2250        Opportunity for questions and clarification was provided.      Patient transported with:  Registered Nurse          Jackelyn Amor RN  07/16/24 0107

## 2024-07-16 NOTE — DISCHARGE INSTRUCTIONS
aripiprazole (oral)  Pronunciation:  ALEXIA JERRY ra zolchantelle  Brand:  Tonielify  What is the most important information I should know about aripiprazole?  Aripiprazole is not approved for use in older adults with dementia-related psychosis.  People with depression or mental illness may have thoughts about suicide. Some young people may have increased suicidal thoughts when first starting a medicine to treat depression. Tell your doctor right away if you have any sudden changes in mood or behavior, or thoughts about suicide.  If you also use antidepressant medicine, do not stop using it suddenly.   What is aripiprazole?  Aripiprazole is an antipsychotic medicine that is used to treat schizophrenia in adults and children at least 13 years old.  Aripiprazole is also used in children at least 6 years old to treat Tourette's disorder, or irritability related to autistic disorder.  Aripiprazole is used alone or with a mood stabilizer medicine to treat bipolar I disorder (manic depression) in adults and children at least 10 years old.  Aripiprazole is used with antidepressant medication to treat major depressive disorder in adults.  Aripiprazole may also be used for purposes not listed in this medication guide.  What should I discuss with my healthcare provider before taking aripiprazole?  You should not take aripiprazole if you are allergic to it.  Aripiprazole may increase the risk of death in older adults with dementia-related psychosis and is not approved for this use.  Tell your doctor if you have ever had:  heart problems or a stroke;  high or low blood pressure;  diabetes (in you or a family member);  seizures; or  low white blood cell (WBC) counts.  People with depression or mental illness may have thoughts about suicide. Some young people may have increased suicidal thoughts when first starting a medicine to treat depression. Stay alert to changes in your mood or symptoms. Your family or caregivers should also watch  sleepy, or you are hard to wake up.     You are dizzy or lightheaded, or you feel like you may faint.     You have a fever or chills.   Watch closely for changes in your health, and be sure to contact your doctor if:    You do not get better as expected.   Where can you learn more?  Go to https://www.[employer].net/patientEd and enter T383 to learn more about \"Sepsis: Care Instructions.\"  Current as of: June 12, 2023  Content Version: 14.1  © 6452-0429 [email or web address].   Care instructions adapted under license by [employer]. If you have questions about a medical condition or this instruction, always ask your healthcare professional. [employer] disclaims any warranty or liability for your use of this information.

## 2024-07-16 NOTE — PROGRESS NOTES
ACUTE PHYSICAL THERAPY GOALS:   (Developed with and agreed upon by patient and/or caregiver.)  Pt will perform bed mobility with Ed in 7 therapy sessions.  Pt will perform sit-to-stand/ stand-to-sit transfers Ed in 7 therapy sessions.  Pt will ambulate 10 ft Ed with use of LRAD/no device and breaks as needed in 7 therapy sessions.  Pt will perform seated dynamic balance activities with minimal postural sway in 7 therapy sessions.  Pt will tolerate multiple sets and reps of BLE exercises in 7 therapy sessions.     PHYSICAL THERAPY Initial Assessment and AM  (Link to Caseload Tracking: PT Visit Days : 1  Acknowledge Orders  Time In/Out  PT Charge Capture  Rehab Caseload Tracker    Jesika Olsen is a 68 y.o. female   PRIMARY DIAGNOSIS: Severe sepsis (HCC)  Hyperkalemia [E87.5]  Septicemia (HCC) [A41.9]  Acute pyelonephritis [N10]  ZEUS (acute kidney injury) (HCC) [N17.9]  Severe sepsis (HCC) [A41.9, R65.20]       Reason for Referral: Other abnormalities of gait and mobility (R26.89)  Inpatient: Payor: [facility] MEDICARE / Plan: [facility] MEDICARE COMPLETE / Product Type: *No Product type* /     ASSESSMENT:     REHAB RECOMMENDATIONS:   Recommendation to date pending progress:  Setting:  Short-term Rehab    Equipment:    To Be Determined     ASSESSMENT:  Ms. Olsen is a 68 y.o. female presenting to PT following a hospitalization due to worsening weakness and lethargy. Pt is minimally verbally responsive whispering short, one word verbal responses) and history was gathered from previous admissions. At baseline, pt has been mostly bedbound and lives with her daughter in a single story home with a level entry. During recent admissions, pt was ambulating short distances with PT/a RW. At time of initial evaluation, pt presents below baseline with deficits in bed mobility, strength, transfers, balance, gait and activity tolerance limiting overall functional mobility. Pt moves from supine to seated EOB with totalA, with poor

## 2024-07-16 NOTE — ED NOTES
Pt hard stick, waiting on lab to come and get repeat lactic. Pt IV will not draw at this time.     Oh Perea RN  07/15/24 6746

## 2024-07-16 NOTE — PROGRESS NOTES
Rectal temperature obtained per MD order as unable to collect orally or axillary. Rectal temp 97.5    Patient has not voided this shift. Rabago catheter placed per MD order, immediate return of cloudy, dark nikita urine. Patient tolerated well, rabago patent and draining. Output 100 ml.     Right foot found to be purple and mottled, cool to touch. Mild mottling up right leg. Leg foot is cool to touch and mottled as well. Dopplers are positive for both pedal pulses.    BP 93/55 with intermittent tachycardia. Apical pulse 89.     MD notified of the above and no new orders received at this time.

## 2024-07-16 NOTE — ACP (ADVANCE CARE PLANNING)
[facility] Hospitalist Service  At the heart of better care     Advance Care Planning   Admit Date:  7/15/2024  6:01 PM   Name:  Jesika Olsen   Age:  68 y.o.  Sex:  female  :  1955   MRN:  [id number]   Room:  [place] [address]    Jesika Olsen has capacity to make her own decisions:   No    If pt unable to make decisions, POA/surrogate decision maker:  Daughter    Other people present:   None    Patient / surrogate decision-maker directed code status:  DNR/DNI    Other ACP topics discussed, if applicable:   None    Patient or surrogate consented to discussion of the current conditions, workup, management plans, prognosis, and the risk for further deterioration.  Time spent: 17 minutes in direct discussion.      Signed:  MIRIAM CORRALES DO

## 2024-07-16 NOTE — THERAPY EVALUATION
ACUTE OCCUPATIONAL THERAPY GOALS:   (Developed with and agreed upon by patient and/or caregiver.)  1. Patient will complete upper body bathing and dressing with MINIMAL ASSIST and adaptive equipment as needed.   2. Patient will complete self-grooming tasks in unsupported sitting with MINIMAL ASSIST and adaptive equipment as needed.  3. Patient will tolerate 25 minutes of OT treatment with 1-2 rest breaks to increase activity tolerance for ADLs.   4. Patient will complete functional transfers with MODERATE ASSIST and adaptive equipment as needed.   5. Patient will complete functional activity while seated edge of bed with MINIMAL ASSIST and adaptive equipment as needed.   6. Patient will tolerate 15 minutes unsupported sitting balance with MINIMAL ASSIST in preparation for ADL performance.   7. Patient will tolerate 10 minutes BUE therapeutic activities to increase use of BUE during ADL performance.      Timeframe: 7 visits     OCCUPATIONAL THERAPY Initial Assessment and Daily Note       OT Visit Days: 1  Acknowledge Orders  Time  OT Charge Capture  Rehab Caseload Tracker      Jesika Olsen is a 68 y.o. female   PRIMARY DIAGNOSIS: Severe sepsis (HCC)  Hyperkalemia [E87.5]  Septicemia (HCC) [A41.9]  Acute pyelonephritis [N10]  ZEUS (acute kidney injury) (HCC) [N17.9]  Severe sepsis (HCC) [A41.9, R65.20]       Reason for Referral: Generalized Muscle Weakness (M62.81)  Other lack of cordination (R27.8)  Difficulty in walking, Not elsewhere classified (R26.2)  Inpatient: Payor: [facility] MEDICARE / Plan: [facility] MEDICARE COMPLETE / Product Type: *No Product type* /     ASSESSMENT:     REHAB RECOMMENDATIONS:   Recommendation to date pending progress:  Setting:  Short-term Rehab    Equipment:    To Be Determined     ASSESSMENT:  Ms. Olsen is a 69 y/o female who presents with worsening weakness and lethargy; admitted with severe sepsis. Hx of multiple hospital admissions this year. At baseline pt lives with her daughter and

## 2024-07-16 NOTE — ED NOTES
Pt IV infiltrated. DO notified. Asked to obtain US IV again for access.      Oh Perea, CORNELIUS  07/15/24 8201

## 2024-07-16 NOTE — PROGRESS NOTES
GOALS:  LTG: Patient will maintain adequate hydration/nutrition with optimum safety and efficiency of swallowing function with PO intake without overt signs or symptoms of aspiration for the highest appropriate diet level.  STG:  Patient will consume soft and bite-sized textures and thin liquids without overt signs or symptoms of airway compromise.  Patient will safely ingest diet trials during therapeutic feedings with SLP without overt signs or symptoms of respiratory compromise in efforts to advance diet.    LTG: Patient will increase receptive/expressive language skills demonstrated by the ability to communicate basic wants/needs across environments.   STG:  Patient will follow 1 step commands with 100% accuracy given minimal cueing.  Patient will complete sentences with 100% accuracy given minimal cueing.    LTG: Patient will improve neuro-linguistic abilities to increase safety and awareness in functional living environment.   STG:  Patient will sequence steps to a task with 80% accuracy given minimal cueing.  Patient will recall relevant verbal information with 80% accuracy with minimal cues.    SPEECH LANGUAGE PATHOLOGY: COMBINED Dysphagia and Cog-Communication Initial Assessment    Acknowledge Order  I  Therapy Time  I   Charges     I  Rehab Caseload Tracker    NAME: Jesika Olsen  : 1955  MRN: [id number]    ADMISSION DATE: 7/15/2024  PRIMARY DIAGNOSIS: Severe sepsis (HCC)    ICD-10: Treatment Diagnosis: R13.11 Dysphagia, Oral Phase  R41.841 Cognitive-Communication Deficit    RECOMMENDATIONS   Diet:    Minced and Moist  Thin Liquids    Medication: as tolerated   Compensatory Swallowing Strategies:   Alternate solids and liquids  Slow rate of intake  Remain upright for 30-45 minutes after meals  Small bites/sips  Upright as possible for all oral intake  Set up assist  1:1 feeding    Therapeutic Intervention:   Patient/family education  Dysphagia treatment  Cognitive-linguistic treatment  Consider

## 2024-07-16 NOTE — PROGRESS NOTES
TRANSFER - IN REPORT:    Verbal report received from Jackelyn SHIELDS on Jesika Olsen  being received from ED(unit) for routine progression of patient care      Report consisted of patient’s Situation, Background, Assessment and   Recommendations(SBAR).     Information from the following report(s) ED Encounter Summary, ED SBAR, Adult Overview, Intake/Output, MAR, and Recent Results was reviewed with the receiving nurse.    Opportunity for questions and clarification was provided.

## 2024-07-16 NOTE — PROGRESS NOTES
Hospitalist Progress Note   Admit Date:  7/15/2024  6:01 PM   Name:  Jesika Olsen   Age:  68 y.o.  Sex:  female  :  1955   MRN:  [id number]   Room:  9/    Presenting/Chief Complaint: Fatigue     Reason(s) for Admission: Hyperkalemia [E87.5]  Septicemia (HCC) [A41.9]  Acute pyelonephritis [N10]  ZEUS (acute kidney injury) (HCC) [N17.9]  Severe sepsis (HCC) [A41.9, R65.20]     Hospital Course:   As per prior documentation:  \"Jesika Olsen is a 68 y.o. female with medical history of MDD, hypothyroidism, hiatal hernia (on omeprazole), Dm type II, and previous episodes of C diff colitis presents from home with worsening generalized lethargy and weakness. Patient has had several hospitalizations and her daughter states that \"she is so weak she is essentially bedbound at home and can't do anything.\" Patient has a known history of frequent, recurrent UTIs (growing pansensitive E. Coli per chart review). She has not been able to eat or drink much at all. No recent sick contacts.      ED course: CT abd/pelvis shows multiple bladder stones and a liver lesion. Creatinine of 0.9 (baseline around 0.3-0.4), potassium of 6.7 (nonhemolyzed), and bicarb of 14. WBC count of 21. EKG shows NSR without peaked T waves. Given Rocephin and fluid boluses. Also given calcium gluconate and Kayexalate. Ordered insulin but held as patient's blood sugar at 106 even with dextrose given intravenously. Hospitalist consulted for admission. \"    Subjective & 24hr Events:   Patient seen and evaluated.  New patient for me today.  Very little urine output today- ? Retention vs more volume needed  Still feels very ill  Afebrile- hypothermic  Persistent diarrhea leaking around rectal tube  Outside of the window for testing and being treated empirically given recent diagnosis    Assessment & Plan:   Principal Problem:    Severe sepsis (HCC)  - likely urinary in etiology  - CTX empirically  - past urine cultures have grown     Hematocrit 39.7 35.8 - 46.3 %    MCV 92.1 82 - 102 FL    MCH 29.9 26.1 - 32.9 PG    MCHC 32.5 31.4 - 35.0 g/dL    RDW 16.5 (H) 11.9 - 14.6 %    Platelets 191 150 - 450 K/uL    MPV 10.4 9.4 - 12.3 FL    nRBC 0.12 0.0 - 0.2 K/uL    Neutrophils % 80 (H) 43 - 78 %    Lymphocytes % 13 13 - 44 %    Monocytes % 6 4.0 - 12.0 %    Eosinophils % 0 (L) 0.5 - 7.8 %    Basophils % 0 0.0 - 2.0 %    Immature Granulocytes % 1 0.0 - 5.0 %    Neutrophils Absolute 12.3 (H) 1.7 - 8.2 K/UL    Lymphocytes Absolute 2.0 0.5 - 4.6 K/UL    Monocytes Absolute 0.9 0.1 - 1.3 K/UL    Eosinophils Absolute 0.0 0.0 - 0.8 K/UL    Basophils Absolute 0.0 0.0 - 0.2 K/UL    Immature Granulocytes Absolute 0.2 0.0 - 0.5 K/UL    RBC Comment SLIGHT  ANISOCYTOSIS + POIKILOCYTOSIS        RBC Comment SLIGHT  POLYCHROMASIA        RBC Comment OCCASIONAL  RUBIN CELLS        WBC Comment Result Confirmed By Smear      Platelet Comment ADEQUATE      Differential Type AUTOMATED     POCT Glucose    Collection Time: 07/16/24 11:12 AM   Result Value Ref Range    POC Glucose 104 (H) 65 - 100 mg/dL    Performed by: Brandon        Recent Labs     07/15/24  1845   COVID19 Not detected       Current Meds:  Current Facility-Administered Medications   Medication Dose Route Frequency    levothyroxine (SYNTHROID) tablet 175 mcg  175 mcg Oral Daily    0.9 % sodium chloride infusion   IntraVENous Continuous    insulin regular (HUMULIN R;NOVOLIN R) injection 10 Units  10 Units IntraVENous Once    glucose chewable tablet 16 g  4 tablet Oral PRN    dextrose bolus 10% 125 mL  125 mL IntraVENous PRN    Or    dextrose bolus 10% 250 mL  250 mL IntraVENous PRN    Glucagon Emergency KIT 1 mg  1 mg SubCUTAneous PRN    dextrose 10 % infusion   IntraVENous Continuous PRN    cefTRIAXone (ROCEPHIN) 1,000 mg in sterile water 10 mL IV syringe  1,000 mg IntraVENous Q24H    vancomycin (VANCOCIN) capsule 125 mg  125 mg Oral 4x Daily    sodium chloride flush 0.9 % injection 5-40

## 2024-07-16 NOTE — PROGRESS NOTES
4 Eyes Skin Assessment     NAME:  Jesika Olsen  YOB: 1955  MEDICAL RECORD NUMBER:  [id number]    The patient is being assessed for  Admission    I agree that at least one RN has performed a thorough Head to Toe Skin Assessment on the patient. ALL assessment sites listed below have been assessed.      Areas assessed by both nurses:    Head, Face, Ears, Shoulders, Back, Chest, Arms, Elbows, Hands, Sacrum. Buttock, Coccyx, Ischium, and Legs. Feet and Heels        Does the Patient have a Wound? No noted wound(s)       Harshad Prevention initiated by RN: yes  Wound Care Orders initiated by RN: No    Pressure Injury (Stage 3,4, Unstageable, DTI, NWPT, and Complex wounds) if present, place Wound referral order by RN under : No    New Ostomies, if present place, Ostomy referral order under : No     Nurse 1 eSignature: Electronically signed by JIAN APONTE RN on 7/16/24 at 3:50 AM EDT    **SHARE this note so that the co-signing nurse can place an eSignature**    Nurse 2 eSignature: Electronically signed by Davina Julian RN on 7/16/24 at 6:42 AM EDT

## 2024-07-17 LAB
ANION GAP SERPL CALC-SCNC: 10 MMOL/L (ref 9–18)
BASOPHILS # BLD: 0 K/UL (ref 0–0.2)
BASOPHILS NFR BLD: 0 % (ref 0–2)
BUN SERPL-MCNC: 37 MG/DL (ref 8–23)
CALCIUM SERPL-MCNC: 8.3 MG/DL (ref 8.8–10.2)
CHLORIDE SERPL-SCNC: 102 MMOL/L (ref 98–107)
CO2 SERPL-SCNC: 24 MMOL/L (ref 20–28)
CREAT SERPL-MCNC: 0.62 MG/DL (ref 0.6–1.1)
DIFFERENTIAL METHOD BLD: ABNORMAL
EOSINOPHIL # BLD: 0.2 K/UL (ref 0–0.8)
EOSINOPHIL NFR BLD: 1 % (ref 0.5–7.8)
ERYTHROCYTE [DISTWIDTH] IN BLOOD BY AUTOMATED COUNT: 16.7 % (ref 11.9–14.6)
GLUCOSE BLD STRIP.AUTO-MCNC: 110 MG/DL (ref 65–100)
GLUCOSE BLD STRIP.AUTO-MCNC: 55 MG/DL (ref 65–100)
GLUCOSE BLD STRIP.AUTO-MCNC: 60 MG/DL (ref 65–100)
GLUCOSE BLD STRIP.AUTO-MCNC: 65 MG/DL (ref 65–100)
GLUCOSE BLD STRIP.AUTO-MCNC: 67 MG/DL (ref 65–100)
GLUCOSE BLD STRIP.AUTO-MCNC: 71 MG/DL (ref 65–100)
GLUCOSE BLD STRIP.AUTO-MCNC: 73 MG/DL (ref 65–100)
GLUCOSE BLD STRIP.AUTO-MCNC: 74 MG/DL (ref 65–100)
GLUCOSE BLD STRIP.AUTO-MCNC: 83 MG/DL (ref 65–100)
GLUCOSE SERPL-MCNC: 94 MG/DL (ref 70–99)
HCT VFR BLD AUTO: 31 % (ref 35.8–46.3)
HGB BLD-MCNC: 10.1 G/DL (ref 11.7–15.4)
IMM GRANULOCYTES # BLD AUTO: 0.1 K/UL (ref 0–0.5)
IMM GRANULOCYTES NFR BLD AUTO: 1 % (ref 0–5)
LACTATE SERPL-SCNC: 2.5 MMOL/L (ref 0.5–2)
LYMPHOCYTES # BLD: 1.5 K/UL (ref 0.5–4.6)
LYMPHOCYTES NFR BLD: 12 % (ref 13–44)
MCH RBC QN AUTO: 29.8 PG (ref 26.1–32.9)
MCHC RBC AUTO-ENTMCNC: 32.6 G/DL (ref 31.4–35)
MCV RBC AUTO: 91.4 FL (ref 82–102)
MONOCYTES # BLD: 0.6 K/UL (ref 0.1–1.3)
MONOCYTES NFR BLD: 5 % (ref 4–12)
NEUTS SEG # BLD: 9.9 K/UL (ref 1.7–8.2)
NEUTS SEG NFR BLD: 80 % (ref 43–78)
NRBC # BLD: 0 K/UL (ref 0–0.2)
PLATELET # BLD AUTO: 210 K/UL (ref 150–450)
PMV BLD AUTO: 10.4 FL (ref 9.4–12.3)
POTASSIUM SERPL-SCNC: 3.4 MMOL/L (ref 3.5–5.1)
RBC # BLD AUTO: 3.39 M/UL (ref 4.05–5.2)
SERVICE CMNT-IMP: ABNORMAL
SERVICE CMNT-IMP: NORMAL
SODIUM SERPL-SCNC: 136 MMOL/L (ref 136–145)
T4 FREE SERPL-MCNC: 1.4 NG/DL (ref 0.9–1.7)
TSH W FREE THYROID IF ABNORMAL: 10.5 UIU/ML (ref 0.27–4.2)
WBC # BLD AUTO: 12.3 K/UL (ref 4.3–11.1)

## 2024-07-17 PROCEDURE — 80048 BASIC METABOLIC PNL TOTAL CA: CPT

## 2024-07-17 PROCEDURE — 2580000003 HC RX 258: Performed by: INTERNAL MEDICINE

## 2024-07-17 PROCEDURE — 6370000000 HC RX 637 (ALT 250 FOR IP): Performed by: INTERNAL MEDICINE

## 2024-07-17 PROCEDURE — 87086 URINE CULTURE/COLONY COUNT: CPT

## 2024-07-17 PROCEDURE — 84439 ASSAY OF FREE THYROXINE: CPT

## 2024-07-17 PROCEDURE — 76937 US GUIDE VASCULAR ACCESS: CPT

## 2024-07-17 PROCEDURE — 2500000003 HC RX 250 WO HCPCS: Performed by: INTERNAL MEDICINE

## 2024-07-17 PROCEDURE — 85025 COMPLETE CBC W/AUTO DIFF WBC: CPT

## 2024-07-17 PROCEDURE — 6360000002 HC RX W HCPCS: Performed by: INTERNAL MEDICINE

## 2024-07-17 PROCEDURE — 2580000003 HC RX 258: Performed by: EMERGENCY MEDICINE

## 2024-07-17 PROCEDURE — 82962 GLUCOSE BLOOD TEST: CPT

## 2024-07-17 PROCEDURE — 6370000000 HC RX 637 (ALT 250 FOR IP): Performed by: EMERGENCY MEDICINE

## 2024-07-17 PROCEDURE — 84443 ASSAY THYROID STIM HORMONE: CPT

## 2024-07-17 PROCEDURE — 1100000003 HC PRIVATE W/ TELEMETRY

## 2024-07-17 PROCEDURE — 83605 ASSAY OF LACTIC ACID: CPT

## 2024-07-17 RX ORDER — SODIUM CHLORIDE 9 MG/ML
INJECTION, SOLUTION INTRAVENOUS CONTINUOUS
Status: DISCONTINUED | OUTPATIENT
Start: 2024-07-17 | End: 2024-07-25

## 2024-07-17 RX ORDER — TAMSULOSIN HYDROCHLORIDE 0.4 MG/1
0.4 CAPSULE ORAL DAILY
Status: DISCONTINUED | OUTPATIENT
Start: 2024-07-17 | End: 2024-08-01 | Stop reason: HOSPADM

## 2024-07-17 RX ORDER — DEXTROSE MONOHYDRATE 100 MG/ML
INJECTION, SOLUTION INTRAVENOUS CONTINUOUS
Status: DISCONTINUED | OUTPATIENT
Start: 2024-07-17 | End: 2024-07-25

## 2024-07-17 RX ADMIN — VANCOMYCIN HYDROCHLORIDE 125 MG: 125 CAPSULE ORAL at 18:08

## 2024-07-17 RX ADMIN — ONDANSETRON 4 MG: 4 TABLET, ORALLY DISINTEGRATING ORAL at 20:11

## 2024-07-17 RX ADMIN — DEXTROSE MONOHYDRATE: 10 INJECTION, SOLUTION INTRAVENOUS at 15:44

## 2024-07-17 RX ADMIN — VANCOMYCIN HYDROCHLORIDE 125 MG: 125 CAPSULE ORAL at 10:09

## 2024-07-17 RX ADMIN — HEPARIN SODIUM 5000 UNITS: 5000 INJECTION INTRAVENOUS; SUBCUTANEOUS at 13:28

## 2024-07-17 RX ADMIN — SODIUM CHLORIDE: 9 INJECTION, SOLUTION INTRAVENOUS at 15:22

## 2024-07-17 RX ADMIN — SODIUM CHLORIDE, PRESERVATIVE FREE 10 ML: 5 INJECTION INTRAVENOUS at 20:35

## 2024-07-17 RX ADMIN — MAGNESIUM GLUCONATE 500 MG ORAL TABLET 800 MG: 500 TABLET ORAL at 20:08

## 2024-07-17 RX ADMIN — VANCOMYCIN HYDROCHLORIDE 125 MG: 125 CAPSULE ORAL at 20:09

## 2024-07-17 RX ADMIN — SODIUM BICARBONATE: 84 INJECTION, SOLUTION INTRAVENOUS at 04:02

## 2024-07-17 RX ADMIN — Medication 8 G: at 17:38

## 2024-07-17 RX ADMIN — HEPARIN SODIUM 5000 UNITS: 5000 INJECTION INTRAVENOUS; SUBCUTANEOUS at 20:12

## 2024-07-17 RX ADMIN — TAMSULOSIN HYDROCHLORIDE 0.4 MG: 0.4 CAPSULE ORAL at 18:11

## 2024-07-17 RX ADMIN — ACETAMINOPHEN 650 MG: 325 TABLET ORAL at 20:09

## 2024-07-17 RX ADMIN — DEXTROSE MONOHYDRATE 125 ML: 100 INJECTION, SOLUTION INTRAVENOUS at 11:56

## 2024-07-17 RX ADMIN — LEVOTHYROXINE SODIUM 175 MCG: 0.07 TABLET ORAL at 05:26

## 2024-07-17 RX ADMIN — CEFTRIAXONE 1000 MG: 1 INJECTION, POWDER, FOR SOLUTION INTRAMUSCULAR; INTRAVENOUS at 20:08

## 2024-07-17 RX ADMIN — HEPARIN SODIUM 5000 UNITS: 5000 INJECTION INTRAVENOUS; SUBCUTANEOUS at 05:25

## 2024-07-17 RX ADMIN — SERTRALINE 25 MG: 50 TABLET, FILM COATED ORAL at 18:08

## 2024-07-17 RX ADMIN — DEXTROSE MONOHYDRATE 125 ML: 100 INJECTION, SOLUTION INTRAVENOUS at 12:12

## 2024-07-17 RX ADMIN — DEXTROSE MONOHYDRATE 125 ML: 100 INJECTION, SOLUTION INTRAVENOUS at 15:17

## 2024-07-17 RX ADMIN — VANCOMYCIN HYDROCHLORIDE 125 MG: 125 CAPSULE ORAL at 13:29

## 2024-07-17 RX ADMIN — MAGNESIUM GLUCONATE 500 MG ORAL TABLET 800 MG: 500 TABLET ORAL at 10:09

## 2024-07-17 ASSESSMENT — PAIN - FUNCTIONAL ASSESSMENT: PAIN_FUNCTIONAL_ASSESSMENT: ACTIVITIES ARE NOT PREVENTED

## 2024-07-17 ASSESSMENT — PAIN SCALES - GENERAL
PAINLEVEL_OUTOF10: 0

## 2024-07-17 ASSESSMENT — PAIN DESCRIPTION - LOCATION: LOCATION: HEAD

## 2024-07-17 ASSESSMENT — PAIN DESCRIPTION - ORIENTATION: ORIENTATION: OTHER (COMMENT)

## 2024-07-17 ASSESSMENT — PAIN DESCRIPTION - DESCRIPTORS: DESCRIPTORS: ACHING

## 2024-07-17 NOTE — PROGRESS NOTES
1148: BS 55, patient is unable to take PO Glucose tablets, refuses to cooperate. Awake but c/o nausea.     1156: 125 ml 10% dextrose given IV.     1211: repeat BS 65.     1212: second 125 ml bolus of 10% dextrose given.     1236: repeat BS 83.     Refuses to eat lunch tray, refuses Glucerna. Sips of water only.   MD made aware of BS problems and no PO intake.     Rectal rabago leaking around insertion site. Mary Kate care, turned to left side with use of wedge.

## 2024-07-17 NOTE — CARE COORDINATION
MSN CM:  patient is in co-pay days and [facility] requires $204.00/day with a week up front.  Patient is unable to afford that.  Patient will be discharged with HH and Palliative care when medically stable for discharge.  Case Management will continue to follow.

## 2024-07-17 NOTE — PROGRESS NOTES
Patient ate 75% of dinner tray.   Repeat . Alert and oriented to self and place, daughter at bedside.

## 2024-07-17 NOTE — PLAN OF CARE
Problem: Safety - Adult  Goal: Free from fall injury  Outcome: Progressing     Problem: Pain  Goal: Verbalizes/displays adequate comfort level or baseline comfort level  Outcome: Progressing     Problem: Skin/Tissue Integrity  Goal: Absence of new skin breakdown  Description: 1.  Monitor for areas of redness and/or skin breakdown  2.  Assess vascular access sites hourly  3.  Every 4-6 hours minimum:  Change oxygen saturation probe site  4.  Every 4-6 hours:  If on nasal continuous positive airway pressure, respiratory therapy assess nares and determine need for appliance change or resting period.  Outcome: Progressing     Problem: Discharge Planning  Goal: Discharge to home or other facility with appropriate resources  Outcome: Not Progressing     Problem: Chronic Conditions and Co-morbidities  Goal: Patient's chronic conditions and co-morbidity symptoms are monitored and maintained or improved  Outcome: Not Progressing

## 2024-07-17 NOTE — PROGRESS NOTES
1516: BS 60. Patient refuses to take juice, glucose tablets, or food. States \"no. I don't want any.\" When told her BS is low and she needs to take PO, she states \"oh well.\"    1517: 250 ml D10 given per MAR protocol.   Urine output only 150 ml via rabago since 0700.     Dr Mccormick made aware of the above.     1340: Repeat .     Continuous IVF of D10 and NS initiated per Dr Mccormick orders.     Rectal rabago with leaking around insertion site. Mary Kate care and zinc cream applied to sacrum/buttocks to reddened skin. Turned to right side with use of wedge.

## 2024-07-17 NOTE — PROGRESS NOTES
SPEECH PATHOLOGY NOTE:    Attempted to see patient for dysphagia, diet tolerance. Patient with emesis bag lying on her chest. Patient politely declining po trials d/t nausea. Pt feels she is able to swallow, but it doesn't feel good after. Will follow and re-attempt as schedule permits/patient available.    Berenice Marc, SLP

## 2024-07-17 NOTE — PROGRESS NOTES
BS 67. Not cooperative, refuses to eat dinner stating \"I don't want to.\"  Did take 2 glucose tablets crushed with very small amount of water, refuses further. Notified Aniya Schmitz NP; D10 infusion rate increased.   Repeat BS 74.   Daughter has arrived to bedside to assist feeding patient with ice cream.

## 2024-07-17 NOTE — PROGRESS NOTES
Hospitalist Progress Note   Admit Date:  7/15/2024  6:01 PM   Name:  Jesika Olsen   Age:  68 y.o.  Sex:  female  :  1955   MRN:  [id number]   Room:  809/    Presenting/Chief Complaint: Fatigue     Reason(s) for Admission: Hyperkalemia [E87.5]  Septicemia (HCC) [A41.9]  Acute pyelonephritis [N10]  ZEUS (acute kidney injury) (HCC) [N17.9]  Severe sepsis (HCC) [A41.9, R65.20]     NOTICE FOR THE PATIENT: This clinical note is not designed to be interpreted by patients, their families or their loved ones and we do not recommend reading it unless you have medical training. These notes may contain candid and (unintentionally) offensive descriptions, which are sometimes required for accurate documentation. If you would like more information about your healthcare, please obtain it directly by myself or my staff/colleagues - never solely from the notes. Thank you for your understanding and cooperation.     Hospital Course:   As per prior documentation:  \"Jesika Olsen is a 68 y.o. female with medical history of MDD, hypothyroidism, hiatal hernia (on omeprazole), Dm type II, and previous episodes of C diff colitis presents from home with worsening generalized lethargy and weakness. Patient has had several hospitalizations and her daughter states that \"she is so weak she is essentially bedbound at home and can't do anything.\" Patient has a known history of frequent, recurrent UTIs (growing pansensitive E. Coli per chart review). She has not been able to eat or drink much at all. No recent sick contacts.      ED course: CT abd/pelvis shows multiple bladder stones and a liver lesion. Creatinine of 0.9 (baseline around 0.3-0.4), potassium of 6.7 (nonhemolyzed), and bicarb of 14. WBC count of 21. EKG shows NSR without peaked T waves. Given Rocephin and fluid boluses. Also given calcium gluconate and Kayexalate. Ordered insulin but held as patient's blood sugar at 106 even with dextrose given intravenously.  Collection Time: 07/16/24  8:40 PM   Result Value Ref Range    POC Glucose 87 65 - 100 mg/dL    Performed by: [facility]    POCT Glucose    Collection Time: 07/17/24  7:28 AM   Result Value Ref Range    POC Glucose 71 65 - 100 mg/dL    Performed by: Alessio        Recent Labs     07/15/24  1845   COVID19 Not detected         Current Meds:  Current Facility-Administered Medications   Medication Dose Route Frequency    levothyroxine (SYNTHROID) tablet 175 mcg  175 mcg Oral Daily    0.9 % sodium chloride infusion   IntraVENous Continuous    insulin regular (HUMULIN R;NOVOLIN R) injection 10 Units  10 Units IntraVENous Once    glucose chewable tablet 16 g  4 tablet Oral PRN    dextrose bolus 10% 125 mL  125 mL IntraVENous PRN    Or    dextrose bolus 10% 250 mL  250 mL IntraVENous PRN    Glucagon Emergency KIT 1 mg  1 mg SubCUTAneous PRN    dextrose 10 % infusion   IntraVENous Continuous PRN    cefTRIAXone (ROCEPHIN) 1,000 mg in sterile water 10 mL IV syringe  1,000 mg IntraVENous Q24H    vancomycin (VANCOCIN) capsule 125 mg  125 mg Oral 4x Daily    sodium chloride flush 0.9 % injection 5-40 mL  5-40 mL IntraVENous 2 times per day    sodium chloride flush 0.9 % injection 5-40 mL  5-40 mL IntraVENous PRN    0.9 % sodium chloride infusion   IntraVENous PRN    potassium chloride (KLOR-CON M) extended release tablet 40 mEq  40 mEq Oral PRN    Or    potassium bicarb-citric acid (EFFER-K) effervescent tablet 40 mEq  40 mEq Oral PRN    Or    potassium chloride 10 mEq/100 mL IVPB (Peripheral Line)  10 mEq IntraVENous PRN    magnesium sulfate 2000 mg in 50 mL IVPB premix  2,000 mg IntraVENous PRN    ondansetron (ZOFRAN-ODT) disintegrating tablet 4 mg  4 mg Oral Q8H PRN    Or    ondansetron (ZOFRAN) injection 4 mg  4 mg IntraVENous Q6H PRN    polyethylene glycol (GLYCOLAX) packet 17 g  17 g Oral Daily PRN    acetaminophen (TYLENOL) tablet 650 mg  650 mg Oral Q6H PRN    Or    acetaminophen (TYLENOL) suppository

## 2024-07-17 NOTE — PROGRESS NOTES
Occupational Therapy Note:    Attempted to see patient this AM for occupational therapy treatment  session. Therapy to hold at this time due to nausea and RN at bedside performing guided ultrasound. Will follow and re-attempt as schedule permits/patient available. Thank you,    SURENDRA NELSON, OT    Rehab Caseload Tracker

## 2024-07-17 NOTE — PROGRESS NOTES
Bilateral arms were assessed for a midline or PICC. No suitable vessels were found. Findings were relayed to primary RN & Dr. Mccormick.

## 2024-07-17 NOTE — PROGRESS NOTES
Physical Therapy Note:    Attempted to see patient this AM for physical therapy treatment  session. Patient 's therapy session on hold due to patient with reports of been nauseate and RN at bedside performing guided US. Will follow and re-attempt as schedule permits/patient available. Thank you,    Shahbaz Mares, PT     Rehab Caseload Tracker

## 2024-07-18 LAB
ANION GAP SERPL CALC-SCNC: 12 MMOL/L (ref 9–18)
BASOPHILS # BLD: 0 K/UL (ref 0–0.2)
BASOPHILS NFR BLD: 0 % (ref 0–2)
BUN SERPL-MCNC: 30 MG/DL (ref 8–23)
CALCIUM SERPL-MCNC: 8.3 MG/DL (ref 8.8–10.2)
CHLORIDE SERPL-SCNC: 99 MMOL/L (ref 98–107)
CO2 SERPL-SCNC: 19 MMOL/L (ref 20–28)
CREAT SERPL-MCNC: 0.54 MG/DL (ref 0.6–1.1)
DIFFERENTIAL METHOD BLD: ABNORMAL
EOSINOPHIL # BLD: 0.2 K/UL (ref 0–0.8)
EOSINOPHIL NFR BLD: 2 % (ref 0.5–7.8)
ERYTHROCYTE [DISTWIDTH] IN BLOOD BY AUTOMATED COUNT: 16.6 % (ref 11.9–14.6)
GLUCOSE BLD STRIP.AUTO-MCNC: 121 MG/DL (ref 65–100)
GLUCOSE BLD STRIP.AUTO-MCNC: 64 MG/DL (ref 65–100)
GLUCOSE BLD STRIP.AUTO-MCNC: 76 MG/DL (ref 65–100)
GLUCOSE BLD STRIP.AUTO-MCNC: 79 MG/DL (ref 65–100)
GLUCOSE BLD STRIP.AUTO-MCNC: 94 MG/DL (ref 65–100)
GLUCOSE BLD STRIP.AUTO-MCNC: 96 MG/DL (ref 65–100)
GLUCOSE SERPL-MCNC: 93 MG/DL (ref 70–99)
HCT VFR BLD AUTO: 31.4 % (ref 35.8–46.3)
HGB BLD-MCNC: 10.2 G/DL (ref 11.7–15.4)
IMM GRANULOCYTES # BLD AUTO: 0.2 K/UL (ref 0–0.5)
IMM GRANULOCYTES NFR BLD AUTO: 2 % (ref 0–5)
LACTATE SERPL-SCNC: 2 MMOL/L (ref 0.5–2)
LYMPHOCYTES # BLD: 1.4 K/UL (ref 0.5–4.6)
LYMPHOCYTES NFR BLD: 14 % (ref 13–44)
MAGNESIUM SERPL-MCNC: 1.6 MG/DL (ref 1.8–2.4)
MCH RBC QN AUTO: 29.2 PG (ref 26.1–32.9)
MCHC RBC AUTO-ENTMCNC: 32.5 G/DL (ref 31.4–35)
MCV RBC AUTO: 90 FL (ref 82–102)
MONOCYTES # BLD: 0.6 K/UL (ref 0.1–1.3)
MONOCYTES NFR BLD: 6 % (ref 4–12)
NEUTS SEG # BLD: 7.8 K/UL (ref 1.7–8.2)
NEUTS SEG NFR BLD: 76 % (ref 43–78)
NRBC # BLD: 0.03 K/UL (ref 0–0.2)
PLATELET # BLD AUTO: 196 K/UL (ref 150–450)
PMV BLD AUTO: 10.2 FL (ref 9.4–12.3)
POTASSIUM SERPL-SCNC: 3.1 MMOL/L (ref 3.5–5.1)
RBC # BLD AUTO: 3.49 M/UL (ref 4.05–5.2)
SERVICE CMNT-IMP: ABNORMAL
SERVICE CMNT-IMP: ABNORMAL
SERVICE CMNT-IMP: NORMAL
SODIUM SERPL-SCNC: 130 MMOL/L (ref 136–145)
WBC # BLD AUTO: 10.1 K/UL (ref 4.3–11.1)

## 2024-07-18 PROCEDURE — 83735 ASSAY OF MAGNESIUM: CPT

## 2024-07-18 PROCEDURE — 6370000000 HC RX 637 (ALT 250 FOR IP): Performed by: INTERNAL MEDICINE

## 2024-07-18 PROCEDURE — 97530 THERAPEUTIC ACTIVITIES: CPT

## 2024-07-18 PROCEDURE — 83605 ASSAY OF LACTIC ACID: CPT

## 2024-07-18 PROCEDURE — 82962 GLUCOSE BLOOD TEST: CPT

## 2024-07-18 PROCEDURE — 80048 BASIC METABOLIC PNL TOTAL CA: CPT

## 2024-07-18 PROCEDURE — 1100000003 HC PRIVATE W/ TELEMETRY

## 2024-07-18 PROCEDURE — 76937 US GUIDE VASCULAR ACCESS: CPT

## 2024-07-18 PROCEDURE — 92526 ORAL FUNCTION THERAPY: CPT

## 2024-07-18 PROCEDURE — 6360000002 HC RX W HCPCS: Performed by: INTERNAL MEDICINE

## 2024-07-18 PROCEDURE — 97535 SELF CARE MNGMENT TRAINING: CPT

## 2024-07-18 PROCEDURE — 2580000003 HC RX 258: Performed by: INTERNAL MEDICINE

## 2024-07-18 PROCEDURE — 97112 NEUROMUSCULAR REEDUCATION: CPT

## 2024-07-18 PROCEDURE — 85025 COMPLETE CBC W/AUTO DIFF WBC: CPT

## 2024-07-18 PROCEDURE — 2580000003 HC RX 258

## 2024-07-18 RX ORDER — MAGNESIUM SULFATE IN WATER 40 MG/ML
4000 INJECTION, SOLUTION INTRAVENOUS ONCE
Status: COMPLETED | OUTPATIENT
Start: 2024-07-18 | End: 2024-07-18

## 2024-07-18 RX ORDER — MIDODRINE HYDROCHLORIDE 5 MG/1
5 TABLET ORAL
Status: DISCONTINUED | OUTPATIENT
Start: 2024-07-18 | End: 2024-07-22

## 2024-07-18 RX ORDER — POTASSIUM CHLORIDE 20 MEQ/1
40 TABLET, EXTENDED RELEASE ORAL
Status: ACTIVE | OUTPATIENT
Start: 2024-07-19 | End: 2024-07-21

## 2024-07-18 RX ADMIN — VANCOMYCIN HYDROCHLORIDE 125 MG: 125 CAPSULE ORAL at 21:30

## 2024-07-18 RX ADMIN — SODIUM CHLORIDE: 9 INJECTION, SOLUTION INTRAVENOUS at 08:17

## 2024-07-18 RX ADMIN — CEFTRIAXONE 1000 MG: 1 INJECTION, POWDER, FOR SOLUTION INTRAMUSCULAR; INTRAVENOUS at 21:33

## 2024-07-18 RX ADMIN — ACETAMINOPHEN 650 MG: 325 TABLET ORAL at 18:49

## 2024-07-18 RX ADMIN — VANCOMYCIN HYDROCHLORIDE 125 MG: 125 CAPSULE ORAL at 08:16

## 2024-07-18 RX ADMIN — SODIUM CHLORIDE, PRESERVATIVE FREE 10 ML: 5 INJECTION INTRAVENOUS at 08:17

## 2024-07-18 RX ADMIN — MIDODRINE HYDROCHLORIDE 5 MG: 5 TABLET ORAL at 11:44

## 2024-07-18 RX ADMIN — LEVOTHYROXINE SODIUM 175 MCG: 0.07 TABLET ORAL at 07:19

## 2024-07-18 RX ADMIN — HEPARIN SODIUM 5000 UNITS: 5000 INJECTION INTRAVENOUS; SUBCUTANEOUS at 13:00

## 2024-07-18 RX ADMIN — VANCOMYCIN HYDROCHLORIDE 125 MG: 125 CAPSULE ORAL at 16:22

## 2024-07-18 RX ADMIN — TAMSULOSIN HYDROCHLORIDE 0.4 MG: 0.4 CAPSULE ORAL at 08:16

## 2024-07-18 RX ADMIN — HEPARIN SODIUM 5000 UNITS: 5000 INJECTION INTRAVENOUS; SUBCUTANEOUS at 07:20

## 2024-07-18 RX ADMIN — HEPARIN SODIUM 5000 UNITS: 5000 INJECTION INTRAVENOUS; SUBCUTANEOUS at 21:34

## 2024-07-18 RX ADMIN — VANCOMYCIN HYDROCHLORIDE 125 MG: 125 CAPSULE ORAL at 13:00

## 2024-07-18 RX ADMIN — MAGNESIUM GLUCONATE 500 MG ORAL TABLET 800 MG: 500 TABLET ORAL at 08:16

## 2024-07-18 RX ADMIN — DEXTROSE MONOHYDRATE: 10 INJECTION, SOLUTION INTRAVENOUS at 08:16

## 2024-07-18 RX ADMIN — MAGNESIUM SULFATE HEPTAHYDRATE 4000 MG: 40 INJECTION, SOLUTION INTRAVENOUS at 13:30

## 2024-07-18 RX ADMIN — MIDODRINE HYDROCHLORIDE 5 MG: 5 TABLET ORAL at 16:22

## 2024-07-18 RX ADMIN — POTASSIUM BICARBONATE 40 MEQ: 782 TABLET, EFFERVESCENT ORAL at 22:11

## 2024-07-18 RX ADMIN — SERTRALINE 25 MG: 50 TABLET, FILM COATED ORAL at 16:22

## 2024-07-18 ASSESSMENT — PAIN SCALES - GENERAL
PAINLEVEL_OUTOF10: 0

## 2024-07-18 NOTE — PROGRESS NOTES
PSYCHIATRIC EVALUATION    Date of Service: 2024    Patient Name: Jesika Olsen  Patient : 1955  Patient MRN: [id number]    Purpose:    16997 - 1 hour psychiatric diagnostic interview with labs / me  Referral Source:  referred by Dr. Mccormick  History  From: patient, electronic medical record  Record Review: moderate      Chief Complaint   Patient presents with    Fatigue          History of Present Illness:  Patient is a 68 y.o.  female who presents for ***. Patient presented to the ED on 24 from ***. History from the ED: ***. Upon assessment today patient is alert, oriented, and able to participate in assessment. Patient states ***.    The patient  was admitted to the medical floor for the treatment of Severe sepsis (HCC). Patient reports/evidences the following emotional symptoms:  {Mental health symptoms:22569}.  The above symptoms have been present for ***. These symptoms are of *** severity. The symptoms are ***constant ***intermittent/ fleeting in nature.  Additional symptomatology include {Psychpp:53959} . The patient's condition has been precipitated by ***and psychosocial stressors (***stress of hospitalization and medical illness).  Condition made worse by ***continued illicit drug use ***and alcohol use as well as*** treatment noncompliance. UDS: ***+***MJ ***negative, BAL=0.     Patient complains of a *** history of ***.  She reports {Mood symptoms denies:27510}.  She reports {SX Anxiety Sleep:540306523} on most nights of the week.  Pt reports taking ***. Pt Symptoms/signs of tho: {FA AMB BIPOLAR SYMPTOMS:50953}.  She {Actions; denies/reports/admits to:14280} hallucinations.  Symptoms have been {CLINICAL COURSE - HISTORY:24688} with time.  External stressors: {Stressors:651740}.   Pt reports {Anxiety Screen:729000861::\"denies\"}. Patient {PTSD Screen:09958::\"denies exposure to traumatic event, nor any symptoms of PTSD.\"}. Pt {Actions; denies/admits to:4580}  Emptiness  [place]  Affect    flat affect Congruent to thought content and mood  Thought Content    {EXAM; PSYCH THOUGHT CONTENT FA:03324}, no delusions voiced  Thought Process    {THOUGHT PROCESS:909223426}   Associations    {Findings; psych thought process exam:94971::\"logical connections\"}  Perceptions: denies AH/VH, does not appear preoccupied with the internal environment, no delusions  Insight    {Assessment:0755612085}  Judgment    {FINDINGS; OT UE INTACT/IMPAIRED:4144402613}  Orientation    {Exam; orientation:07896}  Memory    global memory impairment noted  Attention/Concentration    impaired  Ability to understand instructions No  Ability to respond meaningfully No  SI:   ELBA   HI: Denies HI      No notes of this type exist for this encounter.             7/15/2024     6:08 PM   C-SSRS Suicide Screening   1) Within the past month, have you wished you were dead or wished you could go to sleep and not wake up?  No   2) Have you actually had any thoughts of killing yourself?  No   6) Have you ever done anything, started to do anything, or prepared to do anything to end your life? No          Questionnaire Findings:    PHQ9:    GAD7:        No data to display                  ASSESSMENT  Psychiatric Diagnoses:  ***      Legal Status: voluntary      Suicide Risk Assessment: ELBA           7/15/2024     6:08 PM   C-SSRS Suicide Screening   1) Within the past month, have you wished you were dead or wished you could go to sleep and not wake up?  No   2) Have you actually had any thoughts of killing yourself?  No   6) Have you ever done anything, started to do anything, or prepared to do anything to end your life? No    :              Impression:     Patient is a 68 y.o.  female who presents for ***.     Additional recommendations will follow the clinical course.                  Plan:    Ongoing medical management and stabilization per primary team      - DC sertraline  - Start Abilify 5mg po QD for

## 2024-07-18 NOTE — PROGRESS NOTES
Pt resting in bed comfortably at this time, alert and oriented to person. No distress noted, respirations even and unlabored on room air. Pt has IVF infusing through IV at this time. Esteves in place and draining. Rectal tube in place. C diff precautions noted. Pt instructed to call for assistance if needed, call light in place, will continue to monitor.

## 2024-07-18 NOTE — PROGRESS NOTES
ACUTE PHYSICAL THERAPY GOALS:   (Developed with and agreed upon by patient and/or caregiver.)  Pt will perform bed mobility with Ed in 7 therapy sessions.  Pt will perform sit-to-stand/ stand-to-sit transfers Ed in 7 therapy sessions.  Pt will ambulate 10 ft Ed with use of LRAD/no device and breaks as needed in 7 therapy sessions.  Pt will perform seated dynamic balance activities with minimal postural sway in 7 therapy sessions.  Pt will tolerate multiple sets and reps of BLE exercises in 7 therapy sessions.     PHYSICAL THERAPY: Daily Note AM   (Link to Caseload Tracking: PT Visit Days : 2  Time In/Out PT Charge Capture  Rehab Caseload Tracker  Orders    Jesika Olsen is a 68 y.o. female   PRIMARY DIAGNOSIS: Severe sepsis (HCC)  Hyperkalemia [E87.5]  Septicemia (HCC) [A41.9]  Acute pyelonephritis [N10]  ZEUS (acute kidney injury) (HCC) [N17.9]  Severe sepsis (HCC) [A41.9, R65.20]       Inpatient: Payor: [facility] MEDICARE / Plan: [facility] MEDICARE COMPLETE / Product Type: *No Product type* /     ASSESSMENT:     REHAB RECOMMENDATIONS:   Recommendation to date pending progress:  Setting:  Short-term Rehab    Equipment:    To Be Determined     ASSESSMENT:  Ms. Olsen was supine upon contact and agreeable to PT; yelling out \"help me, help me\". Patient has history of +orthostatic BPs. BP from supine to sitting EOB maintained initially maintained the same and WNL. Patient performs all mobility with max-total assist x 2 and cues for technique/participation. Patient demonstrates poor sitting balance EOB during prolonged seated balance activities while attempting functional tasks. Patient then became less responsive. Attempted a second seated BP but unable to read so she was returned to supine with total assist x 2. Patient then became more responsive and rolled left and right with max-total assist x 2 to get cleaned up and replace/fix griselda pads under her. BP WNL at completion of session. Slow progress. Will continue PT  efforts.     SUBJECTIVE:   Ms. Olsen states, \"Help me\"     Social/Functional Lives With: Daughter  Type of Home: House  Home Layout: One level  Home Access: Level entry  Bathroom Shower/Tub: Tub/Shower unit  Bathroom Toilet: Standard  Bathroom Equipment: None  Bathroom Accessibility: Accessible  Home Equipment: Wheelchair - Manual  Receives Help From: Family  ADL Assistance: Independent  Homemaking Assistance: Independent  Homemaking Responsibilities: Yes  Ambulation Assistance: Non-ambulatory  Transfer Assistance: Needs assistance  Active : No  Patient's  Info: daughter  Mode of Transportation: Car  Occupation: Retired  Additional Comments: per chart from previous admission- pt lives with her daughter in a 1 level home with a level entrance, additionally dtr stating that recently pt has been essentially \"bed bound\"; during admission last month pt was still able to walk short distances with minimal assistance  OBJECTIVE:     PAIN: VITALS / O2: PRECAUTION / LINES / DRAINS:   Pre Treatment: 0         Post Treatment: 0 Vitals        Oxygen    Esteves Catheter, Rectal Tube, and Telemetry     RESTRICTIONS/PRECAUTIONS:        MOBILITY: I Mod I S SBA CGA Min Mod Max Total  NT x2 Comments:   Bed Mobility    Rolling [] [] [] [] [] [] [] [x] [x] [] [x]    Supine to Sit [] [] [] [] [] [] [] [x] [x] [] [x]    Scooting [] [] [] [] [] [] [] [] [] [] []    Sit to Supine [] [] [] [] [] [] [] [x] [] [] [x]    Transfers    Sit to Stand [] [] [] [] [] [] [] [] [] [] []    Bed to Chair [] [] [] [] [] [] [] [] [] [] []    Stand to Sit [] [] [] [] [] [] [] [] [] [] []     [] [] [] [] [] [] [] [] [] [] []    I=Independent, Mod I=Modified Independent, S=Supervision, SBA=Standby Assistance, CGA=Contact Guard Assistance,   Min=Minimal Assistance, Mod=Moderate Assistance, Max=Maximal Assistance, Total=Total Assistance, NT=Not Tested    BALANCE: Good Fair+ Fair Fair- Poor NT Comments   Sitting Static [] [] [] [] [x] []    Sitting

## 2024-07-18 NOTE — PROGRESS NOTES
07/17/24 11:34 AM   Result Value Ref Range    TSH w Free Thyroid if Abnormal 10.50 (H) 0.27 - 4.20 UIU/ML   T4, Free    Collection Time: 07/17/24 11:34 AM   Result Value Ref Range    T4 Free 1.4 0.9 - 1.7 NG/DL   CBC with Auto Differential    Collection Time: 07/17/24 11:39 AM   Result Value Ref Range    WBC 12.3 (H) 4.3 - 11.1 K/uL    RBC 3.39 (L) 4.05 - 5.2 M/uL    Hemoglobin 10.1 (L) 11.7 - 15.4 g/dL    Hematocrit 31.0 (L) 35.8 - 46.3 %    MCV 91.4 82 - 102 FL    MCH 29.8 26.1 - 32.9 PG    MCHC 32.6 31.4 - 35.0 g/dL    RDW 16.7 (H) 11.9 - 14.6 %    Platelets 210 150 - 450 K/uL    MPV 10.4 9.4 - 12.3 FL    nRBC 0.00 0.0 - 0.2 K/uL    Differential Type AUTOMATED      Neutrophils % 80 (H) 43 - 78 %    Lymphocytes % 12 (L) 13 - 44 %    Monocytes % 5 4.0 - 12.0 %    Eosinophils % 1 0.5 - 7.8 %    Basophils % 0 0.0 - 2.0 %    Immature Granulocytes % 1 0.0 - 5.0 %    Neutrophils Absolute 9.9 (H) 1.7 - 8.2 K/UL    Lymphocytes Absolute 1.5 0.5 - 4.6 K/UL    Monocytes Absolute 0.6 0.1 - 1.3 K/UL    Eosinophils Absolute 0.2 0.0 - 0.8 K/UL    Basophils Absolute 0.0 0.0 - 0.2 K/UL    Immature Granulocytes Absolute 0.1 0.0 - 0.5 K/UL   Basic Metabolic Panel    Collection Time: 07/17/24 11:39 AM   Result Value Ref Range    Sodium 136 136 - 145 mmol/L    Potassium 3.4 (L) 3.5 - 5.1 mmol/L    Chloride 102 98 - 107 mmol/L    CO2 24 20 - 28 mmol/L    Anion Gap 10 9 - 18 mmol/L    Glucose 94 70 - 99 mg/dL    BUN 37 (H) 8 - 23 MG/DL    Creatinine 0.62 0.60 - 1.10 MG/DL    Est, Glom Filt Rate >90 >60 ml/min/1.73m2    Calcium 8.3 (L) 8.8 - 10.2 MG/DL   Lactic Acid    Collection Time: 07/17/24 11:39 AM   Result Value Ref Range    Lactic Acid 2.5 (H) 0.5 - 2.0 mmol/L   POCT Glucose    Collection Time: 07/17/24 11:48 AM   Result Value Ref Range    POC Glucose 55 (L) 65 - 100 mg/dL    Performed by: Alessio    POCT Glucose    Collection Time: 07/17/24 12:11 PM   Result Value Ref Range    POC Glucose 65 65 - 100 mg/dL     Lymphocytes Absolute 1.4 0.5 - 4.6 K/UL    Monocytes Absolute 0.6 0.1 - 1.3 K/UL    Eosinophils Absolute 0.2 0.0 - 0.8 K/UL    Basophils Absolute 0.0 0.0 - 0.2 K/UL    Immature Granulocytes Absolute 0.2 0.0 - 0.5 K/UL   Basic Metabolic Panel    Collection Time: 07/18/24  4:42 AM   Result Value Ref Range    Sodium 130 (L) 136 - 145 mmol/L    Potassium 3.1 (L) 3.5 - 5.1 mmol/L    Chloride 99 98 - 107 mmol/L    CO2 19 (L) 20 - 28 mmol/L    Anion Gap 12 9 - 18 mmol/L    Glucose 93 70 - 99 mg/dL    BUN 30 (H) 8 - 23 MG/DL    Creatinine 0.54 (L) 0.60 - 1.10 MG/DL    Est, Glom Filt Rate >90 >60 ml/min/1.73m2    Calcium 8.3 (L) 8.8 - 10.2 MG/DL   Lactic Acid    Collection Time: 07/18/24  4:42 AM   Result Value Ref Range    Lactic Acid 2.0 0.5 - 2.0 mmol/L   POCT Glucose    Collection Time: 07/18/24  7:20 AM   Result Value Ref Range    POC Glucose 79 65 - 100 mg/dL    Performed by: Alessio        Recent Labs     07/15/24  1845   COVID19 Not detected         Current Meds:  Current Facility-Administered Medications   Medication Dose Route Frequency    dextrose 10 % infusion   IntraVENous Continuous    0.9 % sodium chloride infusion   IntraVENous Continuous    sertraline (ZOLOFT) tablet 25 mg  25 mg Oral QPM    tamsulosin (FLOMAX) capsule 0.4 mg  0.4 mg Oral Daily    levothyroxine (SYNTHROID) tablet 175 mcg  175 mcg Oral Daily    insulin regular (HUMULIN R;NOVOLIN R) injection 10 Units  10 Units IntraVENous Once    glucose chewable tablet 16 g  4 tablet Oral PRN    dextrose bolus 10% 125 mL  125 mL IntraVENous PRN    Or    dextrose bolus 10% 250 mL  250 mL IntraVENous PRN    Glucagon Emergency KIT 1 mg  1 mg SubCUTAneous PRN    dextrose 10 % infusion   IntraVENous Continuous PRN    cefTRIAXone (ROCEPHIN) 1,000 mg in sterile water 10 mL IV syringe  1,000 mg IntraVENous Q24H    vancomycin (VANCOCIN) capsule 125 mg  125 mg Oral 4x Daily    sodium chloride flush 0.9 % injection 5-40 mL  5-40 mL IntraVENous 2 times

## 2024-07-18 NOTE — PROGRESS NOTES
US Guided PIV access    Called for assistance with IV access. Ultrasound was used to find the vein which was compressible and does not have any features of an artery or nerve bundle. Skin was cleaned and disinfected prior to IV puncture. Under real-time ultrasound guidance, peripheral access was obtained in the right basilic using 22 G 2.5\" B New Deep Access x 1 attempt. Blood return was present and IV flushed without difficulty. IV dressing applied, no immediate complications noted, and patient tolerated the procedure well.

## 2024-07-18 NOTE — PROGRESS NOTES
Pt resting in bed comfortably at this time, alert and oriented to person. No distress noted, respirations even and unlabored on room air. Rectal tube and rabago in place and draining. Pt stable. Daughter at the bedside. Pt stable. Will prepare for bedside shift report.

## 2024-07-18 NOTE — PROGRESS NOTES
GOALS:  LTG: Patient will maintain adequate hydration/nutrition with optimum safety and efficiency of swallowing function with PO intake without overt signs or symptoms of aspiration for the highest appropriate diet level.  STG: PROGRESSING 24  Patient will consume minced and moist textures and thin liquids without overt signs or symptoms of airway compromise.  Patient will safely ingest diet trials during therapeutic feedings with SLP without overt signs or symptoms of respiratory compromise in efforts to advance diet.    LTG: Patient will increase receptive/expressive language skills demonstrated by the ability to communicate basic wants/needs across environments.   STG:  Patient will follow 1 step commands with 100% accuracy given minimal cueing.  Patient will complete sentences with 100% accuracy given minimal cueing.    LTG: Patient will improve neuro-linguistic abilities to increase safety and awareness in functional living environment.   STG:  Patient will sequence steps to a task with 80% accuracy given minimal cueing.  Patient will recall relevant verbal information with 80% accuracy with minimal cues.    SPEECH LANGUAGE PATHOLOGY: Dysphagia Daily Note #1    Acknowledge Order  I  Therapy Time  I   Charges     I  Rehab Caseload Tracker    NAME: Jesika Olsen  : 1955  MRN: [id number]    ADMISSION DATE: 7/15/2024  PRIMARY DIAGNOSIS: Severe sepsis (HCC)    ICD-10: Treatment Diagnosis: R13.11 Dysphagia, Oral Phase  R41.841 Cognitive-Communication Deficit    RECOMMENDATIONS   Diet:    Minced and Moist  Thin Liquids    Medication: as tolerated   Compensatory Swallowing Strategies:   Alternate solids and liquids  Slow rate of intake  Remain upright for 30-45 minutes after meals  Small bites/sips  Upright as possible for all oral intake  Set up assist  1:1 feeding    Therapeutic Intervention:   Patient/family education  Dysphagia treatment  Cognitive-linguistic treatment  Consider referral to

## 2024-07-18 NOTE — PLAN OF CARE
Problem: Safety - Adult  Goal: Free from fall injury  7/18/2024 0710 by Kim Williamson RN  Outcome: Progressing  7/17/2024 1806 by Kassy Rivers RN  Outcome: Progressing     Problem: Discharge Planning  Goal: Discharge to home or other facility with appropriate resources  Recent Flowsheet Documentation  Taken 7/17/2024 1910 by Maye Coates RN  Discharge to home or other facility with appropriate resources: Identify barriers to discharge with patient and caregiver  7/17/2024 1806 by Kassy Rivers RN  Outcome: Not Progressing     Problem: Chronic Conditions and Co-morbidities  Goal: Patient's chronic conditions and co-morbidity symptoms are monitored and maintained or improved  Recent Flowsheet Documentation  Taken 7/17/2024 1910 by Maye Coates RN  Care Plan - Patient's Chronic Conditions and Co-Morbidity Symptoms are Monitored and Maintained or Improved: Monitor and assess patient's chronic conditions and comorbid symptoms for stability, deterioration, or improvement  7/17/2024 1806 by Kassy Rivers RN  Outcome: Not Progressing

## 2024-07-18 NOTE — PROGRESS NOTES
MD notified of pt bg results. 64 Pt has had a few bites of PB, drank some applejuice and 1 glucose tab administered.

## 2024-07-18 NOTE — PROGRESS NOTES
Pt resting in bed comfortably at this time, alert and oriented to person. No distress noted, respirations even and unlabored on room air. Pt denies pain at this time. Pt instructed to call for assistance if needed, call light in place, will continue to monitor.

## 2024-07-18 NOTE — PROGRESS NOTES
ACUTE OCCUPATIONAL THERAPY GOALS:   (Developed with and agreed upon by patient and/or caregiver.)  1. Patient will complete upper body bathing and dressing with MINIMAL ASSIST and adaptive equipment as needed.   2. Patient will complete self-grooming tasks in unsupported sitting with MINIMAL ASSIST and adaptive equipment as needed.  3. Patient will tolerate 25 minutes of OT treatment with 1-2 rest breaks to increase activity tolerance for ADLs.   4. Patient will complete functional transfers with MODERATE ASSIST and adaptive equipment as needed.   5. Patient will complete functional activity while seated edge of bed with MINIMAL ASSIST and adaptive equipment as needed.   6. Patient will tolerate 15 minutes unsupported sitting balance with MINIMAL ASSIST in preparation for ADL performance.   7. Patient will tolerate 10 minutes BUE therapeutic activities to increase use of BUE during ADL performance.      Timeframe: 7 visits       OCCUPATIONAL THERAPY: Daily Note AM   OT Visit Days: 2   Time In/Out  OT Charge Capture  Rehab Caseload Tracker  OT Orders    Jesika Olsen is a 68 y.o. female   PRIMARY DIAGNOSIS: Severe sepsis (HCC)  Hyperkalemia [E87.5]  Septicemia (HCC) [A41.9]  Acute pyelonephritis [N10]  ZEUS (acute kidney injury) (HCC) [N17.9]  Severe sepsis (HCC) [A41.9, R65.20]       Inpatient: Payor: [facility] MEDICARE / Plan: [facility] MEDICARE COMPLETE / Product Type: *No Product type* /     ASSESSMENT:     REHAB RECOMMENDATIONS:   Recommendation to date pending progress:  Setting:  Short-term Rehab    Equipment:    To Be Determined     ASSESSMENT:  Ms. Olsen is received supine in bed, agreeable to participate in the session. Decreased command follow noted throughout the session (RN notified). Pt performed bed mobility transfers/supine<>sit edge of bed with total assist x2. Pt presented with poor sitting balance EOB requiring consistent/total posterior support/assist to avoid LOB and maintain upright sitting  Assistance, Max=Maximal Assistance, Total=Total Assistance, NT=Not Tested    BALANCE: Good Fair+ Fair Fair- Poor NT Comments   Sitting Static [] [] [] [] [x] []    Sitting Dynamic [] [] [] [] [x] []              Standing Static [] [] [] [] [] [x]    Standing Dynamic [] [] [] [] [] [x]        PLAN:     FREQUENCY/DURATION   OT Plan of Care: 3 times/week for duration of hospital stay or until stated goals are met, whichever comes first.    TREATMENT:     TREATMENT:   Co-Treatment PT/OT necessary due to patient's decreased overall endurance/tolerance levels, as well as need for high level skilled assistance to complete functional transfers/mobility and functional tasks  Neuromuscular Re-education (15 Minutes): Patient participated in neuromuscular re-education including functional reaching, weight shifting, postural training, midline training, and sitting balance activity   with maximal assistance, verbal cues, tactile cues, visual cues, and education to improve sitting balance, proprioception, posture, coordination, static balance, and dynamic balance in order to prepare for functional task, prepare for functional transfer, increase safety awareness, and prepare for self care..   Self Care (10 minutes): Patient participated in grooming ADLs in supported sitting with maximal visual, verbal, manual, and tactile cueing to increase independence, decrease assistance required, increase activity tolerance, and increase safety awareness. Patient also participated in bed mobility, functional transfer, and energy conservation training to increase independence, decrease assistance required, increase activity tolerance, and increase safety awareness.     TREATMENT GRID:  N/A    AFTER TREATMENT PRECAUTIONS: Alarm Activated, Bed, Bed/Chair Locked, Call light within reach, Needs within reach, and RN notified    INTERDISCIPLINARY COLLABORATION:  RN/ PCT, PT/ PTA, and OT/ OLSON    EDUCATION:       TOTAL TREATMENT DURATION AND

## 2024-07-18 NOTE — CONSULTS
Psychiatric consult complete. Chart was reviewed and patient was seen. The patient was not able to provide history or engage in assessment. Only alert to person, confused and disoriented otherwise. Does appear to be responding to internal stimuli. Hx of Bipolar 1, hx of depressive episodes, hx of delusions. Per chart review patient has been hospitalized for psychiatric reasons twice, most recent in 2017 at [facility]. Patient has been on a number of MH medications. Most recently in December prescribed Abilify, Lamictal, Celexa, cymbalta. Unclear why she is no longer taking any mood stabilizers. Unable to obtain collateral from daughter.       - DC sertraline  - Start Abilify 5mg po QD for bipolar depression     Adhere to delirium precautions:   Support sleep/wake cycles (shades open during day, encourage activity; quiet/dark room at night)   Avoid deliriogenic medications (benzos, opiates, anticholinergic)   Reorient to place, time, situation, plan of care frequently   Optimize communication as much as possible (hearing aids, glasses, etc.)   Encourage mobility   Avoid lines, tubes as much as able   Provide patient with orienting, comforting items from home--pictures, home blanket, pillow etc   Encourage family involvement/visits   Optimize communication      Full psychiatric consult note to follow. Psychiatry will sign off at this time.      Thank you for consult. Please do not hesitate to contact provider if there are additional questions regarding patient.    SHERITA Hargrove - NP 7/18/2024  [facility]

## 2024-07-18 NOTE — ACP (ADVANCE CARE PLANNING)
Advance Care Planning     Advance Care Planning Inpatient Note  Spiritual Care Department    Today's Date: 7/18/2024  Unit: [facility] 8 MED SURG    Received request from HealthCare Provider.  Upon review of chart and communication with care team, Spiritual Care will defer advance care planning with patient at this time.. Patient was/were present in the room during visit.    Goals of ACP Conversation:  Discuss advance care planning documents  Facilitate a discussion related to patient's goals of care as they align with the patient's values and beliefs.    Assessment:  CH reviewed chart and checked with RN to discern PT's ability to make decisions. CH will defer ACP conversation at this time. CH visited briefly with PT and offered support.     Interventions:  Patient DECLINED ACP conversation    Electronically signed by Chaplain ERICK on 7/18/2024 at 12:08 PM

## 2024-07-18 NOTE — PROGRESS NOTES
Spiritual Consult for HC POA. CH reviewed chart and checked with RN to discern PT's ability to make decisions. CH will defer ACP conversation at this time. CH introduced self. PT expressed frustration. CH offered empathetic spiritual presence. CH checked for unmet needs and offered support     Rev. Екатерина Negron M.Div.

## 2024-07-19 ENCOUNTER — APPOINTMENT (OUTPATIENT)
Dept: ULTRASOUND IMAGING | Age: 69
End: 2024-07-19
Payer: MEDICARE

## 2024-07-19 LAB
ANION GAP SERPL CALC-SCNC: 12 MMOL/L (ref 9–18)
BACTERIA SPEC CULT: NORMAL
BASOPHILS # BLD: 0 K/UL (ref 0–0.2)
BASOPHILS NFR BLD: 0 % (ref 0–2)
BUN SERPL-MCNC: 19 MG/DL (ref 8–23)
CALCIUM SERPL-MCNC: 8.2 MG/DL (ref 8.8–10.2)
CHLORIDE SERPL-SCNC: 99 MMOL/L (ref 98–107)
CO2 SERPL-SCNC: 19 MMOL/L (ref 20–28)
CREAT SERPL-MCNC: 0.51 MG/DL (ref 0.6–1.1)
DIFFERENTIAL METHOD BLD: ABNORMAL
EOSINOPHIL # BLD: 0.1 K/UL (ref 0–0.8)
EOSINOPHIL NFR BLD: 1 % (ref 0.5–7.8)
ERYTHROCYTE [DISTWIDTH] IN BLOOD BY AUTOMATED COUNT: 16.5 % (ref 11.9–14.6)
GLUCOSE BLD STRIP.AUTO-MCNC: 117 MG/DL (ref 65–100)
GLUCOSE BLD STRIP.AUTO-MCNC: 78 MG/DL (ref 65–100)
GLUCOSE BLD STRIP.AUTO-MCNC: 84 MG/DL (ref 65–100)
GLUCOSE BLD STRIP.AUTO-MCNC: 93 MG/DL (ref 65–100)
GLUCOSE SERPL-MCNC: 75 MG/DL (ref 70–99)
HCT VFR BLD AUTO: 34.5 % (ref 35.8–46.3)
HGB BLD-MCNC: 11.6 G/DL (ref 11.7–15.4)
IMM GRANULOCYTES # BLD AUTO: 0.2 K/UL (ref 0–0.5)
IMM GRANULOCYTES NFR BLD AUTO: 2 % (ref 0–5)
LYMPHOCYTES # BLD: 1.5 K/UL (ref 0.5–4.6)
LYMPHOCYTES NFR BLD: 13 % (ref 13–44)
MCH RBC QN AUTO: 30 PG (ref 26.1–32.9)
MCHC RBC AUTO-ENTMCNC: 33.6 G/DL (ref 31.4–35)
MCV RBC AUTO: 89.1 FL (ref 82–102)
MONOCYTES # BLD: 0.9 K/UL (ref 0.1–1.3)
MONOCYTES NFR BLD: 7 % (ref 4–12)
NEUTS SEG # BLD: 9.2 K/UL (ref 1.7–8.2)
NEUTS SEG NFR BLD: 77 % (ref 43–78)
NRBC # BLD: 0 K/UL (ref 0–0.2)
PLATELET # BLD AUTO: 241 K/UL (ref 150–450)
PMV BLD AUTO: 9.4 FL (ref 9.4–12.3)
POTASSIUM SERPL-SCNC: 3.3 MMOL/L (ref 3.5–5.1)
RBC # BLD AUTO: 3.87 M/UL (ref 4.05–5.2)
SERVICE CMNT-IMP: ABNORMAL
SERVICE CMNT-IMP: NORMAL
SODIUM SERPL-SCNC: 130 MMOL/L (ref 136–145)
WBC # BLD AUTO: 12 K/UL (ref 4.3–11.1)

## 2024-07-19 PROCEDURE — 6360000002 HC RX W HCPCS: Performed by: INTERNAL MEDICINE

## 2024-07-19 PROCEDURE — 2580000003 HC RX 258: Performed by: INTERNAL MEDICINE

## 2024-07-19 PROCEDURE — 92526 ORAL FUNCTION THERAPY: CPT

## 2024-07-19 PROCEDURE — 36415 COLL VENOUS BLD VENIPUNCTURE: CPT

## 2024-07-19 PROCEDURE — 93971 EXTREMITY STUDY: CPT | Performed by: RADIOLOGY

## 2024-07-19 PROCEDURE — 93971 EXTREMITY STUDY: CPT

## 2024-07-19 PROCEDURE — 82962 GLUCOSE BLOOD TEST: CPT

## 2024-07-19 PROCEDURE — 6370000000 HC RX 637 (ALT 250 FOR IP): Performed by: INTERNAL MEDICINE

## 2024-07-19 PROCEDURE — 80048 BASIC METABOLIC PNL TOTAL CA: CPT

## 2024-07-19 PROCEDURE — 85025 COMPLETE CBC W/AUTO DIFF WBC: CPT

## 2024-07-19 PROCEDURE — 1100000000 HC RM PRIVATE

## 2024-07-19 RX ORDER — ARIPIPRAZOLE 5 MG/1
5 TABLET ORAL DAILY
Status: DISCONTINUED | OUTPATIENT
Start: 2024-07-19 | End: 2024-07-31

## 2024-07-19 RX ORDER — LACTOBACILLUS RHAMNOSUS GG 10B CELL
1 CAPSULE ORAL
Status: DISCONTINUED | OUTPATIENT
Start: 2024-07-19 | End: 2024-08-01 | Stop reason: HOSPADM

## 2024-07-19 RX ADMIN — DEXTROSE MONOHYDRATE: 10 INJECTION, SOLUTION INTRAVENOUS at 02:51

## 2024-07-19 RX ADMIN — SODIUM CHLORIDE: 9 INJECTION, SOLUTION INTRAVENOUS at 02:50

## 2024-07-19 RX ADMIN — SODIUM CHLORIDE, PRESERVATIVE FREE 10 ML: 5 INJECTION INTRAVENOUS at 09:52

## 2024-07-19 RX ADMIN — SODIUM CHLORIDE, PRESERVATIVE FREE 10 ML: 5 INJECTION INTRAVENOUS at 21:21

## 2024-07-19 RX ADMIN — HEPARIN SODIUM 5000 UNITS: 5000 INJECTION INTRAVENOUS; SUBCUTANEOUS at 21:22

## 2024-07-19 RX ADMIN — MIDODRINE HYDROCHLORIDE 5 MG: 5 TABLET ORAL at 13:36

## 2024-07-19 RX ADMIN — POTASSIUM CHLORIDE 40 MEQ: 1500 TABLET, EXTENDED RELEASE ORAL at 09:51

## 2024-07-19 RX ADMIN — POTASSIUM BICARBONATE 40 MEQ: 782 TABLET, EFFERVESCENT ORAL at 18:05

## 2024-07-19 RX ADMIN — VANCOMYCIN HYDROCHLORIDE 125 MG: 125 CAPSULE ORAL at 21:22

## 2024-07-19 RX ADMIN — CEFTRIAXONE 1000 MG: 1 INJECTION, POWDER, FOR SOLUTION INTRAMUSCULAR; INTRAVENOUS at 21:21

## 2024-07-19 RX ADMIN — VANCOMYCIN HYDROCHLORIDE 125 MG: 125 CAPSULE ORAL at 13:36

## 2024-07-19 RX ADMIN — VANCOMYCIN HYDROCHLORIDE 125 MG: 125 CAPSULE ORAL at 09:51

## 2024-07-19 RX ADMIN — ARIPIPRAZOLE 5 MG: 5 TABLET ORAL at 09:51

## 2024-07-19 RX ADMIN — LEVOTHYROXINE SODIUM 175 MCG: 0.07 TABLET ORAL at 05:59

## 2024-07-19 RX ADMIN — MIDODRINE HYDROCHLORIDE 5 MG: 5 TABLET ORAL at 09:40

## 2024-07-19 RX ADMIN — HEPARIN SODIUM 5000 UNITS: 5000 INJECTION INTRAVENOUS; SUBCUTANEOUS at 13:36

## 2024-07-19 RX ADMIN — MIDODRINE HYDROCHLORIDE 5 MG: 5 TABLET ORAL at 18:05

## 2024-07-19 RX ADMIN — TAMSULOSIN HYDROCHLORIDE 0.4 MG: 0.4 CAPSULE ORAL at 09:51

## 2024-07-19 RX ADMIN — VANCOMYCIN HYDROCHLORIDE 125 MG: 125 CAPSULE ORAL at 18:05

## 2024-07-19 RX ADMIN — HEPARIN SODIUM 5000 UNITS: 5000 INJECTION INTRAVENOUS; SUBCUTANEOUS at 05:59

## 2024-07-19 RX ADMIN — Medication 1 CAPSULE: at 18:05

## 2024-07-19 NOTE — CARE COORDINATION
Discharge planning was discussed with the interdisciplinary team. Per MD, patient is still being treated for c-diff infection. They are also continuing to monitor her pressures as they have been soft. Midodrine was restarted. Psychiatry has made adjustments to medications. Patient will likely be ready to discharge home with home health services over the weekend. Spoke with daughter, Karin, regarding discharge plan. She is concerned about taking patient home over the weekend and asking to speak with MD at bedside. MD notified via [employer]. Inquired about which home health company they would like to use. Referral sent to [facility] for PT/OT/RN/ST services per daughter request. CM will continue to follow and assist with transition at discharge along with any needs that may arise during her stay.

## 2024-07-19 NOTE — PROGRESS NOTES
with patient in room. Pt remains minimally interactive with RD. RD observed 0% of Minced and Moist breakfast tray consumed. Offered food at time of RD visit, pt refused. Overall, PO intake is charted to be improved to ~75% of meals per RN charting. Reviewed patient with RN Nataly and PCT Lolis; unaware of recent PO intake.  Reviewed offering Nepro throughout the day with patient and continuing 1:1 feed with PCT Lolis.     Lab Results   Component Value Date/Time    K 3.3 07/19/2024 06:42 AM    K 3.1 07/18/2024 04:42 AM    K 3.4 07/17/2024 11:39 AM    K 4.9 07/16/2024 10:52 AM    K QUANTITY NOT SUFFICIENT. SUGGEST RECOLLECTION 07/16/2024 05:53 AM    K 6.3 07/15/2024 11:09 PM   K now low. Remove low K dietary restriction from diet order. Continue Nepro at this time as this is the most calorically dense supplement.     Current Nutrition Therapies:  ADULT DIET; Dysphagia - Minced and Moist; Low Potassium (Less than 3000 mg/day)  ADULT ORAL NUTRITION SUPPLEMENT; Breakfast, Lunch, Dinner; Renal Oral Supplement    Current Intake:   Average Meal Intake: Unable to assess (obsevation vs RN charting inconsistent) Average Supplements Intake: Unable to assess      Anthropometric Measures:  Height: 175.3 cm (5' 9.02\")  Current Body Wt: 82.5 kg (181 lb 14.1 oz) (7/19), Weight source: Bed Scale  BMI: 26.8, Overweight (BMI 25.0-29.9)  Admission Body Weight: 72.6 kg (160 lb) (7/15- bed scale)  Ideal Body Weight (Kg) (Calculated): 66 kg (145 lbs), 112.2 %  BMI Category Overweight (BMI 25.0-29.9)    Estimated Daily Nutrient Needs:  Energy (kcal/day): 5635-7880 (20-25 kcal/kg) (Kcal/kg Weight used: 73.8 kg  (7/16- weight)  Protein (g/day):  (1.2-1.5 g/kg) (increased 2/2 s/p gastric bypass) Weight Used: ( (7/16-  weight)) 73.8 kg  Fluid (ml/day):   (1 ml/kcal)    Nutrition Diagnosis:   Inadequate oral intake related to altered GI structure as evidenced by  (s/p RYGB, PO intake of 0% of meals)    Nutrition Interventions:    Food and/or Nutrient Delivery: Continue Oral Nutrition Supplement, Modify Current Diet     Coordination of Nutrition Care: Continue to monitor while inpatient    Goals:   Previous Goal Met: Progressing toward Goal(s)  Active Goal: PO intake 50% or greater, by next RD assessment       Nutrition Monitoring and Evaluation:      Food/Nutrient Intake Outcomes: Food and Nutrient Intake, Supplement Intake  Physical Signs/Symptoms Outcomes: Biochemical Data, GI Status, Meal Time Behavior, Weight    Discharge Planning:    Too soon to determine    Dea Durán RD

## 2024-07-19 NOTE — PROGRESS NOTES
Hospitalist Progress Note   Admit Date:     7/15/2024  6:01 PM   Name:              Jesika Olsen   Age:                 68 y.o.  Sex:                 female  :               1955   MRN:               [id number]   Room:              809/     Presenting/Chief Complaint: Fatigue     Reason(s) for Admission: Hyperkalemia [E87.5]  Septicemia (HCC) [A41.9]  Acute pyelonephritis [N10]  ZEUS (acute kidney injury) (HCC) [N17.9]  Severe sepsis (HCC) [A41.9, R65.20]      NOTICE FOR THE PATIENT: This clinical note is not designed to be interpreted by patients, their families or their loved ones and we do not recommend reading it unless you have medical training. These notes may contain candid and (unintentionally) offensive descriptions, which are sometimes required for accurate documentation. If you would like more information about your healthcare, please obtain it directly by myself or my staff/colleagues - never solely from the notes. Thank you for your understanding and cooperation.      Hospital Course:   As per prior documentation:  \"Jesika Olsen is a 68 y.o. female with medical history of MDD, hypothyroidism, hiatal hernia (on omeprazole), Dm type II, and previous episodes of C diff colitis presents from home with worsening generalized lethargy and weakness. Patient has had several hospitalizations and her daughter states that \"she is so weak she is essentially bedbound at home and can't do anything.\" Patient has a known history of frequent, recurrent UTIs (growing pansensitive E. Coli per chart review). She has not been able to eat or drink much at all. No recent sick contacts.      ED course: CT abd/pelvis shows multiple bladder stones and a liver lesion. Creatinine of 0.9 (baseline around 0.3-0.4), potassium of 6.7 (nonhemolyzed), and bicarb of 14. WBC count of 21. EKG shows NSR without peaked T waves. Given Rocephin and fluid boluses. Also given calcium gluconate and Kayexalate. Ordered    Hypoglycemia  7/19/2024  continue d10w  Patient refuses to orally address her hypoglycemia  Po intake encouraged daughter trying to get her to eat     Hypotension   7/19/2024  Continue midodrine        Hypokalemia  7/19/2024  Replace     Left arm swelling  7/19/2024  Doppler ultrasound     Depression and bipolar disorder  7/19/2024  Seen by psych  Recommending Abilify  Zoloft discontinued        Anticipated Discharge Arrangements:   Skilled Nursing Facility     PT/OT evals ordered?  Therapy evals ordered  Diet:  ADULT DIET; Dysphagia - Minced and Moist; Low Potassium (Less than 3000 mg/day)  ADULT ORAL NUTRITION SUPPLEMENT; Breakfast, Lunch, Dinner; Renal Oral Supplement  VTE prophylaxis: Heparin  Code status: DNR  Disposition: Home- when medically stable; she has no more insurance days available for rehab and will have to do a co-pay which she cannot afford.     Non-peripheral Lines and Tubes (if present):   Urinary Catheter 07/16/24 [place] (Active)       Fecal Management System 07/16/24 (Active)      Extended Dwell Peripherial IV 07/18/24 Right Basilic (Active)         Telemetry (if present):  Cardiac/Telemetry Monitor On: Portable telemetry pack applied          Hospital Problems:  Principal Problem:    Severe sepsis (HCC)  Active Problems:    Hypothyroidism    MDD (major depressive disorder)    DM2 (diabetes mellitus, type 2) (HCC)    Bladder stones    ZEUS (acute kidney injury) (HCC)    Adult failure to thrive    Metabolic acidosis    Hyponatremia  Resolved Problems:    * No resolved hospital problems. *        Objective:   Patient Vitals for the past 24 hrs:    Temp Pulse Resp BP SpO2   07/19/24 0329 96.9 °F (36.1 °C) 94 18 (!) 97/59 91 %   07/19/24 0002 97.7 °F (36.5 °C) 87 18 92/67 99 %   07/18/24 2047 97.2 °F (36.2 °C) -- -- -- --   07/18/24 1924 -- (!) 101 20 93/67 93 %   07/18/24 1622 97.3 °F (36.3 °C) 85 18 100/82 100 %   07/18/24 1115 98.8 °F (37.1 °C) 90 20 118/78 100 %   07/18/24 0737 -- 71 20 102/66

## 2024-07-19 NOTE — PROGRESS NOTES
Phlebotomist was not able to obtain morning labs. Due to being a hard stick over multiple days, with no other line access, per phlebotomy protocol, they will no longer attempt morning labs.     6:30am- another phlebotomist came and was able to obtain morning labs.

## 2024-07-19 NOTE — PROGRESS NOTES
GOALS:  LTG: Patient will maintain adequate hydration/nutrition with optimum safety and efficiency of swallowing function with PO intake without overt signs or symptoms of aspiration for the highest appropriate diet level.  STG: UPDATED and PROGRESSING 24  Patient will consume pureed textures and thin liquids without overt signs or symptoms of airway compromise.  Patient will safely ingest diet trials during therapeutic feedings with SLP without overt signs or symptoms of respiratory compromise in efforts to advance diet.    LTG: Patient will increase receptive/expressive language skills demonstrated by the ability to communicate basic wants/needs across environments.   STG:  Patient will follow 1 step commands with 100% accuracy given minimal cueing.  Patient will complete sentences with 100% accuracy given minimal cueing.    LTG: Patient will improve neuro-linguistic abilities to increase safety and awareness in functional living environment.   STG:  Patient will sequence steps to a task with 80% accuracy given minimal cueing.  Patient will recall relevant verbal information with 80% accuracy with minimal cues.    SPEECH LANGUAGE PATHOLOGY: Dysphagia Daily Note #2    Acknowledge Order  I  Therapy Time  I   Charges     I  Rehab Caseload Tracker    NAME: Jesika Olsen  : 1955  MRN: [id number]    ADMISSION DATE: 7/15/2024  PRIMARY DIAGNOSIS: Severe sepsis (HCC)    ICD-10: Treatment Diagnosis: R13.11 Dysphagia, Oral Phase  R41.841 Cognitive-Communication Deficit    RECOMMENDATIONS   Diet:    Pureed  Thin Liquids    Medication: as tolerated   Compensatory Swallowing Strategies:   Alternate solids and liquids  Slow rate of intake  Small bites/sips  Upright as possible for all oral intake  1:1 feeding supervision and assistance    Therapeutic Intervention:   Patient/family education  Dysphagia treatment  Cognitive-linguistic treatment  Consider referral to Registered Dietician   Patient continues to  consistencies restricted.   [] 2 Moderate-Severe Dysphagia: Maximum assistance or maximum use     of strategies with partial po intake   [] 1 Severe dysphagia- NPO. Unable to tolerate any po safely     MODIFIED ATA SCALE (mRS) SCORE:   Score: 5 Description   [] 0  No Symptoms   [] 1 No significant disability despite symptoms; Able to carry out all usual duties and activities.   [] 2 Slight Disability: Unable to carry out all previous activities, but able to look after own affairs without assistance.    [] 3 Moderate Disability Requires some help, but is able to walk without assistance.   [] 4 Moderately Severe Disability Unable to walk without assistance and unable to attend to own bodily needs without assistance.    [x] 5 Severe disability Bedridden, incontinent, and requiring constant nursing care and attention.        PLAN    Duration/Frequency: Continue to follow patient 3x/week for duration of hospitalization and/or until goals met    Rehabilitation Potential For Stated Goals: Fair    Interdisciplinary Collaboration: RN/ PCT    Medical Necessity    Patient is expected to demonstrate progress in swallow strength, swallow function, and diet tolerance to improve swallow safety and work toward diet advancement.  Patient is expected to demonstrate progress in expressive communication, receptive ability, and cognitive ability to decrease assistance required communication, increase independence with activities of daily living, and increase communication with family/caregivers.    Education:   Patient educated on Results of evaluation, Speech therapy recommendations, Role of speech therapy, SLP plan, and Diet recommendations  Education provided to Patient, Family/Caregiver, and RN  Education response: Needs reinforcement and No evidence of learning, other parties verbalized understanding     Safety:   Call light within reach  In Bed  RN notified   Family/visitors at bedside  Transport at bedside to take patient for

## 2024-07-20 LAB
BACTERIA SPEC CULT: NORMAL
BACTERIA SPEC CULT: NORMAL
GLUCOSE BLD STRIP.AUTO-MCNC: 67 MG/DL (ref 65–100)
GLUCOSE BLD STRIP.AUTO-MCNC: 67 MG/DL (ref 65–100)
GLUCOSE BLD STRIP.AUTO-MCNC: 68 MG/DL (ref 65–100)
GLUCOSE BLD STRIP.AUTO-MCNC: 69 MG/DL (ref 65–100)
GLUCOSE BLD STRIP.AUTO-MCNC: 76 MG/DL (ref 65–100)
GLUCOSE BLD STRIP.AUTO-MCNC: 76 MG/DL (ref 65–100)
GLUCOSE BLD STRIP.AUTO-MCNC: 79 MG/DL (ref 65–100)
GLUCOSE BLD STRIP.AUTO-MCNC: 80 MG/DL (ref 65–100)
GLUCOSE BLD STRIP.AUTO-MCNC: 80 MG/DL (ref 65–100)
GLUCOSE BLD STRIP.AUTO-MCNC: 81 MG/DL (ref 65–100)
GLUCOSE BLD STRIP.AUTO-MCNC: 81 MG/DL (ref 65–100)
SERVICE CMNT-IMP: NORMAL

## 2024-07-20 PROCEDURE — 2580000003 HC RX 258: Performed by: INTERNAL MEDICINE

## 2024-07-20 PROCEDURE — 6370000000 HC RX 637 (ALT 250 FOR IP): Performed by: INTERNAL MEDICINE

## 2024-07-20 PROCEDURE — 6360000002 HC RX W HCPCS: Performed by: INTERNAL MEDICINE

## 2024-07-20 PROCEDURE — 82962 GLUCOSE BLOOD TEST: CPT

## 2024-07-20 PROCEDURE — 1100000000 HC RM PRIVATE

## 2024-07-20 RX ADMIN — HEPARIN SODIUM 5000 UNITS: 5000 INJECTION INTRAVENOUS; SUBCUTANEOUS at 21:29

## 2024-07-20 RX ADMIN — NYSTATIN 500000 UNITS: 100000 SUSPENSION ORAL at 20:37

## 2024-07-20 RX ADMIN — SODIUM CHLORIDE, PRESERVATIVE FREE 10 ML: 5 INJECTION INTRAVENOUS at 07:44

## 2024-07-20 RX ADMIN — VANCOMYCIN HYDROCHLORIDE 125 MG: 125 CAPSULE ORAL at 16:19

## 2024-07-20 RX ADMIN — LEVOTHYROXINE SODIUM 175 MCG: 0.07 TABLET ORAL at 06:08

## 2024-07-20 RX ADMIN — DEXTROSE MONOHYDRATE: 10 INJECTION, SOLUTION INTRAVENOUS at 01:23

## 2024-07-20 RX ADMIN — HEPARIN SODIUM 5000 UNITS: 5000 INJECTION INTRAVENOUS; SUBCUTANEOUS at 06:08

## 2024-07-20 RX ADMIN — NYSTATIN 500000 UNITS: 100000 SUSPENSION ORAL at 16:20

## 2024-07-20 RX ADMIN — DIPHENHYDRAMINE HYDROCHLORIDE 5 ML: 12.5 LIQUID ORAL at 16:20

## 2024-07-20 RX ADMIN — VANCOMYCIN HYDROCHLORIDE 125 MG: 125 CAPSULE ORAL at 11:51

## 2024-07-20 RX ADMIN — SODIUM CHLORIDE, PRESERVATIVE FREE 10 ML: 5 INJECTION INTRAVENOUS at 20:37

## 2024-07-20 RX ADMIN — MIDODRINE HYDROCHLORIDE 5 MG: 5 TABLET ORAL at 16:20

## 2024-07-20 RX ADMIN — Medication 1 CAPSULE: at 16:20

## 2024-07-20 RX ADMIN — HEPARIN SODIUM 5000 UNITS: 5000 INJECTION INTRAVENOUS; SUBCUTANEOUS at 13:20

## 2024-07-20 RX ADMIN — VANCOMYCIN HYDROCHLORIDE 125 MG: 125 CAPSULE ORAL at 20:37

## 2024-07-20 RX ADMIN — CEFTRIAXONE 1000 MG: 1 INJECTION, POWDER, FOR SOLUTION INTRAMUSCULAR; INTRAVENOUS at 21:27

## 2024-07-20 RX ADMIN — SODIUM CHLORIDE: 9 INJECTION, SOLUTION INTRAVENOUS at 01:23

## 2024-07-20 ASSESSMENT — PAIN SCALES - GENERAL
PAINLEVEL_OUTOF10: 0

## 2024-07-20 NOTE — PROGRESS NOTES
Pt resting in bed comfortably at this time, alert and oriented times 2-3. No distress noted, respirations even and unlabored on room air. Esteves in place, working appropriately. Pt still having frequent BM's. Fecal management system noted with moderate amounts of leaking stool. Pt instructed to call for assistance if needed, call light in place, will continue to monitor.

## 2024-07-20 NOTE — PROGRESS NOTES
Hospitalist Progress Note   Admit Date:  7/15/2024  6:01 PM   Name:  Jesika Olsen   Age:  68 y.o.  Sex:  female  :  1955   MRN:  [id number]   Room:  809/    Presenting/Chief Complaint: Fatigue     Reason(s) for Admission: Hyperkalemia [E87.5]  Septicemia (HCC) [A41.9]  Acute pyelonephritis [N10]  ZEUS (acute kidney injury) (HCC) [N17.9]  Severe sepsis (HCC) [A41.9, R65.20]     NOTICE FOR THE PATIENT: This clinical note is not designed to be interpreted by patients, their families or their loved ones and we do not recommend reading it unless you have medical training. These notes may contain candid and (unintentionally) offensive descriptions, which are sometimes required for accurate documentation. If you would like more information about your healthcare, please obtain it directly by myself or my staff/colleagues - never solely from the notes. Thank you for your understanding and cooperation.     Hospital Course:   As per prior documentation:  \"Jesika Olsen is a 68 y.o. female with medical history of MDD, hypothyroidism, hiatal hernia (on omeprazole), Dm type II, and previous episodes of C diff colitis presents from home with worsening generalized lethargy and weakness. Patient has had several hospitalizations and her daughter states that \"she is so weak she is essentially bedbound at home and can't do anything.\" Patient has a known history of frequent, recurrent UTIs (growing pansensitive E. Coli per chart review). She has not been able to eat or drink much at all. No recent sick contacts.      ED course: CT abd/pelvis shows multiple bladder stones and a liver lesion. Creatinine of 0.9 (baseline around 0.3-0.4), potassium of 6.7 (nonhemolyzed), and bicarb of 14. WBC count of 21. EKG shows NSR without peaked T waves. Given Rocephin and fluid boluses. Also given calcium gluconate and Kayexalate. Ordered insulin but held as patient's blood sugar at 106 even with dextrose given intravenously.  Filt Rate >90 >60 ml/min/1.73m2    Calcium 8.2 (L) 8.8 - 10.2 MG/DL   POCT Glucose    Collection Time: 07/19/24  7:50 AM   Result Value Ref Range    POC Glucose 78 65 - 100 mg/dL    Performed by: Latosha    POCT Glucose    Collection Time: 07/19/24 12:34 PM   Result Value Ref Range    POC Glucose 84 65 - 100 mg/dL    Performed by: Eliza    POCT Glucose    Collection Time: 07/19/24  8:46 PM   Result Value Ref Range    POC Glucose 93 65 - 100 mg/dL    Performed by: Tino    POCT Glucose    Collection Time: 07/20/24  2:47 AM   Result Value Ref Range    POC Glucose 68 65 - 100 mg/dL    Performed by: Karlo    POCT Glucose    Collection Time: 07/20/24  3:36 AM   Result Value Ref Range    POC Glucose 76 65 - 100 mg/dL    Performed by: Karlo    POCT Glucose    Collection Time: 07/20/24  7:42 AM   Result Value Ref Range    POC Glucose 67 65 - 100 mg/dL    Performed by: Manfred    POCT Glucose    Collection Time: 07/20/24  8:59 AM   Result Value Ref Range    POC Glucose 81 65 - 100 mg/dL    Performed by: Venessa    POCT Glucose    Collection Time: 07/20/24 11:20 AM   Result Value Ref Range    POC Glucose 76 65 - 100 mg/dL    Performed by: [email or web address]        No results for input(s): \"COVID19\" in the last 72 hours.      Current Meds:  Current Facility-Administered Medications   Medication Dose Route Frequency    magic (miracle) mouthwash  5 mL Swish & Spit 4x Daily AC & HS    And    nystatin (MYCOSTATIN) 628312 UNIT/ML suspension 500,000 Units  5 mL Oral 4x Daily AC & HS    ARIPiprazole (ABILIFY) tablet 5 mg  5 mg Oral Daily    copper Cu 64 dotatate (Detectnet) injection 4 millicurie  4 millicurie IntraVENous Once    lactobacillus (CULTURELLE) capsule 1 capsule  1 capsule Oral TID WC    midodrine (PROAMATINE) tablet 5 mg  5 mg Oral TID WC    potassium chloride (KLOR-CON M) extended release tablet 40 mEq  40 mEq Oral Daily with breakfast

## 2024-07-20 NOTE — PROGRESS NOTES
Pt resting in bed comfortably at this time, alert and oriented times 2-3. No distress noted, respirations even and unlabored on room air. Pt denies pain at this time. Daughter at the bedside feeding patient ice cream. Esteves cath in place and draining yellow urine. Pt instructed to call for assistance if needed, call light in place, will continue to monitor. Will  prepare for bedside shift report.

## 2024-07-20 NOTE — PROGRESS NOTES
Phlebotomist notified this RN that pt refused AM labs. They will attempt to come back after breakfast.

## 2024-07-20 NOTE — PROGRESS NOTES
This RN was notified by nursing assistant that pt BGL was 68 at 2:47AM. This RN assessed pt and pt was asymptomatic, and this RN attempted to give pt apple juice. Pt was refusing by pushing cup away and asked for cranberry juice. Pt was able to drink 4 oz of cranberry juice. 15 mins after finishing the 4oz of cranberry juice, pt BGL was 76. Pt resting in bed, RR even and unlabored. Bed low and locked, call light within reach.

## 2024-07-20 NOTE — PROGRESS NOTES
Pt resting in bed comfortably at this time, alert and oriented times 4. No distress noted, respirations even and unlabored on room air. IVF infusing at this time. Pt denies pain at this time. Pt instructed to call for assistance if needed, call light in place, will continue to monitor.

## 2024-07-21 LAB
ANION GAP SERPL CALC-SCNC: 10 MMOL/L (ref 9–18)
BASOPHILS # BLD: 0 K/UL (ref 0–0.2)
BASOPHILS NFR BLD: 0 % (ref 0–2)
BUN SERPL-MCNC: 8 MG/DL (ref 8–23)
CALCIUM SERPL-MCNC: 7.8 MG/DL (ref 8.8–10.2)
CHLORIDE SERPL-SCNC: 105 MMOL/L (ref 98–107)
CO2 SERPL-SCNC: 19 MMOL/L (ref 20–28)
CREAT SERPL-MCNC: 0.38 MG/DL (ref 0.6–1.1)
DIFFERENTIAL METHOD BLD: ABNORMAL
EOSINOPHIL # BLD: 0.1 K/UL (ref 0–0.8)
EOSINOPHIL NFR BLD: 2 % (ref 0.5–7.8)
ERYTHROCYTE [DISTWIDTH] IN BLOOD BY AUTOMATED COUNT: 17 % (ref 11.9–14.6)
GLUCOSE BLD STRIP.AUTO-MCNC: 116 MG/DL (ref 65–100)
GLUCOSE BLD STRIP.AUTO-MCNC: 191 MG/DL (ref 65–100)
GLUCOSE BLD STRIP.AUTO-MCNC: 61 MG/DL (ref 65–100)
GLUCOSE BLD STRIP.AUTO-MCNC: 64 MG/DL (ref 65–100)
GLUCOSE BLD STRIP.AUTO-MCNC: 66 MG/DL (ref 65–100)
GLUCOSE BLD STRIP.AUTO-MCNC: 67 MG/DL (ref 65–100)
GLUCOSE BLD STRIP.AUTO-MCNC: 76 MG/DL (ref 65–100)
GLUCOSE BLD STRIP.AUTO-MCNC: 76 MG/DL (ref 65–100)
GLUCOSE BLD STRIP.AUTO-MCNC: 79 MG/DL (ref 65–100)
GLUCOSE BLD STRIP.AUTO-MCNC: 80 MG/DL (ref 65–100)
GLUCOSE BLD STRIP.AUTO-MCNC: 82 MG/DL (ref 65–100)
GLUCOSE BLD STRIP.AUTO-MCNC: 83 MG/DL (ref 65–100)
GLUCOSE BLD STRIP.AUTO-MCNC: 83 MG/DL (ref 65–100)
GLUCOSE BLD STRIP.AUTO-MCNC: 91 MG/DL (ref 65–100)
GLUCOSE SERPL-MCNC: 144 MG/DL (ref 70–99)
HCT VFR BLD AUTO: 28.6 % (ref 35.8–46.3)
HGB BLD-MCNC: 9.1 G/DL (ref 11.7–15.4)
IMM GRANULOCYTES # BLD AUTO: 0.2 K/UL (ref 0–0.5)
IMM GRANULOCYTES NFR BLD AUTO: 2 % (ref 0–5)
LYMPHOCYTES # BLD: 1.2 K/UL (ref 0.5–4.6)
LYMPHOCYTES NFR BLD: 13 % (ref 13–44)
MCH RBC QN AUTO: 29.6 PG (ref 26.1–32.9)
MCHC RBC AUTO-ENTMCNC: 31.8 G/DL (ref 31.4–35)
MCV RBC AUTO: 93.2 FL (ref 82–102)
MONOCYTES # BLD: 0.5 K/UL (ref 0.1–1.3)
MONOCYTES NFR BLD: 5 % (ref 4–12)
NEUTS SEG # BLD: 7 K/UL (ref 1.7–8.2)
NEUTS SEG NFR BLD: 78 % (ref 43–78)
NRBC # BLD: 0 K/UL (ref 0–0.2)
PLATELET # BLD AUTO: 185 K/UL (ref 150–450)
PMV BLD AUTO: 9.1 FL (ref 9.4–12.3)
POTASSIUM SERPL-SCNC: 2.9 MMOL/L (ref 3.5–5.1)
RBC # BLD AUTO: 3.07 M/UL (ref 4.05–5.2)
SERVICE CMNT-IMP: ABNORMAL
SERVICE CMNT-IMP: NORMAL
SODIUM SERPL-SCNC: 134 MMOL/L (ref 136–145)
WBC # BLD AUTO: 8.9 K/UL (ref 4.3–11.1)

## 2024-07-21 PROCEDURE — 6360000002 HC RX W HCPCS: Performed by: INTERNAL MEDICINE

## 2024-07-21 PROCEDURE — 36415 COLL VENOUS BLD VENIPUNCTURE: CPT

## 2024-07-21 PROCEDURE — 6370000000 HC RX 637 (ALT 250 FOR IP): Performed by: INTERNAL MEDICINE

## 2024-07-21 PROCEDURE — 2580000003 HC RX 258: Performed by: INTERNAL MEDICINE

## 2024-07-21 PROCEDURE — 85025 COMPLETE CBC W/AUTO DIFF WBC: CPT

## 2024-07-21 PROCEDURE — 80048 BASIC METABOLIC PNL TOTAL CA: CPT

## 2024-07-21 PROCEDURE — 1100000000 HC RM PRIVATE

## 2024-07-21 PROCEDURE — 82962 GLUCOSE BLOOD TEST: CPT

## 2024-07-21 PROCEDURE — 2580000003 HC RX 258: Performed by: EMERGENCY MEDICINE

## 2024-07-21 PROCEDURE — 51798 US URINE CAPACITY MEASURE: CPT

## 2024-07-21 RX ORDER — POTASSIUM CHLORIDE 7.45 MG/ML
10 INJECTION INTRAVENOUS ONCE
Status: COMPLETED | OUTPATIENT
Start: 2024-07-21 | End: 2024-07-21

## 2024-07-21 RX ORDER — POTASSIUM CHLORIDE 7.45 MG/ML
10 INJECTION INTRAVENOUS EVERY 4 HOURS
Status: COMPLETED | OUTPATIENT
Start: 2024-07-21 | End: 2024-07-22

## 2024-07-21 RX ORDER — DRONABINOL 2.5 MG/1
2.5 CAPSULE ORAL 2 TIMES DAILY
Status: DISCONTINUED | OUTPATIENT
Start: 2024-07-21 | End: 2024-07-22

## 2024-07-21 RX ADMIN — HEPARIN SODIUM 5000 UNITS: 5000 INJECTION INTRAVENOUS; SUBCUTANEOUS at 20:47

## 2024-07-21 RX ADMIN — DEXTROSE MONOHYDRATE 250 ML: 100 INJECTION, SOLUTION INTRAVENOUS at 05:55

## 2024-07-21 RX ADMIN — MIDODRINE HYDROCHLORIDE 5 MG: 5 TABLET ORAL at 16:43

## 2024-07-21 RX ADMIN — POTASSIUM CHLORIDE 10 MEQ: 7.46 INJECTION, SOLUTION INTRAVENOUS at 16:42

## 2024-07-21 RX ADMIN — MIDODRINE HYDROCHLORIDE 5 MG: 5 TABLET ORAL at 12:26

## 2024-07-21 RX ADMIN — ARIPIPRAZOLE 5 MG: 5 TABLET ORAL at 08:45

## 2024-07-21 RX ADMIN — POTASSIUM CHLORIDE 10 MEQ: 7.46 INJECTION, SOLUTION INTRAVENOUS at 20:54

## 2024-07-21 RX ADMIN — SODIUM CHLORIDE, PRESERVATIVE FREE 10 ML: 5 INJECTION INTRAVENOUS at 08:17

## 2024-07-21 RX ADMIN — VANCOMYCIN HYDROCHLORIDE 125 MG: 125 CAPSULE ORAL at 16:47

## 2024-07-21 RX ADMIN — HEPARIN SODIUM 5000 UNITS: 5000 INJECTION INTRAVENOUS; SUBCUTANEOUS at 05:56

## 2024-07-21 RX ADMIN — HEPARIN SODIUM 5000 UNITS: 5000 INJECTION INTRAVENOUS; SUBCUTANEOUS at 13:22

## 2024-07-21 RX ADMIN — CEFTRIAXONE 1000 MG: 1 INJECTION, POWDER, FOR SOLUTION INTRAMUSCULAR; INTRAVENOUS at 20:46

## 2024-07-21 RX ADMIN — VANCOMYCIN HYDROCHLORIDE 125 MG: 125 CAPSULE ORAL at 08:46

## 2024-07-21 RX ADMIN — TAMSULOSIN HYDROCHLORIDE 0.4 MG: 0.4 CAPSULE ORAL at 08:46

## 2024-07-21 RX ADMIN — DRONABINOL 2.5 MG: 2.5 CAPSULE ORAL at 09:44

## 2024-07-21 RX ADMIN — DEXTROSE MONOHYDRATE 125 ML: 100 INJECTION, SOLUTION INTRAVENOUS at 09:25

## 2024-07-21 RX ADMIN — SODIUM CHLORIDE, PRESERVATIVE FREE 10 ML: 5 INJECTION INTRAVENOUS at 20:54

## 2024-07-21 RX ADMIN — MIDODRINE HYDROCHLORIDE 5 MG: 5 TABLET ORAL at 08:46

## 2024-07-21 RX ADMIN — LEVOTHYROXINE SODIUM 175 MCG: 0.07 TABLET ORAL at 05:57

## 2024-07-21 ASSESSMENT — PAIN SCALES - GENERAL
PAINLEVEL_OUTOF10: 0
PAINLEVEL_OUTOF10: 0

## 2024-07-21 NOTE — PROGRESS NOTES
Pt resting in bed comfortably at this time, alert and oriented to person and place, sometimes time. No distress noted, respirations even and unlabored on room air. Pt instructed to call for assistance if needed, call light in place, will continue to monitor.

## 2024-07-21 NOTE — PROGRESS NOTES
Hospitalist Progress Note   Admit Date:  7/15/2024  6:01 PM   Name:  Jesika Olsen   Age:  68 y.o.  Sex:  female  :  1955   MRN:  [id number]   Room:  809/    Presenting/Chief Complaint: Fatigue     Reason(s) for Admission: Hyperkalemia [E87.5]  Septicemia (HCC) [A41.9]  Acute pyelonephritis [N10]  ZEUS (acute kidney injury) (HCC) [N17.9]  Severe sepsis (HCC) [A41.9, R65.20]     NOTICE FOR THE PATIENT: This clinical note is not designed to be interpreted by patients, their families or their loved ones and we do not recommend reading it unless you have medical training. These notes may contain candid and (unintentionally) offensive descriptions, which are sometimes required for accurate documentation. If you would like more information about your healthcare, please obtain it directly by myself or my staff/colleagues - never solely from the notes. Thank you for your understanding and cooperation.     Hospital Course:   As per prior documentation:  \"Jesika Olsen is a 68 y.o. female with medical history of MDD, hypothyroidism, hiatal hernia (on omeprazole), Dm type II, and previous episodes of C diff colitis presents from home with worsening generalized lethargy and weakness. Patient has had several hospitalizations and her daughter states that \"she is so weak she is essentially bedbound at home and can't do anything.\" Patient has a known history of frequent, recurrent UTIs (growing pansensitive E. Coli per chart review). She has not been able to eat or drink much at all. No recent sick contacts.      ED course: CT abd/pelvis shows multiple bladder stones and a liver lesion. Creatinine of 0.9 (baseline around 0.3-0.4), potassium of 6.7 (nonhemolyzed), and bicarb of 14. WBC count of 21. EKG shows NSR without peaked T waves. Given Rocephin and fluid boluses. Also given calcium gluconate and Kayexalate. Ordered insulin but held as patient's blood sugar at 106 even with dextrose given intravenously.  4

## 2024-07-21 NOTE — PROGRESS NOTES
Pt resting in bed comfortably at this time, alert and oriented to person and place. No distress noted, respirations even and unlabored on room air. Pt still refusing to eat. Will continue to monitor. IVF infusing at this time.

## 2024-07-21 NOTE — PROGRESS NOTES
Pt resting in bed comfortably at this time, alert and oriented to person and place. No distress noted, respirations even and unlabored on room air. External cath in place, pt has not urinated yet. Pt denies pain at this time. Pt instructed to call for assistance if needed, call light in place, will continue to monitor. Will prepare for bedside shift report.

## 2024-07-21 NOTE — PROGRESS NOTES
Blood sugar 66. MD Jace made aware. Pt refusing to eat and refusing to drink. Refusing to take certain medications. D10 infusing at 50 ml/hr.

## 2024-07-21 NOTE — PROGRESS NOTES
Patient assess for additional peripheral access per RN request. No suitable veins found in bilateral upper arms or forearms. RN notified.

## 2024-07-21 NOTE — PROGRESS NOTES
Pt still refusing to eat. When offered food, drink, etc. Patient states to go away and closes her mouth shut. D10 infusing at 50ml/hr. Blood sugar 80.

## 2024-07-22 LAB
ANION GAP SERPL CALC-SCNC: 8 MMOL/L (ref 9–18)
BASOPHILS # BLD: 0 K/UL (ref 0–0.2)
BASOPHILS NFR BLD: 0 % (ref 0–2)
BUN SERPL-MCNC: 6 MG/DL (ref 8–23)
CALCIUM SERPL-MCNC: 8.2 MG/DL (ref 8.8–10.2)
CHLORIDE SERPL-SCNC: 107 MMOL/L (ref 98–107)
CO2 SERPL-SCNC: 18 MMOL/L (ref 20–28)
CREAT SERPL-MCNC: 0.36 MG/DL (ref 0.6–1.1)
DIFFERENTIAL METHOD BLD: ABNORMAL
EOSINOPHIL # BLD: 0.1 K/UL (ref 0–0.8)
EOSINOPHIL NFR BLD: 2 % (ref 0.5–7.8)
ERYTHROCYTE [DISTWIDTH] IN BLOOD BY AUTOMATED COUNT: 17 % (ref 11.9–14.6)
GLUCOSE BLD STRIP.AUTO-MCNC: 69 MG/DL (ref 65–100)
GLUCOSE BLD STRIP.AUTO-MCNC: 78 MG/DL (ref 65–100)
GLUCOSE BLD STRIP.AUTO-MCNC: 82 MG/DL (ref 65–100)
GLUCOSE BLD STRIP.AUTO-MCNC: 83 MG/DL (ref 65–100)
GLUCOSE BLD STRIP.AUTO-MCNC: 83 MG/DL (ref 65–100)
GLUCOSE BLD STRIP.AUTO-MCNC: 89 MG/DL (ref 65–100)
GLUCOSE BLD STRIP.AUTO-MCNC: 98 MG/DL (ref 65–100)
GLUCOSE SERPL-MCNC: 85 MG/DL (ref 70–99)
HCT VFR BLD AUTO: 31 % (ref 35.8–46.3)
HGB BLD-MCNC: 10 G/DL (ref 11.7–15.4)
IMM GRANULOCYTES # BLD AUTO: 0.3 K/UL (ref 0–0.5)
IMM GRANULOCYTES NFR BLD AUTO: 3 % (ref 0–5)
LYMPHOCYTES # BLD: 1.4 K/UL (ref 0.5–4.6)
LYMPHOCYTES NFR BLD: 15 % (ref 13–44)
MCH RBC QN AUTO: 29.5 PG (ref 26.1–32.9)
MCHC RBC AUTO-ENTMCNC: 32.3 G/DL (ref 31.4–35)
MCV RBC AUTO: 91.4 FL (ref 82–102)
MONOCYTES # BLD: 0.6 K/UL (ref 0.1–1.3)
MONOCYTES NFR BLD: 6 % (ref 4–12)
NEUTS SEG # BLD: 7.1 K/UL (ref 1.7–8.2)
NEUTS SEG NFR BLD: 74 % (ref 43–78)
NRBC # BLD: 0 K/UL (ref 0–0.2)
PLATELET # BLD AUTO: 259 K/UL (ref 150–450)
PMV BLD AUTO: 8.7 FL (ref 9.4–12.3)
POTASSIUM SERPL-SCNC: 3.3 MMOL/L (ref 3.5–5.1)
RBC # BLD AUTO: 3.39 M/UL (ref 4.05–5.2)
SERVICE CMNT-IMP: NORMAL
SODIUM SERPL-SCNC: 133 MMOL/L (ref 136–145)
WBC # BLD AUTO: 9.6 K/UL (ref 4.3–11.1)

## 2024-07-22 PROCEDURE — 97112 NEUROMUSCULAR REEDUCATION: CPT

## 2024-07-22 PROCEDURE — 1100000000 HC RM PRIVATE

## 2024-07-22 PROCEDURE — 2580000003 HC RX 258: Performed by: INTERNAL MEDICINE

## 2024-07-22 PROCEDURE — 6360000002 HC RX W HCPCS: Performed by: INTERNAL MEDICINE

## 2024-07-22 PROCEDURE — 92526 ORAL FUNCTION THERAPY: CPT

## 2024-07-22 PROCEDURE — 82525 ASSAY OF COPPER: CPT

## 2024-07-22 PROCEDURE — 2500000003 HC RX 250 WO HCPCS: Performed by: INTERNAL MEDICINE

## 2024-07-22 PROCEDURE — 36415 COLL VENOUS BLD VENIPUNCTURE: CPT

## 2024-07-22 PROCEDURE — 80048 BASIC METABOLIC PNL TOTAL CA: CPT

## 2024-07-22 PROCEDURE — 6370000000 HC RX 637 (ALT 250 FOR IP): Performed by: INTERNAL MEDICINE

## 2024-07-22 PROCEDURE — 85025 COMPLETE CBC W/AUTO DIFF WBC: CPT

## 2024-07-22 PROCEDURE — 82962 GLUCOSE BLOOD TEST: CPT

## 2024-07-22 PROCEDURE — 51701 INSERT BLADDER CATHETER: CPT

## 2024-07-22 PROCEDURE — 51798 US URINE CAPACITY MEASURE: CPT

## 2024-07-22 PROCEDURE — 97530 THERAPEUTIC ACTIVITIES: CPT

## 2024-07-22 RX ORDER — MIDODRINE HYDROCHLORIDE 5 MG/1
10 TABLET ORAL
Status: DISCONTINUED | OUTPATIENT
Start: 2024-07-22 | End: 2024-07-31

## 2024-07-22 RX ORDER — MEGESTROL ACETATE 40 MG/ML
200 SUSPENSION ORAL DAILY
Status: DISCONTINUED | OUTPATIENT
Start: 2024-07-23 | End: 2024-07-25

## 2024-07-22 RX ORDER — FOLIC ACID 1 MG/1
1 TABLET ORAL DAILY
Status: DISCONTINUED | OUTPATIENT
Start: 2024-07-22 | End: 2024-08-01 | Stop reason: HOSPADM

## 2024-07-22 RX ORDER — LANOLIN ALCOHOL/MO/W.PET/CERES
1000 CREAM (GRAM) TOPICAL DAILY
Status: DISCONTINUED | OUTPATIENT
Start: 2024-07-22 | End: 2024-08-01 | Stop reason: HOSPADM

## 2024-07-22 RX ADMIN — POTASSIUM CHLORIDE 10 MEQ: 7.46 INJECTION, SOLUTION INTRAVENOUS at 00:29

## 2024-07-22 RX ADMIN — POTASSIUM BICARBONATE 40 MEQ: 782 TABLET, EFFERVESCENT ORAL at 16:27

## 2024-07-22 RX ADMIN — Medication 1 CAPSULE: at 16:26

## 2024-07-22 RX ADMIN — MIDODRINE HYDROCHLORIDE 10 MG: 5 TABLET ORAL at 16:26

## 2024-07-22 RX ADMIN — VALPROATE SODIUM 125 MG: 100 INJECTION, SOLUTION INTRAVENOUS at 10:15

## 2024-07-22 RX ADMIN — VANCOMYCIN HYDROCHLORIDE 125 MG: 125 CAPSULE ORAL at 16:26

## 2024-07-22 RX ADMIN — POTASSIUM CHLORIDE 10 MEQ: 7.46 INJECTION, SOLUTION INTRAVENOUS at 04:54

## 2024-07-22 RX ADMIN — HEPARIN SODIUM 5000 UNITS: 5000 INJECTION INTRAVENOUS; SUBCUTANEOUS at 21:52

## 2024-07-22 RX ADMIN — VALPROATE SODIUM 125 MG: 100 INJECTION, SOLUTION INTRAVENOUS at 21:52

## 2024-07-22 RX ADMIN — POTASSIUM CHLORIDE 10 MEQ: 7.46 INJECTION, SOLUTION INTRAVENOUS at 08:25

## 2024-07-22 RX ADMIN — Medication 1 CAPSULE: at 11:46

## 2024-07-22 RX ADMIN — DRONABINOL 2.5 MG: 2.5 CAPSULE ORAL at 08:18

## 2024-07-22 RX ADMIN — TAMSULOSIN HYDROCHLORIDE 0.4 MG: 0.4 CAPSULE ORAL at 08:18

## 2024-07-22 RX ADMIN — SODIUM CHLORIDE, PRESERVATIVE FREE 10 ML: 5 INJECTION INTRAVENOUS at 08:18

## 2024-07-22 RX ADMIN — HEPARIN SODIUM 5000 UNITS: 5000 INJECTION INTRAVENOUS; SUBCUTANEOUS at 14:41

## 2024-07-22 RX ADMIN — MIDODRINE HYDROCHLORIDE 5 MG: 5 TABLET ORAL at 11:46

## 2024-07-22 RX ADMIN — VANCOMYCIN HYDROCHLORIDE 125 MG: 125 CAPSULE ORAL at 11:46

## 2024-07-22 RX ADMIN — ARIPIPRAZOLE 5 MG: 5 TABLET ORAL at 08:18

## 2024-07-22 RX ADMIN — Medication 1 CAPSULE: at 08:18

## 2024-07-22 RX ADMIN — CYANOCOBALAMIN TAB 1000 MCG 1000 MCG: 1000 TAB at 14:42

## 2024-07-22 RX ADMIN — MIDODRINE HYDROCHLORIDE 5 MG: 5 TABLET ORAL at 08:18

## 2024-07-22 RX ADMIN — LEVOTHYROXINE SODIUM 175 MCG: 0.07 TABLET ORAL at 06:42

## 2024-07-22 RX ADMIN — VANCOMYCIN HYDROCHLORIDE 125 MG: 125 CAPSULE ORAL at 21:52

## 2024-07-22 RX ADMIN — HEPARIN SODIUM 5000 UNITS: 5000 INJECTION INTRAVENOUS; SUBCUTANEOUS at 06:42

## 2024-07-22 RX ADMIN — FOLIC ACID 1 MG: 1 TABLET ORAL at 14:42

## 2024-07-22 RX ADMIN — VANCOMYCIN HYDROCHLORIDE 125 MG: 125 CAPSULE ORAL at 08:18

## 2024-07-22 ASSESSMENT — PAIN SCALES - GENERAL
PAINLEVEL_OUTOF10: 0

## 2024-07-22 NOTE — PLAN OF CARE
Problem: Safety - Adult  Goal: Free from fall injury  Outcome: Progressing  Flowsheets (Taken 7/22/2024 0331 by Akila Zavala RN)  Free From Fall Injury: Instruct family/caregiver on patient safety

## 2024-07-22 NOTE — PROGRESS NOTES
Pt resting in bed comfortably at this time, alert and oriented to person, place. No distress noted, respirations even and un labored on room air. IVF infusing at this time. Pt instructed to call for assistance if needed, call light in place, will continue to monitor.     Pt having hallucinations. MD made aware.

## 2024-07-22 NOTE — PROGRESS NOTES
ACUTE OCCUPATIONAL THERAPY GOALS:   (Developed with and agreed upon by patient and/or caregiver.)  1. Patient will complete upper body bathing and dressing with MINIMAL ASSIST and adaptive equipment as needed.   2. Patient will complete self-grooming tasks in unsupported sitting with MINIMAL ASSIST and adaptive equipment as needed.  3. Patient will tolerate 25 minutes of OT treatment with 1-2 rest breaks to increase activity tolerance for ADLs.   4. Patient will complete functional transfers with MODERATE ASSIST and adaptive equipment as needed.   5. Patient will complete functional activity while seated edge of bed with MINIMAL ASSIST and adaptive equipment as needed.   6. Patient will tolerate 15 minutes unsupported sitting balance with MINIMAL ASSIST in preparation for ADL performance.   7. Patient will tolerate 10 minutes BUE therapeutic activities to increase use of BUE during ADL performance.      Timeframe: 7 visits       OCCUPATIONAL THERAPY: Daily Note PM   OT Visit Days: 3   Time In/Out  OT Charge Capture  Rehab Caseload Tracker  OT Orders    Jesika Olsen is a 68 y.o. female   PRIMARY DIAGNOSIS: Severe sepsis (HCC)  Hyperkalemia [E87.5]  Septicemia (HCC) [A41.9]  Acute pyelonephritis [N10]  ZEUS (acute kidney injury) (HCC) [N17.9]  Severe sepsis (HCC) [A41.9, R65.20]       Inpatient: Payor: [facility] MEDICARE / Plan: [facility] MEDICARE COMPLETE / Product Type: *No Product type* /     ASSESSMENT:     REHAB RECOMMENDATIONS:   Recommendation to date pending progress:  Setting:  TBD- no rehab days    Equipment:    To Be Determined     ASSESSMENT:  Ms. Olsen is very weak and lethargic today. Continue to require max/total A for all bed mobility. Poor sitting balance noted at edge of bed. Not safe to attempt standing today. Pt returned to supine with all needs in reach. Did not like vanilla Nepro (max A for drink to mouth) No progress made today. Will continue to benefit from skilled OT during stay.  transfer, increase safety awareness, and prepare for self care..     TREATMENT GRID:  N/A    AFTER TREATMENT PRECAUTIONS: Alarm Activated, Bed, Bed/Chair Locked, Call light within reach, and Needs within reach    INTERDISCIPLINARY COLLABORATION:  RN/ PCT, PT/ PTA, and OT/ OLSON    EDUCATION:       TOTAL TREATMENT DURATION AND TIME:  Time In: 1302  Time Out: 1331  Minutes: 29    MACK Fitch

## 2024-07-22 NOTE — PROGRESS NOTES
Pt resting in bed comfortably at this time, alert and oriented times 2-3. No distress noted, respirations even and unlabored on room air. Pt still having hallucinations. 1 BM this shift. Pt denies pain at this time. Pt instructed to call for assistance if needed, call light in place, will continue to monitor. Will prepare for bedside shift report.

## 2024-07-22 NOTE — PROGRESS NOTES
GOALS:  LTG: Patient will maintain adequate hydration/nutrition with optimum safety and efficiency of swallowing function with PO intake without overt signs or symptoms of aspiration for the highest appropriate diet level.  STG: PROGRESSING 24  Patient will consume minced and moist textures and thin liquids without overt signs or symptoms of airway compromise.  Patient will safely ingest diet trials during therapeutic feedings with SLP without overt signs or symptoms of respiratory compromise in efforts to advance diet.    LTG: Patient will increase receptive/expressive language skills demonstrated by the ability to communicate basic wants/needs across environments.   STG: PROGRESSING 24  Patient will follow 1 step commands with 100% accuracy given minimal cueing.  Patient will complete sentences with 100% accuracy given minimal cueing.    LTG: Patient will improve neuro-linguistic abilities to increase safety and awareness in functional living environment.   STG:  Patient will sequence steps to a task with 80% accuracy given minimal cueing.  Patient will recall relevant verbal information with 80% accuracy with minimal cues.    SPEECH LANGUAGE PATHOLOGY: Dysphagia Daily Note #3    Acknowledge Order  I  Therapy Time  I   Charges     I  Rehab Caseload Tracker    NAME: Jesika Olsen  : 1955  MRN: [id number]    ADMISSION DATE: 7/15/2024  PRIMARY DIAGNOSIS: Severe sepsis (HCC)    ICD-10: Treatment Diagnosis: R13.11 Dysphagia, Oral Phase  R41.841 Cognitive-Communication Deficit    RECOMMENDATIONS   Diet:    Minced and Moist  Thin Liquids    Medication: as tolerated   Compensatory Swallowing Strategies:   Alternate solids and liquids  Slow rate of intake  Small bites/sips  Upright as possible for all oral intake  1:1 feeding supervision and assistance    Therapeutic Intervention:   Patient/family education  Dysphagia treatment  Cognitive-linguistic treatment  Consider referral to Registered Dietician    Patient continues to require skilled intervention:  Yes. Recommend ongoing speech therapy services during this hospitalization.     Anticipated Discharge Needs: Ongoing speech therapy is recommended at next level of care.      ASSESSMENT    Patient with improvement in diet tolerance, though continues to report disinterest in food. Of note, patient with hx of gastric bypass and patient reiterates that she eats very small meals (typically less than 8 bites per meal).  Mentation/fatigue continue to impact intake this date. Adequate intake of pureed and soft fruit textures/thin liquid.     Recommend upgrade to minced and moist solids, thin liquids, medications floated in puree. Recommend continued speech therapy for both dysphagia and cognitive-communication deficits post discharge.     GENERAL    Subjective: Patient upright in bed. Patient talking about staff getting , however is aware of date, location, year.     Recent Information/Background: Jesika Olsen is a 68 y.o. female with medical history of MDD, hypothyroidism, hiatal hernia (on omeprazole), Dm type II, and previous episodes of C diff colitis presents from home with worsening generalized lethargy and weakness. Patient has had several hospitalizations and her daughter states that \"she is so weak she is essentially bedbound at home and can't do anything.\" Patient has a known history of frequent, recurrent UTIs (growing pansensitive E. Coli per chart review). She has not been able to eat or drink much at all. No recent sick contacts. ST consult due to change in swallow function.     History of Present Injury/Illness: Ms. Olsen  has a past medical history of History of gastric bypass, JEREMI (obstructive sleep apnea), Other ill-defined conditions(799.89), and Psychiatric disorder.. She also  has a past surgical history that includes Cholecystectomy; pr unlisted procedure cardiac surgery; Chest surgery; vascular surgery; pr unlisted procedure abdomen

## 2024-07-22 NOTE — PROGRESS NOTES
ACUTE PHYSICAL THERAPY GOALS:   (Developed with and agreed upon by patient and/or caregiver.)  Pt will perform bed mobility with Ed in 7 therapy sessions.  Pt will perform sit-to-stand/ stand-to-sit transfers Ed in 7 therapy sessions.  Pt will ambulate 10 ft Ed with use of LRAD/no device and breaks as needed in 7 therapy sessions.  Pt will perform seated dynamic balance activities with minimal postural sway in 7 therapy sessions.  Pt will tolerate multiple sets and reps of BLE exercises in 7 therapy sessions.     PHYSICAL THERAPY: Daily Note PM   (Link to Caseload Tracking: PT Visit Days : 2  Time In/Out PT Charge Capture  Rehab Caseload Tracker  Orders    Jesika Olsen is a 68 y.o. female   PRIMARY DIAGNOSIS: Severe sepsis (HCC)  Hyperkalemia [E87.5]  Septicemia (HCC) [A41.9]  Acute pyelonephritis [N10]  ZEUS (acute kidney injury) (HCC) [N17.9]  Severe sepsis (HCC) [A41.9, R65.20]       Inpatient: Payor: [facility] MEDICARE / Plan: [facility] MEDICARE COMPLETE / Product Type: *No Product type* /     ASSESSMENT:     REHAB RECOMMENDATIONS:   Recommendation to date pending progress:  Setting:  Short-term Rehab    Equipment:    To Be Determined     ASSESSMENT:  Ms. Olsen was supine upon contact and agreeable to therapy but with some confusion.  She was able to come to sitting on EOB today with max-totalA x 2 and poor sitting balance.  Pt's BP checked in sitting with MAP >65.  Attempted to have patient perform B LE exercises from seated EOB position but no AROM observed.  Pt appeared to become less responsive and MAP noted to have dropped to 66.  Pt given totalA for sit to supine.  She was able to roll today with maxA x 2 and totalA for scooting.  RN alerted to pt's BP issues.  No progress noted today.       SUBJECTIVE:   Ms. Olsen states, \"Brat\"     Social/Functional Lives With: Daughter  Type of Home: House  Home Layout: One level  Home Access: Level entry  Bathroom Shower/Tub: Tub/Shower unit  Bathroom Toilet:    Distance   feet    DME N/A    Gait Quality N/A    Weightbearing Status      Stairs      I=Independent, Mod I=Modified Independent, S=Supervision, SBA=Standby Assistance, CGA=Contact Guard Assistance,   Min=Minimal Assistance, Mod=Moderate Assistance, Max=Maximal Assistance, Total=Total Assistance, NT=Not Tested    PLAN:   FREQUENCY AND DURATION: 3 times/week for duration of hospital stay or until stated goals are met, whichever comes first.    TREATMENT:   TREATMENT:   Co-Treatment PT/OT necessary due to patient's decreased overall endurance/tolerance levels, as well as need for high level skilled assistance to complete functional transfers/mobility and functional tasks  Therapeutic Activity (23 Minutes): Therapeutic activity included Rolling, Supine to Sit, Sit to Supine, Scooting, and Sitting balance  to improve functional Activity tolerance, Balance, Coordination, Mobility, and Strength.    TREATMENT GRID:  N/A    AFTER TREATMENT PRECAUTIONS: Alarm Activated, Bed, Bed/Chair Locked, Call light within reach, Needs within reach, RN notified, Side rails x3, and Visitors at bedside    INTERDISCIPLINARY COLLABORATION:  RN/ PCT, PT/ PTA, and OT/ OLSON    EDUCATION:      TIME IN/OUT:  Time In: 1302  Time Out: 1331  Minutes: 29    Mariel Lakhani, PT

## 2024-07-22 NOTE — PROGRESS NOTES
Standard High Calorie/High Protein Oral Supplement  VTE prophylaxis: Heparin  Code status: DNR  Disposition: Home- when medically stable; she has no more insurance days available for rehab and will have to do a co-pay which she cannot afford.    Non-peripheral Lines and Tubes (if present):           Extended Dwell Peripherial IV 07/18/24 Right Basilic (Active)       Telemetry (if present):  Cardiac/Telemetry Monitor On: No        Hospital Problems:  Principal Problem:    Severe sepsis (HCC)  Active Problems:    Hypothyroidism    MDD (major depressive disorder)    DM2 (diabetes mellitus, type 2) (HCC)    Bladder stones    ZEUS (acute kidney injury) (HCC)    Adult failure to thrive    Metabolic acidosis    Hyponatremia  Resolved Problems:    * No resolved hospital problems. *      Objective:   Patient Vitals for the past 24 hrs:   Temp Pulse Resp BP SpO2   07/22/24 1452 97.5 °F (36.4 °C) 89 18 98/71 98 %   07/22/24 1052 97.5 °F (36.4 °C) 88 18 107/69 100 %   07/22/24 0739 97.5 °F (36.4 °C) 81 18 118/75 99 %   07/22/24 0254 96.9 °F (36.1 °C) 85 18 107/62 98 %   07/21/24 2257 96.8 °F (36 °C) 86 18 130/88 97 %   07/21/24 2016 97.5 °F (36.4 °C) 81 18 104/71 98 %         Oxygen Therapy  SpO2: 98 %  Pulse via Oximetry: 97 beats per minute  O2 Device: None (Room air)    Estimated body mass index is 26.78 kg/m² as calculated from the following:    Height as of this encounter: 1.753 m (5' 9.02\").    Weight as of this encounter: 82.3 kg (181 lb 7 oz).    Intake/Output Summary (Last 24 hours) at 7/22/2024 1628  Last data filed at 7/22/2024 0718  Gross per 24 hour   Intake 20 ml   Output 450 ml   Net -430 ml           Physical Exam:     General:    Chronically ill looking, malnourished and frail appearing; more pleasant today  Head:  Normocephalic, atraumatic  Eyes:  Sclerae appear normal.  Pupils equally round.  ENT:  Nares appear normal.  Moist oral mucosa  Neck:  No restricted ROM.  Trachea midline   CV:   RRR.  No m/r/g.  No  Oral Daily    insulin regular (HUMULIN R;NOVOLIN R) injection 10 Units  10 Units IntraVENous Once    glucose chewable tablet 16 g  4 tablet Oral PRN    dextrose bolus 10% 125 mL  125 mL IntraVENous PRN    Or    dextrose bolus 10% 250 mL  250 mL IntraVENous PRN    Glucagon Emergency KIT 1 mg  1 mg SubCUTAneous PRN    dextrose 10 % infusion   IntraVENous Continuous PRN    vancomycin (VANCOCIN) capsule 125 mg  125 mg Oral 4x Daily    sodium chloride flush 0.9 % injection 5-40 mL  5-40 mL IntraVENous 2 times per day    sodium chloride flush 0.9 % injection 5-40 mL  5-40 mL IntraVENous PRN    0.9 % sodium chloride infusion   IntraVENous PRN    magnesium sulfate 2000 mg in 50 mL IVPB premix  2,000 mg IntraVENous PRN    ondansetron (ZOFRAN-ODT) disintegrating tablet 4 mg  4 mg Oral Q8H PRN    Or    ondansetron (ZOFRAN) injection 4 mg  4 mg IntraVENous Q6H PRN    polyethylene glycol (GLYCOLAX) packet 17 g  17 g Oral Daily PRN    acetaminophen (TYLENOL) tablet 650 mg  650 mg Oral Q6H PRN    Or    acetaminophen (TYLENOL) suppository 650 mg  650 mg Rectal Q6H PRN    heparin (porcine) injection 5,000 Units  5,000 Units SubCUTAneous 3 times per day       Signed:  Angela Mccormick MD    Part of this note may have been written by using a voice dictation software.  The note has been proof read but may still contain some grammatical/other typographical errors.

## 2024-07-22 NOTE — PROGRESS NOTES
Pt resting in bed comfortably at this time, alert and oriented times 4. No distress noted, respirations even and unlabored on room air. D10 infusing at 50ml/hr. NS infusing at 30ml/hr. Pt denies pain at this time. Pt doing well with not refusing her medications/food/water, etc. Daughter at the bedside. Pt instructed to call for assistance if needed, call light at the bedside. Will continue to monitor.     C diff precautions noted.

## 2024-07-22 NOTE — CARE COORDINATION
Pt chart reviewed. During IDR rounds, MD reported patient is not ready for discharge at this time. Discussion of possible hospice has been presented to family, decision pending.No needs expressed at this time for case management. MSW will follow patient for care.

## 2024-07-22 NOTE — PROGRESS NOTES
Comprehensive Nutrition Assessment    Type and Reason for Visit: Reassess  General Nutrition Management/other reason (Hospitalists)    Nutrition Recommendations/Plan:   Meals and Snacks:  Diet: Remove potassium restriction  Nutrition Supplement Therapy:  Medical food supplement therapy:  Change Ensure Enlive three times per day (this provides 350 kcal and 20 grams protein per bottle) - vanilla  Continue 1:1 feed   Initiate B12, folate supplementation per discussion with Dr. Mccormick   Updated copper lab      Malnutrition Assessment:  Malnutrition Status: At risk for malnutrition (Comment) (sig weight loss s/p GBP, minimal PO intake since GBP, mild wasting noted through NFPE as noted below)    Mild orbital and wasting to hand noted. Difficult to assess with minimal pt participation in NFPE.    Nutrition Assessment:  Nutrition History: 7/16: RD unable to obtain detailed nutrition history at this time.   From 5/28 assessment:  Pt reports poor intake since her gastric bypass a year and a half ago. She stated she was able to take 4-5 tablespoons of food at a time. She stated she tried protein shakes however she did not like them because they were too sweet. She also reports nausea, vomiting, and diarrhea since surgery.      Do You Have Any Cultural, Zoroastrian, or Ethnic Food Preferences?: No (ELBA)   Weight History: 5/8/23: 321# (cardio OV), 8/14/23: 317# (bariatrics OV), 1/18/24: 242# (Gen Surg OV)  6/4/24: 183# (previous hospitalization  CBW: 163# (7/16 bed scale weight)  49% body weight loss in 1 year (s/p gastric bypass).   Nutrition Background:       PMH: MDD, hypothyroidism, hiatal hernia, T2DM, RYGB in September of 2023. Presented with lethargy and weakness. Admitted with severe sepsis,  metabolic acidosis, hyperkalemia, hyponatremia, and ZEUS.  Nutrition Interval:  7/16: SLP downgrades diet to minced and moist  7/21: Marinol initiated per MD    STAR visited with patient in room at lunch time. Pt with much improved  appetite/early satiety, PO intake < 25% of meals)    Nutrition Interventions:   Food and/or Nutrient Delivery: Modify Oral Nutrition Supplement, Modify Current Diet     Coordination of Nutrition Care: Continue to monitor while inpatient    Goals:   Previous Goal Met: Progressing toward Goal(s)  Active Goal: PO intake 50% or greater, by next RD assessment       Nutrition Monitoring and Evaluation:      Food/Nutrient Intake Outcomes: Food and Nutrient Intake, Supplement Intake  Physical Signs/Symptoms Outcomes: Biochemical Data, GI Status, Meal Time Behavior, Weight    Discharge Planning:    Too soon to determine    Dea Durán RD

## 2024-07-23 LAB
COPPER SERPL-MCNC: 39 UG/DL (ref 80–158)
GLUCOSE BLD STRIP.AUTO-MCNC: 75 MG/DL (ref 65–100)
GLUCOSE BLD STRIP.AUTO-MCNC: 77 MG/DL (ref 65–100)
GLUCOSE BLD STRIP.AUTO-MCNC: 80 MG/DL (ref 65–100)
GLUCOSE BLD STRIP.AUTO-MCNC: 82 MG/DL (ref 65–100)
GLUCOSE BLD STRIP.AUTO-MCNC: 83 MG/DL (ref 65–100)
SERVICE CMNT-IMP: NORMAL

## 2024-07-23 PROCEDURE — 6360000002 HC RX W HCPCS: Performed by: INTERNAL MEDICINE

## 2024-07-23 PROCEDURE — 6370000000 HC RX 637 (ALT 250 FOR IP): Performed by: INTERNAL MEDICINE

## 2024-07-23 PROCEDURE — 82962 GLUCOSE BLOOD TEST: CPT

## 2024-07-23 PROCEDURE — 2580000003 HC RX 258: Performed by: EMERGENCY MEDICINE

## 2024-07-23 PROCEDURE — 1100000000 HC RM PRIVATE

## 2024-07-23 PROCEDURE — 2580000003 HC RX 258: Performed by: INTERNAL MEDICINE

## 2024-07-23 PROCEDURE — 92526 ORAL FUNCTION THERAPY: CPT

## 2024-07-23 PROCEDURE — 92507 TX SP LANG VOICE COMM INDIV: CPT

## 2024-07-23 RX ADMIN — Medication 1 CAPSULE: at 13:21

## 2024-07-23 RX ADMIN — TAMSULOSIN HYDROCHLORIDE 0.4 MG: 0.4 CAPSULE ORAL at 09:06

## 2024-07-23 RX ADMIN — VANCOMYCIN HYDROCHLORIDE 125 MG: 125 CAPSULE ORAL at 13:21

## 2024-07-23 RX ADMIN — HEPARIN SODIUM 5000 UNITS: 5000 INJECTION INTRAVENOUS; SUBCUTANEOUS at 13:21

## 2024-07-23 RX ADMIN — LEVOTHYROXINE SODIUM 175 MCG: 0.07 TABLET ORAL at 06:13

## 2024-07-23 RX ADMIN — Medication 1 CAPSULE: at 09:06

## 2024-07-23 RX ADMIN — VANCOMYCIN HYDROCHLORIDE 125 MG: 125 CAPSULE ORAL at 09:06

## 2024-07-23 RX ADMIN — MIDODRINE HYDROCHLORIDE 10 MG: 5 TABLET ORAL at 17:59

## 2024-07-23 RX ADMIN — Medication 1 CAPSULE: at 17:59

## 2024-07-23 RX ADMIN — ARIPIPRAZOLE 5 MG: 5 TABLET ORAL at 09:06

## 2024-07-23 RX ADMIN — MIDODRINE HYDROCHLORIDE 10 MG: 5 TABLET ORAL at 13:21

## 2024-07-23 RX ADMIN — MIDODRINE HYDROCHLORIDE 10 MG: 5 TABLET ORAL at 09:06

## 2024-07-23 RX ADMIN — VANCOMYCIN HYDROCHLORIDE 125 MG: 125 CAPSULE ORAL at 22:02

## 2024-07-23 RX ADMIN — HEPARIN SODIUM 5000 UNITS: 5000 INJECTION INTRAVENOUS; SUBCUTANEOUS at 06:13

## 2024-07-23 RX ADMIN — FOLIC ACID 1 MG: 1 TABLET ORAL at 09:06

## 2024-07-23 RX ADMIN — VANCOMYCIN HYDROCHLORIDE 125 MG: 125 CAPSULE ORAL at 17:59

## 2024-07-23 RX ADMIN — SODIUM CHLORIDE, PRESERVATIVE FREE 5 ML: 5 INJECTION INTRAVENOUS at 09:29

## 2024-07-23 RX ADMIN — MEGESTROL ACETATE 200 MG: 400 SUSPENSION ORAL at 09:06

## 2024-07-23 RX ADMIN — CYANOCOBALAMIN TAB 1000 MCG 1000 MCG: 1000 TAB at 09:06

## 2024-07-23 RX ADMIN — HEPARIN SODIUM 5000 UNITS: 5000 INJECTION INTRAVENOUS; SUBCUTANEOUS at 22:04

## 2024-07-23 ASSESSMENT — PAIN SCALES - GENERAL
PAINLEVEL_OUTOF10: 0

## 2024-07-23 NOTE — PROGRESS NOTES
intervention:  Yes. Recommend ongoing speech therapy services during this hospitalization.     Anticipated Discharge Needs: Ongoing speech therapy is recommended at next level of care.      ASSESSMENT    Dysphagia: Patient with improvement in diet tolerance, consuming ~1/3 of a turkey sandwich with intermittent sips of thin liquids this date. Mentation/fatigue continue to impact intake this date, though agreeable to attempt self feeding and diet upgrade.     Cog: targeted today- able to answer simple questions, participate in conversational exchange, sequence steps for meal task. Did look at her hands at one point and stated \"it's running over\" then states \"it might just be me\" with potential awareness of mentation fluctuations and hallucinations.     Recommend upgrade to soft and bite sized solids, thin liquids, medications floated in puree or with thin liquids per patient preference. Recommend continued speech therapy for both dysphagia and cognitive-communication deficits post discharge.     GENERAL    Subjective: Patient upright in bed. Agreeable to skilled speech therapy.     Recent Information/Background: Jesika Olsen is a 68 y.o. female with medical history of MDD, hypothyroidism, hiatal hernia (on omeprazole), Dm type II, and previous episodes of C diff colitis presents from home with worsening generalized lethargy and weakness. Patient has had several hospitalizations and her daughter states that \"she is so weak she is essentially bedbound at home and can't do anything.\" Patient has a known history of frequent, recurrent UTIs (growing pansensitive E. Coli per chart review). She has not been able to eat or drink much at all. No recent sick contacts. ST consult due to change in swallow function.     History of Present Injury/Illness: Ms. Olsen  has a past medical history of History of gastric bypass, JEREMI (obstructive sleep apnea), Other ill-defined conditions(599.89), and Psychiatric disorder.. She  demonstrate progress in swallow strength, swallow function, and diet tolerance to improve swallow safety and work toward diet advancement.  Patient is expected to demonstrate progress in expressive communication, receptive ability, and cognitive ability to decrease assistance required communication, increase independence with activities of daily living, and increase communication with family/caregivers.    Education:   Patient educated on Speech therapy recommendations, Role of speech therapy, SLP plan, and Diet recommendations  Education provided to Patient and RN  Education response: Needs reinforcement and No evidence of learning    Safety:   Call light within reach  In Bed  RN notified     Therapy Time:  Time In: 1025  Time Out: 1045  Minutes: 20    Francine Gatica M.S., CCC-SLP   7/23/2024 10:50 AM

## 2024-07-23 NOTE — PROGRESS NOTES
Comprehensive Nutrition Assessment    Type and Reason for Visit: Reassess  General Nutrition Management/other reason (Hospitalists)    Nutrition Recommendations/Plan:   Meals and Snacks:  Diet: Continue current diet; further texture modifications per SLP  Nutrition Supplement Therapy:  Medical food supplement therapy:  Change Magic Cup three times per day (this provides 290 kcal and 9 grams protein per serving) - vanilla  Continue 1:1 feed   Continue B12, folate supplementation  Consider additional appetite stimulant as able (ie. Remeron, Megace)  Updated copper lab remains in process. Recommend follow up appointment with PCP within 1 week if does not result prior to discharge. Reviewed with Dr. Mccormick.      Malnutrition Assessment:  Malnutrition Status: At risk for malnutrition (Comment) (sig weight loss s/p GBP, minimal PO intake since GBP, mild wasting noted through NFPE as noted below)    Mild orbital and wasting to hand noted. Difficult to assess with minimal pt participation in NFPE.    Nutrition Assessment:  Nutrition History: 7/16: RD unable to obtain detailed nutrition history at this time.   From 5/28 assessment:  Pt reports poor intake since her gastric bypass a year and a half ago. She stated she was able to take 4-5 tablespoons of food at a time. She stated she tried protein shakes however she did not like them because they were too sweet. She also reports nausea, vomiting, and diarrhea since surgery.      Do You Have Any Cultural, Temple, or Ethnic Food Preferences?: No (ELBA)   Weight History: 5/8/23: 321# (cardio OV), 8/14/23: 317# (bariatrics OV), 1/18/24: 242# (Gen Surg OV)  6/4/24: 183# (previous hospitalization  CBW: 163# (7/16 bed scale weight)  49% body weight loss in 1 year (s/p gastric bypass).   Nutrition Background:       PMH: MDD, hypothyroidism, hiatal hernia, T2DM, RYGB in September of 2023. Presented with lethargy and weakness. Admitted with severe sepsis,  metabolic acidosis,  meals)    Nutrition Interventions:   Food and/or Nutrient Delivery: Continue Current Diet, Modify Oral Nutrition Supplement     Coordination of Nutrition Care: Continue to monitor while inpatient    Goals:   Previous Goal Met: Progressing toward Goal(s)  Active Goal: PO intake 50% or greater, by next RD assessment       Nutrition Monitoring and Evaluation:      Food/Nutrient Intake Outcomes: Food and Nutrient Intake, Supplement Intake  Physical Signs/Symptoms Outcomes: Biochemical Data, GI Status, Meal Time Behavior, Weight    Discharge Planning:    Too soon to determine    Dea Durán RD

## 2024-07-23 NOTE — PROGRESS NOTES
Shift report received from previous RN. No needs voiced by patient. At bedside, with patient appears to be resting quietly. Respirations even and unlabored. No signs of distress noted.

## 2024-07-23 NOTE — PROGRESS NOTES
Granulocytes Absolute 0.3 0.0 - 0.5 K/UL   POCT Glucose    Collection Time: 07/22/24  4:09 PM   Result Value Ref Range    POC Glucose 78 65 - 100 mg/dL    Performed by: Manfred    POCT Glucose    Collection Time: 07/22/24  5:30 PM   Result Value Ref Range    POC Glucose 82 65 - 100 mg/dL    Performed by: Venessa    POCT Glucose    Collection Time: 07/22/24  8:31 PM   Result Value Ref Range    POC Glucose 98 65 - 100 mg/dL    Performed by: DANIEL    POCT Glucose    Collection Time: 07/23/24  2:03 AM   Result Value Ref Range    POC Glucose 82 65 - 100 mg/dL    Performed by: DANIEL    POCT Glucose    Collection Time: 07/23/24  7:14 AM   Result Value Ref Range    POC Glucose 75 65 - 100 mg/dL    Performed by: SienaaPCT    POCT Glucose    Collection Time: 07/23/24 11:19 AM   Result Value Ref Range    POC Glucose 77 65 - 100 mg/dL    Performed by: Manfred        No results for input(s): \"COVID19\" in the last 72 hours.      Current Meds:  Current Facility-Administered Medications   Medication Dose Route Frequency    folic acid (FOLVITE) tablet 1 mg  1 mg Oral Daily    vitamin B-12 (CYANOCOBALAMIN) tablet 1,000 mcg  1,000 mcg Oral Daily    midodrine (PROAMATINE) tablet 10 mg  10 mg Oral TID WC    megestrol (MEGACE) 40 MG/ML suspension 200 mg  200 mg Oral Daily    magic (miracle) mouthwash  5 mL Swish & Spit 4x Daily AC & HS    And    nystatin (MYCOSTATIN) 429831 UNIT/ML suspension 500,000 Units  5 mL Oral 4x Daily AC & HS    ARIPiprazole (ABILIFY) tablet 5 mg  5 mg Oral Daily    copper Cu 64 dotatate (Detectnet) injection 4 millicurie  4 millicurie IntraVENous Once    lactobacillus (CULTURELLE) capsule 1 capsule  1 capsule Oral TID WC    dextrose 10 % infusion   IntraVENous Continuous    0.9 % sodium chloride infusion   IntraVENous Continuous    tamsulosin (FLOMAX) capsule 0.4 mg  0.4 mg Oral Daily    levothyroxine (SYNTHROID) tablet 175  mcg  175 mcg Oral Daily    insulin regular (HUMULIN R;NOVOLIN R) injection 10 Units  10 Units IntraVENous Once    glucose chewable tablet 16 g  4 tablet Oral PRN    dextrose bolus 10% 125 mL  125 mL IntraVENous PRN    Or    dextrose bolus 10% 250 mL  250 mL IntraVENous PRN    Glucagon Emergency KIT 1 mg  1 mg SubCUTAneous PRN    dextrose 10 % infusion   IntraVENous Continuous PRN    vancomycin (VANCOCIN) capsule 125 mg  125 mg Oral 4x Daily    sodium chloride flush 0.9 % injection 5-40 mL  5-40 mL IntraVENous 2 times per day    sodium chloride flush 0.9 % injection 5-40 mL  5-40 mL IntraVENous PRN    0.9 % sodium chloride infusion   IntraVENous PRN    magnesium sulfate 2000 mg in 50 mL IVPB premix  2,000 mg IntraVENous PRN    ondansetron (ZOFRAN-ODT) disintegrating tablet 4 mg  4 mg Oral Q8H PRN    Or    ondansetron (ZOFRAN) injection 4 mg  4 mg IntraVENous Q6H PRN    polyethylene glycol (GLYCOLAX) packet 17 g  17 g Oral Daily PRN    acetaminophen (TYLENOL) tablet 650 mg  650 mg Oral Q6H PRN    Or    acetaminophen (TYLENOL) suppository 650 mg  650 mg Rectal Q6H PRN    heparin (porcine) injection 5,000 Units  5,000 Units SubCUTAneous 3 times per day       Signed:  Angela Mccormick MD    Part of this note may have been written by using a voice dictation software.  The note has been proof read but may still contain some grammatical/other typographical errors.

## 2024-07-24 LAB
GLUCOSE BLD STRIP.AUTO-MCNC: 73 MG/DL (ref 65–100)
GLUCOSE BLD STRIP.AUTO-MCNC: 75 MG/DL (ref 65–100)
GLUCOSE BLD STRIP.AUTO-MCNC: 76 MG/DL (ref 65–100)
GLUCOSE BLD STRIP.AUTO-MCNC: 89 MG/DL (ref 65–100)
SERVICE CMNT-IMP: NORMAL

## 2024-07-24 PROCEDURE — 6370000000 HC RX 637 (ALT 250 FOR IP): Performed by: INTERNAL MEDICINE

## 2024-07-24 PROCEDURE — 1100000000 HC RM PRIVATE

## 2024-07-24 PROCEDURE — 2580000003 HC RX 258: Performed by: INTERNAL MEDICINE

## 2024-07-24 PROCEDURE — 97535 SELF CARE MNGMENT TRAINING: CPT

## 2024-07-24 PROCEDURE — 6360000002 HC RX W HCPCS: Performed by: INTERNAL MEDICINE

## 2024-07-24 PROCEDURE — 92526 ORAL FUNCTION THERAPY: CPT

## 2024-07-24 PROCEDURE — 97112 NEUROMUSCULAR REEDUCATION: CPT

## 2024-07-24 PROCEDURE — 97530 THERAPEUTIC ACTIVITIES: CPT

## 2024-07-24 PROCEDURE — 82962 GLUCOSE BLOOD TEST: CPT

## 2024-07-24 RX ADMIN — MIDODRINE HYDROCHLORIDE 10 MG: 5 TABLET ORAL at 08:59

## 2024-07-24 RX ADMIN — FOLIC ACID 1 MG: 1 TABLET ORAL at 08:59

## 2024-07-24 RX ADMIN — HEPARIN SODIUM 5000 UNITS: 5000 INJECTION INTRAVENOUS; SUBCUTANEOUS at 13:36

## 2024-07-24 RX ADMIN — MEGESTROL ACETATE 200 MG: 400 SUSPENSION ORAL at 10:51

## 2024-07-24 RX ADMIN — SODIUM CHLORIDE, PRESERVATIVE FREE 10 ML: 5 INJECTION INTRAVENOUS at 20:51

## 2024-07-24 RX ADMIN — MIDODRINE HYDROCHLORIDE 10 MG: 5 TABLET ORAL at 16:13

## 2024-07-24 RX ADMIN — SODIUM CHLORIDE: 9 INJECTION, SOLUTION INTRAVENOUS at 06:38

## 2024-07-24 RX ADMIN — VANCOMYCIN HYDROCHLORIDE 125 MG: 125 CAPSULE ORAL at 08:59

## 2024-07-24 RX ADMIN — ACETAMINOPHEN 650 MG: 325 TABLET ORAL at 09:05

## 2024-07-24 RX ADMIN — TAMSULOSIN HYDROCHLORIDE 0.4 MG: 0.4 CAPSULE ORAL at 08:59

## 2024-07-24 RX ADMIN — SODIUM CHLORIDE, PRESERVATIVE FREE 5 ML: 5 INJECTION INTRAVENOUS at 09:17

## 2024-07-24 RX ADMIN — LEVOTHYROXINE SODIUM 175 MCG: 0.07 TABLET ORAL at 06:30

## 2024-07-24 RX ADMIN — Medication 1 CAPSULE: at 08:59

## 2024-07-24 RX ADMIN — Medication 1 CAPSULE: at 16:13

## 2024-07-24 RX ADMIN — VANCOMYCIN HYDROCHLORIDE 125 MG: 125 CAPSULE ORAL at 16:37

## 2024-07-24 RX ADMIN — VANCOMYCIN HYDROCHLORIDE 125 MG: 125 CAPSULE ORAL at 12:34

## 2024-07-24 RX ADMIN — HEPARIN SODIUM 5000 UNITS: 5000 INJECTION INTRAVENOUS; SUBCUTANEOUS at 21:52

## 2024-07-24 RX ADMIN — MIDODRINE HYDROCHLORIDE 10 MG: 5 TABLET ORAL at 12:21

## 2024-07-24 RX ADMIN — ARIPIPRAZOLE 5 MG: 5 TABLET ORAL at 08:59

## 2024-07-24 RX ADMIN — Medication 1 CAPSULE: at 12:21

## 2024-07-24 RX ADMIN — HEPARIN SODIUM 5000 UNITS: 5000 INJECTION INTRAVENOUS; SUBCUTANEOUS at 06:30

## 2024-07-24 RX ADMIN — VANCOMYCIN HYDROCHLORIDE 125 MG: 125 CAPSULE ORAL at 20:49

## 2024-07-24 RX ADMIN — CYANOCOBALAMIN TAB 1000 MCG 1000 MCG: 1000 TAB at 08:59

## 2024-07-24 ASSESSMENT — PAIN DESCRIPTION - PAIN TYPE: TYPE: ACUTE PAIN

## 2024-07-24 ASSESSMENT — PAIN SCALES - GENERAL
PAINLEVEL_OUTOF10: 0
PAINLEVEL_OUTOF10: 3
PAINLEVEL_OUTOF10: 0

## 2024-07-24 ASSESSMENT — PAIN - FUNCTIONAL ASSESSMENT: PAIN_FUNCTIONAL_ASSESSMENT: ACTIVITIES ARE NOT PREVENTED

## 2024-07-24 ASSESSMENT — PAIN DESCRIPTION - DESCRIPTORS: DESCRIPTORS: ACHING

## 2024-07-24 ASSESSMENT — PAIN DESCRIPTION - FREQUENCY: FREQUENCY: INTERMITTENT

## 2024-07-24 ASSESSMENT — PAIN DESCRIPTION - LOCATION: LOCATION: BACK

## 2024-07-24 ASSESSMENT — PAIN DESCRIPTION - ONSET: ONSET: GRADUAL

## 2024-07-24 ASSESSMENT — PAIN DESCRIPTION - ORIENTATION: ORIENTATION: INNER

## 2024-07-24 NOTE — PROGRESS NOTES
ACUTE OCCUPATIONAL THERAPY GOALS:   (Developed with and agreed upon by patient and/or caregiver.)  1. Patient will complete upper body bathing and dressing with MINIMAL ASSIST and adaptive equipment as needed.   2. Patient will complete self-grooming tasks in unsupported sitting with MINIMAL ASSIST and adaptive equipment as needed.  3. Patient will tolerate 25 minutes of OT treatment with 1-2 rest breaks to increase activity tolerance for ADLs.   4. Patient will complete functional transfers with MODERATE ASSIST and adaptive equipment as needed.   5. Patient will complete functional activity while seated edge of bed with MINIMAL ASSIST and adaptive equipment as needed.   6. Patient will tolerate 15 minutes unsupported sitting balance with MINIMAL ASSIST in preparation for ADL performance.   7. Patient will tolerate 10 minutes BUE therapeutic activities to increase use of BUE during ADL performance.      Timeframe: 7 visits       OCCUPATIONAL THERAPY: Daily Note AM   OT Visit Days: 4   Time In/Out  OT Charge Capture  Rehab Caseload Tracker  OT Orders    Jesika Olsen is a 68 y.o. female   PRIMARY DIAGNOSIS: Severe sepsis (HCC)  Hyperkalemia [E87.5]  Septicemia (HCC) [A41.9]  Acute pyelonephritis [N10]  ZEUS (acute kidney injury) (HCC) [N17.9]  Severe sepsis (HCC) [A41.9, R65.20]       Inpatient: Payor: [facility] MEDICARE / Plan: [facility] MEDICARE COMPLETE / Product Type: *No Product type* /     ASSESSMENT:     REHAB RECOMMENDATIONS:   Recommendation to date pending progress:  Setting:  TBD- no rehab days    Equipment:    To Be Determined     ASSESSMENT:  Ms. Olsen continue to be very weak. Continue to require max A x2 for all bed mobility. Fair-poor sitting balance noted at edge of bed. Not safe to attempt standing today due to BP dropping. Minimal progress made today. Will continue to benefit from skilled OT during stay.       SUBJECTIVE:     Ms. Olsen states, \"I need to lay back down\"     Social/Functional Lives

## 2024-07-24 NOTE — PROGRESS NOTES
Comprehensive Nutrition Assessment    Type and Reason for Visit: Reassess  General Nutrition Management/other reason (Hospitalists)    Nutrition Recommendations/Plan:   Meals and Snacks:  Diet: Continue current diet; further texture modifications per SLP  Nutrition Supplement Therapy:  Medical food supplement therapy:  Continue Magic Cup three times per day (this provides 290 kcal and 9 grams protein per serving) - vanilla  Continue 1:1 feed   Continue B12, folate supplementation  Initiate copper supplementation per MD     Malnutrition Assessment:  Malnutrition Status: At risk for malnutrition (Comment) (sig weight loss s/p GBP, minimal PO intake since GBP, mild wasting noted through NFPE as noted below)    Mild orbital and wasting to hand noted. Difficult to assess with minimal pt participation in NFPE.    Nutrition Assessment:  Nutrition History: 7/16: RD unable to obtain detailed nutrition history at this time.   From 5/28 assessment:  Pt reports poor intake since her gastric bypass a year and a half ago. She stated she was able to take 4-5 tablespoons of food at a time. She stated she tried protein shakes however she did not like them because they were too sweet. She also reports nausea, vomiting, and diarrhea since surgery.      Do You Have Any Cultural, Pentecostalism, or Ethnic Food Preferences?: No (ELBA)   Weight History: 5/8/23: 321# (cardio OV), 8/14/23: 317# (bariatrics OV), 1/18/24: 242# (Gen Surg OV)  6/4/24: 183# (previous hospitalization  CBW: 163# (7/16 bed scale weight)  49% body weight loss in 1 year (s/p gastric bypass).   Nutrition Background:       PMH: MDD, hypothyroidism, hiatal hernia, T2DM, RYGB in September of 2023. Presented with lethargy and weakness. Admitted with severe sepsis,  metabolic acidosis, hyperkalemia, hyponatremia, and ZEUS.  Nutrition Interval:  7/16: SLP downgrades diet to minced and moist  7/21: Marinol initiated per MD  7/23: Marinol d/c by MD due to worsening hallucinations,

## 2024-07-24 NOTE — PROGRESS NOTES
Hospitalist Progress Note   Admit Date:  7/15/2024  6:01 PM   Name:  Jesika Olsen   Age:  68 y.o.  Sex:  female  :  1955   MRN:  [id number]   Room:  809/    Presenting/Chief Complaint: Fatigue     Reason(s) for Admission: Hyperkalemia [E87.5]  Septicemia (HCC) [A41.9]  Acute pyelonephritis [N10]  ZEUS (acute kidney injury) (HCC) [N17.9]  Severe sepsis (HCC) [A41.9, R65.20]     NOTICE FOR THE PATIENT: This clinical note is not designed to be interpreted by patients, their families or their loved ones and we do not recommend reading it unless you have medical training. These notes may contain candid and (unintentionally) offensive descriptions, which are sometimes required for accurate documentation. If you would like more information about your healthcare, please obtain it directly by myself or my staff/colleagues - never solely from the notes. Thank you for your understanding and cooperation.     Hospital Course:   As per prior documentation:  \"Jesika Olsen is a 68 y.o. female with medical history of MDD, hypothyroidism, hiatal hernia (on omeprazole), Dm type II, and previous episodes of C diff colitis presents from home with worsening generalized lethargy and weakness. Patient has had several hospitalizations and her daughter states that \"she is so weak she is essentially bedbound at home and can't do anything.\" Patient has a known history of frequent, recurrent UTIs (growing pansensitive E. Coli per chart review). She has not been able to eat or drink much at all. No recent sick contacts.      ED course: CT abd/pelvis shows multiple bladder stones and a liver lesion. Creatinine of 0.9 (baseline around 0.3-0.4), potassium of 6.7 (nonhemolyzed), and bicarb of 14. WBC count of 21. EKG shows NSR without peaked T waves. Given Rocephin and fluid boluses. Also given calcium gluconate and Kayexalate. Ordered insulin but held as patient's blood sugar at 106 even with dextrose given intravenously.  AUTOMATED      Neutrophils % 74 43 - 78 %    Lymphocytes % 15 13 - 44 %    Monocytes % 6 4.0 - 12.0 %    Eosinophils % 2 0.5 - 7.8 %    Basophils % 0 0.0 - 2.0 %    Immature Granulocytes % 3 0.0 - 5.0 %    Neutrophils Absolute 7.1 1.7 - 8.2 K/UL    Lymphocytes Absolute 1.4 0.5 - 4.6 K/UL    Monocytes Absolute 0.6 0.1 - 1.3 K/UL    Eosinophils Absolute 0.1 0.0 - 0.8 K/UL    Basophils Absolute 0.0 0.0 - 0.2 K/UL    Immature Granulocytes Absolute 0.3 0.0 - 0.5 K/UL   Copper, Serum    Collection Time: 07/22/24  3:15 PM   Result Value Ref Range    Copper 39 (L) 80 - 158 ug/dL   POCT Glucose    Collection Time: 07/22/24  4:09 PM   Result Value Ref Range    POC Glucose 78 65 - 100 mg/dL    Performed by: SienaaPCT    POCT Glucose    Collection Time: 07/22/24  5:30 PM   Result Value Ref Range    POC Glucose 82 65 - 100 mg/dL    Performed by: Venessa    POCT Glucose    Collection Time: 07/22/24  8:31 PM   Result Value Ref Range    POC Glucose 98 65 - 100 mg/dL    Performed by: DANIEL    POCT Glucose    Collection Time: 07/23/24  2:03 AM   Result Value Ref Range    POC Glucose 82 65 - 100 mg/dL    Performed by: DANIEL    POCT Glucose    Collection Time: 07/23/24  7:14 AM   Result Value Ref Range    POC Glucose 75 65 - 100 mg/dL    Performed by: SienaaPCT    POCT Glucose    Collection Time: 07/23/24 11:19 AM   Result Value Ref Range    POC Glucose 77 65 - 100 mg/dL    Performed by: SienaaPCT    POCT Glucose    Collection Time: 07/23/24  4:33 PM   Result Value Ref Range    POC Glucose 83 65 - 100 mg/dL    Performed by: SienaaPCT    POCT Glucose    Collection Time: 07/23/24  8:40 PM   Result Value Ref Range    POC Glucose 80 65 - 100 mg/dL    Performed by: AbercrombieAshlynADN    POCT Glucose    Collection Time: 07/24/24  7:25 AM   Result Value Ref Range    POC Glucose 73 65 - 100 mg/dL    Performed by: Kristine Guzman

## 2024-07-24 NOTE — PROGRESS NOTES
GOALS:  LTG: Patient will maintain adequate hydration/nutrition with optimum safety and efficiency of swallowing function with PO intake without overt signs or symptoms of aspiration for the highest appropriate diet level.  STG: PROGRESSING 24  Patient will consume soft and bite-sized textures and thin liquids without overt signs or symptoms of airway compromise.  Patient will safely ingest diet trials during therapeutic feedings with SLP without overt signs or symptoms of respiratory compromise in efforts to advance diet.    LTG: Patient will increase receptive/expressive language skills demonstrated by the ability to communicate basic wants/needs across environments.   STG: MET 24  Patient will follow 1 step commands with 100% accuracy given minimal cueing.  Patient will complete sentences with 100% accuracy given minimal cueing.    LTG: Patient will improve neuro-linguistic abilities to increase safety and awareness in functional living environment.   STG: PROGRESSING 24  Patient will sequence steps to a task with 80% accuracy given minimal cueing.  Patient will recall relevant verbal information with 80% accuracy with minimal cues.    SPEECH LANGUAGE PATHOLOGY: Dysphagia Daily Note #5    Acknowledge Order  I  Therapy Time  I   Charges     I  Rehab Caseload Tracker    NAME: Jesika Olsen  : 1955  MRN: [id number]    ADMISSION DATE: 7/15/2024  PRIMARY DIAGNOSIS: Severe sepsis (HCC)    ICD-10: Treatment Diagnosis: R13.11 Dysphagia, Oral Phase  R41.841 Cognitive-Communication Deficit    RECOMMENDATIONS   Diet:    Soft and Bite-Sized  Thin Liquids    Medication: as tolerated   Compensatory Swallowing Strategies:   Alternate solids and liquids  Slow rate of intake  Small bites/sips  Upright as possible for all oral intake  1:1 feeding supervision and assistance    Therapeutic Intervention:   Patient/family education  Dysphagia treatment   Patient continues to require skilled intervention:   includes Cholecystectomy; pr unlisted procedure cardiac surgery; Chest surgery; vascular surgery; pr unlisted procedure abdomen peritoneum & omentum; gyn (2020); Hysterectomy (2021); Upper gastrointestinal endoscopy (N/A, 12/26/2023); and Upper gastrointestinal endoscopy (N/A, 1/16/2024).  Precautions/Allergies: Aspirin, Levofloxacin, Penicillins, and Ziprasidone hcl     Observations:  Alertness: Drowsy  Voice: low volume  Speech: Intelligible  Expressive Language: Fluent  Receptive Language: Answers yes/no questions and Follows basic commands  Cognition: Distractible and mentation fluctuating throughout session    Prior Dysphagia History: Previously seen for eval only with recommendations for ETC and thin liquids  Prior Instrumental Assessment: none  Prior Speech Therapy: none    Current Diet:  Soft and Bite-Sized  Thin Liquids    Respiratory Status: Room air    Pain:  Patient does not c/o pain  Pain intervention: None- No pain observed  Pain response: Patient satisfied    OBJECTIVE    Oropharyngeal Assessment: Patient agreeable to PO intake this date. Offered thin liquids via straw- patient continues to demonstrate functional siphoning and consumption without overt s/s of aspiration. Patient offered sugar cookie, required encouragement to consume because patient states she's not supposed to eat these because of her sugar. Assisted patient in pinching pieces of sugar cookie once agreeable. Prolonged mastication due to edentulous state, but masticates bolus fully and demonstrates appropriate oral transit, timely swallow initiation, and appropriate bolus clearance.   Patient is agreeable to continue diet of soft and bite sized solids, thin liquids. Speech-Language Pathologist encouraged oral intake for energy and healing. Patient verbalized understanding.     Cognitive-Communication: targeted this date through simple conversation exchange. Patient able to maintain attention and provide meaningful responses to

## 2024-07-24 NOTE — CARE COORDINATION
CM reviewed clinical notes and patient discussed in IDR. Patient's copper level low. Need 5 doses IV.  PT/OT recommending SNF. Per prior CM, daughter, Jhonatan, requesting [facility] RN,PT, OT, SLP.  Referral sent.  CM will remain available for consult.

## 2024-07-24 NOTE — PROGRESS NOTES
ACUTE PHYSICAL THERAPY GOALS:   (Developed with and agreed upon by patient and/or caregiver.)  Pt will perform bed mobility with Ed in 7 therapy sessions.  Pt will perform sit-to-stand/ stand-to-sit transfers Ed in 7 therapy sessions.  Pt will ambulate 10 ft Ed with use of LRAD/no device and breaks as needed in 7 therapy sessions.  Pt will perform seated dynamic balance activities with minimal postural sway in 7 therapy sessions.  Pt will tolerate multiple sets and reps of BLE exercises in 7 therapy sessions.     PHYSICAL THERAPY: Daily Note PM   (Link to Caseload Tracking: PT Visit Days : 4  Time In/Out PT Charge Capture  Rehab Caseload Tracker  Orders    Jesika Olsen is a 68 y.o. female   PRIMARY DIAGNOSIS: Severe sepsis (HCC)  Hyperkalemia [E87.5]  Septicemia (HCC) [A41.9]  Acute pyelonephritis [N10]  ZEUS (acute kidney injury) (HCC) [N17.9]  Severe sepsis (HCC) [A41.9, R65.20]       Inpatient: Payor: [facility] MEDICARE / Plan: [facility] MEDICARE COMPLETE / Product Type: *No Product type* /     ASSESSMENT:     REHAB RECOMMENDATIONS:   Recommendation to date pending progress:  Setting:  Short-term Rehab    Equipment:    To Be Determined     ASSESSMENT:  Ms. Olsen was supine upon contact and agreeable to therapy.  She appeared more alert today and interactive.  Pt was able to come to sitting on EOB with mod-maxA x 2 and fair-poor sitting balance.  She initially demonstrated increased posterior lean but was able to correct with cuing.  Pt's BP recorded again in sitting with over 20 point drop in SBP.  Pt's map was above 70 but she became symptomatic so assisted back to bed.  Pt required maxA x 2 for rolling and totalA for scooting.  Slight progress in mobility today.         SUBJECTIVE:   Ms. Olsen states, \"I know\"     Social/Functional Lives With: Daughter  Type of Home: House  Home Layout: One level  Home Access: Level entry  Bathroom Shower/Tub: Tub/Shower unit  Bathroom Toilet: Standard  Bathroom Equipment:

## 2024-07-24 NOTE — PLAN OF CARE
Problem: Discharge Planning  Goal: Discharge to home or other facility with appropriate resources  7/24/2024 0150 by Carmencita Serrano, RN  Outcome: Progressing  7/23/2024 1648 by Romy Torres, RN  Outcome: Progressing     Problem: Safety - Adult  Goal: Free from fall injury  Outcome: Progressing     Problem: Pain  Goal: Verbalizes/displays adequate comfort level or baseline comfort level  Outcome: Progressing     Problem: Skin/Tissue Integrity  Goal: Absence of new skin breakdown  Description: 1.  Monitor for areas of redness and/or skin breakdown  2.  Assess vascular access sites hourly  3.  Every 4-6 hours minimum:  Change oxygen saturation probe site  4.  Every 4-6 hours:  If on nasal continuous positive airway pressure, respiratory therapy assess nares and determine need for appliance change or resting period.  Outcome: Progressing     Problem: Chronic Conditions and Co-morbidities  Goal: Patient's chronic conditions and co-morbidity symptoms are monitored and maintained or improved  Outcome: Progressing

## 2024-07-25 LAB
ANION GAP SERPL CALC-SCNC: 8 MMOL/L (ref 9–18)
BASOPHILS # BLD: 0 K/UL (ref 0–0.2)
BASOPHILS NFR BLD: 0 % (ref 0–2)
BUN SERPL-MCNC: 6 MG/DL (ref 8–23)
CALCIUM SERPL-MCNC: 8.3 MG/DL (ref 8.8–10.2)
CHLORIDE SERPL-SCNC: 107 MMOL/L (ref 98–107)
CO2 SERPL-SCNC: 20 MMOL/L (ref 20–28)
CREAT SERPL-MCNC: 0.3 MG/DL (ref 0.6–1.1)
DIFFERENTIAL METHOD BLD: ABNORMAL
EOSINOPHIL # BLD: 0.1 K/UL (ref 0–0.8)
EOSINOPHIL NFR BLD: 1 % (ref 0.5–7.8)
ERYTHROCYTE [DISTWIDTH] IN BLOOD BY AUTOMATED COUNT: 17.5 % (ref 11.9–14.6)
GLUCOSE BLD STRIP.AUTO-MCNC: 69 MG/DL (ref 65–100)
GLUCOSE BLD STRIP.AUTO-MCNC: 71 MG/DL (ref 65–100)
GLUCOSE BLD STRIP.AUTO-MCNC: 79 MG/DL (ref 65–100)
GLUCOSE BLD STRIP.AUTO-MCNC: 82 MG/DL (ref 65–100)
GLUCOSE BLD STRIP.AUTO-MCNC: 87 MG/DL (ref 65–100)
GLUCOSE SERPL-MCNC: 69 MG/DL (ref 70–99)
HCT VFR BLD AUTO: 31.6 % (ref 35.8–46.3)
HGB BLD-MCNC: 9.5 G/DL (ref 11.7–15.4)
IMM GRANULOCYTES # BLD AUTO: 0.1 K/UL (ref 0–0.5)
IMM GRANULOCYTES NFR BLD AUTO: 1 % (ref 0–5)
LYMPHOCYTES # BLD: 1.7 K/UL (ref 0.5–4.6)
LYMPHOCYTES NFR BLD: 16 % (ref 13–44)
MCH RBC QN AUTO: 28.7 PG (ref 26.1–32.9)
MCHC RBC AUTO-ENTMCNC: 30.1 G/DL (ref 31.4–35)
MCV RBC AUTO: 95.5 FL (ref 82–102)
MONOCYTES # BLD: 0.7 K/UL (ref 0.1–1.3)
MONOCYTES NFR BLD: 6 % (ref 4–12)
NEUTS SEG # BLD: 8.3 K/UL (ref 1.7–8.2)
NEUTS SEG NFR BLD: 76 % (ref 43–78)
NRBC # BLD: 0 K/UL (ref 0–0.2)
PLATELET # BLD AUTO: 285 K/UL (ref 150–450)
PMV BLD AUTO: 9.3 FL (ref 9.4–12.3)
POTASSIUM SERPL-SCNC: 3.3 MMOL/L (ref 3.5–5.1)
RBC # BLD AUTO: 3.31 M/UL (ref 4.05–5.2)
SERVICE CMNT-IMP: NORMAL
SODIUM SERPL-SCNC: 135 MMOL/L (ref 136–145)
WBC # BLD AUTO: 10.9 K/UL (ref 4.3–11.1)

## 2024-07-25 PROCEDURE — 92526 ORAL FUNCTION THERAPY: CPT

## 2024-07-25 PROCEDURE — 6360000002 HC RX W HCPCS: Performed by: INTERNAL MEDICINE

## 2024-07-25 PROCEDURE — 1100000000 HC RM PRIVATE

## 2024-07-25 PROCEDURE — 2580000003 HC RX 258: Performed by: INTERNAL MEDICINE

## 2024-07-25 PROCEDURE — 85025 COMPLETE CBC W/AUTO DIFF WBC: CPT

## 2024-07-25 PROCEDURE — 6370000000 HC RX 637 (ALT 250 FOR IP): Performed by: INTERNAL MEDICINE

## 2024-07-25 PROCEDURE — 2500000003 HC RX 250 WO HCPCS: Performed by: INTERNAL MEDICINE

## 2024-07-25 PROCEDURE — 36415 COLL VENOUS BLD VENIPUNCTURE: CPT

## 2024-07-25 PROCEDURE — 82962 GLUCOSE BLOOD TEST: CPT

## 2024-07-25 PROCEDURE — 80048 BASIC METABOLIC PNL TOTAL CA: CPT

## 2024-07-25 RX ORDER — MEGESTROL ACETATE 40 MG/ML
400 SUSPENSION ORAL DAILY
Status: DISCONTINUED | OUTPATIENT
Start: 2024-07-25 | End: 2024-08-01 | Stop reason: HOSPADM

## 2024-07-25 RX ORDER — POTASSIUM CHLORIDE 20 MEQ/1
40 TABLET, EXTENDED RELEASE ORAL DAILY
Status: DISCONTINUED | OUTPATIENT
Start: 2024-07-25 | End: 2024-07-31

## 2024-07-25 RX ORDER — DEXTROSE MONOHYDRATE 100 MG/ML
INJECTION, SOLUTION INTRAVENOUS CONTINUOUS
Status: DISCONTINUED | OUTPATIENT
Start: 2024-07-25 | End: 2024-07-29

## 2024-07-25 RX ADMIN — MEGESTROL ACETATE 400 MG: 400 SUSPENSION ORAL at 08:35

## 2024-07-25 RX ADMIN — FOLIC ACID 1 MG: 1 TABLET ORAL at 08:31

## 2024-07-25 RX ADMIN — DEXTROSE MONOHYDRATE: 10 INJECTION, SOLUTION INTRAVENOUS at 12:33

## 2024-07-25 RX ADMIN — VANCOMYCIN HYDROCHLORIDE 125 MG: 125 CAPSULE ORAL at 11:43

## 2024-07-25 RX ADMIN — Medication 1 CAPSULE: at 08:31

## 2024-07-25 RX ADMIN — CYANOCOBALAMIN TAB 1000 MCG 1000 MCG: 1000 TAB at 08:31

## 2024-07-25 RX ADMIN — VANCOMYCIN HYDROCHLORIDE 125 MG: 125 CAPSULE ORAL at 21:36

## 2024-07-25 RX ADMIN — LEVOTHYROXINE SODIUM 175 MCG: 0.07 TABLET ORAL at 06:18

## 2024-07-25 RX ADMIN — SODIUM CHLORIDE, PRESERVATIVE FREE 10 ML: 5 INJECTION INTRAVENOUS at 08:31

## 2024-07-25 RX ADMIN — HEPARIN SODIUM 5000 UNITS: 5000 INJECTION INTRAVENOUS; SUBCUTANEOUS at 13:55

## 2024-07-25 RX ADMIN — TAMSULOSIN HYDROCHLORIDE 0.4 MG: 0.4 CAPSULE ORAL at 08:31

## 2024-07-25 RX ADMIN — HEPARIN SODIUM 5000 UNITS: 5000 INJECTION INTRAVENOUS; SUBCUTANEOUS at 21:36

## 2024-07-25 RX ADMIN — MIDODRINE HYDROCHLORIDE 10 MG: 5 TABLET ORAL at 11:43

## 2024-07-25 RX ADMIN — Medication 1 CAPSULE: at 17:05

## 2024-07-25 RX ADMIN — POTASSIUM CHLORIDE 40 MEQ: 1500 TABLET, EXTENDED RELEASE ORAL at 17:10

## 2024-07-25 RX ADMIN — HEPARIN SODIUM 5000 UNITS: 5000 INJECTION INTRAVENOUS; SUBCUTANEOUS at 06:17

## 2024-07-25 RX ADMIN — MIDODRINE HYDROCHLORIDE 10 MG: 5 TABLET ORAL at 08:31

## 2024-07-25 RX ADMIN — ARIPIPRAZOLE 5 MG: 5 TABLET ORAL at 08:31

## 2024-07-25 RX ADMIN — MIDODRINE HYDROCHLORIDE 10 MG: 5 TABLET ORAL at 17:05

## 2024-07-25 RX ADMIN — VANCOMYCIN HYDROCHLORIDE 125 MG: 125 CAPSULE ORAL at 17:05

## 2024-07-25 RX ADMIN — Medication 1 CAPSULE: at 11:44

## 2024-07-25 RX ADMIN — VANCOMYCIN HYDROCHLORIDE 125 MG: 125 CAPSULE ORAL at 08:31

## 2024-07-25 RX ADMIN — CUPRIC CHLORIDE 2 MG: 0.4 INJECTION, SOLUTION INTRAVENOUS at 17:25

## 2024-07-25 RX ADMIN — NYSTATIN 500000 UNITS: 100000 SUSPENSION ORAL at 21:36

## 2024-07-25 ASSESSMENT — PAIN SCALES - GENERAL
PAINLEVEL_OUTOF10: 0

## 2024-07-25 NOTE — PROGRESS NOTES
Pt resting in bed comfortably at this time, alert and oriented to person and sometimes place. No distress noted, respirations even and unlabored on room air. D10 infusing at 50 ml/hr. Pt denies pain at this time. Esteves in place and draining yellow urine. Pt instructed to call for assistance if needed, call light in place, will continue to monitor.

## 2024-07-25 NOTE — PROGRESS NOTES
Comprehensive Nutrition Assessment    Type and Reason for Visit: Reassess  General Nutrition Management/other reason (Hospitalists)    Nutrition Recommendations/Plan:   Meals and Snacks:  Diet: Continue current diet; further texture modifications per SLP  Nutrition Supplement Therapy:  Medical food supplement therapy:  Continue Magic Cup three times per day (this provides 290 kcal and 9 grams protein per serving) -  vanilla  Continue 1:1 feed   Continue B12, folate supplementation  Consider daily potassium supplementation   5 days of IV copper supplementation to start this evening per MD order  Pt with minimal intake throughout 10 day admission, requiring D10 to support blood glucose levels. If patient is unable to increase PO intake significantly in the upcoming 1-2 days, and aligns with goals of care, enteral nutrition would be appropriate to consider.      Malnutrition Assessment:  Malnutrition Status: At risk for malnutrition (Comment) (sig weight loss s/p GBP, minimal PO intake since GBP, mild wasting noted through NFPE as noted below)    Mild orbital and wasting to hand noted. Difficult to assess with minimal pt participation in NFPE.    Nutrition Assessment:  Nutrition History: 7/16: RD unable to obtain detailed nutrition history at this time.   From 5/28 assessment:  Pt reports poor intake since her gastric bypass a year and a half ago. She stated she was able to take 4-5 tablespoons of food at a time. She stated she tried protein shakes however she did not like them because they were too sweet. She also reports nausea, vomiting, and diarrhea since surgery.      Do You Have Any Cultural, Restorationist, or Ethnic Food Preferences?: No (ELBA)   Weight History: 5/8/23: 321# (cardio OV), 8/14/23: 317# (bariatrics OV), 1/18/24: 242# (Gen Surg OV)  6/4/24: 183# (previous hospitalization  CBW: 163# (7/16 bed scale weight)  49% body weight loss in 1 year (s/p gastric bypass).   Nutrition Background:       PMH: MDD,  25.0-29.9)    Estimated Daily Nutrient Needs:  Energy (kcal/day): 0531-7087 (20-25 kcal/kg) (Kcal/kg Weight used: 73.8 kg  (7/16- weight)  Protein (g/day):  (1.2-1.5 g/kg) (increased 2/2 s/p gastric bypass) Weight Used: ( (7/16-  weight)) 73.8 kg  Fluid (ml/day):   (1 ml/kcal)    Nutrition Diagnosis:   Inadequate oral intake related to altered GI structure as evidenced by  (s/p RYGP, poor appetite/early satiety, PO intake 25% of meals)    Nutrition Interventions:   Food and/or Nutrient Delivery: Continue Current Diet, Continue Oral Nutrition Supplement     Coordination of Nutrition Care: Continue to monitor while inpatient    Goals:   Previous Goal Met: Progressing toward Goal(s)  Active Goal: PO intake 50% or greater, by next RD assessment       Nutrition Monitoring and Evaluation:      Food/Nutrient Intake Outcomes: Food and Nutrient Intake, Supplement Intake  Physical Signs/Symptoms Outcomes: Biochemical Data, GI Status, Meal Time Behavior, Weight    Discharge Planning:    Too soon to determine    Dea Durán RD

## 2024-07-25 NOTE — PLAN OF CARE
Problem: Safety - Adult  Goal: Free from fall injury  7/25/2024 0713 by Kim Williamson, RN  Outcome: Progressing  7/24/2024 2131 by Carmencita Serrano, RN  Outcome: Progressing  Flowsheets (Taken 7/24/2024 0850 by Romy Torres, RN)  Free From Fall Injury: Instruct family/caregiver on patient safety

## 2024-07-25 NOTE — PROGRESS NOTES
Pt resting in bed comfortably at this time, alert and oriented times 2, disoriented to time and situation. No distress noted, respirations even and unlabored on room air. Esteves in place and draining yellow urine. Pt denies pain at this time. Pt instructed to call for assistance if needed, call light in place, will continue to monitor.

## 2024-07-25 NOTE — CARE COORDINATION
Per notes, pt does not have any rehab days remaining. [facility] has accepted for H/H, per IDR this morning, anticipate discharge Mon, 7/29. CM will continue to follow.

## 2024-07-25 NOTE — PROGRESS NOTES
Megace  7/25------ hypoglycemia has returned following discontinuance of D10W.  Resume and hopefully oral intake will improve over next 48 hours and can wean this off.     # C diff colitis  7/24/2024  Continue p.o. vancomycin  Will consider extending therapy for 14 days  7/25--- clinically improved continue full 14-day course of oral vancomycin       Adult failure to thrive  7/24/2024  complicates course of hospitalization  Continue PT and OT  Nutritionist input appreciated  7/25----hope for continued clinical improvement with correction comorbidities including copper deficiency and recent restart psychiatric medications        Decreased urine output  7/24/2024  Renal function actually improved  Continue Flomax  Esteves discontinued- subsequently voiding but retained  07/22/24- IN & Out x 1     Hypoglycemia  7/24/2024  Discontinue IV dextrose  Patient getting better with addressing her hypoglycemia with food  Po intake encouraged by daughter & staff trying to get her to eat   Continue Megace  Marinol DC as above  7/25--- as above.     Hypotension   7/24/2024  Continue midodrine- 10 mg 3 times daily  LA patrick  Repeat orthostatics in a.m.  7/25----needs nutrition-continue midodrine pending clinical improvement with nutritional support          Left arm swelling  7/24/2024  Doppler ultrasound-negative for DVT     Depression and bipolar disorder  7/24/2024  Seen by psych  Recommended Abilify- will continue  She is improving on this medication  Was hallucinating currently improved after Marinol was discontinued  Will monitor  If recurs will discuss with psych further  7/25--- recent restart psych meds           Low serum copper levels  7/24/2024  Discussed with nutritionist and ordered copper injection on formulary but not available  Low copper levels confirmed on labs  Pharmacy consulted for nonformulary copper  She will need to complete 5 days of therapy IV prior to discharge  7/25----to start IV copper     heparin (porcine) injection 5,000 Units  5,000 Units SubCUTAneous 3 times per day       Signed:  Hernandez Gay, DO    Part of this note may have been written by using a voice dictation software.  The note has been proof read but may still contain some grammatical/other typographical errors.

## 2024-07-25 NOTE — PROGRESS NOTES
GOALS:  LTG: Patient will maintain adequate hydration/nutrition with optimum safety and efficiency of swallowing function with PO intake without overt signs or symptoms of aspiration for the highest appropriate diet level.  STG: PROGRESSING 24  Patient will consume minced and moist textures and thin liquids without overt signs or symptoms of airway compromise.  Patient will safely ingest diet trials during therapeutic feedings with SLP without overt signs or symptoms of respiratory compromise in efforts to advance diet.    LTG: Patient will increase receptive/expressive language skills demonstrated by the ability to communicate basic wants/needs across environments.   STG: MET 24  Patient will follow 1 step commands with 100% accuracy given minimal cueing.  Patient will complete sentences with 100% accuracy given minimal cueing.    LTG: Patient will improve neuro-linguistic abilities to increase safety and awareness in functional living environment.   STG: PROGRESSING 24  Patient will sequence steps to a task with 80% accuracy given minimal cueing.  Patient will recall relevant verbal information with 80% accuracy with minimal cues.    SPEECH LANGUAGE PATHOLOGY: Dysphagia Daily Note #6    Acknowledge Order  I  Therapy Time  I   Charges     I  Rehab Caseload Tracker    NAME: Jesika Olsen  : 1955  MRN: [id number]    ADMISSION DATE: 7/15/2024  PRIMARY DIAGNOSIS: Severe sepsis (HCC)    ICD-10: Treatment Diagnosis: R13.11 Dysphagia, Oral Phase  R41.841 Cognitive-Communication Deficit    RECOMMENDATIONS   Diet:    Minced and Moist  Thin Liquids    Medication: as tolerated   Compensatory Swallowing Strategies:   Alternate solids and liquids  Slow rate of intake  Small bites/sips  Upright as possible for all oral intake  1:1 feeding supervision and assistance    Therapeutic Intervention:   Patient/family education  Dysphagia treatment   Patient continues to require skilled intervention:  Yes.

## 2024-07-25 NOTE — PROGRESS NOTES
Physician Progress Note      PATIENT:               ADELE TIAN  CSN #:                  [id number]  :                       1955  ADMIT DATE:       7/15/2024 6:01 PM  DISCH DATE:  RESPONDING  PROVIDER #:        Hernandez Narayanan DO          QUERY TEXT:    Pt admitted with Sepsis with UTI & Hyperkalemia. Pt noted to have AMS.    noted: Now hallucinating-query secondary to Abilify versus sundown. Start   Depakote & Marinol discontinued. If possible, please document in the progress   notes and discharge summary if you are evaluating and / or treating any of the   following:    The medical record reflects the following:  Risk Factors: Sepsis  Clinical Indicators: UTI, Hyperkalemia. EMS was called for patient being pale   cool to touch and confused. Per notes:   Positive for confusion.  noted:   Now hallucinating-query secondary to Abilify versus sundown.  Treatment: IV 0.9 NS 2178 ml bolus. IV Rocephin & Vanc, Calcium gluconate,   D10, Insulin, Kayexalate. Start Depakote & Marinol discontinued.    Irais OWENSN, RN  Options provided:  -- Metabolic encephalopathy  -- Septic encephalopathy  -- Toxic encephalopathy  -- No Encephalopathy  -- Other - I will add my own diagnosis  -- Disagree - Not applicable / Not valid  -- Disagree - Clinically unable to determine / Unknown  -- Refer to Clinical Documentation Reviewer    PROVIDER RESPONSE TEXT:    This patient has septic encephalopathy.    Query created by: Ellen Kong on 2024 7:36 AM      Electronically signed by:  Hernandez Narayanan DO 2024 8:58 AM

## 2024-07-25 NOTE — PROGRESS NOTES
Pt resting in bed comfortably at this time, alert and oriented to person and sometimes place. No distress noted, respirations even and unlabored on room air. Pt denies pain at this time. Pt instructed to call for assistance if needed, call light in place, will continue to monitor. Will prepare for bedside shift report.

## 2024-07-26 LAB
ALBUMIN SERPL-MCNC: 1.5 G/DL (ref 3.2–4.6)
ALBUMIN/GLOB SERPL: 0.5 (ref 1–1.9)
ALP SERPL-CCNC: 159 U/L (ref 35–104)
ALT SERPL-CCNC: 17 U/L (ref 12–65)
ANION GAP SERPL CALC-SCNC: 7 MMOL/L (ref 9–18)
AST SERPL-CCNC: 21 U/L (ref 15–37)
BASOPHILS # BLD: 0 K/UL (ref 0–0.2)
BASOPHILS NFR BLD: 0 % (ref 0–2)
BILIRUB SERPL-MCNC: 0.3 MG/DL (ref 0–1.2)
BUN SERPL-MCNC: 5 MG/DL (ref 8–23)
CALCIUM SERPL-MCNC: 8.2 MG/DL (ref 8.8–10.2)
CHLORIDE SERPL-SCNC: 107 MMOL/L (ref 98–107)
CO2 SERPL-SCNC: 20 MMOL/L (ref 20–28)
CREAT SERPL-MCNC: 0.29 MG/DL (ref 0.6–1.1)
DIFFERENTIAL METHOD BLD: ABNORMAL
EOSINOPHIL # BLD: 0.1 K/UL (ref 0–0.8)
EOSINOPHIL NFR BLD: 1 % (ref 0.5–7.8)
ERYTHROCYTE [DISTWIDTH] IN BLOOD BY AUTOMATED COUNT: 17.2 % (ref 11.9–14.6)
GLOBULIN SER CALC-MCNC: 2.9 G/DL (ref 2.3–3.5)
GLUCOSE BLD STRIP.AUTO-MCNC: 70 MG/DL (ref 65–100)
GLUCOSE BLD STRIP.AUTO-MCNC: 84 MG/DL (ref 65–100)
GLUCOSE BLD STRIP.AUTO-MCNC: 93 MG/DL (ref 65–100)
GLUCOSE SERPL-MCNC: 83 MG/DL (ref 70–99)
HCT VFR BLD AUTO: 29.4 % (ref 35.8–46.3)
HGB BLD-MCNC: 9.4 G/DL (ref 11.7–15.4)
IMM GRANULOCYTES # BLD AUTO: 0.1 K/UL (ref 0–0.5)
IMM GRANULOCYTES NFR BLD AUTO: 1 % (ref 0–5)
LYMPHOCYTES # BLD: 1.8 K/UL (ref 0.5–4.6)
LYMPHOCYTES NFR BLD: 16 % (ref 13–44)
MAGNESIUM SERPL-MCNC: 1.8 MG/DL (ref 1.8–2.4)
MCH RBC QN AUTO: 29.4 PG (ref 26.1–32.9)
MCHC RBC AUTO-ENTMCNC: 32 G/DL (ref 31.4–35)
MCV RBC AUTO: 91.9 FL (ref 82–102)
MONOCYTES # BLD: 0.7 K/UL (ref 0.1–1.3)
MONOCYTES NFR BLD: 7 % (ref 4–12)
NEUTS SEG # BLD: 8.2 K/UL (ref 1.7–8.2)
NEUTS SEG NFR BLD: 75 % (ref 43–78)
NRBC # BLD: 0 K/UL (ref 0–0.2)
PLATELET # BLD AUTO: 281 K/UL (ref 150–450)
PMV BLD AUTO: 8.7 FL (ref 9.4–12.3)
POTASSIUM SERPL-SCNC: 3.4 MMOL/L (ref 3.5–5.1)
PROT SERPL-MCNC: 4.4 G/DL (ref 6.3–8.2)
RBC # BLD AUTO: 3.2 M/UL (ref 4.05–5.2)
SERVICE CMNT-IMP: NORMAL
SODIUM SERPL-SCNC: 134 MMOL/L (ref 136–145)
WBC # BLD AUTO: 10.9 K/UL (ref 4.3–11.1)

## 2024-07-26 PROCEDURE — 84630 ASSAY OF ZINC: CPT

## 2024-07-26 PROCEDURE — 2580000003 HC RX 258: Performed by: INTERNAL MEDICINE

## 2024-07-26 PROCEDURE — 80053 COMPREHEN METABOLIC PANEL: CPT

## 2024-07-26 PROCEDURE — 36415 COLL VENOUS BLD VENIPUNCTURE: CPT

## 2024-07-26 PROCEDURE — 85025 COMPLETE CBC W/AUTO DIFF WBC: CPT

## 2024-07-26 PROCEDURE — 6370000000 HC RX 637 (ALT 250 FOR IP): Performed by: INTERNAL MEDICINE

## 2024-07-26 PROCEDURE — 82962 GLUCOSE BLOOD TEST: CPT

## 2024-07-26 PROCEDURE — 1100000000 HC RM PRIVATE

## 2024-07-26 PROCEDURE — 6360000002 HC RX W HCPCS: Performed by: INTERNAL MEDICINE

## 2024-07-26 PROCEDURE — 83735 ASSAY OF MAGNESIUM: CPT

## 2024-07-26 PROCEDURE — 97530 THERAPEUTIC ACTIVITIES: CPT

## 2024-07-26 PROCEDURE — 97112 NEUROMUSCULAR REEDUCATION: CPT

## 2024-07-26 PROCEDURE — 2500000003 HC RX 250 WO HCPCS: Performed by: INTERNAL MEDICINE

## 2024-07-26 RX ORDER — LANOLIN ALCOHOL/MO/W.PET/CERES
400 CREAM (GRAM) TOPICAL DAILY
Status: DISCONTINUED | OUTPATIENT
Start: 2024-07-26 | End: 2024-08-01 | Stop reason: HOSPADM

## 2024-07-26 RX ADMIN — VANCOMYCIN HYDROCHLORIDE 125 MG: 125 CAPSULE ORAL at 20:51

## 2024-07-26 RX ADMIN — SODIUM CHLORIDE, PRESERVATIVE FREE 10 ML: 5 INJECTION INTRAVENOUS at 20:57

## 2024-07-26 RX ADMIN — Medication 1 CAPSULE: at 18:22

## 2024-07-26 RX ADMIN — TAMSULOSIN HYDROCHLORIDE 0.4 MG: 0.4 CAPSULE ORAL at 10:46

## 2024-07-26 RX ADMIN — NYSTATIN 500000 UNITS: 100000 SUSPENSION ORAL at 06:08

## 2024-07-26 RX ADMIN — HEPARIN SODIUM 5000 UNITS: 5000 INJECTION INTRAVENOUS; SUBCUTANEOUS at 20:51

## 2024-07-26 RX ADMIN — ARIPIPRAZOLE 5 MG: 5 TABLET ORAL at 10:46

## 2024-07-26 RX ADMIN — VANCOMYCIN HYDROCHLORIDE 125 MG: 125 CAPSULE ORAL at 18:22

## 2024-07-26 RX ADMIN — FOLIC ACID 1 MG: 1 TABLET ORAL at 10:45

## 2024-07-26 RX ADMIN — NYSTATIN 500000 UNITS: 100000 SUSPENSION ORAL at 18:22

## 2024-07-26 RX ADMIN — MAGNESIUM GLUCONATE 500 MG ORAL TABLET 400 MG: 500 TABLET ORAL at 18:27

## 2024-07-26 RX ADMIN — LEVOTHYROXINE SODIUM 175 MCG: 0.07 TABLET ORAL at 06:09

## 2024-07-26 RX ADMIN — POTASSIUM CHLORIDE 40 MEQ: 1500 TABLET, EXTENDED RELEASE ORAL at 10:45

## 2024-07-26 RX ADMIN — MIDODRINE HYDROCHLORIDE 10 MG: 5 TABLET ORAL at 10:45

## 2024-07-26 RX ADMIN — HEPARIN SODIUM 5000 UNITS: 5000 INJECTION INTRAVENOUS; SUBCUTANEOUS at 14:05

## 2024-07-26 RX ADMIN — CUPRIC CHLORIDE 2 MG: 0.4 INJECTION, SOLUTION INTRAVENOUS at 18:29

## 2024-07-26 RX ADMIN — VANCOMYCIN HYDROCHLORIDE 125 MG: 125 CAPSULE ORAL at 10:45

## 2024-07-26 RX ADMIN — NYSTATIN 500000 UNITS: 100000 SUSPENSION ORAL at 10:44

## 2024-07-26 RX ADMIN — VANCOMYCIN HYDROCHLORIDE 125 MG: 125 CAPSULE ORAL at 14:05

## 2024-07-26 RX ADMIN — MIDODRINE HYDROCHLORIDE 10 MG: 5 TABLET ORAL at 18:22

## 2024-07-26 RX ADMIN — CYANOCOBALAMIN TAB 1000 MCG 1000 MCG: 1000 TAB at 10:46

## 2024-07-26 RX ADMIN — MIDODRINE HYDROCHLORIDE 10 MG: 5 TABLET ORAL at 14:05

## 2024-07-26 RX ADMIN — Medication 1 CAPSULE: at 10:46

## 2024-07-26 RX ADMIN — HEPARIN SODIUM 5000 UNITS: 5000 INJECTION INTRAVENOUS; SUBCUTANEOUS at 06:07

## 2024-07-26 RX ADMIN — SODIUM CHLORIDE, PRESERVATIVE FREE 10 ML: 5 INJECTION INTRAVENOUS at 10:54

## 2024-07-26 ASSESSMENT — PAIN SCALES - GENERAL
PAINLEVEL_OUTOF10: 0
PAINLEVEL_OUTOF10: 0

## 2024-07-26 NOTE — PROGRESS NOTES
ACUTE OCCUPATIONAL THERAPY GOALS:   (Developed with and agreed upon by patient and/or caregiver.)  1. Patient will complete upper body bathing and dressing with MINIMAL ASSIST and adaptive equipment as needed.   2. Patient will complete self-grooming tasks in unsupported sitting with MINIMAL ASSIST and adaptive equipment as needed.  3. Patient will tolerate 25 minutes of OT treatment with 1-2 rest breaks to increase activity tolerance for ADLs.   4. Patient will complete functional transfers with MODERATE ASSIST and adaptive equipment as needed.   5. Patient will complete functional activity while seated edge of bed with MINIMAL ASSIST and adaptive equipment as needed.   6. Patient will tolerate 15 minutes unsupported sitting balance with MINIMAL ASSIST in preparation for ADL performance.   7. Patient will tolerate 10 minutes BUE therapeutic activities to increase use of BUE during ADL performance.      Timeframe: 7 visits       OCCUPATIONAL THERAPY: Daily Note AM   OT Visit Days: 5   Time In/Out  OT Charge Capture  Rehab Caseload Tracker  OT Orders    Jesika Olsen is a 68 y.o. female   PRIMARY DIAGNOSIS: Severe sepsis (HCC)  Hyperkalemia [E87.5]  Septicemia (HCC) [A41.9]  Acute pyelonephritis [N10]  ZEUS (acute kidney injury) (HCC) [N17.9]  Severe sepsis (HCC) [A41.9, R65.20]       Inpatient: Payor: [facility] MEDICARE / Plan: [facility] MEDICARE COMPLETE / Product Type: *No Product type* /     ASSESSMENT:     REHAB RECOMMENDATIONS:   Recommendation to date pending progress:  Setting:  TBD- no rehab days    Equipment:    To Be Determined     ASSESSMENT:  Ms. Olsen continue to be very weak. Continue to require max A x2 for all bed mobility. Fair-poor sitting balance noted at edge of bed. Pt was transferred to chair with max A x2 (stand pivot). Left in chair with all needs in reach. Minimal progress made today. Will continue to benefit from skilled OT during stay.       SUBJECTIVE:     Ms. Olsen states, \"Do not leave me  ADLs, prepare for functional transfer, increase safety awareness, and prepare for self care..     TREATMENT GRID:  N/A    AFTER TREATMENT PRECAUTIONS: Alarm Activated, Bed/Chair Locked, Call light within reach, Chair, Needs within reach, and RN notified    INTERDISCIPLINARY COLLABORATION:  RN/ PCT, PT/ PTA, and OT/ OLSON    EDUCATION:       TOTAL TREATMENT DURATION AND TIME:  Time In: 1050  Time Out: 1116  Minutes: 26    Lara Piña MACK

## 2024-07-26 NOTE — PROGRESS NOTES
1958 98.1 °F (36.7 °C) 87 20 117/73 100 %   07/25/24 1448 98.2 °F (36.8 °C) 89 18 108/69 99 %         Oxygen Therapy  SpO2: 100 %  Pulse via Oximetry: 97 beats per minute  O2 Device: None (Room air)    Estimated body mass index is 27.3 kg/m² as calculated from the following:    Height as of this encounter: 1.753 m (5' 9.02\").    Weight as of this encounter: 83.9 kg (184 lb 15.5 oz).    Intake/Output Summary (Last 24 hours) at 7/26/2024 1446  Last data filed at 7/26/2024 1141  Gross per 24 hour   Intake --   Output 475 ml   Net -475 ml           Physical Exam:     Physical Exam:   General:                      More conversant and interactive today.  Denies localized pain.    Eyes:                                           Conjunctivae/corneas clear. Pupils equally round and reactive to light, extraocular movements intact.   Lungs:                       Clear to auscultation bilaterally.  No wheezing  Heart:                     Regular rate and rhythm, S1, S2 normal, no murmur, click, rub or gallop.  Abdomen:                       Soft, non-tender. Bowel sounds normal. No masses,  No organomegaly.  Extremities:                     Extremities normal, atraumatic, no cyanosis or unilateral lower extremity edema  Neurologic:                     CNII-XII intact.  No focal motor deficits or tremor        Lab/Data Review:  All lab results for the last 24 hours reviewed.    I have personally reviewed labs and tests:  Recent Labs:  Recent Results (from the past 48 hour(s))   POCT Glucose    Collection Time: 07/24/24  3:54 PM   Result Value Ref Range    POC Glucose 76 65 - 100 mg/dL    Performed by: Srini\"Kit\"BSN    POCT Glucose    Collection Time: 07/24/24  8:51 PM   Result Value Ref Range    POC Glucose 75 65 - 100 mg/dL    Performed by: Franky    CBC with Auto Differential    Collection Time: 07/25/24  5:40 AM   Result Value Ref Range    WBC 10.9 4.3 - 11.1 K/uL    RBC 3.31 (L) 4.05 - 5.2 M/uL    Hemoglobin

## 2024-07-26 NOTE — CONSULTS
Comprehensive Nutrition Assessment    Type and Reason for Visit: Reassess  General Nutrition Management/other reason (Hospitalists)    Nutrition Recommendations/Plan:   Meals and Snacks:  Diet: Continue current diet; further texture modifications per SLP  Nutrition Supplement Therapy:  Medical food supplement therapy:  Change Ensure Enlive three times per day (this provides 350 kcal and 20 grams protein per bottle) -  vanilla  Continue 1:1 feed   Continue B12, folate, potassium supplementation  Continue 5 days of IV copper supplementation (initiated 7/25)  Pt with minimal intake throughout 11 day admission (< 10% of needs on average), requiring D10 to support blood glucose levels. Enteral nutrition would be appropriate to initiate if aligns with goals of care. Discussed with Dr. Gay.      Malnutrition Assessment:  Malnutrition Status: At risk for malnutrition (Comment) (sig weight loss s/p GBP, minimal PO intake since GBP, mild wasting noted through NFPE as noted below)    Mild orbital and wasting to hand noted. Difficult to assess with minimal pt participation in NFPE.    Nutrition Assessment:  Nutrition History: 7/16: RD unable to obtain detailed nutrition history at this time.   From 5/28 assessment:  Pt reports poor intake since her gastric bypass a year and a half ago. She stated she was able to take 4-5 tablespoons of food at a time. She stated she tried protein shakes however she did not like them because they were too sweet. She also reports nausea, vomiting, and diarrhea since surgery.      Do You Have Any Cultural, Sabianist, or Ethnic Food Preferences?: No (ELBA)   Weight History: 5/8/23: 321# (cardio OV), 8/14/23: 317# (bariatrics OV), 1/18/24: 242# (Gen Surg OV)  6/4/24: 183# (previous hospitalization  CBW: 163# (7/16 bed scale weight)  49% body weight loss in 1 year (s/p gastric bypass).   Nutrition Background:       PMH: MDD, hypothyroidism, hiatal hernia, T2DM, RYGB in September of 2023. Presented

## 2024-07-26 NOTE — PROGRESS NOTES
ACUTE PHYSICAL THERAPY GOALS:   (Developed with and agreed upon by patient and/or caregiver.)  Pt will perform bed mobility with Ed in 7 therapy sessions.  Pt will perform sit-to-stand/ stand-to-sit transfers Ed in 7 therapy sessions.  Pt will ambulate 10 ft Ed with use of LRAD/no device and breaks as needed in 7 therapy sessions.  Pt will perform seated dynamic balance activities with minimal postural sway in 7 therapy sessions.  Pt will tolerate multiple sets and reps of BLE exercises in 7 therapy sessions.     PHYSICAL THERAPY: Daily Note AM   (Link to Caseload Tracking: PT Visit Days : 5  Time In/Out PT Charge Capture  Rehab Caseload Tracker  Orders    Jesika Olsen is a 68 y.o. female   PRIMARY DIAGNOSIS: Severe sepsis (HCC)  Hyperkalemia [E87.5]  Septicemia (HCC) [A41.9]  Acute pyelonephritis [N10]  ZEUS (acute kidney injury) (HCC) [N17.9]  Severe sepsis (HCC) [A41.9, R65.20]       Inpatient: Payor: [facility] MEDICARE / Plan: [facility] MEDICARE COMPLETE / Product Type: *No Product type* /     ASSESSMENT:     REHAB RECOMMENDATIONS:   Recommendation to date pending progress:  Setting:  Short-term Rehab    Equipment:    To Be Determined     ASSESSMENT:  Ms. Olsen was supine upon contact and agreeable to therapy.  She appeared slightly more confused today but her BP tolerated bed to chair position well.  Pt required maxA x 2 for rolling today and assistance with belinda-care.  She was assisted with donning compression hose prior to mobilizing.  Pt came to sitting on EOB with max-totalA x 2 and poor initial sitting balance.  She was able to maintain fair sitting balance but only for brief periods of time.  Pt transferred to chair using drop arm and sliding. She required totalA x 2 for transfer.  Slight progress in activity tolerance today.           SUBJECTIVE:   Ms. Oslen states, \"Spaghetti and meatballs\"     Social/Functional Lives With: Daughter  Type of Home: House  Home Layout: One level  Home Access: Level

## 2024-07-27 LAB
ANION GAP SERPL CALC-SCNC: 8 MMOL/L (ref 9–18)
BASOPHILS # BLD: 0 K/UL (ref 0–0.2)
BASOPHILS NFR BLD: 0 % (ref 0–2)
BUN SERPL-MCNC: 4 MG/DL (ref 8–23)
CALCIUM SERPL-MCNC: 8 MG/DL (ref 8.8–10.2)
CHLORIDE SERPL-SCNC: 108 MMOL/L (ref 98–107)
CO2 SERPL-SCNC: 19 MMOL/L (ref 20–28)
CORTIS PM SERPL-MCNC: 14.2 UG/DL (ref 2.5–11.9)
CREAT SERPL-MCNC: 0.32 MG/DL (ref 0.6–1.1)
DIFFERENTIAL METHOD BLD: ABNORMAL
EOSINOPHIL # BLD: 0.1 K/UL (ref 0–0.8)
EOSINOPHIL NFR BLD: 1 % (ref 0.5–7.8)
ERYTHROCYTE [DISTWIDTH] IN BLOOD BY AUTOMATED COUNT: 17.4 % (ref 11.9–14.6)
GLUCOSE BLD STRIP.AUTO-MCNC: 102 MG/DL (ref 65–100)
GLUCOSE BLD STRIP.AUTO-MCNC: 118 MG/DL (ref 65–100)
GLUCOSE BLD STRIP.AUTO-MCNC: 131 MG/DL (ref 65–100)
GLUCOSE BLD STRIP.AUTO-MCNC: 73 MG/DL (ref 65–100)
GLUCOSE BLD STRIP.AUTO-MCNC: 79 MG/DL (ref 65–100)
GLUCOSE BLD STRIP.AUTO-MCNC: 82 MG/DL (ref 65–100)
GLUCOSE SERPL-MCNC: 92 MG/DL (ref 70–99)
HCT VFR BLD AUTO: 29 % (ref 35.8–46.3)
HGB BLD-MCNC: 9.3 G/DL (ref 11.7–15.4)
IMM GRANULOCYTES # BLD AUTO: 0.1 K/UL (ref 0–0.5)
IMM GRANULOCYTES NFR BLD AUTO: 1 % (ref 0–5)
LYMPHOCYTES # BLD: 1.7 K/UL (ref 0.5–4.6)
LYMPHOCYTES NFR BLD: 17 % (ref 13–44)
MCH RBC QN AUTO: 29.5 PG (ref 26.1–32.9)
MCHC RBC AUTO-ENTMCNC: 32.1 G/DL (ref 31.4–35)
MCV RBC AUTO: 92.1 FL (ref 82–102)
MONOCYTES # BLD: 0.8 K/UL (ref 0.1–1.3)
MONOCYTES NFR BLD: 8 % (ref 4–12)
NEUTS SEG # BLD: 7.3 K/UL (ref 1.7–8.2)
NEUTS SEG NFR BLD: 73 % (ref 43–78)
NRBC # BLD: 0 K/UL (ref 0–0.2)
PLATELET # BLD AUTO: 337 K/UL (ref 150–450)
PMV BLD AUTO: 8.6 FL (ref 9.4–12.3)
POTASSIUM SERPL-SCNC: 3.9 MMOL/L (ref 3.5–5.1)
RBC # BLD AUTO: 3.15 M/UL (ref 4.05–5.2)
SERVICE CMNT-IMP: ABNORMAL
SERVICE CMNT-IMP: NORMAL
SODIUM SERPL-SCNC: 134 MMOL/L (ref 136–145)
WBC # BLD AUTO: 9.9 K/UL (ref 4.3–11.1)

## 2024-07-27 PROCEDURE — 82962 GLUCOSE BLOOD TEST: CPT

## 2024-07-27 PROCEDURE — 1100000000 HC RM PRIVATE

## 2024-07-27 PROCEDURE — 76937 US GUIDE VASCULAR ACCESS: CPT

## 2024-07-27 PROCEDURE — 6370000000 HC RX 637 (ALT 250 FOR IP): Performed by: INTERNAL MEDICINE

## 2024-07-27 PROCEDURE — 80048 BASIC METABOLIC PNL TOTAL CA: CPT

## 2024-07-27 PROCEDURE — 2580000003 HC RX 258: Performed by: INTERNAL MEDICINE

## 2024-07-27 PROCEDURE — 85025 COMPLETE CBC W/AUTO DIFF WBC: CPT

## 2024-07-27 PROCEDURE — 6360000002 HC RX W HCPCS: Performed by: INTERNAL MEDICINE

## 2024-07-27 PROCEDURE — 2500000003 HC RX 250 WO HCPCS: Performed by: HOSPITALIST

## 2024-07-27 PROCEDURE — 82533 TOTAL CORTISOL: CPT

## 2024-07-27 PROCEDURE — 36415 COLL VENOUS BLD VENIPUNCTURE: CPT

## 2024-07-27 PROCEDURE — 2500000003 HC RX 250 WO HCPCS: Performed by: INTERNAL MEDICINE

## 2024-07-27 RX ORDER — MORPHINE SULFATE 2 MG/ML
2 INJECTION, SOLUTION INTRAMUSCULAR; INTRAVENOUS EVERY 4 HOURS PRN
Status: DISCONTINUED | OUTPATIENT
Start: 2024-07-27 | End: 2024-08-01 | Stop reason: HOSPADM

## 2024-07-27 RX ADMIN — DEXTROSE MONOHYDRATE: 10 INJECTION, SOLUTION INTRAVENOUS at 00:26

## 2024-07-27 RX ADMIN — SODIUM CHLORIDE, PRESERVATIVE FREE 10 ML: 5 INJECTION INTRAVENOUS at 21:56

## 2024-07-27 RX ADMIN — Medication 1 CAPSULE: at 12:46

## 2024-07-27 RX ADMIN — HEPARIN SODIUM 5000 UNITS: 5000 INJECTION INTRAVENOUS; SUBCUTANEOUS at 14:32

## 2024-07-27 RX ADMIN — DEXTROSE MONOHYDRATE: 10 INJECTION, SOLUTION INTRAVENOUS at 11:04

## 2024-07-27 RX ADMIN — VANCOMYCIN HYDROCHLORIDE 125 MG: 125 CAPSULE ORAL at 09:08

## 2024-07-27 RX ADMIN — MAGNESIUM GLUCONATE 500 MG ORAL TABLET 400 MG: 500 TABLET ORAL at 09:05

## 2024-07-27 RX ADMIN — FOLIC ACID 1 MG: 1 TABLET ORAL at 09:05

## 2024-07-27 RX ADMIN — MEGESTROL ACETATE 400 MG: 400 SUSPENSION ORAL at 09:04

## 2024-07-27 RX ADMIN — ONDANSETRON 4 MG: 2 INJECTION INTRAMUSCULAR; INTRAVENOUS at 09:29

## 2024-07-27 RX ADMIN — ARIPIPRAZOLE 5 MG: 5 TABLET ORAL at 09:08

## 2024-07-27 RX ADMIN — Medication 1 CAPSULE: at 17:41

## 2024-07-27 RX ADMIN — HEPARIN SODIUM 5000 UNITS: 5000 INJECTION INTRAVENOUS; SUBCUTANEOUS at 06:48

## 2024-07-27 RX ADMIN — LEVOTHYROXINE SODIUM 175 MCG: 0.07 TABLET ORAL at 06:47

## 2024-07-27 RX ADMIN — NYSTATIN 500000 UNITS: 100000 SUSPENSION ORAL at 17:49

## 2024-07-27 RX ADMIN — CYANOCOBALAMIN TAB 1000 MCG 1000 MCG: 1000 TAB at 09:05

## 2024-07-27 RX ADMIN — TAMSULOSIN HYDROCHLORIDE 0.4 MG: 0.4 CAPSULE ORAL at 09:08

## 2024-07-27 RX ADMIN — POTASSIUM CHLORIDE 40 MEQ: 1500 TABLET, EXTENDED RELEASE ORAL at 09:08

## 2024-07-27 RX ADMIN — VANCOMYCIN HYDROCHLORIDE 125 MG: 125 CAPSULE ORAL at 12:46

## 2024-07-27 RX ADMIN — Medication 1 CAPSULE: at 09:05

## 2024-07-27 RX ADMIN — MIDODRINE HYDROCHLORIDE 10 MG: 5 TABLET ORAL at 17:41

## 2024-07-27 RX ADMIN — MIDODRINE HYDROCHLORIDE 10 MG: 5 TABLET ORAL at 12:46

## 2024-07-27 RX ADMIN — ACETAMINOPHEN 650 MG: 325 TABLET ORAL at 18:40

## 2024-07-27 RX ADMIN — MIDODRINE HYDROCHLORIDE 10 MG: 5 TABLET ORAL at 09:05

## 2024-07-27 RX ADMIN — HEPARIN SODIUM 5000 UNITS: 5000 INJECTION INTRAVENOUS; SUBCUTANEOUS at 21:56

## 2024-07-27 RX ADMIN — CUPRIC CHLORIDE 2 MG: 0.4 INJECTION, SOLUTION INTRAVENOUS at 18:37

## 2024-07-27 RX ADMIN — VANCOMYCIN HYDROCHLORIDE 125 MG: 125 CAPSULE ORAL at 17:40

## 2024-07-27 RX ADMIN — MORPHINE SULFATE 2 MG: 2 INJECTION, SOLUTION INTRAMUSCULAR; INTRAVENOUS at 23:09

## 2024-07-27 RX ADMIN — VANCOMYCIN HYDROCHLORIDE 125 MG: 125 CAPSULE ORAL at 21:56

## 2024-07-27 ASSESSMENT — PAIN SCALES - GENERAL
PAINLEVEL_OUTOF10: 0
PAINLEVEL_OUTOF10: 10

## 2024-07-27 ASSESSMENT — PAIN DESCRIPTION - DESCRIPTORS: DESCRIPTORS: SORE

## 2024-07-27 ASSESSMENT — PAIN DESCRIPTION - LOCATION: LOCATION: BUTTOCKS;GENERALIZED

## 2024-07-27 NOTE — PROGRESS NOTES
Pt awake in bed. A/ox4. Respirations even and unlabored on room air. Pt denies pain at this time, no distress noted. Bowel sounds active, last bm today 7/26. Fluids infusing. Pt instructed to use call light if assistance is needed with call light in reach. Plan of care ongoing.

## 2024-07-27 NOTE — PROGRESS NOTES
Hospitalist Progress Note   Admit Date:  7/15/2024  6:01 PM   Name:  Jesika Olsen   Age:  68 y.o.  Sex:  female  :  1955   MRN:  [id number]   Room:  9/    Presenting/Chief Complaint: Fatigue     Reason(s) for Admission: Hyperkalemia [E87.5]  Septicemia (HCC) [A41.9]  Acute pyelonephritis [N10]  ZEUS (acute kidney injury) (HCC) [N17.9]  Severe sepsis (HCC) [A41.9, R65.20]     Hospital Course:       \"Jesika Olsen is a 68 y.o. female with medical history of MDD, hypothyroidism, hiatal hernia (on omeprazole), Dm type II, and previous episodes of C diff colitis presents from home with worsening generalized lethargy and weakness. Patient has had several hospitalizations and her daughter states that \"she is so weak she is essentially bedbound at home and can't do anything.\" Patient has a known history of frequent, recurrent UTIs (growing pansensitive E. Coli per chart review). She has not been able to eat or drink much at all. No recent sick contacts.      ED course: CT abd/pelvis shows multiple bladder stones and a liver lesion. Creatinine of 0.9 (baseline around 0.3-0.4), potassium of 6.7 (nonhemolyzed), and bicarb of 14. WBC count of 21. EKG shows NSR without peaked T waves. Given Rocephin and fluid boluses. Also given calcium gluconate and Kayexalate. Ordered insulin but held as patient's blood sugar at 106 even with dextrose given intravenously. Hospitalist consulted for admission. \"     Hospital course since 2024:  Patient continues to have ongoing diarrhea-and has been continued on vancomycin for presumed C. difficile  Also had urinary retention a Esteves was placed that was subsequently removed and she is voiding.  Patient was depressed and apathetic.  Psych was consulted and she was restarted on her bipolar medications.  Secondary to her psychiatric issues she would refuse to eat or take medications to bring up her blood glucose and was subsequently hypoglycemic requiring IV  midodrine (PROAMATINE) tablet 10 mg  10 mg Oral TID WC    magic (miracle) mouthwash  5 mL Swish & Spit 4x Daily AC & HS    And    nystatin (MYCOSTATIN) 270591 UNIT/ML suspension 500,000 Units  5 mL Oral 4x Daily AC & HS    ARIPiprazole (ABILIFY) tablet 5 mg  5 mg Oral Daily    lactobacillus (CULTURELLE) capsule 1 capsule  1 capsule Oral TID WC    tamsulosin (FLOMAX) capsule 0.4 mg  0.4 mg Oral Daily    levothyroxine (SYNTHROID) tablet 175 mcg  175 mcg Oral Daily    glucose chewable tablet 16 g  4 tablet Oral PRN    dextrose bolus 10% 125 mL  125 mL IntraVENous PRN    Or    dextrose bolus 10% 250 mL  250 mL IntraVENous PRN    Glucagon Emergency KIT 1 mg  1 mg SubCUTAneous PRN    dextrose 10 % infusion   IntraVENous Continuous PRN    vancomycin (VANCOCIN) capsule 125 mg  125 mg Oral 4x Daily    sodium chloride flush 0.9 % injection 5-40 mL  5-40 mL IntraVENous 2 times per day    sodium chloride flush 0.9 % injection 5-40 mL  5-40 mL IntraVENous PRN    0.9 % sodium chloride infusion   IntraVENous PRN    magnesium sulfate 2000 mg in 50 mL IVPB premix  2,000 mg IntraVENous PRN    ondansetron (ZOFRAN-ODT) disintegrating tablet 4 mg  4 mg Oral Q8H PRN    Or    ondansetron (ZOFRAN) injection 4 mg  4 mg IntraVENous Q6H PRN    polyethylene glycol (GLYCOLAX) packet 17 g  17 g Oral Daily PRN    acetaminophen (TYLENOL) tablet 650 mg  650 mg Oral Q6H PRN    Or    acetaminophen (TYLENOL) suppository 650 mg  650 mg Rectal Q6H PRN    heparin (porcine) injection 5,000 Units  5,000 Units SubCUTAneous 3 times per day       Signed:  Hernandez Gay DO    Part of this note may have been written by using a voice dictation software.  The note has been proof read but may still contain some grammatical/other typographical errors.

## 2024-07-28 LAB
ANION GAP SERPL CALC-SCNC: 8 MMOL/L (ref 9–18)
BUN SERPL-MCNC: 4 MG/DL (ref 8–23)
CALCIUM SERPL-MCNC: 8.2 MG/DL (ref 8.8–10.2)
CHLORIDE SERPL-SCNC: 109 MMOL/L (ref 98–107)
CO2 SERPL-SCNC: 20 MMOL/L (ref 20–28)
CREAT SERPL-MCNC: 0.35 MG/DL (ref 0.6–1.1)
GLUCOSE BLD STRIP.AUTO-MCNC: 74 MG/DL (ref 65–100)
GLUCOSE BLD STRIP.AUTO-MCNC: 75 MG/DL (ref 65–100)
GLUCOSE BLD STRIP.AUTO-MCNC: 77 MG/DL (ref 65–100)
GLUCOSE BLD STRIP.AUTO-MCNC: 85 MG/DL (ref 65–100)
GLUCOSE BLD STRIP.AUTO-MCNC: 88 MG/DL (ref 65–100)
GLUCOSE SERPL-MCNC: 82 MG/DL (ref 70–99)
POTASSIUM SERPL-SCNC: 3.7 MMOL/L (ref 3.5–5.1)
SERVICE CMNT-IMP: NORMAL
SODIUM SERPL-SCNC: 136 MMOL/L (ref 136–145)

## 2024-07-28 PROCEDURE — 6360000002 HC RX W HCPCS: Performed by: INTERNAL MEDICINE

## 2024-07-28 PROCEDURE — 82962 GLUCOSE BLOOD TEST: CPT

## 2024-07-28 PROCEDURE — 1100000000 HC RM PRIVATE

## 2024-07-28 PROCEDURE — 2580000003 HC RX 258: Performed by: INTERNAL MEDICINE

## 2024-07-28 PROCEDURE — 6370000000 HC RX 637 (ALT 250 FOR IP): Performed by: INTERNAL MEDICINE

## 2024-07-28 PROCEDURE — 80048 BASIC METABOLIC PNL TOTAL CA: CPT

## 2024-07-28 PROCEDURE — 36415 COLL VENOUS BLD VENIPUNCTURE: CPT

## 2024-07-28 PROCEDURE — 76937 US GUIDE VASCULAR ACCESS: CPT

## 2024-07-28 RX ADMIN — MIDODRINE HYDROCHLORIDE 10 MG: 5 TABLET ORAL at 16:53

## 2024-07-28 RX ADMIN — VANCOMYCIN HYDROCHLORIDE 125 MG: 125 CAPSULE ORAL at 16:31

## 2024-07-28 RX ADMIN — VANCOMYCIN HYDROCHLORIDE 125 MG: 125 CAPSULE ORAL at 20:57

## 2024-07-28 RX ADMIN — DIPHENHYDRAMINE HYDROCHLORIDE 5 ML: 12.5 LIQUID ORAL at 16:53

## 2024-07-28 RX ADMIN — VANCOMYCIN HYDROCHLORIDE 125 MG: 125 CAPSULE ORAL at 22:47

## 2024-07-28 RX ADMIN — NYSTATIN 500000 UNITS: 100000 SUSPENSION ORAL at 12:12

## 2024-07-28 RX ADMIN — Medication 1 CAPSULE: at 11:12

## 2024-07-28 RX ADMIN — HEPARIN SODIUM 5000 UNITS: 5000 INJECTION INTRAVENOUS; SUBCUTANEOUS at 20:57

## 2024-07-28 RX ADMIN — MAGNESIUM GLUCONATE 500 MG ORAL TABLET 400 MG: 500 TABLET ORAL at 11:13

## 2024-07-28 RX ADMIN — HEPARIN SODIUM 5000 UNITS: 5000 INJECTION INTRAVENOUS; SUBCUTANEOUS at 06:07

## 2024-07-28 RX ADMIN — VANCOMYCIN HYDROCHLORIDE 125 MG: 125 CAPSULE ORAL at 11:12

## 2024-07-28 RX ADMIN — ARIPIPRAZOLE 5 MG: 5 TABLET ORAL at 11:13

## 2024-07-28 RX ADMIN — DEXTROSE MONOHYDRATE: 10 INJECTION, SOLUTION INTRAVENOUS at 06:07

## 2024-07-28 RX ADMIN — Medication 1 CAPSULE: at 16:53

## 2024-07-28 RX ADMIN — NYSTATIN 500000 UNITS: 100000 SUSPENSION ORAL at 16:53

## 2024-07-28 RX ADMIN — MIDODRINE HYDROCHLORIDE 10 MG: 5 TABLET ORAL at 11:13

## 2024-07-28 RX ADMIN — LEVOTHYROXINE SODIUM 175 MCG: 0.07 TABLET ORAL at 06:07

## 2024-07-28 RX ADMIN — FOLIC ACID 1 MG: 1 TABLET ORAL at 11:13

## 2024-07-28 RX ADMIN — HEPARIN SODIUM 5000 UNITS: 5000 INJECTION INTRAVENOUS; SUBCUTANEOUS at 15:25

## 2024-07-28 RX ADMIN — POTASSIUM CHLORIDE 40 MEQ: 1500 TABLET, EXTENDED RELEASE ORAL at 11:11

## 2024-07-28 RX ADMIN — SODIUM CHLORIDE, PRESERVATIVE FREE 10 ML: 5 INJECTION INTRAVENOUS at 11:13

## 2024-07-28 RX ADMIN — CYANOCOBALAMIN TAB 1000 MCG 1000 MCG: 1000 TAB at 11:13

## 2024-07-28 RX ADMIN — TAMSULOSIN HYDROCHLORIDE 0.4 MG: 0.4 CAPSULE ORAL at 11:12

## 2024-07-28 RX ADMIN — MEGESTROL ACETATE 400 MG: 400 SUSPENSION ORAL at 12:11

## 2024-07-28 NOTE — PROGRESS NOTES
Hospitalist Progress Note   Admit Date:  7/15/2024  6:01 PM   Name:  Jesika Olsen   Age:  68 y.o.  Sex:  female  :  1955   MRN:  [id number]   Room:  9/    Presenting/Chief Complaint: Fatigue     Reason(s) for Admission: Hyperkalemia [E87.5]  Septicemia (HCC) [A41.9]  Acute pyelonephritis [N10]  ZEUS (acute kidney injury) (HCC) [N17.9]  Severe sepsis (HCC) [A41.9, R65.20]     Hospital Course:       \"Jesika Olsen is a 68 y.o. female with medical history of MDD, hypothyroidism, hiatal hernia (on omeprazole), Dm type II, and previous episodes of C diff colitis presents from home with worsening generalized lethargy and weakness. Patient has had several hospitalizations and her daughter states that \"she is so weak she is essentially bedbound at home and can't do anything.\" Patient has a known history of frequent, recurrent UTIs (growing pansensitive E. Coli per chart review). She has not been able to eat or drink much at all. No recent sick contacts.      ED course: CT abd/pelvis shows multiple bladder stones and a liver lesion. Creatinine of 0.9 (baseline around 0.3-0.4), potassium of 6.7 (nonhemolyzed), and bicarb of 14. WBC count of 21. EKG shows NSR without peaked T waves. Given Rocephin and fluid boluses. Also given calcium gluconate and Kayexalate. Ordered insulin but held as patient's blood sugar at 106 even with dextrose given intravenously. Hospitalist consulted for admission. \"     Hospital course since 2024:  Patient continues to have ongoing diarrhea-and has been continued on vancomycin for presumed C. difficile  Also had urinary retention a Esteves was placed that was subsequently removed and she is voiding.  Patient was depressed and apathetic.  Psych was consulted and she was restarted on her bipolar medications.  Secondary to her psychiatric issues she would refuse to eat or take medications to bring up her blood glucose and was subsequently hypoglycemic requiring IV

## 2024-07-28 NOTE — PROGRESS NOTES
Left arm assessed for piv access no veins in forearm and veins in upper to deep for needle to reach and to small for midline line.

## 2024-07-29 LAB
GLUCOSE BLD STRIP.AUTO-MCNC: 229 MG/DL (ref 65–100)
GLUCOSE BLD STRIP.AUTO-MCNC: 71 MG/DL (ref 65–100)
GLUCOSE BLD STRIP.AUTO-MCNC: 72 MG/DL (ref 65–100)
GLUCOSE BLD STRIP.AUTO-MCNC: 79 MG/DL (ref 65–100)
GLUCOSE BLD STRIP.AUTO-MCNC: 89 MG/DL (ref 65–100)
SERVICE CMNT-IMP: ABNORMAL
SERVICE CMNT-IMP: NORMAL

## 2024-07-29 PROCEDURE — 6370000000 HC RX 637 (ALT 250 FOR IP): Performed by: INTERNAL MEDICINE

## 2024-07-29 PROCEDURE — 82962 GLUCOSE BLOOD TEST: CPT

## 2024-07-29 PROCEDURE — 1100000000 HC RM PRIVATE

## 2024-07-29 PROCEDURE — 6360000002 HC RX W HCPCS: Performed by: INTERNAL MEDICINE

## 2024-07-29 RX ADMIN — LEVOTHYROXINE SODIUM 175 MCG: 0.07 TABLET ORAL at 06:11

## 2024-07-29 RX ADMIN — ARIPIPRAZOLE 5 MG: 5 TABLET ORAL at 08:45

## 2024-07-29 RX ADMIN — VANCOMYCIN HYDROCHLORIDE 125 MG: 125 CAPSULE ORAL at 21:52

## 2024-07-29 RX ADMIN — Medication 1 CAPSULE: at 11:26

## 2024-07-29 RX ADMIN — MAGNESIUM GLUCONATE 500 MG ORAL TABLET 400 MG: 500 TABLET ORAL at 08:45

## 2024-07-29 RX ADMIN — Medication 1 CAPSULE: at 08:45

## 2024-07-29 RX ADMIN — TAMSULOSIN HYDROCHLORIDE 0.4 MG: 0.4 CAPSULE ORAL at 08:45

## 2024-07-29 RX ADMIN — MIDODRINE HYDROCHLORIDE 10 MG: 5 TABLET ORAL at 16:45

## 2024-07-29 RX ADMIN — VANCOMYCIN HYDROCHLORIDE 125 MG: 125 CAPSULE ORAL at 11:26

## 2024-07-29 RX ADMIN — VANCOMYCIN HYDROCHLORIDE 125 MG: 125 CAPSULE ORAL at 16:45

## 2024-07-29 RX ADMIN — MEGESTROL ACETATE 400 MG: 400 SUSPENSION ORAL at 09:18

## 2024-07-29 RX ADMIN — FOLIC ACID 1 MG: 1 TABLET ORAL at 08:45

## 2024-07-29 RX ADMIN — VANCOMYCIN HYDROCHLORIDE 125 MG: 125 CAPSULE ORAL at 08:45

## 2024-07-29 RX ADMIN — MIDODRINE HYDROCHLORIDE 10 MG: 5 TABLET ORAL at 11:26

## 2024-07-29 RX ADMIN — Medication 1 CAPSULE: at 16:45

## 2024-07-29 RX ADMIN — HEPARIN SODIUM 5000 UNITS: 5000 INJECTION INTRAVENOUS; SUBCUTANEOUS at 06:11

## 2024-07-29 RX ADMIN — MIDODRINE HYDROCHLORIDE 10 MG: 5 TABLET ORAL at 08:45

## 2024-07-29 RX ADMIN — CYANOCOBALAMIN TAB 1000 MCG 1000 MCG: 1000 TAB at 08:45

## 2024-07-29 RX ADMIN — POTASSIUM CHLORIDE 40 MEQ: 1500 TABLET, EXTENDED RELEASE ORAL at 08:45

## 2024-07-29 RX ADMIN — HEPARIN SODIUM 5000 UNITS: 5000 INJECTION INTRAVENOUS; SUBCUTANEOUS at 21:52

## 2024-07-29 ASSESSMENT — PAIN SCALES - GENERAL
PAINLEVEL_OUTOF10: 0
PAINLEVEL_OUTOF10: 0

## 2024-07-29 NOTE — CONSULTS
Comprehensive Nutrition Assessment    Type and Reason for Visit: Reassess  General Nutrition Management/other reason (Hospitalists)    Nutrition Recommendations/Plan:   Meals and Snacks:  Diet: Continue current diet; further texture modifications per SLP  Nutrition Supplement Therapy:  Medical food supplement therapy:  Change Ensure Enlive three times per day (this provides 350 kcal and 20 grams protein per bottle) -  vanilla  Continue 1:1 feed   Continue B12, folate, potassium supplementation  Continue 5 days of IV copper supplementation (initiated 7/25)  Pt with minimal intake throughout 14 day admission (< 10% of needs on average), requiring D10 to support blood glucose levels. Enteral nutrition would be appropriate to initiate if aligns with goals of care.      Malnutrition Assessment:  Malnutrition Status: At risk for malnutrition (Comment) (sig weight loss s/p GBP, minimal PO intake since GBP, mild wasting noted through NFPE as noted below)    Mild orbital and wasting to hand noted. Difficult to assess with minimal pt participation in NFPE.    Nutrition Assessment:  Nutrition History: 7/16: RD unable to obtain detailed nutrition history at this time.   From 5/28 assessment:  Pt reports poor intake since her gastric bypass a year and a half ago. She stated she was able to take 4-5 tablespoons of food at a time. She stated she tried protein shakes however she did not like them because they were too sweet. She also reports nausea, vomiting, and diarrhea since surgery.      Do You Have Any Cultural, Congregation, or Ethnic Food Preferences?: No (ELBA)   Weight History: 5/8/23: 321# (cardio OV), 8/14/23: 317# (bariatrics OV), 1/18/24: 242# (Gen Surg OV)  6/4/24: 183# (previous hospitalization  CBW: 163# (7/16 bed scale weight)  49% body weight loss in 1 year (s/p gastric bypass).   Nutrition Background:       PMH: MDD, hypothyroidism, hiatal hernia, T2DM, RYGB in September of 2023. Presented with lethargy and weakness.  Needs:  Energy (kcal/day): 5915-0049 (20-25 kcal/kg) (Kcal/kg Weight used: 73.8 kg  (7/16- weight)  Protein (g/day):  (1.2-1.5 g/kg) (increased 2/2 s/p gastric bypass) Weight Used: ( (7/16-  weight)) 73.8 kg  Fluid (ml/day):   (1 ml/kcal)    Nutrition Diagnosis:   Inadequate oral intake related to altered GI structure as evidenced by  (s/p RYGP, poor appetite/early satiety, PO intake 25% of meals)    Nutrition Interventions:   Food and/or Nutrient Delivery: Continue Current Diet, Continue Oral Nutrition Supplement     Coordination of Nutrition Care: Continue to monitor while inpatient    Goals:   Previous Goal Met: Progressing toward Goal(s) (mild)  Active Goal: PO intake 50% or greater, by next RD assessment       Nutrition Monitoring and Evaluation:      Food/Nutrient Intake Outcomes: Food and Nutrient Intake, Supplement Intake  Physical Signs/Symptoms Outcomes: Biochemical Data, GI Status, Meal Time Behavior, Weight    Discharge Planning:    Continue Oral Nutrition Supplement    Dea Durán RD

## 2024-07-29 NOTE — CARE COORDINATION
MSN, CM:  patient is in co-pay days for rehab services.  Patient is unable to afford out of pocket balance.  Patient will return home with HH and Palliative Care.  Patient to receive 2 more IV copper infusions prior to discharge.  Case Management will continue to follow.

## 2024-07-29 NOTE — PROGRESS NOTES
Pt awake in bed. A/ox4. Respirations even and unlabored on room air. Pt denies pain, no distress noted. Bowel sounds active, last bm today 7/28. Pt instructed to use call light if assistance is needed with call light in reach. Plan of care ongoing.

## 2024-07-29 NOTE — PROGRESS NOTES
Pt resting in bed comfortably at this time, alert and oriented to person, place and time to a certain extent. Disoriented to situation. No distress noted, respirations even and unlabored on room air. Esteves in place and draining nikita colored urine. Pt appetite getting better. Pt instructed to call for assistance if needed, call light in place, will continue to monitor.     Enteric precautions noted.

## 2024-07-29 NOTE — CONSULTS
Interventional Radiology Inpatient Communication       Interventional Radiology has received a consult for Central Venous Catheter placment.       Any diagnostic labs to be performed on collected specimen must be entered into [facility] prior to transport to Interventional Radiology.      We anticipate this procedure will be completed on Tuesday July 30th, 2024.                 Primary Care Nurse:  Kim RN        Please ensure the following is completed:     Patient is NPO: No    Blood thinners held: Yes  *HOLD 0600 dose of Heparin SQ on 7/30/24    Patient must have working IV: No       Patient must be in a hospital gown with snaps and be transported via stretcher to Interventional Radiology. Please send hospital chart and labels.     If the patient is unable to provide consent for the procedure, please contact Interventional Radiology at [phone].

## 2024-07-29 NOTE — PROGRESS NOTES
evals ordered?  Therapy evals ordered  Diet:  ADULT DIET; Dysphagia - Soft and Bite Sized  ADULT ORAL NUTRITION SUPPLEMENT; Breakfast, Lunch, Dinner; Frozen Oral Supplement  VTE prophylaxis: Heparin  Code status: DNR  Disposition: Home- when medically stable; she has no more insurance days available for rehab and will have to do a co-pay which she cannot afford.     # Hyperkalemia  7/24/2024   resolved       # ZEUS  7/24/2024  likely due to prerenal azotemia  resolved     # Metabolic acidosis  7/24/2024  due to above  Resolved      Hallucinations  7/24/2024  Dc marinol  Resolved  monitor      Non-peripheral Lines and Tubes (if present):      Urinary Catheter 07/23/24 Esteves (Active)            Telemetry (if present):  Cardiac/Telemetry Monitor On: No        Hospital Problems:  Principal Problem:    Severe sepsis (HCC)  Active Problems:    Hypothyroidism    MDD (major depressive disorder)    DM2 (diabetes mellitus, type 2) (HCC)    Bladder stones    ZEUS (acute kidney injury) (HCC)    Adult failure to thrive    Metabolic acidosis    Hyponatremia  Resolved Problems:    * No resolved hospital problems. *      Objective:   Patient Vitals for the past 24 hrs:   Temp Pulse Resp BP SpO2   07/29/24 1056 98.1 °F (36.7 °C) 90 14 (!) 81/63 100 %   07/29/24 0758 98.2 °F (36.8 °C) 94 16 115/80 99 %   07/29/24 0335 97.5 °F (36.4 °C) 92 18 117/76 98 %   07/28/24 2250 98.1 °F (36.7 °C) 96 18 109/62 97 %   07/28/24 1953 98.2 °F (36.8 °C) 97 18 111/74 99 %   07/28/24 1545 98.4 °F (36.9 °C) 97 20 107/74 100 %   07/28/24 1130 98.6 °F (37 °C) 94 18 (!) 88/56 100 %         Oxygen Therapy  SpO2: 100 %  Pulse via Oximetry: 97 beats per minute  O2 Device: None (Room air)    Estimated body mass index is 27.3 kg/m² as calculated from the following:    Height as of this encounter: 1.753 m (5' 9.02\").    Weight as of this encounter: 83.9 kg (184 lb 15.5 oz).    Intake/Output Summary (Last 24 hours) at 7/29/2024 1126  Last data filed at 7/28/2024  1522  Gross per 24 hour   Intake --   Output 400 ml   Net -400 ml           Physical Exam:     General-alert-oriented as above-cooperative and calm  Eyes-conjunctive are clear pupils equal round react to light extraocular muscles intact  Ears-no deformity-no drainage  Nares-no nasal deformity-no discharge-turbinates not significantly enlarged  Throat-oral mucosa moist, no erythema or exudate  Heart-regular rate and rhythm no rubs gallops clicks or murmurs.  No JVD grossly  Lungs-clear auscultation palpation and percussion, symmetric excursion of the chest wall.  No wheezing  Abdomen-soft, nontender, no gross percussible organomegaly.  No gross distention.  Bowel sounds present all 4 quadrants  Extremities-some component of diffuse edema-low serum albumin noted-moves all extremities and no obvious deformity.  Neurologic-cranial nerves II through XII grossly intact, diffusely weak but no focal motor deficits  Psychiatric-now alert to person town, hospital name.  More interactive than prior.  Strange affect        I have personally reviewed labs and tests:  Recent Labs:  Recent Results (from the past 48 hour(s))   POCT Glucose    Collection Time: 07/27/24 11:27 AM   Result Value Ref Range    POC Glucose 118 (H) 65 - 100 mg/dL    Performed by: [email or web address]    POCT Glucose    Collection Time: 07/27/24  4:01 PM   Result Value Ref Range    POC Glucose 79 65 - 100 mg/dL    Performed by: Nguyễn"[employer][email or web address]    POCT Glucose    Collection Time: 07/27/24  8:39 PM   Result Value Ref Range    POC Glucose 82 65 - 100 mg/dL    Performed by: DANIEL    POCT Glucose    Collection Time: 07/28/24  2:01 AM   Result Value Ref Range    POC Glucose 85 65 - 100 mg/dL    Performed by: DANIEL    Basic Metabolic Panel w/ Reflex to MG    Collection Time: 07/28/24  5:12 AM   Result Value Ref Range    Sodium 136 136 - 145 mmol/L    Potassium 3.7 3.5 - 5.1 mmol/L    Chloride 109 (H) 98 - 107 mmol/L    CO2 20

## 2024-07-29 NOTE — PROGRESS NOTES
Pt resting in bed comfortably at this time, alert and oriented times 2-3. No distress noted, respirations even and unlabored on room air. Esteves in place and draining. Pt instructed to call for assistance if needed, call light in place, will continue to monitor. Will prepare for bedside shift report.

## 2024-07-29 NOTE — PROGRESS NOTES
Pt resting in bed comfortably at this time, alert and oriented to person, place and time. No distress noted, respirations even and unlabored on room air. Pt denies pain at this time. Pt instructed to call for assistance if needed, call light in place, will continue to monitor.

## 2024-07-30 ENCOUNTER — HOSPITAL ENCOUNTER (INPATIENT)
Dept: INTERVENTIONAL RADIOLOGY/VASCULAR | Age: 69
Discharge: HOME | End: 2024-08-02
Attending: INTERNAL MEDICINE
Payer: MEDICARE

## 2024-07-30 VITALS
RESPIRATION RATE: 18 BRPM | SYSTOLIC BLOOD PRESSURE: 126 MMHG | DIASTOLIC BLOOD PRESSURE: 82 MMHG | TEMPERATURE: 98.3 F | OXYGEN SATURATION: 100 % | HEART RATE: 103 BPM

## 2024-07-30 LAB
ALBUMIN SERPL-MCNC: 1.5 G/DL (ref 3.2–4.6)
ALBUMIN/GLOB SERPL: 0.5 (ref 1–1.9)
ALP SERPL-CCNC: 138 U/L (ref 35–104)
ALT SERPL-CCNC: 14 U/L (ref 12–65)
ANION GAP SERPL CALC-SCNC: 7 MMOL/L (ref 9–18)
AST SERPL-CCNC: 17 U/L (ref 15–37)
BILIRUB SERPL-MCNC: 0.3 MG/DL (ref 0–1.2)
BUN SERPL-MCNC: 5 MG/DL (ref 8–23)
CALCIUM SERPL-MCNC: 8.5 MG/DL (ref 8.8–10.2)
CHLORIDE SERPL-SCNC: 110 MMOL/L (ref 98–107)
CO2 SERPL-SCNC: 21 MMOL/L (ref 20–28)
CREAT SERPL-MCNC: 0.24 MG/DL (ref 0.6–1.1)
GLOBULIN SER CALC-MCNC: 3 G/DL (ref 2.3–3.5)
GLUCOSE BLD STRIP.AUTO-MCNC: 72 MG/DL (ref 65–100)
GLUCOSE BLD STRIP.AUTO-MCNC: 72 MG/DL (ref 65–100)
GLUCOSE BLD STRIP.AUTO-MCNC: 76 MG/DL (ref 65–100)
GLUCOSE BLD STRIP.AUTO-MCNC: 79 MG/DL (ref 65–100)
GLUCOSE SERPL-MCNC: 76 MG/DL (ref 70–99)
POTASSIUM SERPL-SCNC: 4.1 MMOL/L (ref 3.5–5.1)
PROT SERPL-MCNC: 4.6 G/DL (ref 6.3–8.2)
SERVICE CMNT-IMP: NORMAL
SODIUM SERPL-SCNC: 137 MMOL/L (ref 136–145)
ZINC SERPL-MCNC: 62 UG/DL (ref 44–115)

## 2024-07-30 PROCEDURE — 2500000003 HC RX 250 WO HCPCS: Performed by: PHYSICIAN ASSISTANT

## 2024-07-30 PROCEDURE — 6370000000 HC RX 637 (ALT 250 FOR IP): Performed by: INTERNAL MEDICINE

## 2024-07-30 PROCEDURE — 92526 ORAL FUNCTION THERAPY: CPT

## 2024-07-30 PROCEDURE — 76937 US GUIDE VASCULAR ACCESS: CPT | Performed by: PHYSICIAN ASSISTANT

## 2024-07-30 PROCEDURE — 77001 FLUOROGUIDE FOR VEIN DEVICE: CPT | Performed by: PHYSICIAN ASSISTANT

## 2024-07-30 PROCEDURE — 97530 THERAPEUTIC ACTIVITIES: CPT

## 2024-07-30 PROCEDURE — 36558 INSERT TUNNELED CV CATH: CPT

## 2024-07-30 PROCEDURE — 36556 INSERT NON-TUNNEL CV CATH: CPT | Performed by: PHYSICIAN ASSISTANT

## 2024-07-30 PROCEDURE — 76937 US GUIDE VASCULAR ACCESS: CPT

## 2024-07-30 PROCEDURE — 6360000002 HC RX W HCPCS: Performed by: INTERNAL MEDICINE

## 2024-07-30 PROCEDURE — 36415 COLL VENOUS BLD VENIPUNCTURE: CPT

## 2024-07-30 PROCEDURE — C1751 CATH, INF, PER/CENT/MIDLINE: HCPCS

## 2024-07-30 PROCEDURE — 2500000003 HC RX 250 WO HCPCS: Performed by: INTERNAL MEDICINE

## 2024-07-30 PROCEDURE — 2580000003 HC RX 258: Performed by: INTERNAL MEDICINE

## 2024-07-30 PROCEDURE — 82962 GLUCOSE BLOOD TEST: CPT

## 2024-07-30 PROCEDURE — C1894 INTRO/SHEATH, NON-LASER: HCPCS

## 2024-07-30 PROCEDURE — 80053 COMPREHEN METABOLIC PANEL: CPT

## 2024-07-30 PROCEDURE — B5181ZA FLUOROSCOPY OF SUPERIOR VENA CAVA USING LOW OSMOLAR CONTRAST, GUIDANCE: ICD-10-PCS | Performed by: RADIOLOGY

## 2024-07-30 PROCEDURE — 97112 NEUROMUSCULAR REEDUCATION: CPT

## 2024-07-30 PROCEDURE — 02H633Z INSERTION OF INFUSION DEVICE INTO RIGHT ATRIUM, PERCUTANEOUS APPROACH: ICD-10-PCS | Performed by: RADIOLOGY

## 2024-07-30 PROCEDURE — 1100000000 HC RM PRIVATE

## 2024-07-30 RX ORDER — LIDOCAINE HYDROCHLORIDE 10 MG/ML
INJECTION, SOLUTION EPIDURAL; INFILTRATION; INTRACAUDAL; PERINEURAL PRN
Status: COMPLETED | OUTPATIENT
Start: 2024-07-30 | End: 2024-07-30

## 2024-07-30 RX ADMIN — MIDODRINE HYDROCHLORIDE 10 MG: 5 TABLET ORAL at 09:40

## 2024-07-30 RX ADMIN — VANCOMYCIN HYDROCHLORIDE 125 MG: 125 CAPSULE ORAL at 09:40

## 2024-07-30 RX ADMIN — Medication 1 CAPSULE: at 09:40

## 2024-07-30 RX ADMIN — SODIUM CHLORIDE, PRESERVATIVE FREE 10 ML: 5 INJECTION INTRAVENOUS at 20:43

## 2024-07-30 RX ADMIN — CUPRIC CHLORIDE 2 MG: 0.4 INJECTION, SOLUTION INTRAVENOUS at 18:26

## 2024-07-30 RX ADMIN — HEPARIN SODIUM 5000 UNITS: 5000 INJECTION INTRAVENOUS; SUBCUTANEOUS at 20:41

## 2024-07-30 RX ADMIN — ARIPIPRAZOLE 5 MG: 5 TABLET ORAL at 09:40

## 2024-07-30 RX ADMIN — SODIUM CHLORIDE, PRESERVATIVE FREE 10 ML: 5 INJECTION INTRAVENOUS at 09:54

## 2024-07-30 RX ADMIN — LIDOCAINE HYDROCHLORIDE 5 ML: 10 INJECTION, SOLUTION EPIDURAL; INFILTRATION; INTRACAUDAL; PERINEURAL at 08:25

## 2024-07-30 RX ADMIN — VANCOMYCIN HYDROCHLORIDE 125 MG: 125 CAPSULE ORAL at 12:42

## 2024-07-30 RX ADMIN — TAMSULOSIN HYDROCHLORIDE 0.4 MG: 0.4 CAPSULE ORAL at 09:41

## 2024-07-30 RX ADMIN — LEVOTHYROXINE SODIUM 175 MCG: 0.07 TABLET ORAL at 06:05

## 2024-07-30 ASSESSMENT — PAIN - FUNCTIONAL ASSESSMENT: PAIN_FUNCTIONAL_ASSESSMENT: NONE - DENIES PAIN

## 2024-07-30 ASSESSMENT — PAIN SCALES - GENERAL
PAINLEVEL_OUTOF10: 0
PAINLEVEL_OUTOF10: 0

## 2024-07-30 NOTE — PROGRESS NOTES
GOALS:  LTG: Patient will maintain adequate hydration/nutrition with optimum safety and efficiency of swallowing function with PO intake without overt signs or symptoms of aspiration for the highest appropriate diet level.  STG: MET 24  Patient will consume minced and moist textures and thin liquids without overt signs or symptoms of airway compromise.  Patient will safely ingest diet trials during therapeutic feedings with SLP without overt signs or symptoms of respiratory compromise in efforts to advance diet.    LTG: Patient will increase receptive/expressive language skills demonstrated by the ability to communicate basic wants/needs across environments.   STG: REINSTATED 24  Patient will follow 1 step commands with 100% accuracy given minimal cueing.  Patient will complete sentences with 100% accuracy given minimal cueing.    LTG: Patient will improve neuro-linguistic abilities to increase safety and awareness in functional living environment.   STG: CONTINUE 24  Patient will sequence steps to a task with 80% accuracy given minimal cueing.  Patient will recall relevant verbal information with 80% accuracy with minimal cues.    SPEECH LANGUAGE PATHOLOGY: Dysphagia Daily Note #7    Acknowledge Order  I  Therapy Time  I   Charges     I  Rehab Caseload Tracker    NAME: Jesika Olsen  : 1955  MRN: [id number]    ADMISSION DATE: 7/15/2024  PRIMARY DIAGNOSIS: Severe sepsis (HCC)    ICD-10: Treatment Diagnosis: R13.11 Dysphagia, Oral Phase  R41.841 Cognitive-Communication Deficit    RECOMMENDATIONS   Diet:    Minced and Moist  Thin Liquids    Medication: as tolerated and whole floated in puree or applesauce (mentation impacting consumption of medication)   Compensatory Swallowing Strategies:   Alternate solids and liquids  Slow rate of intake  Small bites/sips  Upright as possible for all oral intake  1:1 feeding supervision and assistance if patient allows    Therapeutic Intervention:

## 2024-07-30 NOTE — BRIEF OP NOTE
[facility]  Department of Interventional Radiology  [phone]        Interventional Radiology Brief Procedure Note    Patient: Jesika Olsen MRN: [id number]  SSN: [id number]    YOB: 1955  Age: 68 y.o.  Sex: female      Date of Procedure: 7/30/2024    Pre-Procedure Diagnosis: poor IV access, AMS, sepsis    Post-Procedure Diagnosis: SAME    Procedure(s): Temporary Central Venous Catheter    Brief Description of Procedure: see above    Performed By: Marybel Blake PA-C     Assistants: None    Anesthesia:Lidocaine    Estimated Blood Loss: Less than 10ml    Specimens:  None    Implants:  Temporary Venous Catheter    Findings: catheter tip in right atrium     Complications: None    Recommendations: ok to use catheter     Follow Up: prn    Signed By: Marybel Blake PA-C     July 30, 2024

## 2024-07-30 NOTE — PROGRESS NOTES
ACUTE PHYSICAL THERAPY GOALS:   (Developed with and agreed upon by patient and/or caregiver.)  Pt will perform bed mobility with Ed in 7 therapy sessions.  Pt will perform sit-to-stand/ stand-to-sit transfers Ed in 7 therapy sessions.  Pt will ambulate 10 ft Ed with use of LRAD/no device and breaks as needed in 7 therapy sessions.  Pt will perform seated dynamic balance activities with minimal postural sway in 7 therapy sessions.  Pt will tolerate multiple sets and reps of BLE exercises in 7 therapy sessions.     PHYSICAL THERAPY: Daily Note PM   (Link to Caseload Tracking: PT Visit Days : 6  Time In/Out PT Charge Capture  Rehab Caseload Tracker  Orders    Jesika Olsen is a 68 y.o. female   PRIMARY DIAGNOSIS: Severe sepsis (HCC)  Hyperkalemia [E87.5]  Septicemia (HCC) [A41.9]  Acute pyelonephritis [N10]  ZEUS (acute kidney injury) (HCC) [N17.9]  Severe sepsis (HCC) [A41.9, R65.20]       Inpatient: Payor: [facility] MEDICARE / Plan: [facility] MEDICARE COMPLETE / Product Type: *No Product type* /     ASSESSMENT:     REHAB RECOMMENDATIONS:   Recommendation to date pending progress:  Setting:  Short-term Rehab    Equipment:    To Be Determined     ASSESSMENT:  Ms. Olsen was found supine in bed and agreeable to PT. Pt required maxA x2 to move from supine to seated with poor seated balance and maxA x2 to perform 1x STS to the standing frame to complete a totalA SPT from bed to the recliner in order to change her bed sheets. Once back in bed, pt again required maxA x2 to roll bilaterally to assist with a brief change. During today's session, all activity was performed on RA. Pt made minimal progress towards goals today, but will continue to be seen by PT in order to address remaining mobility impairments and return pt to PLOF. Pt left in bed with needs in reach and RN present with pt.     SUBJECTIVE:   Ms. Olsen states, \"I want some cottage cheese\"     Social/Functional Lives With: Daughter  Type of Home: House  Home  Layout: One level  Home Access: Level entry  Bathroom Shower/Tub: Tub/Shower unit  Bathroom Toilet: Standard  Bathroom Equipment: None  Bathroom Accessibility: Accessible  Home Equipment: Wheelchair - Manual  Receives Help From: Family  ADL Assistance: Independent  Homemaking Assistance: Independent  Homemaking Responsibilities: Yes  Ambulation Assistance: Non-ambulatory  Transfer Assistance: Needs assistance  Active : No  Patient's  Info: daughter  Mode of Transportation: Car  Occupation: Retired  Additional Comments: per chart from previous admission- pt lives with her daughter in a 1 level home with a level entrance, additionally dtr stating that recently pt has been essentially \"bed bound\"; during admission last month pt was still able to walk short distances with minimal assistance  OBJECTIVE:     PAIN: VITALS / O2: PRECAUTION / LINES / DRAINS:   Pre Treatment: 0         Post Treatment: 0 Vitals        Oxygen    Esteves Catheter and Telemetry     RESTRICTIONS/PRECAUTIONS:  Restrictions/Precautions  Restrictions/Precautions: Fall Risk  Restrictions/Precautions: Fall Risk     MOBILITY: I Mod I S SBA CGA Min Mod Max Total  NT x2 Comments:   Bed Mobility    Rolling [] [] [] [] [] [] [] [x] [] [] [x]    Supine to Sit [] [] [] [] [] [] [] [x] [] [] [x]    Scooting [] [] [] [] [] [] [] [x] [] [] [x]    Sit to Supine [] [] [] [] [] [] [] [x] [] [] [x]    Transfers    Sit to Stand [] [] [] [] [] [] [] [x] [] [] [x]    Bed to Chair [] [] [] [] [] [] [] [] [x] [] [x] Standing frame   Stand to Sit [] [] [] [] [] [] [] [x] [] [] [x]     [] [] [] [] [] [] [] [] [] [] []    I=Independent, Mod I=Modified Independent, S=Supervision, SBA=Standby Assistance, CGA=Contact Guard Assistance,   Min=Minimal Assistance, Mod=Moderate Assistance, Max=Maximal Assistance, Total=Total Assistance, NT=Not Tested    BALANCE: Good Fair+ Fair Fair- Poor NT Comments   Sitting Static [] [] [] [x] [x] []    Sitting Dynamic [] [] [] [] [x]

## 2024-07-30 NOTE — PROGRESS NOTES
Pt awake in bed. A/ox2. Respirations even and unlabored on room air. Pt denies pain, no distress noted. Bowel sounds active, last bm today 7/30. Copper infusing. Pt instructed to use call light if assistance is needed with call light in reach. Plan of care ongoing.

## 2024-07-30 NOTE — OP NOTE
Title:  Temporary central venous catheter placement.       Indication: 68-year-old female with poor IV access, sepsis, altered mental  status     A temporary central venous catheter is placed for this patient through the right  internal jugular vein using ultrasound and fluoroscopic guidance with maximum  sterile barrier appropriately documented and fluoro time and images documented  in the report     :  Marybel Blake PA-C     Supervising Physician: Dwayne Mcnally MD.   The physician attests to  supervising this procedure and agrees with the report as written.      Consent: Informed telephone consent was obtained from the patient's daughter  after the risks and benefits were discussed. She requested that we proceed.     Procedure: This central venous catheter was inserted with all elements of  maximal sterile barrier technique and cap and mask and sterile gown and sterile  gloves and sterile full-body drape and hand hygiene and 2% chlorhexidine for  cutaneous antisepsis and sterile ultrasound gel and sterile ultrasound probe  cover.     Following routine prep and drape of the right neck, a local field block with  lidocaine was achieved.  Ultrasound evaluation of all potential access sites was  performed due to lack of a palpable vein.   No veins were palpable due to  overlying adipose tissue.  Using real-time ultrasound guidance, with appropriate  image recording and visualization of vascular needle entry, the patent right  internal jugular vein was accessed using micropuncture technique.       Using fluoroscopy, a triple lumen central venous catheter was advanced over the  wire positioning the tip in the right atrium.       All lumens aspirated easily and were filled with heparinized saline.  The  catheter was secured with nonabsorbable suture.  Sterile dressings were applied.     Complications: None.     Radiation dose:  Fluoroscopy time: 12 seconds.  Reference air kerma (mGy): 1.25  Kerma

## 2024-07-30 NOTE — PROGRESS NOTES
Hospitalist Progress Note   Admit Date:  7/15/2024  6:01 PM   Name:  Jesika Olsen   Age:  68 y.o.  Sex:  female  :  1955   MRN:  [id number]   Room:  [facility]/    Presenting/Chief Complaint: Fatigue     Reason(s) for Admission: Hyperkalemia [E87.5]  Septicemia (HCC) [A41.9]  Acute pyelonephritis [N10]  ZEUS (acute kidney injury) (HCC) [N17.9]  Severe sepsis (HCC) [A41.9, R65.20]     Hospital Course:   Please refer to the admission H&P for details of presentation.      In summary, Jesika Olsen is a 68 y.o. female with medical history significant for MDD, hypothyroidism, hiatal hernia, type 2 diabetes, previous episodes of C. difficile colitis, history of recurrent urinary tract infection who presents from home with worsening generalized lethargy and weakness.   CT abdomen pelvis with multiple bladder stones and a liver lesion.  Creatinine of 0.9 with baseline around 0.3-0.4, hypokalemia with potassium of 6.7, bicarb of 14 and white count of 21.  EKG with normal sinus rhythm without any peaked T waves.    Patient was also noted to have low serum copper and started on IV copper repletion.    She was noted to be depressed and apathetic.  Psychiatry was consulted and restarted her bipolar medications.  Patient was also refusing to eat or take medication and therefore became hypoglycemic requiring several doses of IV dextrose as needed.      Hospitalization was complicated due to loss of IV access and required IR for assistance.  Also noted to have left arm swelling but ultrasound was negative for DVT.    Subjective/24 hr Events (24) :  Patient is seen and examined at bedside.    Patient laying in bed.   Alert awake and oriented x 1-2.  Flat affect and minimally conversant  Per RN, did not take most of her morning meds.  Patient however was compliant with taking her vancomycin during my encounter.    Assessment & Plan:   Severe sepsis secondary to urine tract infection and C. difficile

## 2024-07-30 NOTE — PROGRESS NOTES
ACUTE OCCUPATIONAL THERAPY GOALS:   (Developed with and agreed upon by patient and/or caregiver.)  1. Patient will complete upper body bathing and dressing with MINIMAL ASSIST and adaptive equipment as needed.   2. Patient will complete self-grooming tasks in unsupported sitting with MINIMAL ASSIST and adaptive equipment as needed.  3. Patient will tolerate 25 minutes of OT treatment with 1-2 rest breaks to increase activity tolerance for ADLs.   4. Patient will complete functional transfers with MODERATE ASSIST and adaptive equipment as needed.   5. Patient will complete functional activity while seated edge of bed with MINIMAL ASSIST and adaptive equipment as needed.   6. Patient will tolerate 15 minutes unsupported sitting balance with MINIMAL ASSIST in preparation for ADL performance.   7. Patient will tolerate 10 minutes BUE therapeutic activities to increase use of BUE during ADL performance.      Timeframe: 7 visits       OCCUPATIONAL THERAPY: Daily Note PM   OT Visit Days: 6   Time In/Out  OT Charge Capture  Rehab Caseload Tracker  OT Orders    Jesika Olsen is a 68 y.o. female   PRIMARY DIAGNOSIS: Severe sepsis (HCC)  Hyperkalemia [E87.5]  Septicemia (HCC) [A41.9]  Acute pyelonephritis [N10]  ZEUS (acute kidney injury) (HCC) [N17.9]  Severe sepsis (HCC) [A41.9, R65.20]       Inpatient: Payor: [facility] MEDICARE / Plan: [facility] MEDICARE COMPLETE / Product Type: *No Product type* /     ASSESSMENT:     REHAB RECOMMENDATIONS:   Recommendation to date pending progress:  Setting:  TBD- no rehab days    Equipment:    To Be Determined     ASSESSMENT:  Ms. Olsen continue to be very weak. Continue to require max A x2 for all bed mobility. Poor sitting balance noted at edge of bed. Pt was transferred to chair with max A x2 (standing frame). Returned back to bed with same amount of assistance. Rolled for linen placement and bowel hygiene. Minimal to no progress made today. Left with all needs in reach. Will continue to  balance in order to prepare for functional task, prepare for seated ADLs, prepare for functional transfer, increase safety awareness, and prepare for self care..     TREATMENT GRID:  N/A    AFTER TREATMENT PRECAUTIONS: Alarm Activated, Bed, Bed/Chair Locked, Call light within reach, Needs within reach, and RN notified    INTERDISCIPLINARY COLLABORATION:  RN/ PCT, PT/ PTA, and OT/ OLSON    EDUCATION:       TOTAL TREATMENT DURATION AND TIME:  Time In: 1441  Time Out: 1515  Minutes: 34    Lara Piña MACK

## 2024-07-30 NOTE — PROGRESS NOTES
Pt awake in bed. Alert and disoriented at times. Respirations even and unlabored on room air. Pt denies pain, no distress noted. Esteves in place. Pt instructed to use call light if assistance is needed with call light in reach. Plan of care ongoing.

## 2024-07-30 NOTE — PROGRESS NOTES
TRANSFER - OUT REPORT:    Telephone report given to Jodie on Jesika Olsen  being transferred to [facility] for routine progression of patient care       Report consisted of patient's Situation, Background, Assessment and   Recommendations(SBAR).     Information from the following report(s) Nurse Handoff Report was reviewed with the receiving nurse.           Lines:   CVC  07/30/24 Right Internal jugular (Active)        Opportunity for questions and clarification was provided.      Patient transported with:  Tech

## 2024-07-31 LAB
GLUCOSE BLD STRIP.AUTO-MCNC: 73 MG/DL (ref 65–100)
GLUCOSE BLD STRIP.AUTO-MCNC: 97 MG/DL (ref 65–100)
SERVICE CMNT-IMP: NORMAL
SERVICE CMNT-IMP: NORMAL

## 2024-07-31 PROCEDURE — 6370000000 HC RX 637 (ALT 250 FOR IP): Performed by: INTERNAL MEDICINE

## 2024-07-31 PROCEDURE — 1100000000 HC RM PRIVATE

## 2024-07-31 PROCEDURE — 99232 SBSQ HOSP IP/OBS MODERATE 35: CPT

## 2024-07-31 PROCEDURE — 97530 THERAPEUTIC ACTIVITIES: CPT

## 2024-07-31 PROCEDURE — 6360000002 HC RX W HCPCS: Performed by: INTERNAL MEDICINE

## 2024-07-31 PROCEDURE — 97535 SELF CARE MNGMENT TRAINING: CPT

## 2024-07-31 PROCEDURE — 2500000003 HC RX 250 WO HCPCS: Performed by: INTERNAL MEDICINE

## 2024-07-31 PROCEDURE — 92526 ORAL FUNCTION THERAPY: CPT

## 2024-07-31 PROCEDURE — 2580000003 HC RX 258: Performed by: INTERNAL MEDICINE

## 2024-07-31 PROCEDURE — 82962 GLUCOSE BLOOD TEST: CPT

## 2024-07-31 RX ORDER — MULTIVITAMIN WITH IRON
1 TABLET ORAL DAILY
Status: DISCONTINUED | OUTPATIENT
Start: 2024-07-31 | End: 2024-08-01 | Stop reason: HOSPADM

## 2024-07-31 RX ORDER — MIDODRINE HYDROCHLORIDE 5 MG/1
5 TABLET ORAL
Status: DISCONTINUED | OUTPATIENT
Start: 2024-07-31 | End: 2024-08-01 | Stop reason: HOSPADM

## 2024-07-31 RX ORDER — ARIPIPRAZOLE 2 MG/1
2 TABLET ORAL DAILY
Status: DISCONTINUED | OUTPATIENT
Start: 2024-07-31 | End: 2024-08-01 | Stop reason: HOSPADM

## 2024-07-31 RX ORDER — ARIPIPRAZOLE 5 MG/1
5 TABLET ORAL NIGHTLY
Status: DISCONTINUED | OUTPATIENT
Start: 2024-08-01 | End: 2024-08-01 | Stop reason: HOSPADM

## 2024-07-31 RX ORDER — ESCITALOPRAM OXALATE 10 MG/1
10 TABLET ORAL DAILY
Status: DISCONTINUED | OUTPATIENT
Start: 2024-07-31 | End: 2024-08-01 | Stop reason: HOSPADM

## 2024-07-31 RX ADMIN — Medication 1 CAPSULE: at 17:03

## 2024-07-31 RX ADMIN — NYSTATIN 500000 UNITS: 100000 SUSPENSION ORAL at 10:39

## 2024-07-31 RX ADMIN — LEVOTHYROXINE SODIUM 175 MCG: 0.07 TABLET ORAL at 06:28

## 2024-07-31 RX ADMIN — CYANOCOBALAMIN TAB 1000 MCG 1000 MCG: 1000 TAB at 10:35

## 2024-07-31 RX ADMIN — MIDODRINE HYDROCHLORIDE 5 MG: 5 TABLET ORAL at 17:03

## 2024-07-31 RX ADMIN — NYSTATIN 500000 UNITS: 100000 SUSPENSION ORAL at 20:35

## 2024-07-31 RX ADMIN — FOLIC ACID 1 MG: 1 TABLET ORAL at 10:35

## 2024-07-31 RX ADMIN — ACETAMINOPHEN 650 MG: 325 TABLET ORAL at 20:35

## 2024-07-31 RX ADMIN — ONDANSETRON 4 MG: 4 TABLET, ORALLY DISINTEGRATING ORAL at 20:34

## 2024-07-31 RX ADMIN — CUPRIC CHLORIDE 2 MG: 0.4 INJECTION, SOLUTION INTRAVENOUS at 10:51

## 2024-07-31 RX ADMIN — SODIUM CHLORIDE, PRESERVATIVE FREE 10 ML: 5 INJECTION INTRAVENOUS at 10:43

## 2024-07-31 RX ADMIN — HEPARIN SODIUM 5000 UNITS: 5000 INJECTION INTRAVENOUS; SUBCUTANEOUS at 20:37

## 2024-07-31 RX ADMIN — SODIUM CHLORIDE, PRESERVATIVE FREE 10 ML: 5 INJECTION INTRAVENOUS at 20:51

## 2024-07-31 RX ADMIN — TAMSULOSIN HYDROCHLORIDE 0.4 MG: 0.4 CAPSULE ORAL at 10:35

## 2024-07-31 RX ADMIN — HEPARIN SODIUM 5000 UNITS: 5000 INJECTION INTRAVENOUS; SUBCUTANEOUS at 06:28

## 2024-07-31 RX ADMIN — MIDODRINE HYDROCHLORIDE 5 MG: 5 TABLET ORAL at 10:35

## 2024-07-31 RX ADMIN — ESCITALOPRAM OXALATE 10 MG: 10 TABLET ORAL at 17:03

## 2024-07-31 RX ADMIN — Medication 1 CAPSULE: at 10:35

## 2024-07-31 RX ADMIN — MAGNESIUM GLUCONATE 500 MG ORAL TABLET 400 MG: 500 TABLET ORAL at 10:35

## 2024-07-31 RX ADMIN — HEPARIN SODIUM 5000 UNITS: 5000 INJECTION INTRAVENOUS; SUBCUTANEOUS at 14:13

## 2024-07-31 RX ADMIN — ARIPIPRAZOLE 5 MG: 5 TABLET ORAL at 10:35

## 2024-07-31 ASSESSMENT — PAIN - FUNCTIONAL ASSESSMENT: PAIN_FUNCTIONAL_ASSESSMENT: ACTIVITIES ARE NOT PREVENTED

## 2024-07-31 ASSESSMENT — PAIN DESCRIPTION - LOCATION: LOCATION: ABDOMEN

## 2024-07-31 ASSESSMENT — PAIN DESCRIPTION - PAIN TYPE: TYPE: ACUTE PAIN

## 2024-07-31 ASSESSMENT — PAIN DESCRIPTION - ORIENTATION: ORIENTATION: INNER

## 2024-07-31 ASSESSMENT — PAIN SCALES - GENERAL
PAINLEVEL_OUTOF10: 4
PAINLEVEL_OUTOF10: 0
PAINLEVEL_OUTOF10: 0

## 2024-07-31 ASSESSMENT — PAIN DESCRIPTION - FREQUENCY: FREQUENCY: INTERMITTENT

## 2024-07-31 ASSESSMENT — PAIN DESCRIPTION - ONSET: ONSET: GRADUAL

## 2024-07-31 ASSESSMENT — PAIN DESCRIPTION - DESCRIPTORS: DESCRIPTORS: CRAMPING

## 2024-07-31 NOTE — PROGRESS NOTES
ACUTE OCCUPATIONAL THERAPY GOALS:   (Developed with and agreed upon by patient and/or caregiver.)  1. Patient will complete upper body bathing and dressing with MINIMAL ASSIST and adaptive equipment as needed.   2. Patient will complete self-grooming tasks in unsupported sitting with MINIMAL ASSIST and adaptive equipment as needed.  3. Patient will tolerate 25 minutes of OT treatment with 1-2 rest breaks to increase activity tolerance for ADLs.   4. Patient will complete functional transfers with MODERATE ASSIST and adaptive equipment as needed.   5. Patient will complete functional activity while seated edge of bed with MINIMAL ASSIST and adaptive equipment as needed.   6. Patient will tolerate 15 minutes unsupported sitting balance with MINIMAL ASSIST in preparation for ADL performance.   7. Patient will tolerate 10 minutes BUE therapeutic activities to increase use of BUE during ADL performance.      Timeframe: 7 visits       OCCUPATIONAL THERAPY: Daily Note PM   OT Visit Days: 7   Time In/Out  OT Charge Capture  Rehab Caseload Tracker  OT Orders    Jesika Olsen is a 68 y.o. female   PRIMARY DIAGNOSIS: Severe sepsis (HCC)  Hyperkalemia [E87.5]  Septicemia (HCC) [A41.9]  Acute pyelonephritis [N10]  ZEUS (acute kidney injury) (HCC) [N17.9]  Severe sepsis (HCC) [A41.9, R65.20]       Inpatient: Payor: [facility] MEDICARE / Plan: [facility] MEDICARE COMPLETE / Product Type: *No Product type* /     ASSESSMENT:     REHAB RECOMMENDATIONS:   Recommendation to date pending progress:  Setting:  TBD- no rehab days    Equipment:    To Be Determined     ASSESSMENT:  Ms. Olsen continues to be very weak. Attempted re-eval early afternoon however pt soiled therefore deferred until later. Returned and pt was actively having a BM, however unaware of this. She requires Max Assist to roll into sidelying for dependent bowel hygiene. Pt requires Mod A to doff/don gown, Max/total A to doff/don brief at bed level. She then c/o severe nausea

## 2024-07-31 NOTE — PROGRESS NOTES
PSYCHIATRY PROGRESS VISIT    Jesika Olsen  [id number]  1955 07/31/24  Reason for Consult: Re-evaluation  Consulting Physician: Dr. Leon       ID: Jesika Olsen is a 68 y.o.  female.    CC: \"I don't know\"    HPI: Patient remains on 8th floor requires further assessment.    Upon assessment patient expresses feeling tired most of the time, states sleep has been poor, denies any depressive symptoms but endorses worsening anxiety, no distress noted. She reports adequate appetite but states eating has been difficult because of her tiredness. When asked about her mood she reports being \"tired and mahajan\". She endorses VH of 2 individuals she is unable to describe, worse in the evenings, denies any AH.     Patient rated depression a “0,” on a scale of zero to ten with ten being the worst and zero being none. Patient rated anxiety a “8,” on the same scale.     Current suicidal ideations: Denies  Current homicidal ideations: Denies  Current auditory/visual hallucinations: Endorses visual hallucinations of individuals in her room.     No TD noted, AIMS = 0.        7/15/2024     6:08 PM   C-SSRS Suicide Screening   1) Within the past month, have you wished you were dead or wished you could go to sleep and not wake up?  No   2) Have you actually had any thoughts of killing yourself?  No   6) Have you ever done anything, started to do anything, or prepared to do anything to end your life? No      Risk of Suicide: Low Risk    Allergies:  Allergies   Allergen Reactions    Aspirin Swelling     Rash    Levofloxacin Swelling     Also, itching    Penicillins Anaphylaxis    Ziprasidone Hcl Rash        Medications:    Current Facility-Administered Medications:     midodrine (PROAMATINE) tablet 5 mg, 5 mg, Oral, TID Edward Banyar, MD, 5 mg at 07/31/24 1035    multivitamin 1 tablet, 1 tablet, Oral, Daily, Sarita Leon MD    morphine (PF) injection 2 mg, 2 mg, IntraVENous, Q4H PRN, Khoa Knutson MD, 2 mg at  Samuel Webber DO    magnesium sulfate 2000 mg in 50 mL IVPB premix, 2,000 mg, IntraVENous, PRN, Samuel Webber DO, Held at 07/23/24 0754    ondansetron (ZOFRAN-ODT) disintegrating tablet 4 mg, 4 mg, Oral, Q8H PRN, 4 mg at 07/17/24 2011 **OR** ondansetron (ZOFRAN) injection 4 mg, 4 mg, IntraVENous, Q6H PRN, Samuel Webber DO, 4 mg at 07/27/24 0929    polyethylene glycol (GLYCOLAX) packet 17 g, 17 g, Oral, Daily PRN, Samuel Webber DO    acetaminophen (TYLENOL) tablet 650 mg, 650 mg, Oral, Q6H PRN, 650 mg at 07/27/24 1840 **OR** acetaminophen (TYLENOL) suppository 650 mg, 650 mg, Rectal, Q6H PRN, Samuel Webber DO    heparin (porcine) injection 5,000 Units, 5,000 Units, SubCUTAneous, 3 times per day, Samuel Webber DO, 5,000 Units at 07/31/24 0628  Medications Prior to Admission: magnesium oxide (MAG-OX) 400 (240 Mg) MG tablet, Take 2 tablets by mouth 2 times daily  potassium chloride (KLOR-CON M) 20 MEQ extended release tablet, Take 2 tablets by mouth daily  levothyroxine (SYNTHROID) 150 MCG tablet, Take 1 tablet by mouth Daily  gabapentin (NEURONTIN) 400 MG capsule, Take 1 capsule by mouth 3 times daily for 30 days.  dicyclomine (BENTYL) 20 MG tablet, Take 1 tablet by mouth 3 times daily (before meals)  omeprazole (PRILOSEC) 40 MG delayed release capsule, Take 1 capsule by mouth in the morning and at bedtime Please open up the capsule and take the medication.  sucralfate (CARAFATE) 1 GM tablet, Take 1 tablet by mouth 4 times daily  scopolamine (TRANSDERM-SCOP) transdermal patch, Place 1 patch onto the skin every 72 hours  Medications Prior to Admission: magnesium oxide (MAG-OX) 400 (240 Mg) MG tablet, Take 2 tablets by mouth 2 times daily  potassium chloride (KLOR-CON M) 20 MEQ extended release tablet, Take 2 tablets by mouth daily  levothyroxine (SYNTHROID) 150 MCG tablet, Take 1 tablet by mouth Daily  gabapentin (NEURONTIN) 400 MG capsule, Take 1 capsule by mouth 3 times daily for 30 days.  dicyclomine (BENTYL) 20 MG

## 2024-07-31 NOTE — CARE COORDINATION
MSN, CM:  patient to have rabago taken out with voiding trail.  Patient to receive her last copper IV infusion today.  Psy reconsulted r/t hallucinations overnight.  Possible medication changes.  Case Management will continue to follow.

## 2024-07-31 NOTE — PROGRESS NOTES
Hospitalist Progress Note   Admit Date:  7/15/2024  6:01 PM   Name:  Jesika Olsen   Age:  68 y.o.  Sex:  female  :  1955   MRN:  [id number]   Room:  9/    Presenting/Chief Complaint: Fatigue     Reason(s) for Admission: Hyperkalemia [E87.5]  Septicemia (HCC) [A41.9]  Acute pyelonephritis [N10]  ZEUS (acute kidney injury) (HCC) [N17.9]  Severe sepsis (HCC) [A41.9, R65.20]     Hospital Course:   Please refer to the admission H&P for details of presentation.      In summary, Jesika Olsen is a 68 y.o. female with medical history significant for MDD, hypothyroidism, hiatal hernia, type 2 diabetes, previous episodes of C. difficile colitis, history of recurrent urinary tract infection who presents from home with worsening generalized lethargy and weakness.   CT abdomen pelvis with multiple bladder stones and a liver lesion.  Creatinine of 0.9 with baseline around 0.3-0.4, hypokalemia with potassium of 6.7, bicarb of 14 and white count of 21.  EKG with normal sinus rhythm without any peaked T waves.    Patient was also noted to have low serum copper and started on IV copper repletion.   She was noted to be depressed and apathetic.  Psychiatry was consulted and restarted her bipolar medications.  Patient was also refusing to eat or take medication and therefore became hypoglycemic requiring several doses of IV dextrose as needed.      Hospitalization was complicated due to loss of IV access and required IR for assistance.  Also noted to have left arm swelling but ultrasound was negative for DVT.    Subjective/24 hr Events (24) :  Patient is seen and examined at bedside.    Per RN, patient has been agitated and has been very rude with most of the staff.  Also with hallucinations.     Pt is laying in bed. No acute distress. Pleasant with me.  AAOx1-2.  Flat affect.  Pt has been refusing to eat and stating \"I dont need to eat because I have surgery done\". Poor insight to her medical condition.  typographical errors.

## 2024-07-31 NOTE — PROGRESS NOTES
TRANSFER - IN REPORT:    Verbal report received from IR on Jesika Olsen  being received from IR for routine progression of patient care      Report consisted of patient’s Situation, Background, Assessment and   Recommendations(SBAR).     Information from the following report(s) Nurse Handoff Report was reviewed with the receiving nurse.    Opportunity for questions and clarification was provided.

## 2024-07-31 NOTE — PROGRESS NOTES
GOALS:  LTG: Patient will maintain adequate hydration/nutrition with optimum safety and efficiency of swallowing function with PO intake without overt signs or symptoms of aspiration for the highest appropriate diet level.  STG: MET 24  Patient will consume minced and moist textures and thin liquids without overt signs or symptoms of airway compromise.  Patient will safely ingest diet trials during therapeutic feedings with SLP without overt signs or symptoms of respiratory compromise in efforts to advance diet.  Patient will consume medications whole with water in timely manner. NEW 24    LTG: Patient will increase receptive/expressive language skills demonstrated by the ability to communicate basic wants/needs across environments.   STG: REINSTATED 24  Patient will follow 1 step commands with 100% accuracy given minimal cueing.  Patient will complete sentences with 100% accuracy given minimal cueing.    LTG: Patient will improve neuro-linguistic abilities to increase safety and awareness in functional living environment.   STG: CONTINUE 24  Patient will sequence steps to a task with 80% accuracy given minimal cueing.  Patient will recall relevant verbal information with 80% accuracy with minimal cues.    SPEECH LANGUAGE PATHOLOGY: Dysphagia Daily Note #8    Acknowledge Order  I  Therapy Time  I   Charges     I  Rehab Caseload Tracker    NAME: Jesika Olsen  : 1955  MRN: [id number]    ADMISSION DATE: 7/15/2024  PRIMARY DIAGNOSIS: Severe sepsis (HCC)    ICD-10: Treatment Diagnosis: R13.11 Dysphagia, Oral Phase  R41.841 Cognitive-Communication Deficit    RECOMMENDATIONS   Diet:    Minced and Moist  Thin Liquids    Medication: as tolerated (mentation impacting consumption of medication)   Compensatory Swallowing Strategies:   Alternate solids and liquids  Slow rate of intake  Small bites/sips  Upright as possible for all oral intake  1:1 feeding supervision and assistance if patient  lethargy and weakness. Patient has had several hospitalizations and her daughter states that \"she is so weak she is essentially bedbound at home and can't do anything.\" Patient has a known history of frequent, recurrent UTIs (growing pansensitive E. Coli per chart review). She has not been able to eat or drink much at all. No recent sick contacts. ST consult due to change in swallow function.     History of Present Injury/Illness: Ms. Olsen  has a past medical history of History of gastric bypass, JEREMI (obstructive sleep apnea), Other ill-defined conditions(799.89), and Psychiatric disorder.. She also  has a past surgical history that includes Cholecystectomy; pr unlisted procedure cardiac surgery; Chest surgery; vascular surgery; pr unlisted procedure abdomen peritoneum & omentum; gyn (2020); Hysterectomy (2021); Upper gastrointestinal endoscopy (N/A, 12/26/2023); Upper gastrointestinal endoscopy (N/A, 1/16/2024); and IR NONTUNNELED VASCULAR CATHETER > 5 YEARS (7/30/2024).  Precautions/Allergies: Aspirin, Levofloxacin, Penicillins, and Ziprasidone hcl     Observations:  Alertness: Drowsy  Voice: low volume  Speech: Intelligible  Expressive Language: Fluent  Receptive Language: Answers yes/no questions and Follows basic commands  Cognition: Distractible and hallucinating throughout session    Prior Dysphagia History: Previously seen for eval only with recommendations for ETC and thin liquids  Prior Instrumental Assessment: none  Prior Speech Therapy: none    Current Diet:  Minced and Moist  Thin Liquids    Respiratory Status: Room air    Pain:  Patient does not c/o pain  Pain intervention: None- No pain observed  Pain response: Patient satisfied    OBJECTIVE    Oropharyngeal Assessment: Patient consumed 2 capsules over 20 minute period with no overt indications of airway compromise when consuming with thin liquids. Unknown amount of breakfast consumed.      Cognitive-Communication: attempted to target cog therapy

## 2024-07-31 NOTE — PROGRESS NOTES
Patient refused some morning medications after multiple attempts. 2nd RN witness assisted and patient continues to refuse. Patient spitting medications out of mouth across the bed. Patient refused breakfast from CNA that attempted to feed her multiple times. Patient stated \"forcing food and pills down my throat\". Updated MD.

## 2024-07-31 NOTE — PROGRESS NOTES
Comprehensive Nutrition Assessment    Type and Reason for Visit: Reassess  General Nutrition Management/other reason (Hospitalists)    Nutrition Recommendations/Plan:   Meals and Snacks:  Diet: Continue current diet; further texture modifications per SLP  Nutrition Supplement Therapy:  Medical food supplement therapy:  Continue Ensure Enlive three times per day (this provides 350 kcal and 20 grams protein per bottle) -  vanilla  Continue 1:1 feed   Continue B12, folate, potassium supplementation  Initiate MV  Continue 5 days of IV copper supplementation   Pt with minimal intake throughout 16 day admission (< 10% of needs on average). Enteral nutrition would be appropriate to initiate if aligns with goals of care. Reviewed with Dr. Leon.      Malnutrition Assessment:  Malnutrition Status: At risk for malnutrition (Comment) (sig weight loss s/p GBP, minimal PO intake since GBP, mild wasting noted through NFPE as noted below)    Mild orbital and wasting to hand noted. Difficult to assess with minimal pt participation in NFPE.    Nutrition Assessment:  Nutrition History: 7/16: RD unable to obtain detailed nutrition history at this time.   From 5/28 assessment:  Pt reports poor intake since her gastric bypass a year and a half ago. She stated she was able to take 4-5 tablespoons of food at a time. She stated she tried protein shakes however she did not like them because they were too sweet. She also reports nausea, vomiting, and diarrhea since surgery.      Do You Have Any Cultural, Anabaptism, or Ethnic Food Preferences?: No (ELBA)   Weight History: 5/8/23: 321# (cardio OV), 8/14/23: 317# (bariatrics OV), 1/18/24: 242# (Gen Surg OV)  6/4/24: 183# (previous hospitalization  CBW: 163# (7/16 bed scale weight)  49% body weight loss in 1 year (s/p gastric bypass).   Nutrition Background:       PMH: MDD, hypothyroidism, hiatal hernia, T2DM, RYGB in September of 2023. Presented with lethargy and weakness. Admitted with severe  sepsis,  metabolic acidosis, hyperkalemia, hyponatremia, and ZEUS.  Nutrition Interval:  7/16: SLP downgrades diet to minced and moist  7/21: Marinol initiated per MD  7/23: Marinol d/c by MD due to worsening hallucinations, megace initiated per MD. SLP upgrades patient to Soft and Bite size.   7/24: Copper labs resulted as low (39), decreased from 6/7/24 copper lab (59). MD submits non-formulary request for IV copper.   7/25: SLP recommends Minced and Moist diet. 5 days of IV copper initiated.   7/28: IV access lost.   7/30: CVC placed. Psych consulted.     Pt with worsening mentation/agitation x 24 hours. Today is last scheduled dose of IV copper. Spoke with CORNELIUS Stanton, reports that patient took no PO other than an ice cream cup 7/30. ROMAIN Escamilla working with patient at time of assessment. Reviewed patient in detail with Dr. Leon. Concerns for minimal PO intake during extended admission, and general failure to thrive.     Current Nutrition Therapies:  ADULT DIET; Dysphagia - Minced and Moist  ADULT ORAL NUTRITION SUPPLEMENT; Breakfast, Lunch, Dinner; Standard High Calorie/High Protein Oral Supplement    Current Intake:   Average Meal Intake: 1-25% Average Supplements Intake: 1-25%      Anthropometric Measures:  Height: 175.3 cm (5' 9.02\")  Current Body Wt: 83.9 kg (184 lb 15.5 oz) (7/24), Weight source: Bed Scale  BMI: 27.3, Overweight (BMI 25.0-29.9)  Admission Body Weight: 72.6 kg (160 lb) (7/15- bed scale)  Ideal Body Weight (Kg) (Calculated): 66 kg (145 lbs), 112.2 %  BMI Category Overweight (BMI 25.0-29.9)    Estimated Daily Nutrient Needs:  Energy (kcal/day): 5163-0874 (20-25 kcal/kg) (Kcal/kg Weight used: 73.8 kg  (7/16- weight)  Protein (g/day):  (1.2-1.5 g/kg) (increased 2/2 s/p gastric bypass) Weight Used: ( (7/16-  weight)) 73.8 kg  Fluid (ml/day):   (1 ml/kcal)    Nutrition Diagnosis:   Inadequate oral intake related to altered GI structure, psychological cause or life stress as evidenced by

## 2024-07-31 NOTE — PROGRESS NOTES
ACUTE PHYSICAL THERAPY GOALS:   (Developed with and agreed upon by patient and/or caregiver.)  Pt will perform bed mobility with Ed in 7 therapy sessions.  Pt will perform sit-to-stand/ stand-to-sit transfers Ed in 7 therapy sessions.  Pt will ambulate 10 ft Ed with use of LRAD/no device and breaks as needed in 7 therapy sessions.  Pt will perform seated dynamic balance activities with minimal postural sway in 7 therapy sessions.  Pt will tolerate multiple sets and reps of BLE exercises in 7 therapy sessions.     PHYSICAL THERAPY: Daily Note PM   (Link to Caseload Tracking: PT Visit Days : 7  Time In/Out PT Charge Capture  Rehab Caseload Tracker  Orders    Jesika Olsen is a 68 y.o. female   PRIMARY DIAGNOSIS: Severe sepsis (HCC)  Hyperkalemia [E87.5]  Septicemia (HCC) [A41.9]  Acute pyelonephritis [N10]  ZEUS (acute kidney injury) (HCC) [N17.9]  Severe sepsis (HCC) [A41.9, R65.20]       Inpatient: Payor: [facility] MEDICARE / Plan: [facility] MEDICARE COMPLETE / Product Type: *No Product type* /     ASSESSMENT:     REHAB RECOMMENDATIONS:   Recommendation to date pending progress:  Setting:  Short-term Rehab    Equipment:    To Be Determined     ASSESSMENT:  Ms. Olsen was found supine in bed and agreeable to PT. Pt required maxA x2 to roll bilaterally to assist with a brief change, but became increasingly nauseated following bed mobility. Further mobility EOB was held at this time. During today's session, all activity was performed on RA. Pt made minimal progress towards goals today, but will continue to be seen by PT in order to address remaining mobility impairments and return pt to OF. Pt left in bed with needs in reach and RN present with pt.     SUBJECTIVE:   Ms. Olsen states, \"Do you know who thunder is?\"     Social/Functional Lives With: Daughter  Type of Home: House  Home Layout: One level  Home Access: Level entry  Bathroom Shower/Tub: Tub/Shower unit  Bathroom Toilet: Standard  Bathroom Equipment:

## 2024-08-01 VITALS
HEIGHT: 69 IN | RESPIRATION RATE: 18 BRPM | SYSTOLIC BLOOD PRESSURE: 135 MMHG | HEART RATE: 93 BPM | DIASTOLIC BLOOD PRESSURE: 88 MMHG | WEIGHT: 172.84 LBS | BODY MASS INDEX: 25.6 KG/M2 | OXYGEN SATURATION: 98 % | TEMPERATURE: 98.1 F

## 2024-08-01 LAB
GLUCOSE BLD STRIP.AUTO-MCNC: 73 MG/DL (ref 65–100)
GLUCOSE BLD STRIP.AUTO-MCNC: 74 MG/DL (ref 65–100)
GLUCOSE BLD STRIP.AUTO-MCNC: 84 MG/DL (ref 65–100)
GLUCOSE BLD STRIP.AUTO-MCNC: 98 MG/DL (ref 65–100)
SERVICE CMNT-IMP: NORMAL

## 2024-08-01 PROCEDURE — 6370000000 HC RX 637 (ALT 250 FOR IP): Performed by: INTERNAL MEDICINE

## 2024-08-01 PROCEDURE — 97164 PT RE-EVAL EST PLAN CARE: CPT

## 2024-08-01 PROCEDURE — 2580000003 HC RX 258: Performed by: INTERNAL MEDICINE

## 2024-08-01 PROCEDURE — 51798 US URINE CAPACITY MEASURE: CPT

## 2024-08-01 PROCEDURE — 97535 SELF CARE MNGMENT TRAINING: CPT

## 2024-08-01 PROCEDURE — 82962 GLUCOSE BLOOD TEST: CPT

## 2024-08-01 PROCEDURE — 6360000002 HC RX W HCPCS: Performed by: INTERNAL MEDICINE

## 2024-08-01 PROCEDURE — 97530 THERAPEUTIC ACTIVITIES: CPT

## 2024-08-01 RX ORDER — TAMSULOSIN HYDROCHLORIDE 0.4 MG/1
0.4 CAPSULE ORAL DAILY
Qty: 30 CAPSULE | Refills: 1 | Status: SHIPPED | OUTPATIENT
Start: 2024-08-02

## 2024-08-01 RX ORDER — LACTOBACILLUS RHAMNOSUS GG 10B CELL
1 CAPSULE ORAL
Qty: 42 CAPSULE | Refills: 0 | Status: SHIPPED | OUTPATIENT
Start: 2024-08-01 | End: 2024-08-15

## 2024-08-01 RX ORDER — ARIPIPRAZOLE 2 MG/1
2 TABLET ORAL DAILY
Qty: 30 TABLET | Refills: 1 | Status: SHIPPED | OUTPATIENT
Start: 2024-08-02

## 2024-08-01 RX ORDER — MULTIVITAMIN WITH IRON
1 TABLET ORAL DAILY
Qty: 30 TABLET | Refills: 1 | Status: SHIPPED | OUTPATIENT
Start: 2024-08-02

## 2024-08-01 RX ORDER — ESCITALOPRAM OXALATE 10 MG/1
10 TABLET ORAL DAILY
Qty: 30 TABLET | Refills: 3 | Status: SHIPPED | OUTPATIENT
Start: 2024-08-02

## 2024-08-01 RX ORDER — FOLIC ACID 1 MG/1
1 TABLET ORAL DAILY
Qty: 30 TABLET | Refills: 3 | Status: SHIPPED | OUTPATIENT
Start: 2024-08-02

## 2024-08-01 RX ORDER — ARIPIPRAZOLE 5 MG/1
5 TABLET ORAL NIGHTLY
Qty: 30 TABLET | Refills: 1 | Status: SHIPPED | OUTPATIENT
Start: 2024-08-01

## 2024-08-01 RX ORDER — LEVOTHYROXINE SODIUM 175 UG/1
175 TABLET ORAL DAILY
Qty: 30 TABLET | Refills: 1 | Status: SHIPPED | OUTPATIENT
Start: 2024-08-02

## 2024-08-01 RX ORDER — MIDODRINE HYDROCHLORIDE 5 MG/1
5 TABLET ORAL
Qty: 90 TABLET | Refills: 1 | Status: SHIPPED | OUTPATIENT
Start: 2024-08-01

## 2024-08-01 RX ADMIN — TAMSULOSIN HYDROCHLORIDE 0.4 MG: 0.4 CAPSULE ORAL at 07:23

## 2024-08-01 RX ADMIN — ESCITALOPRAM OXALATE 10 MG: 10 TABLET ORAL at 07:22

## 2024-08-01 RX ADMIN — MIDODRINE HYDROCHLORIDE 5 MG: 5 TABLET ORAL at 07:22

## 2024-08-01 RX ADMIN — HEPARIN SODIUM 5000 UNITS: 5000 INJECTION INTRAVENOUS; SUBCUTANEOUS at 06:33

## 2024-08-01 RX ADMIN — CYANOCOBALAMIN TAB 1000 MCG 1000 MCG: 1000 TAB at 07:23

## 2024-08-01 RX ADMIN — MAGNESIUM GLUCONATE 500 MG ORAL TABLET 400 MG: 500 TABLET ORAL at 07:22

## 2024-08-01 RX ADMIN — MIDODRINE HYDROCHLORIDE 5 MG: 5 TABLET ORAL at 15:23

## 2024-08-01 RX ADMIN — Medication 1 CAPSULE: at 07:22

## 2024-08-01 RX ADMIN — SODIUM CHLORIDE, PRESERVATIVE FREE 10 ML: 5 INJECTION INTRAVENOUS at 07:24

## 2024-08-01 RX ADMIN — MIDODRINE HYDROCHLORIDE 5 MG: 5 TABLET ORAL at 11:50

## 2024-08-01 RX ADMIN — Medication 1 CAPSULE: at 11:50

## 2024-08-01 RX ADMIN — ARIPIPRAZOLE 2 MG: 2 TABLET ORAL at 07:23

## 2024-08-01 RX ADMIN — Medication 1 CAPSULE: at 15:23

## 2024-08-01 RX ADMIN — FOLIC ACID 1 MG: 1 TABLET ORAL at 07:22

## 2024-08-01 RX ADMIN — B-COMPLEX W/ C & FOLIC ACID TAB 1 TABLET: TAB at 07:22

## 2024-08-01 RX ADMIN — LEVOTHYROXINE SODIUM 175 MCG: 0.07 TABLET ORAL at 06:25

## 2024-08-01 ASSESSMENT — PAIN SCALES - GENERAL: PAINLEVEL_OUTOF10: 0

## 2024-08-01 NOTE — PROGRESS NOTES
Pt resting in bed comfortably at this time, alert and oriented times 3. Disoriented to situation. No distress noted, respirations even and unlabored on room air. Pt denies pain at this time. Pt instructed to call for assistance if needed, call light in place, will continue to monitor.

## 2024-08-01 NOTE — PROGRESS NOTES
Patient on voiding trial due to removal of rabago catheter during day shift 7/31. Patient has been refusing external catheter and will only allow incontinent briefs. No accurate measurement of output has been obtained due to frequent bowel movements into the brief as well as patient refusing to be checked several times throughout this shift. Patient agreeable to bladder scan her at this time. Bladder scan shows 153 ml. MD made aware via perfect serve.

## 2024-08-01 NOTE — PROGRESS NOTES
Assessment: None - Denies Pain        Post Treatment: 0/10 Vitals 107/65 HR 91 supine, 11/74  sitting on EOB, 65/57 sitting immediately after standing         Oxygen  room air        MOBILITY: I Mod I S SBA CGA Min Mod Max Total  NT x2 Comments:   Bed Mobility    Rolling [] [] [] [] [] [] [] [x] [] [] [x]    Supine to Sit [] [] [] [] [] [] [] [x] [x] [] [x]    Scooting [] [] [] [] [] [] [] [] [x] [] [x]    Sit to Supine [] [] [] [] [] [] [x] [x] [] [] [] For legs up to bed   Transfers    Sit to Stand [] [] [] [] [] [] [] [x] [x] [] [x] Didn't come to full standing   Bed to Chair [] [] [] [] [] [] [] [] [] [x] []    Stand to Sit [] [] [] [] [] [] [] [x] [] [] [x]    Tub/Shower [] [] [] [] [] [] [] [] [] [x] []    Toilet [] [] [] [] [] [] [] [] [] [x] []      [] [] [] [] [] [] [] [] [] [] []    I=Independent, Mod I=Modified Independent, S=Supervision/Setup, SBA=Standby Assistance, CGA=Contact Guard Assistance, Min=Minimal Assistance, Mod=Moderate Assistance, Max=Maximal Assistance, Total=Total Assistance, NT=Not Tested    ACTIVITIES OF DAILY LIVING: I Mod I S SBA CGA Min Mod Max Total NT Comments   BASIC ADLs:              Upper Body   Bathing [] [] [] [] [] [] [] [] [] [x]     Lower Body Bathing [] [] [] [] [] [] [] [] [] [x]     Toileting [] [] [] [] [] [] [] [] [] [x] CNA cleaned pt up earlier   Upper Body Dressing [] [] [] [] [] [] [] [] [] [x]    Lower Body Dressing [] [] [] [] [] [] [] [] [] [x]       Feeding [] [] [] [] [] [] [] [] [] [x]    Grooming [] [] [] [] [] [] [] [x] [] [] Attempted washing face and combing out tangles   Personal Device Care [] [] [] [] [] [] [] [] [] [x]    Functional Mobility [] [] [] [] [] [] [] [x] [x] [] X2 with poor participation   I=Independent, Mod I=Modified Independent, S=Supervision/Setup, SBA=Standby Assistance, CGA=Contact Guard Assistance, Min=Minimal Assistance, Mod=Moderate Assistance, Max=Maximal Assistance, Total=Total Assistance, NT=Not Tested    BALANCE: Good

## 2024-08-01 NOTE — DISCHARGE SUMMARY
Hospitalist Discharge Summary   Admit Date:  7/15/2024  6:01 PM   DC Note date: 2024  Name:  Jesika Olsen   Age:  68 y.o.  Sex:  female  :  1955   MRN:  [id number]   Room:  [facility]  PCP:  Paulette Negrete MD    Presenting Complaint: Fatigue     Initial Admission Diagnosis: Hyperkalemia [E87.5]  Septicemia (HCC) [A41.9]  Acute pyelonephritis [N10]  ZEUS (acute kidney injury) (HCC) [N17.9]  Severe sepsis (HCC) [A41.9, R65.20]     Problem List for this Hospitalization (present on admission):    Principal Problem:    Severe sepsis (HCC)  Active Problems:    Hypothyroidism    MDD (major depressive disorder)    DM2 (diabetes mellitus, type 2) (HCC)    Bladder stones    ZEUS (acute kidney injury) (HCC)    Adult failure to thrive    Metabolic acidosis    Hyponatremia  Resolved Problems:    * No resolved hospital problems. *      Hospital Course:  Please refer to the admission H&P for details of presentation. In summary, Jesika Olsen is a 68 y.o. female with past medical history significant for   MDD, hypothyroidism, hiatal hernia, type 2 diabetes, previous episodes of C. difficile colitis, history of recurrent urinary tract infection who presents from home with worsening generalized lethargy and weakness.   CT abdomen pelvis with multiple bladder stones and a liver lesion.  Creatinine of 0.9 with baseline around 0.3-0.4, hypokalemia with potassium of 6.7, bicarb of 14 and white count of 21.  EKG with normal sinus rhythm without any peaked T waves.     Patient was also noted to have low serum copper and started on IV copper repletion.   She was noted to be depressed and apathetic.  Psychiatry was consulted and restarted her bipolar medications.  Patient was also refusing to eat or take medication and therefore became hypoglycemic requiring several doses of IV dextrose as needed.       Hospitalization was complicated due to loss of IV access and required IR for assistance.  Also noted to have left arm  venous distension.  Lungs:             CTAB.  No wheezing.  Symmetric expansion.  Abdomen:        Soft, nontender, nondistended.  Extremities:     No cyanosis or clubbing.  No edema  Skin:                No rashes.  Normal coloration.   Warm and dry.    Neuro:             CN II-XII grossly intact.    Psych:             Flat affect.  Not much interactive/conversant      Signed:  Sarita Leon MD    Part of this note may have been written by using a voice dictation software.  The note has been proof read but may still contain some grammatical/other typographical errors.

## 2024-08-01 NOTE — PROGRESS NOTES
Pt resting in bed comfortably at this time, alert and oriented to person and place. Disoriented to time and situation. No distress noted, respirations even and unlabored on room air. Pt has R IJ noted, dressing clean dry and intact. Pt denies pain at this time. Pt instructed to call for assistance if needed, call light in place, will continue to monitor.     Enteric precautions noted.

## 2024-08-01 NOTE — PROGRESS NOTES
Discharge instructions reviewed with Karin - daughter. Pt R IJ removed, pt tolerated well. Pt belongings packed in pt bag from home. Pt phone in annetta pocket. Glasses placed in patient bag. Discharge instructions in patient bag. Transport here to  patient.

## 2024-08-01 NOTE — CARE COORDINATION
MSN, CM:  patient to be discharged home today with [facility] and [facility].  Patient and family agree with this discharge plan.  Patient has met all milestones for this admission.  Patient was recommended for SNF but declines r/t patient is in co-pay days and will cost $209.00/day out of pocket.  Spoke with patient's daughter about discharge today.  Karin requested transport for 18:00.  [facility] to transport patient home.       08/01/24 [id number]   Service Assessment   Patient Orientation Alert and Oriented   Cognition Alert   Services At/After Discharge   Transition of Care Consult (CM Consult) Home Health;Other  ([facility])   Internal Home Health No   Reason Outside Agency Chosen Patient already serviced by other home care/hospice agency   Services At/After Discharge PT;OT;Nursing services   Mode of Transport at Discharge BLS  ([facility])   Confirm Follow Up Transport Family   Condition of Participation: Discharge Planning   The Plan for Transition of Care is related to the following treatment goals: Home Heatl[facility] and Palliative Care   The Patient and/or Patient Representative was provided with a Choice of Provider? Patient;Patient Representative   Name of the Patient Representative who was provided with the Choice of Provider and agrees with the Discharge Plan?  Daughter   The Patient and/Or Patient Representative agree with the Discharge Plan? Yes   Freedom of Choice list was provided with basic dialogue that supports the patient's individualized plan of care/goals, treatment preferences, and shares the quality data associated with the providers?  Yes

## 2024-08-01 NOTE — PLAN OF CARE
Problem: Safety - Adult  Goal: Free from fall injury  8/1/2024 0846 by Kim Williamson, RN  Outcome: Progressing  8/1/2024 0025 by Angelique Amor, RN  Outcome: Progressing  Flowsheets (Taken 7/31/2024 1939)  Free From Fall Injury: Instruct family/caregiver on patient safety

## 2024-08-01 NOTE — PROGRESS NOTES
ACUTE PHYSICAL THERAPY GOALS:   (Developed with and agreed upon by patient and/or caregiver.)  Pt will perform bed mobility with modA in 7 therapy sessions.  Pt will perform sit-to-stand/ stand-to-sit transfers maxA in 7 therapy sessions.  Pt will ambulate 5 ft maxA with use of LRAD/no device and breaks as needed in 7 therapy sessions.  Pt will perform seated dynamic balance activities with minimal postural sway in 7 therapy sessions.  Pt will tolerate multiple sets and reps of BLE exercises in 7 therapy sessions.   Pt will tolerate 4 hours sitting in the recliner in 7 therapy sessions.    PT goals re-assessed during re-evaluation on 8/1/2024 and downgraded.    PHYSICAL THERAPY Re-evaluation and PM  (Link to Caseload Tracking: PT Visit Days : 1  Acknowledge Orders  Time In/Out  PT Charge Capture  Rehab Caseload Tracker    Jesika Olsen is a 68 y.o. female   PRIMARY DIAGNOSIS: Severe sepsis (HCC)  Hyperkalemia [E87.5]  Septicemia (HCC) [A41.9]  Acute pyelonephritis [N10]  ZEUS (acute kidney injury) (HCC) [N17.9]  Severe sepsis (HCC) [A41.9, R65.20]       Reason for Referral: Other abnormalities of gait and mobility (R26.89)  Inpatient: Payor: [facility] MEDICARE / Plan: [facility] MEDICARE COMPLETE / Product Type: *No Product type* /     ASSESSMENT:     REHAB RECOMMENDATIONS:   Recommendation to date pending progress:  Setting:  Short-term Rehab, but pt is out of rehab days.    Equipment:    To Be Determined     ASSESSMENT:  Ms. Olsen is a 68 y.o. female presenting to PT following a hospitalization due to worsening weakness and lethargy. At baseline, pt has been mostly bedbound and lives with her daughter in a single story home with a level entry. During recent admissions, pt was ambulating short distances with PT/a RW. Today presents to PT for re-evaluation following her 8th PT visit. Since evaluation, pt has made minimal progress with functional mobility. Pt is still having difficulty with OOB mobility due to poor    I=Independent, Mod I=Modified Independent, S=Supervision, SBA=Standby Assistance, CGA=Contact Guard Assistance,   Min=Minimal Assistance, Mod=Moderate Assistance, Max=Maximal Assistance, Total=Total Assistance, NT=Not Tested    GAIT: I Mod I S SBA CGA Min Mod Max Total  NT x2 Comments:   Level of Assistance [] [] [] [] [] [] [] [] [] [x] [] Not appropriate to attempt. Weak and orthostatic    Distance 0  feet    DME N/A    Gait Quality N/A    Weightbearing Status      Stairs      I=Independent, Mod I=Modified Independent, S=Supervision, SBA=Standby Assistance, CGA=Contact Guard Assistance,   Min=Minimal Assistance, Mod=Moderate Assistance, Max=Maximal Assistance, Total=Total Assistance, NT=Not Tested    PLAN:   FREQUENCY AND DURATION: 2 times/week for duration of hospital stay or until stated goals are met, whichever comes first.    THERAPY PROGNOSIS: Fair    PROBLEM LIST:   (Skilled intervention is medically necessary to address:)  Decreased ADL/Functional Activities  Decreased Activity Tolerance  Decreased AROM/PROM  Decreased Balance  Decreased Cognition  Decreased Coordination  Decreased Gait Ability  Decreased Strength  Decreased Transfer Abilities INTERVENTIONS PLANNED:   (Benefits and precautions of physical therapy have been discussed with the patient.)  Self Care Training  Therapeutic Activity  Therapeutic Exercise/HEP  Neuromuscular Re-education  Gait Training  Education         TREATMENT:   EVALUATION: Re-evaluation: (Untimed Charge)  The initial evaluation charge encompasses clinical chart review, objective assessment, interpretation of assessment, and skilled monitoring of the patient's response to treatment in order to develop a plan of care.     TREATMENT:   Co-Treatment PT/OT necessary due to patient's decreased overall endurance/tolerance levels, as well as need for high level skilled assistance to complete functional transfers/mobility and functional tasks  Therapeutic Activity (15 Minutes):

## 2024-08-01 NOTE — PROGRESS NOTES
Patient resting in bed, with eyes closed at this time. Respirations even and unlabored, on room air. No s/sx of distress. Safety measures in place. Call light within reach.

## 2024-08-13 NOTE — CARE COORDINATION
Insurance auth request submitted to [facility] Medicare via the [employer] . portal. Decision pending. [employer] reference #[id number]. Rapid COVID ordered. CM following.    13-Aug-2024 16:43

## (undated) DEVICE — CONTAINER FORMALIN PREFILLED 10% NBF 60ML

## (undated) DEVICE — AIRLIFE™ OXYGEN TUBING 7 FEET (2.1 M) CRUSH RESISTANT OXYGEN TUBING, VINYL TIPPED: Brand: AIRLIFE™

## (undated) DEVICE — SINGLE PORT MANIFOLD: Brand: NEPTUNE 2

## (undated) DEVICE — GAUZE,SPONGE,4"X4",12PLY,WOVEN,NS,LF: Brand: MEDLINE

## (undated) DEVICE — NEEDLE SYR 18GA L1.5IN RED PLAS HUB S STL BLNT FILL W/O

## (undated) DEVICE — YANKAUER,BULB TIP,W/O VENT,RIGID,STERILE: Brand: MEDLINE

## (undated) DEVICE — SYRINGE, LUER SLIP, STERILE, 60ML: Brand: MEDLINE

## (undated) DEVICE — ENDOSCOPIC KIT 1.1+ OP4 CA DE 2 GWN AAMI LEVEL 3

## (undated) DEVICE — BLOCK BITE AD 60FR W/ VELC STRP ADDRESSES MOST PT AND

## (undated) DEVICE — FORCEPS BX L240CM JAW DIA2.8MM L CAP W/ NDL MIC MESH TOOTH

## (undated) DEVICE — KENDALL RADIOLUCENT FOAM MONITORING ELECTRODE RECTANGULAR SHAPE: Brand: KENDALL

## (undated) DEVICE — CANNULA NSL ORAL AD FOR CAPNOFLEX CO2 O2 AIRLFE

## (undated) DEVICE — SYRINGE MED 10ML LUERLOCK TIP W/O SFTY DISP

## (undated) DEVICE — CONNECTOR TBNG OD5-7MM O2 END DISP

## (undated) DEVICE — MOUTHPIECE ENDOSCP L CTRL OPN AND SIDE PORTS DISP

## (undated) DEVICE — SYRINGE MEDICAL 3ML CLEAR PLASTIC STANDARD NON CONTROL LUERLOCK TIP DISPOSABLE